# Patient Record
Sex: MALE | Race: WHITE | NOT HISPANIC OR LATINO | Employment: OTHER | URBAN - METROPOLITAN AREA
[De-identification: names, ages, dates, MRNs, and addresses within clinical notes are randomized per-mention and may not be internally consistent; named-entity substitution may affect disease eponyms.]

---

## 2012-03-12 LAB — CRC RECOMMENDATION EXT: NORMAL

## 2017-04-22 ENCOUNTER — HISTORICAL (OUTPATIENT)
Dept: LAB | Facility: HOSPITAL | Age: 62
End: 2017-04-22

## 2018-02-20 ENCOUNTER — HISTORICAL (OUTPATIENT)
Dept: LAB | Facility: HOSPITAL | Age: 63
End: 2018-02-20

## 2018-02-20 LAB
ABS NEUT (OLG): 2.8 X10(3)/MCL (ref 2.1–9.2)
ALBUMIN SERPL-MCNC: 4 GM/DL (ref 3.4–5)
ALBUMIN/GLOB SERPL: 1.2 RATIO (ref 1.1–2)
ALP SERPL-CCNC: 49 UNIT/L (ref 46–116)
ALT SERPL-CCNC: 31 UNIT/L (ref 12–78)
AST SERPL-CCNC: 13 UNIT/L (ref 15–37)
BASOPHILS NFR BLD AUTO: 0 % (ref 0–2)
BILIRUB SERPL-MCNC: 0.5 MG/DL (ref 0.2–1)
BILIRUBIN DIRECT+TOT PNL SERPL-MCNC: 0.15 MG/DL (ref 0–0.2)
BILIRUBIN DIRECT+TOT PNL SERPL-MCNC: 0.39 MG/DL (ref 0–0.8)
BUN SERPL-MCNC: 26.2 MG/DL (ref 7–18)
CALCIUM SERPL-MCNC: 9.6 MG/DL (ref 8.5–10.1)
CHLORIDE SERPL-SCNC: 105 MMOL/L (ref 98–107)
CHOLEST SERPL-MCNC: 200 MG/DL (ref 0–200)
CHOLEST/HDLC SERPL: 3.9 {RATIO} (ref 0–5)
CO2 SERPL-SCNC: 29.9 MMOL/L (ref 21–32)
CREAT SERPL-MCNC: 1.05 MG/DL (ref 0.6–1.3)
DEPRECATED CALCIDIOL+CALCIFEROL SERPL-MC: 18.73 NG/ML (ref 30–80)
EOSINOPHIL # BLD AUTO: 0.1 X10(3)/MCL
EOSINOPHIL NFR BLD AUTO: 2 %
ERYTHROCYTE [DISTWIDTH] IN BLOOD BY AUTOMATED COUNT: 13.4 % (ref 11.5–17)
EST. AVERAGE GLUCOSE BLD GHB EST-MCNC: 146 MG/DL
GLOBULIN SER-MCNC: 3.2 GM/DL (ref 2.4–3.5)
GLUCOSE SERPL-MCNC: 144 MG/DL (ref 74–106)
HBA1C MFR BLD: 6.7 % (ref 4.5–6.2)
HCT VFR BLD AUTO: 43.9 % (ref 42–52)
HDLC SERPL-MCNC: 51 MG/DL (ref 40–60)
HGB BLD-MCNC: 15.3 GM/DL (ref 14–18)
LDLC SERPL CALC-MCNC: 78 MG/DL (ref 0–129)
LYMPHOCYTES # BLD AUTO: 2 X10(3)/MCL
LYMPHOCYTES NFR BLD AUTO: 35 % (ref 13–40)
MCH RBC QN AUTO: 32.3 PG (ref 27–31)
MCHC RBC AUTO-ENTMCNC: 34.8 GM/DL (ref 33–36)
MCV RBC AUTO: 92.8 FL (ref 80–94)
MONOCYTES # BLD AUTO: 0.9 X10(3)/MCL
MONOCYTES NFR BLD AUTO: 16 % (ref 2–11)
NEUTROPHILS # BLD AUTO: 2.8 X10(3)/MCL (ref 2.1–9.2)
NEUTROPHILS NFR BLD AUTO: 47 % (ref 47–80)
PLATELET # BLD AUTO: 190 X10(3)/MCL (ref 130–400)
PMV BLD AUTO: 8.5 FL (ref 7.4–10.4)
POTASSIUM SERPL-SCNC: 3.9 MMOL/L (ref 3.5–5.1)
PROT SERPL-MCNC: 7.2 GM/DL (ref 6.4–8.2)
RBC # BLD AUTO: 4.73 X10(6)/MCL (ref 4.7–6.1)
SODIUM SERPL-SCNC: 143 MMOL/L (ref 136–145)
TRIGL SERPL-MCNC: 356 MG/DL
TSH SERPL-ACNC: 1.58 MIU/ML (ref 0.36–3.74)
URATE SERPL-MCNC: 5.6 MG/DL (ref 3.4–7)
VLDLC SERPL CALC-MCNC: 71 MG/DL
WBC # SPEC AUTO: 5.9 X10(3)/MCL (ref 4.5–11.5)

## 2018-04-16 ENCOUNTER — HISTORICAL (OUTPATIENT)
Dept: RADIOLOGY | Facility: HOSPITAL | Age: 63
End: 2018-04-16

## 2018-06-13 ENCOUNTER — HISTORICAL (OUTPATIENT)
Dept: RADIOLOGY | Facility: HOSPITAL | Age: 63
End: 2018-06-13

## 2018-06-26 ENCOUNTER — HISTORICAL (OUTPATIENT)
Dept: CARDIOLOGY | Facility: CLINIC | Age: 63
End: 2018-06-26

## 2018-07-02 ENCOUNTER — HISTORICAL (OUTPATIENT)
Dept: CARDIOLOGY | Facility: HOSPITAL | Age: 63
End: 2018-07-02

## 2018-07-02 LAB
APTT PPP: 23.5 SECOND(S) (ref 23.3–37)
BUN SERPL-MCNC: 20 MG/DL (ref 7–18)
CALCIUM SERPL-MCNC: 9.1 MG/DL (ref 8.5–10.1)
CHLORIDE SERPL-SCNC: 106 MMOL/L (ref 98–107)
CHOLEST SERPL-MCNC: 164 MG/DL
CHOLEST/HDLC SERPL: 4.1 {RATIO} (ref 0–5)
CO2 SERPL-SCNC: 28 MMOL/L (ref 21–32)
CREAT SERPL-MCNC: 1 MG/DL (ref 0.6–1.3)
CREAT/UREA NIT SERPL: 20
GLUCOSE SERPL-MCNC: 133 MG/DL (ref 74–106)
HDLC SERPL-MCNC: 40 MG/DL
INR PPP: 0.97 (ref 0.9–1.2)
LDLC SERPL CALC-MCNC: 44 MG/DL (ref 0–130)
POTASSIUM SERPL-SCNC: 3.8 MMOL/L (ref 3.5–5.1)
PROTHROMBIN TIME: 12.2 SECOND(S) (ref 11.9–14.4)
SODIUM SERPL-SCNC: 140 MMOL/L (ref 136–145)
TRIGL SERPL-MCNC: 399 MG/DL
VLDLC SERPL CALC-MCNC: 80 MG/DL

## 2018-07-12 ENCOUNTER — HISTORICAL (OUTPATIENT)
Dept: RESPIRATORY THERAPY | Facility: HOSPITAL | Age: 63
End: 2018-07-12

## 2018-09-06 ENCOUNTER — HISTORICAL (OUTPATIENT)
Dept: LAB | Facility: HOSPITAL | Age: 63
End: 2018-09-06

## 2018-09-06 LAB
EST. AVERAGE GLUCOSE BLD GHB EST-MCNC: 148 MG/DL
HBA1C MFR BLD: 6.8 % (ref 4.5–6.2)
PSA SERPL-MCNC: 0.87 NG/ML (ref 0–4)

## 2019-04-10 ENCOUNTER — HISTORICAL (OUTPATIENT)
Dept: RADIOLOGY | Facility: HOSPITAL | Age: 64
End: 2019-04-10

## 2019-05-22 ENCOUNTER — HISTORICAL (OUTPATIENT)
Dept: PHYSICAL THERAPY | Facility: HOSPITAL | Age: 64
End: 2019-05-22

## 2019-08-05 ENCOUNTER — HISTORICAL (OUTPATIENT)
Dept: LAB | Facility: HOSPITAL | Age: 64
End: 2019-08-05

## 2019-08-05 LAB
ALBUMIN SERPL-MCNC: 4.3 GM/DL (ref 3.4–5)
ALP SERPL-CCNC: 44 UNIT/L (ref 46–116)
ALT SERPL-CCNC: 26 UNIT/L (ref 12–78)
AST SERPL-CCNC: 18 UNIT/L (ref 15–37)
BILIRUB SERPL-MCNC: 0.7 MG/DL (ref 0.2–1)
BILIRUBIN DIRECT+TOT PNL SERPL-MCNC: 0.18 MG/DL (ref 0–0.2)
BILIRUBIN DIRECT+TOT PNL SERPL-MCNC: 0.52 MG/DL (ref 0–0.8)
HCV AB SERPL QL IA: NEGATIVE
PROT SERPL-MCNC: 8 GM/DL (ref 6.4–8.2)

## 2019-08-12 ENCOUNTER — HISTORICAL (OUTPATIENT)
Dept: ADMINISTRATIVE | Facility: HOSPITAL | Age: 64
End: 2019-08-12

## 2019-08-12 LAB
ABS NEUT (OLG): 6.82 X10(3)/MCL (ref 2.1–9.2)
ALBUMIN SERPL-MCNC: 4 GM/DL (ref 3.4–5)
ALBUMIN/GLOB SERPL: 1.1 RATIO (ref 1.1–2)
ALP SERPL-CCNC: 44 UNIT/L (ref 45–117)
ALT SERPL-CCNC: 16 UNIT/L (ref 12–78)
AST SERPL-CCNC: 24 UNIT/L (ref 15–37)
BASOPHILS # BLD AUTO: 0.02 X10(3)/MCL
BASOPHILS NFR BLD AUTO: 0 %
BILIRUB SERPL-MCNC: 0.5 MG/DL (ref 0.2–1)
BILIRUBIN DIRECT+TOT PNL SERPL-MCNC: 0.2 MG/DL
BILIRUBIN DIRECT+TOT PNL SERPL-MCNC: 0.3 MG/DL
BUN SERPL-MCNC: 16 MG/DL (ref 7–18)
CALCIUM SERPL-MCNC: 9 MG/DL (ref 8.5–10.1)
CHLORIDE SERPL-SCNC: 107 MMOL/L (ref 98–107)
CO2 SERPL-SCNC: 29 MMOL/L (ref 21–32)
CREAT SERPL-MCNC: 1 MG/DL (ref 0.6–1.3)
CRP SERPL-MCNC: <0.3 MG/DL
EOSINOPHIL # BLD AUTO: 0.05 10*3/UL
EOSINOPHIL NFR BLD AUTO: 0 %
ERYTHROCYTE [DISTWIDTH] IN BLOOD BY AUTOMATED COUNT: 12.7 % (ref 11.5–14.5)
ERYTHROCYTE [SEDIMENTATION RATE] IN BLOOD: 8 MM/HR (ref 0–15)
GLOBULIN SER-MCNC: 3.8 GM/ML (ref 2.3–3.5)
GLUCOSE SERPL-MCNC: 80 MG/DL (ref 74–106)
HCT VFR BLD AUTO: 43.5 % (ref 40–51)
HGB BLD-MCNC: 15.1 GM/DL (ref 13.5–17.5)
IMM GRANULOCYTES # BLD AUTO: 0.04 10*3/UL
IMM GRANULOCYTES NFR BLD AUTO: 0 %
LYMPHOCYTES # BLD AUTO: 2.94 X10(3)/MCL
LYMPHOCYTES NFR BLD AUTO: 27 % (ref 13–40)
MCH RBC QN AUTO: 31.9 PG (ref 26–34)
MCHC RBC AUTO-ENTMCNC: 34.7 GM/DL (ref 31–37)
MCV RBC AUTO: 92 FL (ref 80–100)
MONOCYTES # BLD AUTO: 1.12 X10(3)/MCL
MONOCYTES NFR BLD AUTO: 10 % (ref 0–24)
NEUTROPHILS # BLD AUTO: 6.82 X10(3)/MCL
NEUTROPHILS NFR BLD AUTO: 62 X10(3)/MCL
PLATELET # BLD AUTO: 242 X10(3)/MCL (ref 130–400)
PMV BLD AUTO: 10.2 FL (ref 7.4–10.4)
POTASSIUM SERPL-SCNC: 4 MMOL/L (ref 3.5–5.1)
PROT SERPL-MCNC: 7.8 GM/DL (ref 6.4–8.2)
RBC # BLD AUTO: 4.73 X10(6)/MCL (ref 4.5–5.9)
SODIUM SERPL-SCNC: 141 MMOL/L (ref 136–145)
URATE SERPL-MCNC: 5.4 MG/DL (ref 3.5–7.2)
WBC # SPEC AUTO: 11 X10(3)/MCL (ref 4.5–11)

## 2019-09-04 LAB
BILIRUB SERPL-MCNC: NEGATIVE MG/DL
BLOOD URINE, POC: NEGATIVE
CLARITY, POC UA: CLEAR
COLOR, POC UA: YELLOW
GLUCOSE UR QL STRIP: NEGATIVE
KETONES UR QL STRIP: NEGATIVE
LEUKOCYTE EST, POC UA: NEGATIVE
NITRITE, POC UA: NEGATIVE
PH, POC UA: 5
PROTEIN, POC: NEGATIVE
SPECIFIC GRAVITY, POC UA: 1.01
UROBILINOGEN, POC UA: NORMAL

## 2019-09-18 ENCOUNTER — HISTORICAL (OUTPATIENT)
Dept: LAB | Facility: HOSPITAL | Age: 64
End: 2019-09-18

## 2019-09-18 LAB
ABS NEUT (OLG): 4.43 X10(3)/MCL (ref 2.1–9.2)
ALBUMIN SERPL-MCNC: 4.3 GM/DL (ref 3.4–5)
ALBUMIN/GLOB SERPL: 1.1 RATIO (ref 1.1–2)
ALP SERPL-CCNC: 52 UNIT/L (ref 46–116)
ALT SERPL-CCNC: 51 UNIT/L (ref 12–78)
AST SERPL-CCNC: 27 UNIT/L (ref 15–37)
BILIRUB SERPL-MCNC: 0.6 MG/DL (ref 0.2–1)
BILIRUBIN DIRECT+TOT PNL SERPL-MCNC: 0.14 MG/DL (ref 0–0.2)
BILIRUBIN DIRECT+TOT PNL SERPL-MCNC: 0.46 MG/DL (ref 0–0.8)
BUN SERPL-MCNC: 26.7 MG/DL (ref 7–18)
CALCIUM SERPL-MCNC: 9.7 MG/DL (ref 8.5–10.1)
CHLORIDE SERPL-SCNC: 103 MMOL/L (ref 98–107)
CHOLEST SERPL-MCNC: 211 MG/DL (ref 0–200)
CHOLEST/HDLC SERPL: 4.5 {RATIO} (ref 0–5)
CO2 SERPL-SCNC: 30.2 MMOL/L (ref 21–32)
CREAT SERPL-MCNC: 1.32 MG/DL (ref 0.6–1.3)
DEPRECATED CALCIDIOL+CALCIFEROL SERPL-MC: 30.46 NG/ML (ref 30–80)
ERYTHROCYTE [DISTWIDTH] IN BLOOD BY AUTOMATED COUNT: 12.8 % (ref 11.5–17)
GLOBULIN SER-MCNC: 3.8 GM/DL (ref 2.4–3.5)
GLUCOSE SERPL-MCNC: 110 MG/DL (ref 74–106)
HCT VFR BLD AUTO: 43.8 % (ref 42–52)
HDLC SERPL-MCNC: 47 MG/DL (ref 40–60)
HGB BLD-MCNC: 15.5 GM/DL (ref 14–18)
LDLC SERPL CALC-MCNC: 82 MG/DL (ref 0–129)
LYMPHOCYTES NFR BLD MANUAL: 27 % (ref 13–40)
MCH RBC QN AUTO: 32.7 PG (ref 27–31)
MCHC RBC AUTO-ENTMCNC: 35.4 GM/DL (ref 33–36)
MCV RBC AUTO: 92.4 FL (ref 80–94)
MONOCYTES NFR BLD MANUAL: 10 % (ref 2–11)
NEUTROPHILS NFR BLD MANUAL: 63 % (ref 47–80)
PLATELET # BLD AUTO: 222 X10(3)/MCL (ref 130–400)
PLATELET # BLD EST: ADEQUATE 10*3/UL
PMV BLD AUTO: 10.8 FL (ref 9.4–12.4)
POTASSIUM SERPL-SCNC: 4 MMOL/L (ref 3.5–5.1)
PROT SERPL-MCNC: 8.1 GM/DL (ref 6.4–8.2)
PSA SERPL-MCNC: 1.06 NG/ML (ref 0–4)
RBC # BLD AUTO: 4.74 X10(6)/MCL (ref 4.7–6.1)
RBC MORPH BLD: NORMAL
SODIUM SERPL-SCNC: 143 MMOL/L (ref 136–145)
TRIGL SERPL-MCNC: 409 MG/DL
TSH SERPL-ACNC: 2.55 MIU/ML (ref 0.36–3.74)
VLDLC SERPL CALC-MCNC: 82 MG/DL
WBC # SPEC AUTO: 8.3 X10(3)/MCL (ref 4.5–11.5)

## 2019-11-12 ENCOUNTER — HISTORICAL (OUTPATIENT)
Dept: ADMINISTRATIVE | Facility: HOSPITAL | Age: 64
End: 2019-11-12

## 2019-11-12 LAB
ABS NEUT (OLG): 2.97 X10(3)/MCL (ref 2.1–9.2)
ALBUMIN SERPL-MCNC: 4.1 GM/DL (ref 3.4–5)
ALBUMIN/GLOB SERPL: 1.1 RATIO (ref 1.1–2)
ALP SERPL-CCNC: 41 UNIT/L (ref 45–117)
ALT SERPL-CCNC: 62 UNIT/L (ref 12–78)
AST SERPL-CCNC: 32 UNIT/L (ref 15–37)
BASOPHILS # BLD AUTO: 0 X10(3)/MCL (ref 0–0.2)
BASOPHILS NFR BLD AUTO: 0 %
BILIRUB SERPL-MCNC: 0.6 MG/DL (ref 0.2–1)
BILIRUBIN DIRECT+TOT PNL SERPL-MCNC: 0.1 MG/DL (ref 0–0.2)
BILIRUBIN DIRECT+TOT PNL SERPL-MCNC: 0.5 MG/DL
BUN SERPL-MCNC: 18 MG/DL (ref 7–18)
CALCIUM SERPL-MCNC: 9.9 MG/DL (ref 8.5–10.1)
CHLORIDE SERPL-SCNC: 104 MMOL/L (ref 98–107)
CHOLEST SERPL-MCNC: 227 MG/DL
CHOLEST/HDLC SERPL: 4.9 {RATIO} (ref 0–5)
CO2 SERPL-SCNC: 28 MMOL/L (ref 21–32)
CREAT SERPL-MCNC: 1.1 MG/DL (ref 0.6–1.3)
EOSINOPHIL # BLD AUTO: 0.1 X10(3)/MCL (ref 0–0.9)
EOSINOPHIL NFR BLD AUTO: 1 %
ERYTHROCYTE [DISTWIDTH] IN BLOOD BY AUTOMATED COUNT: 13.2 % (ref 11.5–14.5)
EST. AVERAGE GLUCOSE BLD GHB EST-MCNC: 123 MG/DL
GLOBULIN SER-MCNC: 3.9 GM/ML (ref 2.3–3.5)
GLUCOSE SERPL-MCNC: 111 MG/DL (ref 74–106)
HBA1C MFR BLD: 5.9 % (ref 4.2–6.3)
HCT VFR BLD AUTO: 44.6 % (ref 40–51)
HDLC SERPL-MCNC: 46 MG/DL (ref 40–59)
HGB BLD-MCNC: 15.5 GM/DL (ref 13.5–17.5)
IMM GRANULOCYTES # BLD AUTO: 0.02 10*3/UL
IMM GRANULOCYTES NFR BLD AUTO: 0 %
LDLC SERPL CALC-MCNC: ABNORMAL MG/DL
LYMPHOCYTES # BLD AUTO: 2.5 X10(3)/MCL (ref 0.6–4.6)
LYMPHOCYTES NFR BLD AUTO: 38 %
MCH RBC QN AUTO: 32.4 PG (ref 26–34)
MCHC RBC AUTO-ENTMCNC: 34.8 GM/DL (ref 31–37)
MCV RBC AUTO: 93.3 FL (ref 80–100)
MONOCYTES # BLD AUTO: 1 X10(3)/MCL (ref 0.1–1.3)
MONOCYTES NFR BLD AUTO: 15 %
NEUTROPHILS # BLD AUTO: 2.97 X10(3)/MCL (ref 2.1–9.2)
NEUTROPHILS NFR BLD AUTO: 45 %
PLATELET # BLD AUTO: 233 X10(3)/MCL (ref 130–400)
PMV BLD AUTO: 10.6 FL (ref 7.4–10.4)
POTASSIUM SERPL-SCNC: 4.2 MMOL/L (ref 3.5–5.1)
PROT SERPL-MCNC: 8 GM/DL (ref 6.4–8.2)
RBC # BLD AUTO: 4.78 X10(6)/MCL (ref 4.5–5.9)
SODIUM SERPL-SCNC: 139 MMOL/L (ref 136–145)
TRIGL SERPL-MCNC: 613 MG/DL
URATE SERPL-MCNC: 6.9 MG/DL (ref 3.5–7.2)
VLDLC SERPL CALC-MCNC: ABNORMAL MG/DL
WBC # SPEC AUTO: 6.6 X10(3)/MCL (ref 4.5–11)

## 2020-02-13 ENCOUNTER — HISTORICAL (OUTPATIENT)
Dept: ADMINISTRATIVE | Facility: HOSPITAL | Age: 65
End: 2020-02-13

## 2020-02-13 LAB
ABS NEUT (OLG): 2.16 X10(3)/MCL (ref 2.1–9.2)
ALBUMIN SERPL-MCNC: 4.3 GM/DL (ref 3.4–5)
ALBUMIN/GLOB SERPL: 1.1 RATIO (ref 1.1–2)
ALP SERPL-CCNC: 37 UNIT/L (ref 45–117)
ALT SERPL-CCNC: 102 UNIT/L (ref 12–78)
AST SERPL-CCNC: 59 UNIT/L (ref 15–37)
BASOPHILS # BLD AUTO: 0 X10(3)/MCL (ref 0–0.2)
BASOPHILS NFR BLD AUTO: 0 %
BILIRUB SERPL-MCNC: 0.8 MG/DL (ref 0.2–1)
BILIRUBIN DIRECT+TOT PNL SERPL-MCNC: 0.1 MG/DL (ref 0–0.2)
BILIRUBIN DIRECT+TOT PNL SERPL-MCNC: 0.7 MG/DL
BUN SERPL-MCNC: 22 MG/DL (ref 7–18)
CALCIUM SERPL-MCNC: 9.4 MG/DL (ref 8.5–10.1)
CHLORIDE SERPL-SCNC: 103 MMOL/L (ref 98–107)
CO2 SERPL-SCNC: 29 MMOL/L (ref 21–32)
CREAT SERPL-MCNC: 1.2 MG/DL (ref 0.6–1.3)
EOSINOPHIL # BLD AUTO: 0.1 X10(3)/MCL (ref 0–0.9)
EOSINOPHIL NFR BLD AUTO: 2 %
ERYTHROCYTE [DISTWIDTH] IN BLOOD BY AUTOMATED COUNT: 12.9 % (ref 11.5–14.5)
GLOBULIN SER-MCNC: 3.9 GM/ML (ref 2.3–3.5)
GLUCOSE SERPL-MCNC: 110 MG/DL (ref 74–106)
HCT VFR BLD AUTO: 45.1 % (ref 40–51)
HGB BLD-MCNC: 16 GM/DL (ref 13.5–17.5)
IMM GRANULOCYTES # BLD AUTO: 0.01 10*3/UL
IMM GRANULOCYTES NFR BLD AUTO: 0 %
LYMPHOCYTES # BLD AUTO: 2.4 X10(3)/MCL (ref 0.6–4.6)
LYMPHOCYTES NFR BLD AUTO: 44 %
MCH RBC QN AUTO: 33.8 PG (ref 26–34)
MCHC RBC AUTO-ENTMCNC: 35.5 GM/DL (ref 31–37)
MCV RBC AUTO: 95.1 FL (ref 80–100)
MONOCYTES # BLD AUTO: 0.8 X10(3)/MCL (ref 0.1–1.3)
MONOCYTES NFR BLD AUTO: 15 %
NEUTROPHILS # BLD AUTO: 2.16 X10(3)/MCL (ref 2.1–9.2)
NEUTROPHILS NFR BLD AUTO: 39 %
PLATELET # BLD AUTO: 215 X10(3)/MCL (ref 130–400)
PMV BLD AUTO: 10.9 FL (ref 7.4–10.4)
POTASSIUM SERPL-SCNC: 3.8 MMOL/L (ref 3.5–5.1)
PROT SERPL-MCNC: 8.2 GM/DL (ref 6.4–8.2)
RBC # BLD AUTO: 4.74 X10(6)/MCL (ref 4.5–5.9)
SODIUM SERPL-SCNC: 139 MMOL/L (ref 136–145)
WBC # SPEC AUTO: 5.6 X10(3)/MCL (ref 4.5–11)

## 2020-05-08 ENCOUNTER — HISTORICAL (OUTPATIENT)
Dept: LAB | Facility: HOSPITAL | Age: 65
End: 2020-05-08

## 2020-05-08 LAB
ALBUMIN SERPL-MCNC: 4.4 GM/DL (ref 3.4–5)
ALBUMIN/GLOB SERPL: 1.2 RATIO (ref 1.1–2)
ALP SERPL-CCNC: 33 UNIT/L (ref 46–116)
ALT SERPL-CCNC: 103 UNIT/L (ref 12–78)
AMYLASE SERPL-CCNC: 68 UNIT/L (ref 25–115)
AST SERPL-CCNC: 61 UNIT/L (ref 15–37)
BILIRUB SERPL-MCNC: 0.6 MG/DL (ref 0.2–1)
BILIRUBIN DIRECT+TOT PNL SERPL-MCNC: 0.11 MG/DL (ref 0–0.2)
BILIRUBIN DIRECT+TOT PNL SERPL-MCNC: 0.49 MG/DL (ref 0–0.8)
BUN SERPL-MCNC: 24.9 MG/DL (ref 7–18)
CALCIUM SERPL-MCNC: 9.6 MG/DL (ref 8.5–10.1)
CHLORIDE SERPL-SCNC: 101 MMOL/L (ref 98–107)
CHOLEST SERPL-MCNC: 246 MG/DL (ref 0–200)
CHOLEST/HDLC SERPL: 6.2 {RATIO} (ref 0–5)
CO2 SERPL-SCNC: 30 MMOL/L (ref 21–32)
CREAT SERPL-MCNC: 1.2 MG/DL (ref 0.6–1.3)
EST. AVERAGE GLUCOSE BLD GHB EST-MCNC: 131 MG/DL
GLOBULIN SER-MCNC: 3.6 GM/DL (ref 2.4–3.5)
GLUCOSE SERPL-MCNC: 116 MG/DL (ref 74–106)
HBA1C MFR BLD: 6.2 % (ref 4.5–6.2)
HDLC SERPL-MCNC: 40 MG/DL (ref 40–60)
LDLC SERPL CALC-MCNC: 69 MG/DL (ref 0–129)
LIPASE SERPL-CCNC: 206 UNIT/L (ref 73–393)
POTASSIUM SERPL-SCNC: 3.9 MMOL/L (ref 3.5–5.1)
PROT SERPL-MCNC: 8 GM/DL (ref 6.4–8.2)
SODIUM SERPL-SCNC: 140 MMOL/L (ref 136–145)
TRIGL SERPL-MCNC: 686 MG/DL
TSH SERPL-ACNC: 1.93 MIU/ML (ref 0.36–3.74)
VLDLC SERPL CALC-MCNC: 137 MG/DL

## 2020-05-14 ENCOUNTER — HISTORICAL (OUTPATIENT)
Dept: LAB | Facility: HOSPITAL | Age: 65
End: 2020-05-14

## 2020-06-25 ENCOUNTER — HISTORICAL (OUTPATIENT)
Dept: ADMINISTRATIVE | Facility: HOSPITAL | Age: 65
End: 2020-06-25

## 2020-07-22 ENCOUNTER — HISTORICAL (OUTPATIENT)
Dept: LAB | Facility: HOSPITAL | Age: 65
End: 2020-07-22

## 2020-07-22 LAB
ABS NEUT (OLG): 1.72 X10(3)/MCL (ref 2.1–9.2)
ALBUMIN SERPL-MCNC: 4.1 GM/DL (ref 3.4–5)
ALBUMIN/GLOB SERPL: 0.9 RATIO (ref 1.1–2)
ALP SERPL-CCNC: 34 UNIT/L (ref 45–117)
ALT SERPL-CCNC: 58 UNIT/L (ref 12–78)
AST SERPL-CCNC: 54 UNIT/L (ref 15–37)
BASOPHILS # BLD AUTO: 0 X10(3)/MCL (ref 0–0.2)
BASOPHILS NFR BLD AUTO: 0 %
BILIRUB SERPL-MCNC: 0.4 MG/DL (ref 0.2–1)
BILIRUBIN DIRECT+TOT PNL SERPL-MCNC: 0.2 MG/DL
BILIRUBIN DIRECT+TOT PNL SERPL-MCNC: 0.2 MG/DL (ref 0–0.2)
BUN SERPL-MCNC: 24 MG/DL (ref 7–18)
CALCIUM SERPL-MCNC: 9.6 MG/DL (ref 8.5–10.1)
CHLORIDE SERPL-SCNC: 108 MMOL/L (ref 98–107)
CHOLEST SERPL-MCNC: 199 MG/DL
CHOLEST/HDLC SERPL: 4.2 {RATIO} (ref 0–5)
CO2 SERPL-SCNC: 25 MMOL/L (ref 21–32)
CREAT SERPL-MCNC: 1.2 MG/DL (ref 0.6–1.3)
CREAT UR-MCNC: 138 MG/DL
EOSINOPHIL # BLD AUTO: 0.2 X10(3)/MCL (ref 0–0.9)
EOSINOPHIL NFR BLD AUTO: 2 %
ERYTHROCYTE [DISTWIDTH] IN BLOOD BY AUTOMATED COUNT: 13.3 % (ref 11.5–14.5)
EST. AVERAGE GLUCOSE BLD GHB EST-MCNC: 137 MG/DL
GLOBULIN SER-MCNC: 4.4 GM/ML (ref 2.3–3.5)
GLUCOSE SERPL-MCNC: 142 MG/DL (ref 74–106)
HBA1C MFR BLD: 6.4 % (ref 4.2–6.3)
HCT VFR BLD AUTO: 40.2 % (ref 40–51)
HDLC SERPL-MCNC: 47 MG/DL (ref 40–59)
HGB BLD-MCNC: 14 GM/DL (ref 13.5–17.5)
IMM GRANULOCYTES # BLD AUTO: 0.06 10*3/UL
IMM GRANULOCYTES NFR BLD AUTO: 1 %
LDLC SERPL CALC-MCNC: 80 MG/DL
LYMPHOCYTES # BLD AUTO: 3.5 X10(3)/MCL (ref 0.6–4.6)
LYMPHOCYTES NFR BLD AUTO: 55 %
MCH RBC QN AUTO: 33 PG (ref 26–34)
MCHC RBC AUTO-ENTMCNC: 34.8 GM/DL (ref 31–37)
MCV RBC AUTO: 94.8 FL (ref 80–100)
MICROALBUMIN UR-MCNC: 33 MG/L (ref 0–19)
MICROALBUMIN/CREAT RATIO PNL UR: 23.9 MCG/MG CR (ref 0–29)
MONOCYTES # BLD AUTO: 0.9 X10(3)/MCL (ref 0.1–1.3)
MONOCYTES NFR BLD AUTO: 14 %
NEUTROPHILS # BLD AUTO: 1.72 X10(3)/MCL (ref 2.1–9.2)
NEUTROPHILS NFR BLD AUTO: 27 %
PLATELET # BLD AUTO: 330 X10(3)/MCL (ref 130–400)
PMV BLD AUTO: 10 FL (ref 7.4–10.4)
POTASSIUM SERPL-SCNC: 4 MMOL/L (ref 3.5–5.1)
PROT SERPL-MCNC: 8.5 GM/DL (ref 6.4–8.2)
PSA SERPL-MCNC: 1.1 NG/ML
RBC # BLD AUTO: 4.24 X10(6)/MCL (ref 4.5–5.9)
SODIUM SERPL-SCNC: 138 MMOL/L (ref 136–145)
TRIGL SERPL-MCNC: 359 MG/DL
TSH SERPL-ACNC: 1.83 MIU/L (ref 0.36–3.74)
VLDLC SERPL CALC-MCNC: 72 MG/DL
WBC # SPEC AUTO: 6.3 X10(3)/MCL (ref 4.5–11)

## 2020-09-25 ENCOUNTER — HISTORICAL (OUTPATIENT)
Dept: ADMINISTRATIVE | Facility: HOSPITAL | Age: 65
End: 2020-09-25

## 2020-09-25 LAB
ABS NEUT (OLG): 1.61 X10(3)/MCL (ref 2.1–9.2)
ALBUMIN SERPL-MCNC: 4.7 GM/DL (ref 3.4–5)
ALBUMIN/GLOB SERPL: 1.2 RATIO (ref 1.1–2)
ALP SERPL-CCNC: 44 UNIT/L (ref 45–117)
ALT SERPL-CCNC: 110 UNIT/L (ref 12–78)
AST SERPL-CCNC: 85 UNIT/L (ref 15–37)
BASOPHILS # BLD AUTO: 0 X10(3)/MCL (ref 0–0.2)
BASOPHILS NFR BLD AUTO: 1 %
BILIRUB SERPL-MCNC: 0.4 MG/DL (ref 0.2–1)
BILIRUBIN DIRECT+TOT PNL SERPL-MCNC: 0.2 MG/DL
BILIRUBIN DIRECT+TOT PNL SERPL-MCNC: 0.2 MG/DL (ref 0–0.2)
BUN SERPL-MCNC: 23 MG/DL (ref 7–18)
CALCIUM SERPL-MCNC: 9.6 MG/DL (ref 8.5–10.1)
CHLORIDE SERPL-SCNC: 105 MMOL/L (ref 98–107)
CO2 SERPL-SCNC: 29 MMOL/L (ref 21–32)
CREAT SERPL-MCNC: 1.3 MG/DL (ref 0.6–1.3)
DEPRECATED CALCIDIOL+CALCIFEROL SERPL-MC: 35.8 NG/ML (ref 30–80)
EOSINOPHIL # BLD AUTO: 0.2 X10(3)/MCL (ref 0–0.9)
EOSINOPHIL NFR BLD AUTO: 5 %
ERYTHROCYTE [DISTWIDTH] IN BLOOD BY AUTOMATED COUNT: 13.1 % (ref 11.5–14.5)
EST. AVERAGE GLUCOSE BLD GHB EST-MCNC: 154 MG/DL
GLOBULIN SER-MCNC: 4 GM/ML (ref 2.3–3.5)
GLUCOSE SERPL-MCNC: 126 MG/DL (ref 74–106)
HBA1C MFR BLD: 7 % (ref 4.2–6.3)
HBV SURFACE AG SERPL QL IA: NONREACTIVE
HCT VFR BLD AUTO: 43.4 % (ref 40–51)
HGB BLD-MCNC: 14.9 GM/DL (ref 13.5–17.5)
IMM GRANULOCYTES # BLD AUTO: 0.02 10*3/UL
IMM GRANULOCYTES NFR BLD AUTO: 0 %
LYMPHOCYTES # BLD AUTO: 2.3 X10(3)/MCL (ref 0.6–4.6)
LYMPHOCYTES NFR BLD AUTO: 46 %
MCH RBC QN AUTO: 32.9 PG (ref 26–34)
MCHC RBC AUTO-ENTMCNC: 34.3 GM/DL (ref 31–37)
MCV RBC AUTO: 95.8 FL (ref 80–100)
MONOCYTES # BLD AUTO: 0.8 X10(3)/MCL (ref 0.1–1.3)
MONOCYTES NFR BLD AUTO: 16 %
NEG CONT SPOT COUNT: NORMAL
NEUTROPHILS # BLD AUTO: 1.61 X10(3)/MCL (ref 2.1–9.2)
NEUTROPHILS NFR BLD AUTO: 32 %
PANEL A SPOT COUNT: 1
PANEL B SPOT COUNT: 1
PLATELET # BLD AUTO: 245 X10(3)/MCL (ref 130–400)
PMV BLD AUTO: 10.1 FL (ref 7.4–10.4)
POS CONT SPOT COUNT: NORMAL
POTASSIUM SERPL-SCNC: 3.8 MMOL/L (ref 3.5–5.1)
PROT SERPL-MCNC: 8.7 GM/DL (ref 6.4–8.2)
PSA SERPL-MCNC: 1.3 NG/ML
RBC # BLD AUTO: 4.53 X10(6)/MCL (ref 4.5–5.9)
SODIUM SERPL-SCNC: 140 MMOL/L (ref 136–145)
T-SPOT.TB: NORMAL
TSH SERPL-ACNC: 2.87 MIU/L (ref 0.36–3.74)
URATE SERPL-MCNC: 6.2 MG/DL (ref 3.5–7.2)
VIT B12 SERPL-MCNC: 556 PG/ML (ref 193–986)
WBC # SPEC AUTO: 5 X10(3)/MCL (ref 4.5–11)

## 2020-10-05 ENCOUNTER — HISTORICAL (OUTPATIENT)
Dept: RADIOLOGY | Facility: HOSPITAL | Age: 65
End: 2020-10-05

## 2020-11-24 ENCOUNTER — HISTORICAL (OUTPATIENT)
Dept: SPEECH THERAPY | Facility: HOSPITAL | Age: 65
End: 2020-11-24

## 2020-11-24 LAB
ABS NEUT (OLG): 3.02 X10(3)/MCL (ref 2.1–9.2)
ALBUMIN SERPL-MCNC: 4.7 GM/DL (ref 3.4–4.8)
ALBUMIN/GLOB SERPL: 1.2 RATIO (ref 1.1–2)
ALP SERPL-CCNC: 38 UNIT/L (ref 40–150)
ALT SERPL-CCNC: 103 UNIT/L (ref 0–55)
AST SERPL-CCNC: 103 UNIT/L (ref 5–34)
BASOPHILS # BLD AUTO: 0 X10(3)/MCL (ref 0–0.2)
BASOPHILS NFR BLD AUTO: 0 %
BILIRUB SERPL-MCNC: 0.6 MG/DL
BILIRUBIN DIRECT+TOT PNL SERPL-MCNC: 0.3 MG/DL (ref 0–0.5)
BILIRUBIN DIRECT+TOT PNL SERPL-MCNC: 0.3 MG/DL (ref 0–0.8)
BUN SERPL-MCNC: 21 MG/DL (ref 8.4–25.7)
CALCIUM SERPL-MCNC: 10.1 MG/DL (ref 8.8–10)
CHLORIDE SERPL-SCNC: 104 MMOL/L (ref 98–107)
CO2 SERPL-SCNC: 26 MMOL/L (ref 23–31)
CREAT SERPL-MCNC: 1.19 MG/DL (ref 0.73–1.18)
CRP SERPL-MCNC: <0.1 MG/DL
EOSINOPHIL # BLD AUTO: 0.1 X10(3)/MCL (ref 0–0.9)
EOSINOPHIL NFR BLD AUTO: 2 %
ERYTHROCYTE [DISTWIDTH] IN BLOOD BY AUTOMATED COUNT: 13.1 % (ref 11.5–14.5)
ERYTHROCYTE [SEDIMENTATION RATE] IN BLOOD: 16 MM/HR (ref 0–15)
GLOBULIN SER-MCNC: 3.8 GM/DL (ref 2.4–3.5)
GLUCOSE SERPL-MCNC: 108 MG/DL (ref 82–115)
HCT VFR BLD AUTO: 42.9 % (ref 40–51)
HGB BLD-MCNC: 14.9 GM/DL (ref 13.5–17.5)
IMM GRANULOCYTES # BLD AUTO: 0.02 10*3/UL
IMM GRANULOCYTES NFR BLD AUTO: 0 %
LYMPHOCYTES # BLD AUTO: 1.9 X10(3)/MCL (ref 0.6–4.6)
LYMPHOCYTES NFR BLD AUTO: 32 %
MCH RBC QN AUTO: 32.9 PG (ref 26–34)
MCHC RBC AUTO-ENTMCNC: 34.7 GM/DL (ref 31–37)
MCV RBC AUTO: 94.7 FL (ref 80–100)
MONOCYTES # BLD AUTO: 0.9 X10(3)/MCL (ref 0.1–1.3)
MONOCYTES NFR BLD AUTO: 14 %
NEUTROPHILS # BLD AUTO: 3.02 X10(3)/MCL (ref 2.1–9.2)
NEUTROPHILS NFR BLD AUTO: 51 %
PLATELET # BLD AUTO: 272 X10(3)/MCL (ref 130–400)
PMV BLD AUTO: 10.7 FL (ref 7.4–10.4)
POTASSIUM SERPL-SCNC: 4 MMOL/L (ref 3.5–5.1)
PROT SERPL-MCNC: 8.5 GM/DL (ref 5.8–7.6)
RBC # BLD AUTO: 4.53 X10(6)/MCL (ref 4.5–5.9)
SODIUM SERPL-SCNC: 141 MMOL/L (ref 136–145)
WBC # SPEC AUTO: 5.9 X10(3)/MCL (ref 4.5–11)

## 2020-12-16 ENCOUNTER — HISTORICAL (OUTPATIENT)
Dept: LAB | Facility: HOSPITAL | Age: 65
End: 2020-12-16

## 2020-12-16 LAB
EST. AVERAGE GLUCOSE BLD GHB EST-MCNC: 174.3 MG/DL
HBA1C MFR BLD: 7.7 %

## 2020-12-22 ENCOUNTER — HISTORICAL (OUTPATIENT)
Dept: SPEECH THERAPY | Facility: HOSPITAL | Age: 65
End: 2020-12-22

## 2021-01-26 ENCOUNTER — HISTORICAL (OUTPATIENT)
Dept: SPEECH THERAPY | Facility: HOSPITAL | Age: 66
End: 2021-01-26

## 2021-02-02 ENCOUNTER — HISTORICAL (OUTPATIENT)
Dept: RADIOLOGY | Facility: HOSPITAL | Age: 66
End: 2021-02-02

## 2021-02-25 ENCOUNTER — HISTORICAL (OUTPATIENT)
Dept: LAB | Facility: HOSPITAL | Age: 66
End: 2021-02-25

## 2021-02-25 LAB
ABS NEUT (OLG): 4.68 X10(3)/MCL (ref 2.1–9.2)
ALBUMIN SERPL-MCNC: 4.1 GM/DL (ref 3.4–4.8)
ALBUMIN/GLOB SERPL: 1 RATIO (ref 1.1–2)
ALP SERPL-CCNC: 60 UNIT/L (ref 40–150)
ALT SERPL-CCNC: 37 UNIT/L (ref 0–55)
AST SERPL-CCNC: 28 UNIT/L (ref 5–34)
BASOPHILS # BLD AUTO: 0 X10(3)/MCL (ref 0–0.2)
BASOPHILS NFR BLD AUTO: 0 %
BILIRUB SERPL-MCNC: 0.6 MG/DL
BILIRUBIN DIRECT+TOT PNL SERPL-MCNC: 0.3 MG/DL (ref 0–0.5)
BILIRUBIN DIRECT+TOT PNL SERPL-MCNC: 0.3 MG/DL (ref 0–0.8)
BUN SERPL-MCNC: 18 MG/DL (ref 8.4–25.7)
CALCIUM SERPL-MCNC: 10 MG/DL (ref 8.8–10)
CHLORIDE SERPL-SCNC: 100 MMOL/L (ref 98–107)
CO2 SERPL-SCNC: 28 MMOL/L (ref 23–31)
CREAT SERPL-MCNC: 1.13 MG/DL (ref 0.73–1.18)
CRP SERPL-MCNC: 4.32 MG/DL
EOSINOPHIL # BLD AUTO: 0.3 X10(3)/MCL (ref 0–0.9)
EOSINOPHIL NFR BLD AUTO: 4 %
ERYTHROCYTE [DISTWIDTH] IN BLOOD BY AUTOMATED COUNT: 13.7 % (ref 11.5–14.5)
ERYTHROCYTE [SEDIMENTATION RATE] IN BLOOD: 80 MM/HR (ref 0–15)
GLOBULIN SER-MCNC: 4 GM/DL (ref 2.4–3.5)
GLUCOSE SERPL-MCNC: 121 MG/DL (ref 82–115)
HCT VFR BLD AUTO: 36.7 % (ref 40–51)
HGB BLD-MCNC: 12.6 GM/DL (ref 13.5–17.5)
IMM GRANULOCYTES # BLD AUTO: 0.03 10*3/UL
IMM GRANULOCYTES NFR BLD AUTO: 0 %
LYMPHOCYTES # BLD AUTO: 1.2 X10(3)/MCL (ref 0.6–4.6)
LYMPHOCYTES NFR BLD AUTO: 16 %
MCH RBC QN AUTO: 33.2 PG (ref 26–34)
MCHC RBC AUTO-ENTMCNC: 34.3 GM/DL (ref 31–37)
MCV RBC AUTO: 96.8 FL (ref 80–100)
MONOCYTES # BLD AUTO: 1.4 X10(3)/MCL (ref 0.1–1.3)
MONOCYTES NFR BLD AUTO: 18 %
NEUTROPHILS # BLD AUTO: 4.68 X10(3)/MCL (ref 2.1–9.2)
NEUTROPHILS NFR BLD AUTO: 61 %
PLATELET # BLD AUTO: 294 X10(3)/MCL (ref 130–400)
PMV BLD AUTO: 10.4 FL (ref 7.4–10.4)
POTASSIUM SERPL-SCNC: 3.9 MMOL/L (ref 3.5–5.1)
PROT SERPL-MCNC: 8.1 GM/DL (ref 5.8–7.6)
RBC # BLD AUTO: 3.79 X10(6)/MCL (ref 4.5–5.9)
SODIUM SERPL-SCNC: 138 MMOL/L (ref 136–145)
WBC # SPEC AUTO: 7.7 X10(3)/MCL (ref 4.5–11)

## 2021-03-30 ENCOUNTER — HISTORICAL (OUTPATIENT)
Dept: ADMINISTRATIVE | Facility: HOSPITAL | Age: 66
End: 2021-03-30

## 2021-03-30 LAB
ABS NEUT (OLG): 1.5 X10(3)/MCL (ref 2.1–9.2)
ALBUMIN SERPL-MCNC: 4.6 GM/DL (ref 3.4–4.8)
ALBUMIN/GLOB SERPL: 1.2 RATIO (ref 1.1–2)
ALP SERPL-CCNC: 44 UNIT/L (ref 40–150)
ALT SERPL-CCNC: 19 UNIT/L (ref 0–55)
AST SERPL-CCNC: 37 UNIT/L (ref 5–34)
BASOPHILS # BLD AUTO: 0 X10(3)/MCL (ref 0–0.2)
BASOPHILS NFR BLD AUTO: 0 %
BILIRUB SERPL-MCNC: 0.5 MG/DL
BILIRUBIN DIRECT+TOT PNL SERPL-MCNC: 0.2 MG/DL (ref 0–0.5)
BILIRUBIN DIRECT+TOT PNL SERPL-MCNC: 0.3 MG/DL (ref 0–0.8)
BUN SERPL-MCNC: 27 MG/DL (ref 8.4–25.7)
CALCIUM SERPL-MCNC: 10 MG/DL (ref 8.8–10)
CHLORIDE SERPL-SCNC: 102 MMOL/L (ref 98–107)
CO2 SERPL-SCNC: 28 MMOL/L (ref 23–31)
CREAT SERPL-MCNC: 1.25 MG/DL (ref 0.73–1.18)
CRP SERPL-MCNC: <0.4 MG/DL
EOSINOPHIL # BLD AUTO: 0.2 X10(3)/MCL (ref 0–0.9)
EOSINOPHIL NFR BLD AUTO: 4 %
ERYTHROCYTE [DISTWIDTH] IN BLOOD BY AUTOMATED COUNT: 13.8 % (ref 11.5–14.5)
ERYTHROCYTE [SEDIMENTATION RATE] IN BLOOD: 17 MM/HR (ref 0–15)
EST. AVERAGE GLUCOSE BLD GHB EST-MCNC: 131.2 MG/DL
GLOBULIN SER-MCNC: 3.9 GM/DL (ref 2.4–3.5)
GLUCOSE SERPL-MCNC: 111 MG/DL (ref 82–115)
HBA1C MFR BLD: 6.2 %
HCT VFR BLD AUTO: 40.8 % (ref 40–51)
HGB BLD-MCNC: 13.9 GM/DL (ref 13.5–17.5)
IMM GRANULOCYTES # BLD AUTO: 0.01 10*3/UL
IMM GRANULOCYTES NFR BLD AUTO: 0 %
LYMPHOCYTES # BLD AUTO: 1.5 X10(3)/MCL (ref 0.6–4.6)
LYMPHOCYTES NFR BLD AUTO: 38 %
MCH RBC QN AUTO: 33 PG (ref 26–34)
MCHC RBC AUTO-ENTMCNC: 34.1 GM/DL (ref 31–37)
MCV RBC AUTO: 96.9 FL (ref 80–100)
MONOCYTES # BLD AUTO: 0.8 X10(3)/MCL (ref 0.1–1.3)
MONOCYTES NFR BLD AUTO: 20 %
NEUTROPHILS # BLD AUTO: 1.5 X10(3)/MCL (ref 2.1–9.2)
NEUTROPHILS NFR BLD AUTO: 38 %
PLATELET # BLD AUTO: 189 X10(3)/MCL (ref 130–400)
PMV BLD AUTO: 10.2 FL (ref 7.4–10.4)
POTASSIUM SERPL-SCNC: 4.2 MMOL/L (ref 3.5–5.1)
PROT SERPL-MCNC: 8.5 GM/DL (ref 5.8–7.6)
RBC # BLD AUTO: 4.21 X10(6)/MCL (ref 4.5–5.9)
SODIUM SERPL-SCNC: 141 MMOL/L (ref 136–145)
WBC # SPEC AUTO: 4 X10(3)/MCL (ref 4.5–11)

## 2021-05-25 ENCOUNTER — HISTORICAL (OUTPATIENT)
Dept: LAB | Facility: HOSPITAL | Age: 66
End: 2021-05-25

## 2021-05-25 LAB
CHOLEST SERPL-MCNC: 201 MG/DL
CHOLEST/HDLC SERPL: 4 {RATIO} (ref 0–5)
DEPRECATED CALCIDIOL+CALCIFEROL SERPL-MC: 43.5 NG/ML (ref 30–80)
HDLC SERPL-MCNC: 48 MG/DL (ref 35–60)
LDLC SERPL CALC-MCNC: 115 MG/DL (ref 50–140)
PSA SERPL-MCNC: 2.26 NG/ML
TRIGL SERPL-MCNC: 191 MG/DL (ref 34–140)
TSH SERPL-ACNC: 1.9 UIU/ML (ref 0.35–4.94)
URATE SERPL-MCNC: 5.7 MG/DL (ref 3.5–7.2)
VLDLC SERPL CALC-MCNC: 38 MG/DL

## 2021-06-01 ENCOUNTER — HISTORICAL (OUTPATIENT)
Dept: ADMINISTRATIVE | Facility: HOSPITAL | Age: 66
End: 2021-06-01

## 2021-06-08 ENCOUNTER — HISTORICAL (OUTPATIENT)
Dept: SPEECH THERAPY | Facility: HOSPITAL | Age: 66
End: 2021-06-08

## 2021-06-23 ENCOUNTER — HISTORICAL (OUTPATIENT)
Dept: SURGERY | Facility: HOSPITAL | Age: 66
End: 2021-06-23

## 2021-07-14 ENCOUNTER — HISTORICAL (OUTPATIENT)
Dept: SURGERY | Facility: HOSPITAL | Age: 66
End: 2021-07-14

## 2021-10-08 ENCOUNTER — HISTORICAL (OUTPATIENT)
Dept: RADIOLOGY | Facility: HOSPITAL | Age: 66
End: 2021-10-08

## 2021-10-26 ENCOUNTER — HISTORICAL (OUTPATIENT)
Dept: LAB | Facility: HOSPITAL | Age: 66
End: 2021-10-26

## 2021-10-26 LAB
ABS NEUT (OLG): 1.72 X10(3)/MCL (ref 2.1–9.2)
ALBUMIN SERPL-MCNC: 4.5 GM/DL (ref 3.4–4.8)
ALBUMIN/GLOB SERPL: 1.2 RATIO (ref 1.1–2)
ALP SERPL-CCNC: 48 UNIT/L (ref 40–150)
ALT SERPL-CCNC: 14 UNIT/L (ref 0–55)
AST SERPL-CCNC: 26 UNIT/L (ref 5–34)
BASOPHILS # BLD AUTO: 0 X10(3)/MCL (ref 0–0.2)
BASOPHILS NFR BLD AUTO: 0 %
BILIRUB SERPL-MCNC: 0.5 MG/DL
BILIRUBIN DIRECT+TOT PNL SERPL-MCNC: 0.2 MG/DL (ref 0–0.5)
BILIRUBIN DIRECT+TOT PNL SERPL-MCNC: 0.3 MG/DL (ref 0–0.8)
BUN SERPL-MCNC: 36 MG/DL (ref 8.4–25.7)
CALCIUM SERPL-MCNC: 10.6 MG/DL (ref 8.7–10.5)
CHLORIDE SERPL-SCNC: 103 MMOL/L (ref 98–107)
CO2 SERPL-SCNC: 25 MMOL/L (ref 23–31)
CREAT SERPL-MCNC: 1.24 MG/DL (ref 0.73–1.18)
CRP SERPL-MCNC: 0.16 MG/DL
EOSINOPHIL # BLD AUTO: 0.3 X10(3)/MCL (ref 0–0.9)
EOSINOPHIL NFR BLD AUTO: 5 %
ERYTHROCYTE [DISTWIDTH] IN BLOOD BY AUTOMATED COUNT: 13.1 % (ref 11.5–14.5)
ERYTHROCYTE [SEDIMENTATION RATE] IN BLOOD: 18 MM/HR (ref 0–15)
GLOBULIN SER-MCNC: 3.7 GM/DL (ref 2.4–3.5)
GLUCOSE SERPL-MCNC: 108 MG/DL (ref 82–115)
HCT VFR BLD AUTO: 39.6 % (ref 40–51)
HGB BLD-MCNC: 13.9 GM/DL (ref 13.5–17.5)
IMM GRANULOCYTES # BLD AUTO: 0.02 10*3/UL
IMM GRANULOCYTES NFR BLD AUTO: 0 %
LYMPHOCYTES # BLD AUTO: 2.5 X10(3)/MCL (ref 0.6–4.6)
LYMPHOCYTES NFR BLD AUTO: 45 %
MCH RBC QN AUTO: 33.6 PG (ref 26–34)
MCHC RBC AUTO-ENTMCNC: 35.1 GM/DL (ref 31–37)
MCV RBC AUTO: 95.7 FL (ref 80–100)
MONOCYTES # BLD AUTO: 1 X10(3)/MCL (ref 0.1–1.3)
MONOCYTES NFR BLD AUTO: 18 %
NEUTROPHILS # BLD AUTO: 1.72 X10(3)/MCL (ref 2.1–9.2)
NEUTROPHILS NFR BLD AUTO: 32 %
NRBC BLD AUTO-RTO: 0 % (ref 0–0.2)
PLATELET # BLD AUTO: 250 X10(3)/MCL (ref 130–400)
PMV BLD AUTO: 10.3 FL (ref 7.4–10.4)
POTASSIUM SERPL-SCNC: 4 MMOL/L (ref 3.5–5.1)
PROT SERPL-MCNC: 8.2 GM/DL (ref 5.8–7.6)
RBC # BLD AUTO: 4.14 X10(6)/MCL (ref 4.5–5.9)
SODIUM SERPL-SCNC: 141 MMOL/L (ref 136–145)
WBC # SPEC AUTO: 5.4 X10(3)/MCL (ref 4.5–11)

## 2021-11-18 ENCOUNTER — HISTORICAL (OUTPATIENT)
Dept: ADMINISTRATIVE | Facility: HOSPITAL | Age: 66
End: 2021-11-18

## 2021-11-18 LAB — SARS-COV-2 RNA RESP QL NAA+PROBE: NEGATIVE

## 2021-11-29 ENCOUNTER — HISTORICAL (OUTPATIENT)
Dept: RESPIRATORY THERAPY | Facility: HOSPITAL | Age: 66
End: 2021-11-29

## 2022-01-27 ENCOUNTER — HISTORICAL (OUTPATIENT)
Dept: INTERNAL MEDICINE | Facility: CLINIC | Age: 67
End: 2022-01-27

## 2022-01-27 LAB
ABS NEUT (OLG): 2.03 (ref 2.1–9.2)
ALBUMIN SERPL-MCNC: 4.5 G/DL (ref 3.4–4.8)
ALBUMIN/GLOB SERPL: 1.2 {RATIO} (ref 1.1–2)
ALP SERPL-CCNC: 51 U/L (ref 40–150)
ALT SERPL-CCNC: 29 U/L (ref 0–55)
AST SERPL-CCNC: 30 U/L (ref 5–34)
BASOPHILS # BLD AUTO: 0 10*3/UL (ref 0–0.2)
BASOPHILS NFR BLD AUTO: 0 %
BILIRUB SERPL-MCNC: 0.6 MG/DL
BILIRUBIN DIRECT+TOT PNL SERPL-MCNC: 0.2 (ref 0–0.5)
BILIRUBIN DIRECT+TOT PNL SERPL-MCNC: 0.4 (ref 0–0.8)
BUN SERPL-MCNC: 31.1 MG/DL (ref 8.4–25.7)
CALCIUM SERPL-MCNC: 10.4 MG/DL (ref 8.7–10.5)
CHLORIDE SERPL-SCNC: 100 MMOL/L (ref 98–107)
CO2 SERPL-SCNC: 28 MMOL/L (ref 23–31)
CREAT SERPL-MCNC: 1.24 MG/DL (ref 0.73–1.18)
CRP SERPL-MCNC: 0.18 MG/L
EOSINOPHIL # BLD AUTO: 0.2 10*3/UL (ref 0–0.9)
EOSINOPHIL NFR BLD AUTO: 4 %
ERYTHROCYTE [DISTWIDTH] IN BLOOD BY AUTOMATED COUNT: 13.6 % (ref 11.5–14.5)
ERYTHROCYTE [SEDIMENTATION RATE] IN BLOOD: 18 MM/H (ref 0–15)
FLAG2 (OHS): 80
FLAG3 (OHS): 90
FLAGS (OHS): 70
GLOBULIN SER-MCNC: 3.8 G/DL (ref 2.4–3.5)
GLUCOSE SERPL-MCNC: 114 MG/DL (ref 82–115)
HBV SURFACE AG SERPL QL IA: 0.38
HBV SURFACE AG SERPL QL IA: NONREACTIVE
HCT VFR BLD AUTO: 41.3 % (ref 40–51)
HCV AB SERPL QL IA: 0.11
HCV AB SERPL QL IA: NONREACTIVE
HEMOLYSIS INTERF INDEX SERPL-ACNC: 2
HGB BLD-MCNC: 14.2 G/DL (ref 13.5–17.5)
ICTERIC INTERF INDEX SERPL-ACNC: 1
IMM GRANULOCYTES # BLD AUTO: 0.01 10*3/UL
IMM GRANULOCYTES NFR BLD AUTO: 0 %
LIPEMIC INTERF INDEX SERPL-ACNC: 8
LOW EVENT # SUSPECT FLAG (OHS): 70
LYMPHOCYTES # BLD AUTO: 1.3 10*3/UL (ref 0.6–4.6)
LYMPHOCYTES NFR BLD AUTO: 31 %
MANUAL DIFF? (OHS): NO
MCH RBC QN AUTO: 33.3 PG (ref 26–34)
MCHC RBC AUTO-ENTMCNC: 34.4 G/DL (ref 31–37)
MCV RBC AUTO: 96.9 FL (ref 80–100)
MO BLASTS SUSPECT FLAG (OHS): 40
MONOCYTES # BLD AUTO: 0.7 10*3/UL (ref 0.1–1.3)
MONOCYTES NFR BLD AUTO: 17 %
NEUTROPHILS # BLD AUTO: 2.03 10*3/UL (ref 2.1–9.2)
NEUTROPHILS NFR BLD AUTO: 47 %
NRBC BLD AUTO-RTO: 0 % (ref 0–0.2)
PLATELET # BLD AUTO: 244 10*3/UL (ref 130–400)
PMV BLD AUTO: 10 FL (ref 7.4–10.4)
POTASSIUM SERPL-SCNC: 3.8 MMOL/L (ref 3.5–5.1)
PROT SERPL-MCNC: 8.3 G/DL (ref 5.8–7.6)
RBC # BLD AUTO: 4.26 10*6/UL (ref 4.5–5.9)
SODIUM SERPL-SCNC: 137 MMOL/L (ref 136–145)
WBC # SPEC AUTO: 4.3 10*3/UL (ref 4.5–11)

## 2022-01-31 LAB
NEG CONT SPOT COUNT: NORMAL
PANEL A SPOT COUNT: 0
PANEL B SPOT COUNT: 0
POS CONT SPOT COUNT: NORMAL
T-SPOT.TB: NORMAL

## 2022-02-08 ENCOUNTER — HISTORICAL (OUTPATIENT)
Dept: LAB | Facility: HOSPITAL | Age: 67
End: 2022-02-08

## 2022-02-08 LAB
IRON SATN MFR SERPL: 34 % (ref 20–50)
IRON SERPL-MCNC: 128 UG/DL (ref 65–175)
TIBC SERPL-MCNC: 254 UG/DL (ref 69–240)
TIBC SERPL-MCNC: 382 UG/DL (ref 250–450)
TRANSFERRIN SERPL-MCNC: 341 MG/DL (ref 163–344)

## 2022-03-29 ENCOUNTER — HISTORICAL (OUTPATIENT)
Dept: INTERNAL MEDICINE | Facility: CLINIC | Age: 67
End: 2022-03-29

## 2022-03-29 LAB
APPEARANCE, UA: CLEAR
BACTERIA SPEC CULT: NORMAL
BILIRUB UR QL STRIP: NEGATIVE
BUN SERPL-MCNC: 30.4 MG/DL (ref 8.4–25.7)
CALCIUM SERPL-MCNC: 10.4 MG/DL (ref 8.7–10.5)
CHLORIDE SERPL-SCNC: 103 MMOL/L (ref 98–107)
CHOLEST SERPL-MCNC: 177 MG/DL
CHOLEST/HDLC SERPL: 4 {RATIO} (ref 0–5)
CO2 SERPL-SCNC: 28 MMOL/L (ref 23–31)
COLOR UR: YELLOW
CREAT SERPL-MCNC: 1.25 MG/DL (ref 0.73–1.18)
CREAT UR-MCNC: 210.2 MG/DL (ref 58–161)
CREAT/UREA NIT SERPL: 24
EST. AVERAGE GLUCOSE BLD GHB EST-MCNC: 125.5 MG/DL
GLUCOSE (UA): NORMAL
GLUCOSE SERPL-MCNC: 113 MG/DL (ref 82–115)
HBA1C MFR BLD: 6 %
HDLC SERPL-MCNC: 48 MG/DL (ref 35–60)
HEMOLYSIS INTERF INDEX SERPL-ACNC: 3
HGB UR QL STRIP: NEGATIVE
HYALINE CASTS #/AREA URNS LPF: NORMAL /[LPF]
ICTERIC INTERF INDEX SERPL-ACNC: 1
KETONES UR QL STRIP: NEGATIVE
LDLC SERPL CALC-MCNC: 101 MG/DL (ref 50–140)
LEUKOCYTE ESTERASE UR QL STRIP: NEGATIVE
LIPEMIC INTERF INDEX SERPL-ACNC: 6
MICROALBUMIN UR-MCNC: 55.6
MICROALBUMIN/CREAT RATIO PNL UR: 26.5 (ref 0–30)
MUCOUS THREADS URNS QL MICRO: NORMAL
NITRITE UR QL STRIP: NEGATIVE
PH UR STRIP: 6 [PH] (ref 4.5–8)
POTASSIUM SERPL-SCNC: 4.4 MMOL/L (ref 3.5–5.1)
PROT UR QL STRIP: NORMAL
RBC #/AREA URNS HPF: NORMAL /[HPF] (ref 0–5)
SODIUM SERPL-SCNC: 139 MMOL/L (ref 136–145)
SP GR UR STRIP: 1.02 (ref 1–1.03)
SQUAMOUS EPITHELIAL, UA: NORMAL
TRIGL SERPL-MCNC: 138 MG/DL (ref 34–140)
TSH SERPL-ACNC: 1.82 M[IU]/L (ref 0.35–4.94)
UROBILINOGEN UR STRIP-ACNC: NORMAL
VLDLC SERPL CALC-MCNC: 28 MG/DL
WBC #/AREA URNS HPF: NORMAL /[HPF] (ref 0–5)

## 2022-04-11 ENCOUNTER — HISTORICAL (OUTPATIENT)
Dept: ADMINISTRATIVE | Facility: HOSPITAL | Age: 67
End: 2022-04-11
Payer: MEDICARE

## 2022-04-25 VITALS
DIASTOLIC BLOOD PRESSURE: 82 MMHG | SYSTOLIC BLOOD PRESSURE: 137 MMHG | WEIGHT: 229.25 LBS | OXYGEN SATURATION: 94 % | BODY MASS INDEX: 32.1 KG/M2 | HEIGHT: 71 IN

## 2022-05-04 NOTE — HISTORICAL [facility] CERNER
This is a historical note converted from Valentin. Formatting and pictures may have been removed.  Please reference Valentin for original formatting and attached multimedia. Procedure Name  Bilateral L2-5 medial branch blocks  ?   Pre-Procedure Diagnoses:  1. Chronic pain syndrome  2. Low back pain  3. Lumbar facet arthropathy  ?   Post-Procedure Diagnoses:  1. Chronic pain syndrome  2. Low back pain  3. Lumbar facet arthropathy  ?   Anesthesia:  Local and MAC  ?  Estimated Blood Loss:  None  ?  Complications:  None  ?  Informed Consent:  The procedure, risks, benefits, and alternatives were discussed with the patient.? There were no contraindications to the procedure.? The patient expressed understanding and agreed to proceed.? Fully informed written consent was obtained.?  ?  Description of the Procedure:  The patient was taken to the operating room.? IV access was obtained prior to the start of the procedure.? The patient was positioned prone on the fluoroscopy table.? Continuous hemodynamic monitoring was initiated and continued throughout the duration of the procedure.? The skin overlying the lumbosacral spine was prepped with Chloraprep and draped into a sterile field.? Fluoroscopy was used to identify the locations of the left side?L2, L3, L4, and L5 medial branch nerves at the junctions of the superior articular processes and transverse processes of L3, L4, L5, and the sacral ala, respectively.? Skin anesthesia was achieved using?0.5 mL of 1% lidocaine over each injection site.? A 22-gauge 3.5-inch Quinke spinal needle was slowly inserted and advanced under intermittent fluoroscopic guidance until it made bony contact at the target sites.? Proper needle position was confirmed under AP, oblique, and lateral fluoroscopic views.? Negative aspiration for blood or CSF was confirmed.? Then 0.5 mL of 0.25% bupivacaine was easily injected at each level.? There was no pain on injection.? The needles were removed intact  and bleeding was nil.? The same procedure was repeated in identical fashion on the?right side. Sterile bandages were applied.? The patient was taken to the recovery room for further observation in stable condition.? The patient was then discharged home without any complications.

## 2022-05-04 NOTE — HISTORICAL [facility] CERNER
This is a historical note converted from Valentin. Formatting and pictures may have been removed.  Please reference Valentin for original formatting and attached multimedia. Procedure Name  1. Left L4?Transforaminal Epidural Steroid Injection  2. Right L4?Transforaminal Epidural Steroid Injection  ?  Pre-Procedure Diagnoses:  1. Chronic pain syndrome  2. Low back pain  3. Lumbar radiculopathy  4. Lumbar disc displacement  5. Lumbar degenerative disc disease  ?   Post-Procedure Diagnoses:  1. Chronic pain syndrome  2. Low back pain  3. Lumbar radiculopathy  4. Lumbar disc displacement  5. Lumbar degenerative disc disease  ?   Anesthesia:  Local and MAC  ?   Estimated Blood Loss:  None  ?  Complications:  None  ?  Informed Consent:  The procedure, risks, benefits, and alternatives were discussed with the patient.? There were no contraindications to the procedure.? The patient expressed understanding and agreed to proceed.? Fully informed written consent was obtained.?  ?  Description of the Procedure:  The patient was taken to the operating room.? IV access was obtained prior to the start of the procedure.? The patient was positioned prone on the fluoroscopy table.? Continuous hemodynamic monitoring was initiated and continued throughout the duration of the procedure.? The skin overlying the lumbosacral spine was prepped with Chloraprep and draped into a sterile field.? An oblique fluoroscopic view was obtained on the left side at L4, with the superior articular process of the inferior vertebral body aligned with the pedicle.? Skin anesthesia was achieved using 1 mL of 1% lidocaine.? A 22-gauge 3.5-inch Quinke spinal needle was slowly inserted and advanced towards the 6 oclock position of the pedicle and into the epidural space.? Proper needle position was confirmed under AP, oblique, and lateral fluoroscopic views.? Negative aspiration for blood or CSF was confirmed.? 0.5 mL of Isovue contrast was injected.? Fluoroscopic  imaging revealed a clear outline of the spinal nerve with proximal spread of agent through the neural foramen and into the epidural space.? Then a combination of 5 mg of dexamethasone and 1 mL of 0.5% bupivacaine was easily injected.? There was no pain on injection.? The needle was removed intact and bleeding was nil.? The same procedure was repeated in identical fashion on the right side at L4. Sterile bandages were applied.? The patient was taken to the recovery room for further observation in stable condition.? The patient was then discharged home without any complications.

## 2022-05-26 ENCOUNTER — DOCUMENTATION ONLY (OUTPATIENT)
Dept: AUDIOLOGY | Facility: HOSPITAL | Age: 67
End: 2022-05-26

## 2022-05-26 ENCOUNTER — TELEPHONE (OUTPATIENT)
Dept: AUDIOLOGY | Facility: HOSPITAL | Age: 67
End: 2022-05-26
Payer: MEDICARE

## 2022-07-18 ENCOUNTER — TELEPHONE (OUTPATIENT)
Dept: INTERNAL MEDICINE | Facility: CLINIC | Age: 67
End: 2022-07-18

## 2022-07-18 DIAGNOSIS — U07.1 COVID-19: Primary | ICD-10-CM

## 2022-07-18 NOTE — TELEPHONE ENCOUNTER
Pt called stating that he has covid and is in need of a blood infusion because pt has the lico and lico shot and there is not booster for this vaccine so Bethany after ours told him that he would need a referral in order to receive the infusion.

## 2022-07-19 NOTE — TELEPHONE ENCOUNTER
A referral to the infusion clinic has been placed. Infusions are being performed at [facility] in [place]. Phone number is [phone]. Patient can check status once referral sent. He cannot take Paxlovid (the oral agent to treat COVID) due to major drug-drug interactions. Please inform patient.

## 2022-07-22 ENCOUNTER — INFUSION (OUTPATIENT)
Dept: INFECTIOUS DISEASES | Facility: HOSPITAL | Age: 67
End: 2022-07-22
Attending: NURSE PRACTITIONER
Payer: MEDICARE

## 2022-07-22 VITALS
RESPIRATION RATE: 18 BRPM | DIASTOLIC BLOOD PRESSURE: 92 MMHG | WEIGHT: 229 LBS | HEART RATE: 78 BPM | HEIGHT: 71 IN | BODY MASS INDEX: 32.06 KG/M2 | TEMPERATURE: 98 F | SYSTOLIC BLOOD PRESSURE: 154 MMHG | OXYGEN SATURATION: 96 %

## 2022-07-22 DIAGNOSIS — U07.1 COVID: Primary | ICD-10-CM

## 2022-07-22 PROCEDURE — M0222 HC IV INJECTION, BEBTELOVIMAB, INCL POST ADMIN MONIT: HCPCS | Performed by: NURSE PRACTITIONER

## 2022-07-22 PROCEDURE — 63600175 PHARM REV CODE 636 W HCPCS: Performed by: NURSE PRACTITIONER

## 2022-07-22 RX ORDER — ALBUTEROL SULFATE 90 UG/1
2 AEROSOL, METERED RESPIRATORY (INHALATION)
Status: ACTIVE | OUTPATIENT
Start: 2022-07-22 | End: 2022-07-25

## 2022-07-22 RX ORDER — ONDANSETRON 4 MG/1
4 TABLET, ORALLY DISINTEGRATING ORAL
Status: ACTIVE | OUTPATIENT
Start: 2022-07-22 | End: 2022-07-23

## 2022-07-22 RX ORDER — EPINEPHRINE 0.3 MG/.3ML
0.3 INJECTION SUBCUTANEOUS
Status: ACTIVE | OUTPATIENT
Start: 2022-07-22 | End: 2022-07-25

## 2022-07-22 RX ORDER — ACETAMINOPHEN 325 MG/1
650 TABLET ORAL
Status: ACTIVE | OUTPATIENT
Start: 2022-07-22 | End: 2022-07-23

## 2022-07-22 RX ORDER — BEBTELOVIMAB 87.5 MG/ML
175 INJECTION, SOLUTION INTRAVENOUS
Status: COMPLETED | OUTPATIENT
Start: 2022-07-22 | End: 2022-07-22

## 2022-07-22 RX ORDER — DIPHENHYDRAMINE HYDROCHLORIDE 50 MG/ML
25 INJECTION INTRAMUSCULAR; INTRAVENOUS
Status: ACTIVE | OUTPATIENT
Start: 2022-07-22 | End: 2022-07-23

## 2022-07-22 RX ADMIN — BEBTELOVIMAB 175 MG: 87.5 INJECTION, SOLUTION INTRAVENOUS at 09:07

## 2022-07-22 NOTE — PROGRESS NOTES
No signs/symptoms of reaction to bebtelovimab noted after one hour post administration monitoring period.  Provided patient with AVS, no questions.  Escorted patient to exit.

## 2022-08-29 RX ORDER — COLCHICINE 0.6 MG/1
0.6 TABLET ORAL DAILY
Qty: 30 TABLET | Refills: 0 | Status: SHIPPED | OUTPATIENT
Start: 2022-08-29 | End: 2022-09-30 | Stop reason: SDUPTHER

## 2022-09-21 ENCOUNTER — HISTORICAL (OUTPATIENT)
Dept: ADMINISTRATIVE | Facility: HOSPITAL | Age: 67
End: 2022-09-21
Payer: MEDICARE

## 2022-09-26 ENCOUNTER — LAB VISIT (OUTPATIENT)
Dept: LAB | Facility: HOSPITAL | Age: 67
End: 2022-09-26
Attending: NURSE PRACTITIONER
Payer: MEDICARE

## 2022-09-26 DIAGNOSIS — Z12.5 ENCOUNTER FOR PROSTATE CANCER SCREENING: ICD-10-CM

## 2022-09-26 DIAGNOSIS — I10 HYPERTENSION, UNSPECIFIED TYPE: ICD-10-CM

## 2022-09-26 DIAGNOSIS — E78.5 HYPERLIPIDEMIA, UNSPECIFIED HYPERLIPIDEMIA TYPE: ICD-10-CM

## 2022-09-26 DIAGNOSIS — N18.9 CKD (CHRONIC KIDNEY DISEASE): ICD-10-CM

## 2022-09-26 DIAGNOSIS — E11.9 DM (DIABETES MELLITUS): Primary | ICD-10-CM

## 2022-09-26 LAB
ALBUMIN SERPL-MCNC: 4.3 GM/DL (ref 3.4–4.8)
ALBUMIN/GLOB SERPL: 1.2 RATIO (ref 1.1–2)
ALP SERPL-CCNC: 52 UNIT/L (ref 40–150)
ALT SERPL-CCNC: 23 UNIT/L (ref 0–55)
APPEARANCE UR: CLEAR
AST SERPL-CCNC: 20 UNIT/L (ref 5–34)
BACTERIA #/AREA URNS AUTO: ABNORMAL /HPF
BASOPHILS # BLD AUTO: 0.04 X10(3)/MCL (ref 0–0.2)
BASOPHILS NFR BLD AUTO: 0.7 %
BILIRUB UR QL STRIP.AUTO: NEGATIVE MG/DL
BILIRUBIN DIRECT+TOT PNL SERPL-MCNC: 0.4 MG/DL
BUN SERPL-MCNC: 21.8 MG/DL (ref 8.4–25.7)
CALCIUM SERPL-MCNC: 10.2 MG/DL (ref 8.8–10)
CHLORIDE SERPL-SCNC: 105 MMOL/L (ref 98–107)
CHOLEST SERPL-MCNC: 174 MG/DL
CHOLEST/HDLC SERPL: 4 {RATIO} (ref 0–5)
CO2 SERPL-SCNC: 26 MMOL/L (ref 23–31)
COLOR UR AUTO: ABNORMAL
CREAT SERPL-MCNC: 1.21 MG/DL (ref 0.73–1.18)
CREAT UR-MCNC: 289.4 MG/DL (ref 63–166)
EOSINOPHIL # BLD AUTO: 0.21 X10(3)/MCL (ref 0–0.9)
EOSINOPHIL NFR BLD AUTO: 3.4 %
ERYTHROCYTE [DISTWIDTH] IN BLOOD BY AUTOMATED COUNT: 13.8 % (ref 11.5–17)
EST. AVERAGE GLUCOSE BLD GHB EST-MCNC: 137 MG/DL
GFR SERPLBLD CREATININE-BSD FMLA CKD-EPI: >60 MLS/MIN/1.73/M2
GLOBULIN SER-MCNC: 3.7 GM/DL (ref 2.4–3.5)
GLUCOSE SERPL-MCNC: 122 MG/DL (ref 82–115)
GLUCOSE UR QL STRIP.AUTO: NORMAL MG/DL
HBA1C MFR BLD: 6.4 %
HCT VFR BLD AUTO: 40.3 % (ref 42–52)
HDLC SERPL-MCNC: 47 MG/DL (ref 35–60)
HGB BLD-MCNC: 13.7 GM/DL (ref 14–18)
HYALINE CASTS #/AREA URNS LPF: ABNORMAL /LPF
IMM GRANULOCYTES # BLD AUTO: 0.04 X10(3)/MCL (ref 0–0.04)
IMM GRANULOCYTES NFR BLD AUTO: 0.7 %
KETONES UR QL STRIP.AUTO: ABNORMAL MG/DL
LDLC SERPL CALC-MCNC: 85 MG/DL (ref 50–140)
LEUKOCYTE ESTERASE UR QL STRIP.AUTO: NEGATIVE UNIT/L
LYMPHOCYTES # BLD AUTO: 1.29 X10(3)/MCL (ref 0.6–4.6)
LYMPHOCYTES NFR BLD AUTO: 21.1 %
MCH RBC QN AUTO: 32.4 PG (ref 27–31)
MCHC RBC AUTO-ENTMCNC: 34 MG/DL (ref 33–36)
MCV RBC AUTO: 95.3 FL (ref 80–94)
MICROALBUMIN UR-MCNC: 26.8 UG/ML
MICROALBUMIN/CREAT RATIO PNL UR: 9.3 MG/GM CR (ref 0–30)
MONOCYTES # BLD AUTO: 1.04 X10(3)/MCL (ref 0.1–1.3)
MONOCYTES NFR BLD AUTO: 17 %
MUCOUS THREADS URNS QL MICRO: ABNORMAL /LPF
NEUTROPHILS # BLD AUTO: 3.5 X10(3)/MCL (ref 2.1–9.2)
NEUTROPHILS NFR BLD AUTO: 57.1 %
NITRITE UR QL STRIP.AUTO: NEGATIVE
NRBC BLD AUTO-RTO: 0 %
PH UR STRIP.AUTO: 6 [PH]
PLATELET # BLD AUTO: 270 X10(3)/MCL (ref 130–400)
PMV BLD AUTO: 10.1 FL (ref 7.4–10.4)
POTASSIUM SERPL-SCNC: 4.1 MMOL/L (ref 3.5–5.1)
PROT SERPL-MCNC: 8 GM/DL (ref 5.8–7.6)
PROT UR QL STRIP.AUTO: ABNORMAL MG/DL
PSA SERPL-MCNC: 0.91 NG/ML
RBC # BLD AUTO: 4.23 X10(6)/MCL (ref 4.7–6.1)
RBC #/AREA URNS AUTO: ABNORMAL /HPF
RBC UR QL AUTO: NEGATIVE UNIT/L
SODIUM SERPL-SCNC: 141 MMOL/L (ref 136–145)
SP GR UR STRIP.AUTO: 1.03
SQUAMOUS #/AREA URNS LPF: ABNORMAL /HPF
TRIGL SERPL-MCNC: 211 MG/DL (ref 34–140)
UROBILINOGEN UR STRIP-ACNC: NORMAL MG/DL
VLDLC SERPL CALC-MCNC: 42 MG/DL
WBC # SPEC AUTO: 6.1 X10(3)/MCL (ref 4.5–11.5)
WBC #/AREA URNS AUTO: ABNORMAL /HPF

## 2022-09-26 PROCEDURE — 36415 COLL VENOUS BLD VENIPUNCTURE: CPT

## 2022-09-26 PROCEDURE — 83036 HEMOGLOBIN GLYCOSYLATED A1C: CPT

## 2022-09-26 PROCEDURE — 82043 UR ALBUMIN QUANTITATIVE: CPT

## 2022-09-26 PROCEDURE — 80061 LIPID PANEL: CPT

## 2022-09-26 PROCEDURE — 80053 COMPREHEN METABOLIC PANEL: CPT

## 2022-09-26 PROCEDURE — 81001 URINALYSIS AUTO W/SCOPE: CPT

## 2022-09-26 PROCEDURE — 85025 COMPLETE CBC W/AUTO DIFF WBC: CPT

## 2022-09-26 PROCEDURE — 84153 ASSAY OF PSA TOTAL: CPT

## 2022-09-30 ENCOUNTER — TELEPHONE (OUTPATIENT)
Dept: INTERNAL MEDICINE | Facility: CLINIC | Age: 67
End: 2022-09-30
Payer: MEDICARE

## 2022-09-30 ENCOUNTER — OFFICE VISIT (OUTPATIENT)
Dept: INTERNAL MEDICINE | Facility: CLINIC | Age: 67
End: 2022-09-30
Payer: MEDICARE

## 2022-09-30 VITALS
DIASTOLIC BLOOD PRESSURE: 74 MMHG | TEMPERATURE: 98 F | SYSTOLIC BLOOD PRESSURE: 118 MMHG | BODY MASS INDEX: 32.9 KG/M2 | WEIGHT: 235 LBS | RESPIRATION RATE: 20 BRPM | HEART RATE: 89 BPM | HEIGHT: 71 IN

## 2022-09-30 DIAGNOSIS — I10 HYPERTENSION, UNSPECIFIED TYPE: ICD-10-CM

## 2022-09-30 DIAGNOSIS — Z00.00 WELLNESS EXAMINATION: ICD-10-CM

## 2022-09-30 DIAGNOSIS — Z12.5 SCREENING FOR PROSTATE CANCER: ICD-10-CM

## 2022-09-30 DIAGNOSIS — M06.9 RHEUMATOID ARTHRITIS, INVOLVING UNSPECIFIED SITE, UNSPECIFIED WHETHER RHEUMATOID FACTOR PRESENT: ICD-10-CM

## 2022-09-30 DIAGNOSIS — K21.9 GASTROESOPHAGEAL REFLUX DISEASE, UNSPECIFIED WHETHER ESOPHAGITIS PRESENT: ICD-10-CM

## 2022-09-30 DIAGNOSIS — I48.91 ATRIAL FIBRILLATION AND FLUTTER: ICD-10-CM

## 2022-09-30 DIAGNOSIS — M51.36 DEGENERATION OF LUMBAR INTERVERTEBRAL DISC: ICD-10-CM

## 2022-09-30 DIAGNOSIS — I48.92 ATRIAL FIBRILLATION AND FLUTTER: ICD-10-CM

## 2022-09-30 DIAGNOSIS — Z12.11 SCREENING FOR COLON CANCER: ICD-10-CM

## 2022-09-30 DIAGNOSIS — E11.9 CONTROLLED TYPE 2 DIABETES MELLITUS WITHOUT COMPLICATION, WITHOUT LONG-TERM CURRENT USE OF INSULIN: ICD-10-CM

## 2022-09-30 DIAGNOSIS — G20.A1 PARKINSON'S DISEASE: ICD-10-CM

## 2022-09-30 DIAGNOSIS — E78.5 HYPERLIPIDEMIA, UNSPECIFIED HYPERLIPIDEMIA TYPE: ICD-10-CM

## 2022-09-30 DIAGNOSIS — K59.00 CONSTIPATION, UNSPECIFIED CONSTIPATION TYPE: ICD-10-CM

## 2022-09-30 DIAGNOSIS — E11.9 TYPE 2 DIABETES MELLITUS WITHOUT COMPLICATION, WITHOUT LONG-TERM CURRENT USE OF INSULIN: Primary | ICD-10-CM

## 2022-09-30 DIAGNOSIS — Z83.719 FAMILY HISTORY OF COLONIC POLYPS: ICD-10-CM

## 2022-09-30 DIAGNOSIS — G89.4 CHRONIC PAIN SYNDROME: ICD-10-CM

## 2022-09-30 PROBLEM — M51.369 DEGENERATION OF LUMBAR INTERVERTEBRAL DISC: Status: ACTIVE | Noted: 2022-07-06

## 2022-09-30 PROBLEM — Z86.73 HISTORY OF CVA (CEREBROVASCULAR ACCIDENT): Status: ACTIVE | Noted: 2022-09-30

## 2022-09-30 PROBLEM — N48.6 INDURATIO PENIS PLASTICA: Status: ACTIVE | Noted: 2022-09-30

## 2022-09-30 PROBLEM — Z98.41 HISTORY OF RIGHT CATARACT EXTRACTION: Status: ACTIVE | Noted: 2022-09-30

## 2022-09-30 PROBLEM — M48.061 SPINAL STENOSIS OF LUMBAR REGION: Status: ACTIVE | Noted: 2022-09-30

## 2022-09-30 PROBLEM — K76.0 STEATOSIS OF LIVER: Status: ACTIVE | Noted: 2022-09-30

## 2022-09-30 PROBLEM — E66.9 OBESITY: Status: ACTIVE | Noted: 2022-09-30

## 2022-09-30 PROBLEM — R49.0 DYSPHONIA: Status: ACTIVE | Noted: 2022-09-30

## 2022-09-30 PROBLEM — N52.9 ERECTILE DYSFUNCTION: Status: ACTIVE | Noted: 2022-09-30

## 2022-09-30 PROBLEM — Z79.899 HIGH RISK MEDICATION USE: Status: ACTIVE | Noted: 2022-09-30

## 2022-09-30 PROBLEM — H49.20 SIXTH NERVE PALSY: Status: ACTIVE | Noted: 2022-09-30

## 2022-09-30 PROCEDURE — 1159F PR MEDICATION LIST DOCUMENTED IN MEDICAL RECORD: ICD-10-PCS | Mod: CPTII,,, | Performed by: NURSE PRACTITIONER

## 2022-09-30 PROCEDURE — 3066F NEPHROPATHY DOC TX: CPT | Mod: CPTII,,, | Performed by: NURSE PRACTITIONER

## 2022-09-30 PROCEDURE — 3066F PR DOCUMENTATION OF TREATMENT FOR NEPHROPATHY: ICD-10-PCS | Mod: CPTII,,, | Performed by: NURSE PRACTITIONER

## 2022-09-30 PROCEDURE — 99214 PR OFFICE/OUTPT VISIT, EST, LEVL IV, 30-39 MIN: ICD-10-PCS | Mod: S$PBB,,, | Performed by: NURSE PRACTITIONER

## 2022-09-30 PROCEDURE — 3078F PR MOST RECENT DIASTOLIC BLOOD PRESSURE < 80 MM HG: ICD-10-PCS | Mod: CPTII,,, | Performed by: NURSE PRACTITIONER

## 2022-09-30 PROCEDURE — 3061F PR NEG MICROALBUMINURIA RESULT DOCUMENTED/REVIEW: ICD-10-PCS | Mod: CPTII,,, | Performed by: NURSE PRACTITIONER

## 2022-09-30 PROCEDURE — 3078F DIAST BP <80 MM HG: CPT | Mod: CPTII,,, | Performed by: NURSE PRACTITIONER

## 2022-09-30 PROCEDURE — 1159F MED LIST DOCD IN RCRD: CPT | Mod: CPTII,,, | Performed by: NURSE PRACTITIONER

## 2022-09-30 PROCEDURE — 3061F NEG MICROALBUMINURIA REV: CPT | Mod: CPTII,,, | Performed by: NURSE PRACTITIONER

## 2022-09-30 PROCEDURE — 4010F PR ACE/ARB THEARPY RXD/TAKEN: ICD-10-PCS | Mod: CPTII,,, | Performed by: NURSE PRACTITIONER

## 2022-09-30 PROCEDURE — 99214 OFFICE O/P EST MOD 30 MIN: CPT | Mod: S$PBB,,, | Performed by: NURSE PRACTITIONER

## 2022-09-30 PROCEDURE — 1160F PR REVIEW ALL MEDS BY PRESCRIBER/CLIN PHARMACIST DOCUMENTED: ICD-10-PCS | Mod: CPTII,,, | Performed by: NURSE PRACTITIONER

## 2022-09-30 PROCEDURE — 1101F PR PT FALLS ASSESS DOC 0-1 FALLS W/OUT INJ PAST YR: ICD-10-PCS | Mod: CPTII,,, | Performed by: NURSE PRACTITIONER

## 2022-09-30 PROCEDURE — 4010F ACE/ARB THERAPY RXD/TAKEN: CPT | Mod: CPTII,,, | Performed by: NURSE PRACTITIONER

## 2022-09-30 PROCEDURE — 3288F PR FALLS RISK ASSESSMENT DOCUMENTED: ICD-10-PCS | Mod: CPTII,,, | Performed by: NURSE PRACTITIONER

## 2022-09-30 PROCEDURE — 3008F BODY MASS INDEX DOCD: CPT | Mod: CPTII,,, | Performed by: NURSE PRACTITIONER

## 2022-09-30 PROCEDURE — 3074F PR MOST RECENT SYSTOLIC BLOOD PRESSURE < 130 MM HG: ICD-10-PCS | Mod: CPTII,,, | Performed by: NURSE PRACTITIONER

## 2022-09-30 PROCEDURE — 99215 OFFICE O/P EST HI 40 MIN: CPT | Mod: PBBFAC | Performed by: NURSE PRACTITIONER

## 2022-09-30 PROCEDURE — 1101F PT FALLS ASSESS-DOCD LE1/YR: CPT | Mod: CPTII,,, | Performed by: NURSE PRACTITIONER

## 2022-09-30 PROCEDURE — 1160F RVW MEDS BY RX/DR IN RCRD: CPT | Mod: CPTII,,, | Performed by: NURSE PRACTITIONER

## 2022-09-30 PROCEDURE — 3288F FALL RISK ASSESSMENT DOCD: CPT | Mod: CPTII,,, | Performed by: NURSE PRACTITIONER

## 2022-09-30 PROCEDURE — 3074F SYST BP LT 130 MM HG: CPT | Mod: CPTII,,, | Performed by: NURSE PRACTITIONER

## 2022-09-30 PROCEDURE — 3008F PR BODY MASS INDEX (BMI) DOCUMENTED: ICD-10-PCS | Mod: CPTII,,, | Performed by: NURSE PRACTITIONER

## 2022-09-30 RX ORDER — PANTOPRAZOLE SODIUM 40 MG/1
40 TABLET, DELAYED RELEASE ORAL DAILY
Qty: 90 TABLET | Refills: 1 | Status: SHIPPED | OUTPATIENT
Start: 2022-09-30 | End: 2023-10-31

## 2022-09-30 RX ORDER — ALOGLIPTIN AND PIOGLITAZONE 25; 30 MG/1; MG/1
1 TABLET, FILM COATED ORAL DAILY
Qty: 90 TABLET | Refills: 1 | Status: SHIPPED | OUTPATIENT
Start: 2022-09-30 | End: 2023-03-30 | Stop reason: SDUPTHER

## 2022-09-30 RX ORDER — COLCHICINE 0.6 MG/1
0.6 TABLET ORAL 2 TIMES DAILY
Qty: 180 TABLET | Refills: 1 | Status: SHIPPED | OUTPATIENT
Start: 2022-09-30 | End: 2023-03-30 | Stop reason: SDUPTHER

## 2022-09-30 NOTE — TELEPHONE ENCOUNTER
"Patient previously referred to Dr. Armen Reyes for chronic lower back pain in May. States has never received an appt. Apparently clinic told him they've been contacting us for "more information," but there's no communication regarding such. Can we check status.  "

## 2022-09-30 NOTE — ASSESSMENT & PLAN NOTE
Trig above goal. States had a pastry the night before lab analysis. LDL improved  Continue Crestor 10 mg po nightly  Educated on a low-fat, low-cholesterol diet and aerobic exercise (20-30 mins/day x 5 days a week). Encouraged healthy fruits and veggie intake. Increase water intake. Avoid excess ETOH intake and smoking

## 2022-09-30 NOTE — ASSESSMENT & PLAN NOTE
States taken off of antihypertensives per Cards 2/2 hypotension said to be from PD  BP at goal today  DASH

## 2022-09-30 NOTE — PROGRESS NOTES
DAVIDSON Mandujano   [facility]  [address] [facility] [address]      PATIENT NAME: Melquiades Rehman Jr.  : 1955  DATE: 22  MRN: [id number]      Billing Provider: DAVIDSON Mandujano  Level of Service:   Patient PCP Information       Provider PCP Type    DAVIDSON Mandujano General            Reason for Visit / Chief Complaint: Follow-up (Lab review)       History of Present Illness / Problem Focused Workflow     Melquiades Rehman Jr. presents to the clinic with Follow-up (Lab review)     22: 66 y.o. male presenting to [facility] to establish primary care.   Previous PCP: Dr. Woodrow Cobb, last visit > 6 mths   PmHx: h/o CVA (), HTN, HLD, CHF (patient denies such hx, though documented in record mx times), A fib managed on OAC, Type 2 DM, hepatic steatosis, gout, asymmetric hearing loss on left side--h/o infection, tinnitus, h/o right eye cataract, Parkinson's disease (), Peyronie's disease, RA (), obesity, h/o tobacco abuse, chronic lower back pain (following pain mgmt), GERD  SHx: cataract sx, knee arthroscopy, LHC, lumbar spine injections  FHx: Alzheimer's Disease and Emphysema in Father, Heart Failure and colon polyps in mother, PNA in sister  Complaints today: Establish primary care. Previously following Dr. Cobb. States seen x 2. Now requesting to establish at [facility]. Extensive PmHX as documented. He follows [facility]; has been seen in Cards at [facility] in the past. H/o AFIB/denies h/o CHF. Type 2 DM managed with Oseni. States taking x ~10 years. Reports CBGs 90s-130s. Tolerating medication well. Taking Rosuvastatin and Gemfibrozil for HLD. He follows ENT at [facility]. Follows Rheum at [facility] for RA. Awaiting an appt with new rheumatologist as previous MD moved out of state. Following prescribed management. Parkinson's Dz managed by [facility]. Pt reports recently seen by neurologist. Scheduled to completed  labs today as ordered by Neuro. H/o lumbar spine stenosis and lower back pain. Apparently followed by Dr. Simpson for pain mgmt. He was referred to Dr. Perez for neurosx eval, however MD no longer with same practice. Pt now waiting to see Dr. Erik Love. When he was seen by Dr. Perez, it was not recommended that he undergo a multilevel lumbar fusion. He did suggest a spinal cord stimulator, however, pt alleges he was told he'd be sent for some type of stem cell treatment. This information has not been verified. He follows Dr. Chintan Benjamin for podiatry needs. No acute concerns.  Colon Cancer Screening: Reported colonoscopy 3/2012  Prostate Cancer Screening: PSA 2.26 (5/2021)  Eye Exam: Following [facility]  Vaccinations: Refuses Tdap; Shingrix series previously started    (3/29/22): Pt presenting for routine f/u. He was seen in ED 3/1/22 with c/o epistaxis. Apparently was in a [facility] and had a syncopal episode due to nosebleed x 48hs. He was then transferred to [facility]. States they were able to control/stop the bleeding ~5 hrs later in the ED. On OAC; states Xarelto was decreased from 20 mg po daily to 15 mg po daily. Following ENT. States cancelled recent appt because he hadn't had a nose bleed in 2 weeks, however, he had some bleeding from right nare last night x 10 min. Controlled with manual pressure. States will f/u with ENT if recurs. He also reports having some issues with low blood pressures (asymptomatic) lately. [facility] cards told him Parkinson's could play a role. So, he is currently holding his HCTZ-Lisinopril at present. Labs completed and noted stable. Renal indices stable compared to baseline. HgA1c 6%. Taking Oseni. Denies overt s/s of hypoglycemia or hyperglycemia. No other concerns.    Today's Visit (9/30/22): Pt presenting for routine f/u. Labs completed earlier this week. Significant for: triglycerides 211, glucose 122, A1c 6.4%. Otherwise, labs are stable compared to baseline. He was  "referred to Dr. Armen Reyes in May for chronic lower back pain. Alleges their office has "been trying to get in touch with you for some more paperwork, but they can't get through." He continues to follows Cards at [facility], Dr. Meza. States has been taken off of ACE-I d/t hypotension that is reportedly 2/2 PD. BP at goal today. Parkinson's Dz managed by [facility]. He will see Rheum in November. Asking for refill on colchicine. He mentions h/o hard, infrequent stools from "when I was taking that pain medicine." States plans to obtain Miralax OTC for PRN use. No other complaints.      Review of Systems     Review of Systems   Gastrointestinal:  Positive for constipation.   All other systems reviewed and are negative.    Medical / Social / Family History     Past Medical History:   Diagnosis Date    Central stenosis of spinal canal     CHF (congestive heart failure)     Fatty liver     HTN (hypertension)     Parkinson's disease     Paroxysmal atrial fibrillation     Peyronie disease     Rheumatoid arthritis, unspecified     Type 2 diabetes mellitus without complications        Past Surgical History:   Procedure Laterality Date    CATARACT EXTRACTION      EPIDURAL STEROID INJECTION INTO LUMBAR SPINE         Social History    reports that he has quit smoking. He has never used smokeless tobacco. He reports that he does not currently use alcohol after a past usage of about 1.0 - 2.0 standard drink per week. He reports current drug use. Drug: Marijuana.    Family History  's family history includes Alzheimer's disease in his father; Autoimmune disease in his sister; Coronary artery disease in his mother; Dementia in his sister; Emphysema in his father; Heart failure in his mother; Pneumonia in his sister.    Medications and Allergies     Medications  Medication List with Changes/Refills   Current Medications    B COMPLEX VITAMINS CAPSULE    Take by mouth.    BLOOD SUGAR DIAGNOSTIC (ACCU-CHEK GUIDE TEST " STRIPS MISC)    Accu-Chek Guide test strips    CARBIDOPA-LEVODOPA  MG (SINEMET)  MG PER TABLET    Take 2 tablets by mouth 2 (two) times a day.    COENZYME Q10 100 MG CAPSULE    Take 100 mg by mouth once daily.    FOLIC ACID/MULTIVIT-MIN/LUTEIN (CENTRUM SILVER ORAL)    Take 1 tablet by mouth once daily.    GEMFIBROZIL (LOPID) 600 MG TABLET    Take 600 mg by mouth 2 (two) times daily.    LANCETS (ACCU-CHEK FASTCLIX LANCET DRUM MISC)    Accu-Chek Fastclix Lancet Drum    ROSUVASTATIN (CRESTOR) 10 MG TABLET    Take 10 mg by mouth nightly.    XARELTO 20 MG TAB    Take 20 mg by mouth once daily.    XELJANZ XR 11 MG TB24    Take 1 tablet by mouth once daily.    ZINC SULFATE (ZINC-15 ORAL)    Take 140 mg by mouth once daily.   Changed and/or Refilled Medications    Modified Medication Previous Medication    COLCHICINE (COLCRYS) 0.6 MG TABLET colchicine (COLCRYS) 0.6 mg tablet       Take 1 tablet (0.6 mg total) by mouth 2 (two) times daily.    Take 1 tablet (0.6 mg total) by mouth once daily.    OSENI 25-30 MG TAB OSENI 25-30 mg Tab       Take 1 tablet by mouth once daily.    Take 1 tablet by mouth once daily.    PANTOPRAZOLE (PROTONIX) 40 MG TABLET pantoprazole (PROTONIX) 40 MG tablet       Take 1 tablet (40 mg total) by mouth once daily.    TAKE ONE TABLET BY MOUTH EVERY DAY   Discontinued Medications    FLUTICASONE PROPIONATE (FLONASE ALLERGY RELIEF) 50 MCG/ACTUATION NASAL SPRAY    by Nasal route.    GEMFIBROZIL ORAL    gemfibrozil Take No date recorded No form recorded No frequency recorded No route recorded No set duration recorded No set duration amount recorded active No dosage strength recorded No dosage strength units of measure recorded    LISINOPRIL 10 MG TABLET        LISINOPRIL-HYDROCHLOROTHIAZIDE (PRINZIDE,ZESTORETIC) 20-25 MG TAB        MUPIROCIN (BACTROBAN) 2 % OINTMENT        SELEGILINE HCL (ELDEPRYL) 5 MG TABLET        TRAMADOL (ULTRAM) 50 MG TABLET    Take 50 mg by mouth 3 (three) times daily as  needed.       Allergies  Review of patient's allergies indicates:   Allergen Reactions    Sarilumab      Other reaction(s): fainting       Physical Examination     Vitals:    09/30/22 0739   BP: 118/74   Pulse: 89   Resp: 20   Temp: 97.7 °F (36.5 °C)     Physical Exam  Constitutional:       Appearance: Normal appearance. He is obese.   HENT:      Head: Normocephalic and atraumatic.   Eyes:      Extraocular Movements: Extraocular movements intact.      Conjunctiva/sclera: Conjunctivae normal.      Pupils: Pupils are equal, round, and reactive to light.   Cardiovascular:      Rate and Rhythm: Normal rate and regular rhythm.      Pulses: Normal pulses.      Heart sounds: Normal heart sounds.   Pulmonary:      Effort: Pulmonary effort is normal.      Breath sounds: Normal breath sounds.   Abdominal:      General: Bowel sounds are normal.      Palpations: Abdomen is soft.   Musculoskeletal:         General: Normal range of motion.   Skin:     General: Skin is warm and dry.   Neurological:      General: No focal deficit present.      Mental Status: He is alert and oriented to person, place, and time.      Motor: Tremor present.   Psychiatric:         Mood and Affect: Mood normal.         Behavior: Behavior normal.         Thought Content: Thought content normal.         Judgment: Judgment normal.         Results     Lab Results   Component Value Date    WBC 6.1 09/26/2022    RBC 4.23 (L) 09/26/2022    HGB 13.7 (L) 09/26/2022    HCT 40.3 (L) 09/26/2022    MCV 95.3 (H) 09/26/2022    MCH 32.4 (H) 09/26/2022    MCHC 34.0 09/26/2022    RDW 13.8 09/26/2022     09/26/2022    MPV 10.1 09/26/2022     CMP  Sodium Level   Date Value Ref Range Status   09/26/2022 141 136 - 145 mmol/L Final     Potassium Level   Date Value Ref Range Status   09/26/2022 4.1 3.5 - 5.1 mmol/L Final     Carbon Dioxide   Date Value Ref Range Status   09/26/2022 26 23 - 31 mmol/L Final     Blood Urea Nitrogen   Date Value Ref Range Status    09/26/2022 21.8 8.4 - 25.7 mg/dL Final     Creatinine   Date Value Ref Range Status   09/26/2022 1.21 (H) 0.73 - 1.18 mg/dL Final     Calcium Level Total   Date Value Ref Range Status   09/26/2022 10.2 (H) 8.8 - 10.0 mg/dL Final     Albumin Level   Date Value Ref Range Status   09/26/2022 4.3 3.4 - 4.8 gm/dL Final     Bilirubin Total   Date Value Ref Range Status   09/26/2022 0.4 <=1.5 mg/dL Final     Alkaline Phosphatase   Date Value Ref Range Status   09/26/2022 52 40 - 150 unit/L Final     Aspartate Aminotransferase   Date Value Ref Range Status   09/26/2022 20 5 - 34 unit/L Final     Alanine Aminotransferase   Date Value Ref Range Status   09/26/2022 23 0 - 55 unit/L Final     Estimated GFR-Non    Date Value Ref Range Status   03/29/2022 62 >=90      Lab Results   Component Value Date    CHOL 174 09/26/2022     Lab Results   Component Value Date    HDL 47 09/26/2022     No results found for: LDLCALC  Lab Results   Component Value Date    TRIG 211 (H) 09/26/2022     No results found for: CHOLHDL  Lab Results   Component Value Date    TSH 1.8182 03/29/2022     Lab Results   Component Value Date    PHUR 6.0 03/29/2022    SPECGRAV 1.015 09/04/2019    PROTEINUA Trace (A) 09/26/2022    GLUCUA Normal 03/29/2022    KETONESU Negative 03/29/2022    OCCULTUA Negative 03/29/2022    NITRITE Negative 03/29/2022    LEUKOCYTESUR Negative 09/26/2022           Assessment and Plan (including Health Maintenance)     Plan:         Health Maintenance Due   Topic Date Due    TETANUS VACCINE  Never done    Colorectal Cancer Screening  Never done    COVID-19 Vaccine (3 - Booster for Danie series) 12/20/2021    Influenza Vaccine (1) 09/01/2022    Abdominal Aortic Aneurysm Screening  Never done       Problem List Items Addressed This Visit          Neuro    Chronic pain syndrome    Current Assessment & Plan     Check status of referral to Dr. Reyes         Relevant Medications    colchicine (COLCRYS) 0.6 mg tablet     Degeneration of lumbar intervertebral disc    Current Assessment & Plan     As above         Relevant Medications    colchicine (COLCRYS) 0.6 mg tablet    Parkinson's disease    Current Assessment & Plan     Mgmt per Neuro. Continue Sinemet            Cardiac/Vascular    Atrial fibrillation and flutter    Current Assessment & Plan     Continue OAC with Xarelto         Hyperlipidemia    Current Assessment & Plan     Trig above goal. States had a pastry the night before lab analysis. LDL improved  Continue Crestor 10 mg po nightly  Educated on a low-fat, low-cholesterol diet and aerobic exercise (20-30 mins/day x 5 days a week). Encouraged healthy fruits and veggie intake. Increase water intake. Avoid excess ETOH intake and smoking           Hypertension    Current Assessment & Plan     States taken off of antihypertensives per Cards 2/2 hypotension said to be from PD  BP at goal today  DASH            Immunology/Multi System    Rheumatoid arthritis    Current Assessment & Plan     Following Rhuem  Continue medications  Refilled Colchicine per request. Monitor renal function. Ensure adequate hydration            Endocrine    Diabetes type 2, controlled    Overview     Current medications: Oseni  A1c level: 6.4%, at goal  CBG trends: no log provided  Educated on diabetic diet: Low-fat, Low-carb, Low-cholesterol. Avoid sugary/dark drinks/sodas and white foods (i.e., bread, pasta, potatoes, rice)  Aerobic exercise: 20-30 min/day x 5 days/week  Diabetic Eye Exam: Following Eye Clinic. F/u 11/11/22  Diabetic Foot Exam: 2/8/22, WNL  Microalbumin-U: slightly elevated. H/o ACE-I therapy  Kidney Protection: ACE-I stopped per Cards at [facility] d/t hypotension  Statin Therapy: yes           Current Assessment & Plan     Medication Adjustments: None, continue current medication         RESOLVED: Diabetes mellitus - Primary    Relevant Medications    OSENI 25-30 mg Tab    Other Relevant Orders    CBC Auto Differential    Comprehensive  Metabolic Panel    Lipid Panel    TSH    Hemoglobin A1C    Urinalysis, Reflex to Urine Culture Urine, Clean Catch       GI    Family history of colonic polyps    Current Assessment & Plan     Refer for repeat colonoscopy         GERD (gastroesophageal reflux disease)    Current Assessment & Plan     1. Drink 6-8 glasses of water a day  2. Avoid triggers (i.e., spicy foods, caffeine, chocolate, fatty foods)  3. No smoking  4. Elevate head of bed at night if needed  5. Avoid eating large meals 2-3 hours before bedtime  6. Antacids as needed           Relevant Medications    pantoprazole (PROTONIX) 40 MG tablet    Constipation    Current Assessment & Plan     Increase Fiber in diet  Increase Water intake to 6-8 glasses of water/day  Pharmacological Therapy: Miralax PRN. Obtain OTC  Call if no BM for 3 or more days (if this is not normal pattern)  Refer for colonoscopy            Other    Wellness examination    Overview     Colon Cancer Screening: Reported colonoscopy 3/2012  Prostate Cancer Screening: PSA 0.91 (9/2022), 2.26 (5/2021)  Eye Exam: Following [facility]              Other Visit Diagnoses       Screening for colon cancer        Relevant Orders    Ambulatory referral/consult to Endo Procedure     Screening for prostate cancer                Health Maintenance Topics with due status: Not Due       Topic Last Completion Date    Pneumococcal Vaccines (Age 65+) 11/24/2020    Lipid Panel 09/26/2022       Future Appointments   Date Time Provider Department Center   11/11/2022  8:30 AM PROVIDERS, CONY Stallings   11/16/2022  8:30 AM Brian Hernandez MD [facility] BRITNI Peña   3/30/2023  7:15 AM DAVIDSON Mandujano [facility] VIPUL Peña    I spent a total of 32 minutes on the day of the visit.  This includes face to face time and non-face to face time preparing to see the patient (eg, review of tests), obtaining and/or reviewing separately obtained history, documenting  clinical information in the electronic or other health record, independently interpreting results and communicating results to the patient/family/caregiver, or care coordinator.          Signature:  DAVIDSON Mandujano  [facility]  [address] [address]    Date of encounter: 9/30/22

## 2022-09-30 NOTE — ASSESSMENT & PLAN NOTE
Increase Fiber in diet  Increase Water intake to 6-8 glasses of water/day  Pharmacological Therapy: Miralax PRN. Obtain OTC  Call if no BM for 3 or more days (if this is not normal pattern)  Refer for colonoscopy

## 2022-09-30 NOTE — ASSESSMENT & PLAN NOTE
Following Rhuem  Continue medications  Refilled Colchicine per request. Monitor renal function. Ensure adequate hydration

## 2022-09-30 NOTE — TELEPHONE ENCOUNTER
I called Dr. Pal office and left a voicemail, to contact clinic in reference to what information is needed for the pt to be seen.

## 2022-09-30 NOTE — TELEPHONE ENCOUNTER
Spoke to Magalys, she informed me that she had everything that she needed and that the referral is under review by the NP. They will give the pt a call as soon as the review is done.

## 2022-10-03 NOTE — TELEPHONE ENCOUNTER
Spoke to Nguyen with Dr. Armen Pal office and informed her that the pt was not seeing Dr. Simpson anymore and wants to be seen by Dr. Pal. Nguyen informed me that the pt had also called and told her the same thing.Nguyen said that Dr. Pal will re-review the referral and get back to us.

## 2022-10-14 ENCOUNTER — OFFICE VISIT (OUTPATIENT)
Dept: ORTHOPEDICS | Facility: CLINIC | Age: 67
End: 2022-10-14
Payer: MEDICARE

## 2022-10-14 VITALS — SYSTOLIC BLOOD PRESSURE: 148 MMHG | DIASTOLIC BLOOD PRESSURE: 95 MMHG | HEART RATE: 89 BPM

## 2022-10-14 DIAGNOSIS — M51.36 DEGENERATION OF LUMBAR INTERVERTEBRAL DISC: Primary | ICD-10-CM

## 2022-10-14 DIAGNOSIS — M54.9 CHRONIC BACK PAIN GREATER THAN 3 MONTHS DURATION: ICD-10-CM

## 2022-10-14 DIAGNOSIS — M47.816 LUMBAR SPONDYLOSIS: ICD-10-CM

## 2022-10-14 DIAGNOSIS — G89.29 CHRONIC BACK PAIN GREATER THAN 3 MONTHS DURATION: ICD-10-CM

## 2022-10-14 PROCEDURE — 3066F PR DOCUMENTATION OF TREATMENT FOR NEPHROPATHY: ICD-10-PCS | Mod: CPTII,,, | Performed by: ANESTHESIOLOGY

## 2022-10-14 PROCEDURE — 3077F SYST BP >= 140 MM HG: CPT | Mod: CPTII,,, | Performed by: ANESTHESIOLOGY

## 2022-10-14 PROCEDURE — 1101F PR PT FALLS ASSESS DOC 0-1 FALLS W/OUT INJ PAST YR: ICD-10-PCS | Mod: CPTII,,, | Performed by: ANESTHESIOLOGY

## 2022-10-14 PROCEDURE — 3061F PR NEG MICROALBUMINURIA RESULT DOCUMENTED/REVIEW: ICD-10-PCS | Mod: CPTII,,, | Performed by: ANESTHESIOLOGY

## 2022-10-14 PROCEDURE — 1160F RVW MEDS BY RX/DR IN RCRD: CPT | Mod: CPTII,,, | Performed by: ANESTHESIOLOGY

## 2022-10-14 PROCEDURE — 4010F PR ACE/ARB THEARPY RXD/TAKEN: ICD-10-PCS | Mod: CPTII,,, | Performed by: ANESTHESIOLOGY

## 2022-10-14 PROCEDURE — 3066F NEPHROPATHY DOC TX: CPT | Mod: CPTII,,, | Performed by: ANESTHESIOLOGY

## 2022-10-14 PROCEDURE — 99213 OFFICE O/P EST LOW 20 MIN: CPT | Mod: ,,, | Performed by: ANESTHESIOLOGY

## 2022-10-14 PROCEDURE — 1160F PR REVIEW ALL MEDS BY PRESCRIBER/CLIN PHARMACIST DOCUMENTED: ICD-10-PCS | Mod: CPTII,,, | Performed by: ANESTHESIOLOGY

## 2022-10-14 PROCEDURE — 3061F NEG MICROALBUMINURIA REV: CPT | Mod: CPTII,,, | Performed by: ANESTHESIOLOGY

## 2022-10-14 PROCEDURE — 3080F DIAST BP >= 90 MM HG: CPT | Mod: CPTII,,, | Performed by: ANESTHESIOLOGY

## 2022-10-14 PROCEDURE — 3077F PR MOST RECENT SYSTOLIC BLOOD PRESSURE >= 140 MM HG: ICD-10-PCS | Mod: CPTII,,, | Performed by: ANESTHESIOLOGY

## 2022-10-14 PROCEDURE — 99213 PR OFFICE/OUTPT VISIT, EST, LEVL III, 20-29 MIN: ICD-10-PCS | Mod: ,,, | Performed by: ANESTHESIOLOGY

## 2022-10-14 PROCEDURE — 3080F PR MOST RECENT DIASTOLIC BLOOD PRESSURE >= 90 MM HG: ICD-10-PCS | Mod: CPTII,,, | Performed by: ANESTHESIOLOGY

## 2022-10-14 PROCEDURE — 3288F FALL RISK ASSESSMENT DOCD: CPT | Mod: CPTII,,, | Performed by: ANESTHESIOLOGY

## 2022-10-14 PROCEDURE — 4010F ACE/ARB THERAPY RXD/TAKEN: CPT | Mod: CPTII,,, | Performed by: ANESTHESIOLOGY

## 2022-10-14 PROCEDURE — 1101F PT FALLS ASSESS-DOCD LE1/YR: CPT | Mod: CPTII,,, | Performed by: ANESTHESIOLOGY

## 2022-10-14 PROCEDURE — 1159F MED LIST DOCD IN RCRD: CPT | Mod: CPTII,,, | Performed by: ANESTHESIOLOGY

## 2022-10-14 PROCEDURE — 3288F PR FALLS RISK ASSESSMENT DOCUMENTED: ICD-10-PCS | Mod: CPTII,,, | Performed by: ANESTHESIOLOGY

## 2022-10-14 PROCEDURE — 1159F PR MEDICATION LIST DOCUMENTED IN MEDICAL RECORD: ICD-10-PCS | Mod: CPTII,,, | Performed by: ANESTHESIOLOGY

## 2022-10-14 NOTE — PROGRESS NOTES
Gertrude Blanco MD        PATIENT NAME: Melquiades Rehman Jr.  : 1955  DATE: 10/14/22  MRN: [id number]      Billing Provider: Gertrude Blanco MD  Level of Service:   Patient PCP Information       Provider PCP Type    [facility]            Reason for Visit / Chief Complaint: Back Pain (States having lower back pain. Was seen here a year ago. Ibuprofen PRN. Walks for exercise.)       Update PCP  Update Chief Complaint         History of Present Illness / Problem Focused Workflow     Melquiades Rehman Jr. presents to the clinic with Back Pain (States having lower back pain. Was seen here a year ago. Ibuprofen PRN. Walks for exercise.)     This is a 67-year-old male who returns to clinic today for follow up of his chronic low back pain.  He was last seen here over a year ago, at which time he was talking about getting stem cell injections with Dr. Perez.  He had undergone lumbar medial branch blocks here, but states he did not really tested out to see how much relief he was getting from those injections.  He is interested in a radiofrequency ablation at this time.  He does not really have much pain when he is sitting or lying down, but states that when he has been standing at the sink to wash dishes for just 10 minutes, the pain is so severe that he has to sit down.    Back Pain      Review of Systems     Review of Systems   Musculoskeletal:  Positive for back pain.   All other systems reviewed and are negative.     Medical / Social / Family History     Past Medical History:   Diagnosis Date    Central stenosis of spinal canal     CHF (congestive heart failure)     Fatty liver     HTN (hypertension)     Parkinson's disease     Paroxysmal atrial fibrillation     Peyronie disease     Rheumatoid arthritis, unspecified     Type 2 diabetes mellitus without complications        Past Surgical History:   Procedure Laterality Date    CATARACT EXTRACTION      EPIDURAL STEROID INJECTION INTO LUMBAR SPINE       VASECTOMY  1998       Social History  Mr. Rehman  reports that he has quit smoking. He has never used smokeless tobacco. He reports current drug use. Drug: Marijuana. He reports that he does not drink alcohol.    Family History  Mr.'s Rehman family history includes Alzheimer's disease in his father; Autoimmune disease in his sister; Coronary artery disease in his mother; Dementia in his sister; Emphysema in his father; Heart failure in his mother; Pneumonia in his sister.    Medications and Allergies     Medications  Outpatient Medications Marked as Taking for the 10/14/22 encounter (Office Visit) with Gertrude Blanco MD   Medication Sig Dispense Refill    b complex vitamins capsule Take by mouth.      blood sugar diagnostic (ACCU-CHEK GUIDE TEST STRIPS MIS) Accu-Chek Guide test strips      carbidopa-levodopa  mg (SINEMET)  mg per tablet Take 2 tablets by mouth 2 (two) times a day.      coenzyme Q10 100 mg capsule Take 100 mg by mouth once daily.      colchicine (COLCRYS) 0.6 mg tablet Take 1 tablet (0.6 mg total) by mouth 2 (two) times daily. 180 tablet 1    folic acid/multivit-min/lutein (CENTRUM SILVER ORAL) Take 1 tablet by mouth once daily.      gemfibroziL (LOPID) 600 MG tablet Take 600 mg by mouth 2 (two) times daily.      lancets (ACCU-CHEK FASTCLIX LANCET DRUM [facility]) Accu-Chek Fastclix Lancet Drum      OSENI 25-30 mg Tab Take 1 tablet by mouth once daily. 90 tablet 1    pantoprazole (PROTONIX) 40 MG tablet Take 1 tablet (40 mg total) by mouth once daily. 90 tablet 1    rosuvastatin (CRESTOR) 10 MG tablet Take 10 mg by mouth nightly.      XARELTO 20 mg Tab Take 20 mg by mouth once daily.      XELJANZ XR 11 mg Tb24 Take 1 tablet by mouth once daily.      zinc sulfate (ZINC-15 ORAL) Take 140 mg by mouth once daily.         Allergies  Review of patient's allergies indicates:   Allergen Reactions    Sarilumab      Other reaction(s): fainting       Physical Examination     Vitals:     10/14/22 0845   BP: (!) 148/95   Pulse: 89     Spine Musculoskeletal Exam    Gait    Gait is normal.    Inspection    Thoracolumbar    Thoracolumbar inspection is normal.    Range of Motion    Thoracolumbar        Thoracolumbar range of motion additional comments:   Restricted range of motion in the lower back due to pain    Strength    Thoracolumbar    Thoracolumbar motor exam is normal.       Sensory    Thoracolumbar    Thoracolumbar sensation is normal.    General      Constitutional: appears stated age, well-developed and well-nourished    Scleral icterus: no    Labored breathing: no    Psychiatric: normal mood and affect and no acute distress    Neurological: alert and oriented x3    Skin: intact    Lymphadenopathy: none     Assessment and Plan (including Health Maintenance)      Problem List  Smart Sets  Document Outside HM   :    Plan:   Degeneration of lumbar intervertebral disc    Chronic back pain greater than 3 months duration    Lumbar spondylosis        I am scheduling him for bilateral L4-5 and L5-S1 facet diagnostic medial branch blocks.  We discussed proceeding to radiofrequency ablation if he has good but temporary relief with the blocks.  He has had blocks done before, but has been over a year.  The plan was discussed with the patient and he wishes to proceed.    Problem List Items Addressed This Visit          Orthopedic Problems    Degeneration of lumbar intervertebral disc - Primary    Lumbar spondylosis       Other    Chronic back pain greater than 3 months duration         Future Appointments   Date Time Provider Department Center   11/11/2022  8:30 AM [facility], CONY [facility]all[facility]   11/16/2022  8:30 AM Brian Hernandez MD [facility] BRITNI Peña   3/30/2023  7:15 AM DAV[facility] Mandujano [facility] VIPUL Peña        There are no Patient Instructions on file for this visit.  No follow-ups on file.     Signature:  Gertrude Blanco MD      Date of encounter: 10/14/22

## 2022-10-26 ENCOUNTER — TELEPHONE (OUTPATIENT)
Dept: ORTHOPEDICS | Facility: CLINIC | Age: 67
End: 2022-10-26
Payer: MEDICARE

## 2022-10-26 DIAGNOSIS — M47.816 LUMBAR SPONDYLOSIS: Primary | ICD-10-CM

## 2022-10-26 DIAGNOSIS — M51.36 DEGENERATION OF LUMBAR INTERVERTEBRAL DISC: ICD-10-CM

## 2022-10-26 NOTE — TELEPHONE ENCOUNTER
Called patient to schedule procedure no answer. Will try again later. Called patient alternate contact spoke with patient's daughter she states patient doesn't have a working phone but she will give him a message.

## 2022-11-09 DIAGNOSIS — M06.9 RHEUMATOID ARTHRITIS, INVOLVING UNSPECIFIED SITE, UNSPECIFIED WHETHER RHEUMATOID FACTOR PRESENT: Primary | ICD-10-CM

## 2022-11-09 RX ORDER — TOFACITINIB 11 MG/1
1 TABLET, FILM COATED, EXTENDED RELEASE ORAL DAILY
Qty: 30 TABLET | Refills: 0 | Status: SHIPPED | OUTPATIENT
Start: 2022-11-09 | End: 2022-11-16 | Stop reason: SDUPTHER

## 2022-11-09 NOTE — TELEPHONE ENCOUNTER
----- Message from Lola Key sent at 11/9/2022  7:50 AM CST -----  Regarding: Refill  Pt needs refill on [facility]anz [facility] [address]

## 2022-11-10 RX ORDER — IBUPROFEN 100 MG/5ML
1000 SUSPENSION, ORAL (FINAL DOSE FORM) ORAL NIGHTLY
COMMUNITY
End: 2024-01-11

## 2022-11-10 RX ORDER — VITAMIN E 268 MG
400 CAPSULE ORAL NIGHTLY
COMMUNITY
End: 2024-01-11

## 2022-11-10 RX ORDER — CHOLECALCIFEROL (VITAMIN D3) 125 MCG
1000 CAPSULE ORAL NIGHTLY
COMMUNITY
End: 2024-01-11

## 2022-11-10 RX ORDER — POLYETHYLENE GLYCOL 3350 17 G/17G
POWDER, FOR SOLUTION ORAL DAILY
COMMUNITY
End: 2023-03-30 | Stop reason: ALTCHOICE

## 2022-11-10 RX ORDER — CALCIUM CARB, CITRATE, MALATE 250 MG
1 CAPSULE ORAL NIGHTLY
COMMUNITY
End: 2024-01-11

## 2022-11-10 RX ORDER — MAGNESIUM 30 MG
250 TABLET ORAL NIGHTLY
COMMUNITY
End: 2024-01-11

## 2022-11-10 RX ORDER — SYRING-NEEDL,DISP,INSUL,0.3 ML 29 G X1/2"
SYRINGE, EMPTY DISPOSABLE MISCELLANEOUS NIGHTLY
COMMUNITY
End: 2022-11-16 | Stop reason: CLARIF

## 2022-11-13 ENCOUNTER — PATIENT MESSAGE (OUTPATIENT)
Dept: ADMINISTRATIVE | Facility: OTHER | Age: 67
End: 2022-11-13
Payer: MEDICARE

## 2022-11-14 ENCOUNTER — PATIENT MESSAGE (OUTPATIENT)
Dept: ADMINISTRATIVE | Facility: OTHER | Age: 67
End: 2022-11-14
Payer: MEDICARE

## 2022-11-14 ENCOUNTER — ANESTHESIA EVENT (OUTPATIENT)
Dept: SURGERY | Facility: HOSPITAL | Age: 67
End: 2022-11-14
Payer: MEDICARE

## 2022-11-16 ENCOUNTER — OFFICE VISIT (OUTPATIENT)
Dept: RHEUMATOLOGY | Facility: CLINIC | Age: 67
End: 2022-11-16
Payer: MEDICARE

## 2022-11-16 ENCOUNTER — TELEPHONE (OUTPATIENT)
Dept: RHEUMATOLOGY | Facility: CLINIC | Age: 67
End: 2022-11-16

## 2022-11-16 ENCOUNTER — ANESTHESIA (OUTPATIENT)
Dept: SURGERY | Facility: HOSPITAL | Age: 67
End: 2022-11-16
Payer: MEDICARE

## 2022-11-16 ENCOUNTER — HOSPITAL ENCOUNTER (OUTPATIENT)
Dept: RADIOLOGY | Facility: HOSPITAL | Age: 67
Discharge: HOME | End: 2022-11-16
Attending: ANESTHESIOLOGY
Payer: MEDICARE

## 2022-11-16 ENCOUNTER — HOSPITAL ENCOUNTER (OUTPATIENT)
Facility: HOSPITAL | Age: 67
Discharge: HOME | End: 2022-11-16
Attending: ANESTHESIOLOGY | Admitting: ANESTHESIOLOGY
Payer: MEDICARE

## 2022-11-16 VITALS
RESPIRATION RATE: 18 BRPM | OXYGEN SATURATION: 96 % | BODY MASS INDEX: 32.67 KG/M2 | SYSTOLIC BLOOD PRESSURE: 153 MMHG | DIASTOLIC BLOOD PRESSURE: 92 MMHG | TEMPERATURE: 98 F | HEIGHT: 71 IN | HEART RATE: 86 BPM | WEIGHT: 233.38 LBS

## 2022-11-16 DIAGNOSIS — Z79.899 HIGH RISK MEDICATION USE: ICD-10-CM

## 2022-11-16 DIAGNOSIS — I50.9 CONGESTIVE HEART FAILURE, UNSPECIFIED HF CHRONICITY, UNSPECIFIED HEART FAILURE TYPE: ICD-10-CM

## 2022-11-16 DIAGNOSIS — M47.816 LUMBAR SPONDYLOSIS: ICD-10-CM

## 2022-11-16 DIAGNOSIS — K21.9 GASTROESOPHAGEAL REFLUX DISEASE, UNSPECIFIED WHETHER ESOPHAGITIS PRESENT: ICD-10-CM

## 2022-11-16 DIAGNOSIS — M47.816 LUMBAR SPONDYLOSIS: Primary | ICD-10-CM

## 2022-11-16 DIAGNOSIS — M54.9 CHRONIC BACK PAIN GREATER THAN 3 MONTHS DURATION: ICD-10-CM

## 2022-11-16 DIAGNOSIS — K76.0 STEATOSIS OF LIVER: ICD-10-CM

## 2022-11-16 DIAGNOSIS — M48.062 SPINAL STENOSIS OF LUMBAR REGION WITH NEUROGENIC CLAUDICATION: ICD-10-CM

## 2022-11-16 DIAGNOSIS — Z86.73 HISTORY OF CVA (CEREBROVASCULAR ACCIDENT): ICD-10-CM

## 2022-11-16 DIAGNOSIS — M05.9 SEROPOSITIVE RHEUMATOID ARTHRITIS: Primary | ICD-10-CM

## 2022-11-16 DIAGNOSIS — M06.9 RHEUMATOID ARTHRITIS, INVOLVING UNSPECIFIED SITE, UNSPECIFIED WHETHER RHEUMATOID FACTOR PRESENT: ICD-10-CM

## 2022-11-16 DIAGNOSIS — G89.29 CHRONIC BACK PAIN GREATER THAN 3 MONTHS DURATION: ICD-10-CM

## 2022-11-16 LAB — POCT GLUCOSE: 112 MG/DL (ref 70–110)

## 2022-11-16 PROCEDURE — 3077F SYST BP >= 140 MM HG: CPT | Mod: CPTII,,, | Performed by: INTERNAL MEDICINE

## 2022-11-16 PROCEDURE — 3066F NEPHROPATHY DOC TX: CPT | Mod: CPTII,,, | Performed by: INTERNAL MEDICINE

## 2022-11-16 PROCEDURE — 3061F PR NEG MICROALBUMINURIA RESULT DOCUMENTED/REVIEW: ICD-10-PCS | Mod: CPTII,,, | Performed by: INTERNAL MEDICINE

## 2022-11-16 PROCEDURE — 3008F PR BODY MASS INDEX (BMI) DOCUMENTED: ICD-10-PCS | Mod: CPTII,,, | Performed by: INTERNAL MEDICINE

## 2022-11-16 PROCEDURE — 1101F PR PT FALLS ASSESS DOC 0-1 FALLS W/OUT INJ PAST YR: ICD-10-PCS | Mod: CPTII,,, | Performed by: INTERNAL MEDICINE

## 2022-11-16 PROCEDURE — 1159F PR MEDICATION LIST DOCUMENTED IN MEDICAL RECORD: ICD-10-PCS | Mod: CPTII,,, | Performed by: INTERNAL MEDICINE

## 2022-11-16 PROCEDURE — 4010F ACE/ARB THERAPY RXD/TAKEN: CPT | Mod: CPTII,,, | Performed by: INTERNAL MEDICINE

## 2022-11-16 PROCEDURE — 64494 INJ PARAVERT F JNT L/S 2 LEV: CPT | Mod: 50,KX,, | Performed by: ANESTHESIOLOGY

## 2022-11-16 PROCEDURE — 64494 INJ PARAVERT F JNT L/S 2 LEV: CPT | Performed by: ANESTHESIOLOGY

## 2022-11-16 PROCEDURE — 64493 INJ PARAVERT F JNT L/S 1 LEV: CPT | Mod: 50,KX,, | Performed by: ANESTHESIOLOGY

## 2022-11-16 PROCEDURE — 1159F MED LIST DOCD IN RCRD: CPT | Mod: CPTII,,, | Performed by: INTERNAL MEDICINE

## 2022-11-16 PROCEDURE — 64494 PR INJ DX/THER AGNT PARAVERT FACET JOINT,IMG GUIDE,LUMBAR/SAC, 2ND LEVEL: ICD-10-PCS | Mod: 50,KX,, | Performed by: ANESTHESIOLOGY

## 2022-11-16 PROCEDURE — 63600175 PHARM REV CODE 636 W HCPCS: Performed by: NURSE ANESTHETIST, CERTIFIED REGISTERED

## 2022-11-16 PROCEDURE — 3080F PR MOST RECENT DIASTOLIC BLOOD PRESSURE >= 90 MM HG: ICD-10-PCS | Mod: CPTII,,, | Performed by: INTERNAL MEDICINE

## 2022-11-16 PROCEDURE — 63600175 PHARM REV CODE 636 W HCPCS: Performed by: ANESTHESIOLOGY

## 2022-11-16 PROCEDURE — 3288F PR FALLS RISK ASSESSMENT DOCUMENTED: ICD-10-PCS | Mod: CPTII,,, | Performed by: INTERNAL MEDICINE

## 2022-11-16 PROCEDURE — 99215 OFFICE O/P EST HI 40 MIN: CPT | Mod: PBBFAC | Performed by: INTERNAL MEDICINE

## 2022-11-16 PROCEDURE — 25000003 PHARM REV CODE 250: Performed by: NURSE ANESTHETIST, CERTIFIED REGISTERED

## 2022-11-16 PROCEDURE — 64493 INJ PARAVERT F JNT L/S 1 LEV: CPT | Performed by: ANESTHESIOLOGY

## 2022-11-16 PROCEDURE — 3061F NEG MICROALBUMINURIA REV: CPT | Mod: CPTII,,, | Performed by: INTERNAL MEDICINE

## 2022-11-16 PROCEDURE — 37000008 HC ANESTHESIA 1ST 15 MINUTES: Performed by: ANESTHESIOLOGY

## 2022-11-16 PROCEDURE — 99214 PR OFFICE/OUTPT VISIT, EST, LEVL IV, 30-39 MIN: ICD-10-PCS | Mod: S$PBB,,, | Performed by: INTERNAL MEDICINE

## 2022-11-16 PROCEDURE — 3066F PR DOCUMENTATION OF TREATMENT FOR NEPHROPATHY: ICD-10-PCS | Mod: CPTII,,, | Performed by: INTERNAL MEDICINE

## 2022-11-16 PROCEDURE — 3077F PR MOST RECENT SYSTOLIC BLOOD PRESSURE >= 140 MM HG: ICD-10-PCS | Mod: CPTII,,, | Performed by: INTERNAL MEDICINE

## 2022-11-16 PROCEDURE — 25000003 PHARM REV CODE 250: Performed by: ANESTHESIOLOGY

## 2022-11-16 PROCEDURE — 3288F FALL RISK ASSESSMENT DOCD: CPT | Mod: CPTII,,, | Performed by: INTERNAL MEDICINE

## 2022-11-16 PROCEDURE — 64493 PR INJ DX/THER AGNT PARAVERT FACET JOINT,IMG GUIDE,LUMBAR/SAC,1ST LVL: ICD-10-PCS | Mod: 50,KX,, | Performed by: ANESTHESIOLOGY

## 2022-11-16 PROCEDURE — 99214 OFFICE O/P EST MOD 30 MIN: CPT | Mod: S$PBB,,, | Performed by: INTERNAL MEDICINE

## 2022-11-16 PROCEDURE — 82962 GLUCOSE BLOOD TEST: CPT | Performed by: ANESTHESIOLOGY

## 2022-11-16 PROCEDURE — 3080F DIAST BP >= 90 MM HG: CPT | Mod: CPTII,,, | Performed by: INTERNAL MEDICINE

## 2022-11-16 PROCEDURE — 4010F PR ACE/ARB THEARPY RXD/TAKEN: ICD-10-PCS | Mod: CPTII,,, | Performed by: INTERNAL MEDICINE

## 2022-11-16 PROCEDURE — 76000 FLUOROSCOPY <1 HR PHYS/QHP: CPT | Mod: TC

## 2022-11-16 PROCEDURE — 1101F PT FALLS ASSESS-DOCD LE1/YR: CPT | Mod: CPTII,,, | Performed by: INTERNAL MEDICINE

## 2022-11-16 PROCEDURE — 3008F BODY MASS INDEX DOCD: CPT | Mod: CPTII,,, | Performed by: INTERNAL MEDICINE

## 2022-11-16 RX ORDER — HYDROMORPHONE HYDROCHLORIDE 2 MG/ML
0.4 INJECTION, SOLUTION INTRAMUSCULAR; INTRAVENOUS; SUBCUTANEOUS EVERY 5 MIN PRN
Status: CANCELLED | OUTPATIENT
Start: 2022-11-16

## 2022-11-16 RX ORDER — LIDOCAINE HYDROCHLORIDE 10 MG/ML
INJECTION, SOLUTION EPIDURAL; INFILTRATION; INTRACAUDAL; PERINEURAL
Status: DISCONTINUED
Start: 2022-11-16 | End: 2022-11-16 | Stop reason: HOSPADM

## 2022-11-16 RX ORDER — LIDOCAINE HYDROCHLORIDE 10 MG/ML
INJECTION INFILTRATION; PERINEURAL
Status: DISCONTINUED
Start: 2022-11-16 | End: 2022-11-16 | Stop reason: HOSPADM

## 2022-11-16 RX ORDER — PREDNISONE 5 MG/1
5 TABLET ORAL DAILY PRN
Qty: 30 TABLET | Refills: 5 | Status: SHIPPED | OUTPATIENT
Start: 2022-11-16 | End: 2023-03-30

## 2022-11-16 RX ORDER — TRIAMCINOLONE ACETONIDE 40 MG/ML
INJECTION, SUSPENSION INTRA-ARTICULAR; INTRAMUSCULAR
Status: DISCONTINUED | OUTPATIENT
Start: 2022-11-16 | End: 2022-11-16 | Stop reason: HOSPADM

## 2022-11-16 RX ORDER — PREGABALIN 25 MG/1
25 CAPSULE ORAL 2 TIMES DAILY
Qty: 60 CAPSULE | Refills: 5 | Status: SHIPPED | OUTPATIENT
Start: 2022-11-16 | End: 2023-03-30

## 2022-11-16 RX ORDER — TOFACITINIB 11 MG/1
1 TABLET, FILM COATED, EXTENDED RELEASE ORAL DAILY
Qty: 30 TABLET | Refills: 11 | Status: SHIPPED | OUTPATIENT
Start: 2022-11-16 | End: 2023-03-21 | Stop reason: SDUPTHER

## 2022-11-16 RX ORDER — MIDAZOLAM HYDROCHLORIDE 1 MG/ML
2 INJECTION INTRAMUSCULAR; INTRAVENOUS ONCE AS NEEDED
Status: CANCELLED | OUTPATIENT
Start: 2022-11-16 | End: 2034-04-14

## 2022-11-16 RX ORDER — ACETAMINOPHEN 325 MG/1
650 TABLET ORAL EVERY 4 HOURS PRN
Status: CANCELLED | OUTPATIENT
Start: 2022-11-16

## 2022-11-16 RX ORDER — ONDANSETRON 2 MG/ML
4 INJECTION INTRAMUSCULAR; INTRAVENOUS DAILY PRN
Status: CANCELLED | OUTPATIENT
Start: 2022-11-16

## 2022-11-16 RX ORDER — LIDOCAINE HYDROCHLORIDE 20 MG/ML
INJECTION, SOLUTION EPIDURAL; INFILTRATION; INTRACAUDAL; PERINEURAL
Status: DISCONTINUED | OUTPATIENT
Start: 2022-11-16 | End: 2022-11-16

## 2022-11-16 RX ORDER — BUPIVACAINE HYDROCHLORIDE 2.5 MG/ML
INJECTION, SOLUTION EPIDURAL; INFILTRATION; INTRACAUDAL
Status: DISCONTINUED
Start: 2022-11-16 | End: 2022-11-16 | Stop reason: HOSPADM

## 2022-11-16 RX ORDER — BUPIVACAINE HYDROCHLORIDE 5 MG/ML
INJECTION, SOLUTION EPIDURAL; INTRACAUDAL
Status: DISCONTINUED | OUTPATIENT
Start: 2022-11-16 | End: 2022-11-16 | Stop reason: HOSPADM

## 2022-11-16 RX ORDER — SODIUM CHLORIDE, SODIUM GLUCONATE, SODIUM ACETATE, POTASSIUM CHLORIDE AND MAGNESIUM CHLORIDE 30; 37; 368; 526; 502 MG/100ML; MG/100ML; MG/100ML; MG/100ML; MG/100ML
INJECTION, SOLUTION INTRAVENOUS CONTINUOUS
Status: CANCELLED | OUTPATIENT
Start: 2022-11-16 | End: 2022-12-16

## 2022-11-16 RX ORDER — ONDANSETRON 4 MG/1
4 TABLET, ORALLY DISINTEGRATING ORAL ONCE
Status: CANCELLED | OUTPATIENT
Start: 2022-11-16 | End: 2022-11-16

## 2022-11-16 RX ORDER — HYDROCODONE BITARTRATE AND ACETAMINOPHEN 5; 325 MG/1; MG/1
1 TABLET ORAL EVERY 4 HOURS PRN
Status: CANCELLED | OUTPATIENT
Start: 2022-11-16

## 2022-11-16 RX ORDER — LISINOPRIL AND HYDROCHLOROTHIAZIDE 20; 25 MG/1; MG/1
1 TABLET ORAL
COMMUNITY
End: 2024-02-08 | Stop reason: SDUPTHER

## 2022-11-16 RX ORDER — LIDOCAINE HYDROCHLORIDE 10 MG/ML
1 INJECTION, SOLUTION EPIDURAL; INFILTRATION; INTRACAUDAL; PERINEURAL ONCE
Status: CANCELLED | OUTPATIENT
Start: 2022-11-16 | End: 2022-11-16

## 2022-11-16 RX ORDER — MEPERIDINE HYDROCHLORIDE 25 MG/ML
12.5 INJECTION INTRAMUSCULAR; INTRAVENOUS; SUBCUTANEOUS ONCE
Status: CANCELLED | OUTPATIENT
Start: 2022-11-16 | End: 2022-11-16

## 2022-11-16 RX ORDER — DIPHENHYDRAMINE HYDROCHLORIDE 50 MG/ML
25 INJECTION INTRAMUSCULAR; INTRAVENOUS ONCE
Status: CANCELLED | OUTPATIENT
Start: 2022-11-16 | End: 2022-11-16

## 2022-11-16 RX ORDER — PROPOFOL 10 MG/ML
VIAL (ML) INTRAVENOUS
Status: DISCONTINUED | OUTPATIENT
Start: 2022-11-16 | End: 2022-11-16

## 2022-11-16 RX ORDER — TRIAMCINOLONE ACETONIDE 40 MG/ML
INJECTION, SUSPENSION INTRA-ARTICULAR; INTRAMUSCULAR
Status: DISCONTINUED
Start: 2022-11-16 | End: 2022-11-16 | Stop reason: HOSPADM

## 2022-11-16 RX ADMIN — PROPOFOL 50 MG: 10 INJECTION, EMULSION INTRAVENOUS at 01:11

## 2022-11-16 RX ADMIN — LIDOCAINE HYDROCHLORIDE 50 MG: 20 INJECTION, SOLUTION EPIDURAL; INFILTRATION; INTRACAUDAL; PERINEURAL at 01:11

## 2022-11-16 NOTE — PLAN OF CARE
Preparing patient for discharge. Vital signs stable. Patient reports no pain. Patient and spouse verbalize understanding of discharge instructions.

## 2022-11-16 NOTE — TELEPHONE ENCOUNTER
Attempted to contact patient to schedule 4-month follow-up with Dr. Donovan. Left message for callback.

## 2022-11-16 NOTE — DISCHARGE SUMMARY
[facility] - Periop Services  Discharge Note  Short Stay    Procedure(s) (LRB):  Block-nerve-medial branch-lumbar (Bilateral)      OUTCOME: Patient tolerated treatment/procedure well without complication and is now ready for discharge.    DISPOSITION: Home or Self Care    FINAL DIAGNOSIS:  <principal problem not specified>    FOLLOWUP: In clinic    DISCHARGE INSTRUCTIONS:  No discharge procedures on file.     TIME SPENT ON DISCHARGE: 5 minutes

## 2022-11-16 NOTE — PROGRESS NOTES
"Subjective:       Patient ID: Melquiades Rehman Jr. is a 67 y.o. male.    Chief Complaint: New patient, RA    Pt  is complaining of joint pain involving his MCP PIP wrist elbow shoulders hips knees and ankles bilaterally.  Is 9/10 in intensity dull in quality and continuous.  It is associated with a morning stiffness lasting for more than 60 minutes. Pt reports having difficulty maintaining a good night of sleep and this has been associated with myalgia of 9/10 in intensity.  This pain is dull continuous and gets worse mainly at night.  It is associated with fatigue.  No fever no chills no others.        Review of Systems   Constitutional:  Negative for appetite change, chills and fever.   HENT:  Negative for congestion, ear pain, mouth sores, nosebleeds and trouble swallowing.    Eyes:  Negative for photophobia and discharge.   Respiratory:  Negative for chest tightness and shortness of breath.    Cardiovascular:  Negative for chest pain.   Gastrointestinal:  Negative for abdominal pain and vomiting.   Endocrine: Negative.    Genitourinary:  Negative for hematuria.   Musculoskeletal:         As per HPI   Skin:  Negative for rash.   Neurological:  Negative for weakness.       Objective:   BP (!) 153/92 (BP Location: Right arm, Patient Position: Sitting, BP Method: Medium (Automatic))   Pulse 86   Temp 97.8 °F (36.6 °C) (Oral)   Resp 18   Ht 5' 11" (1.803 m)   Wt 105.9 kg (233 lb 6.4 oz)   SpO2 96%   BMI 32.55 kg/m²      Physical Exam   Constitutional: He is oriented to person, place, and time. He appears well-developed and well-nourished. No distress.   HENT:   Head: Normocephalic and atraumatic.   Right Ear: External ear normal.   Left Ear: External ear normal.   Eyes: Pupils are equal, round, and reactive to light.   Cardiovascular: Normal rate, regular rhythm and normal heart sounds.   Pulmonary/Chest: Breath sounds normal.   Abdominal: Soft. There is no abdominal tenderness.   Musculoskeletal:      Right " shoulder: Tenderness present.      Left shoulder: Tenderness present.      Right elbow: Tenderness present.      Left elbow: Tenderness present.      Right wrist: Tenderness present.      Left wrist: Tenderness present.      Cervical back: Neck supple.      Right hip: Tenderness present.      Left hip: Tenderness present.      Right knee: Tenderness present.      Left knee: Tenderness present.      Right ankle: Tenderness present.      Left ankle: Tenderness present.   Lymphadenopathy:     He has no cervical adenopathy.   Neurological: He is alert and oriented to person, place, and time. He displays normal reflexes. No cranial nerve deficit or sensory deficit. He exhibits normal muscle tone. Coordination normal.   Skin: No rash noted. No erythema.   Vitals reviewed.      Right Side Rheumatological Exam     The patient is tender to palpation of the shoulder, elbow, wrist, knee, 1st PIP, 1st MCP, 2nd PIP, 2nd MCP, 3rd PIP, 3rd MCP, 4th PIP, 4th MCP, 5th PIP, hip, ankle, 1st MTP, 2nd MTP, 3rd MTP, 4th MTP, 5th MTP, 1st toe IP, 2nd toe IP, 3rd toe IP, 4th toe IP and 5th toe IP    Left Side Rheumatological Exam     The patient is tender to palpation of the shoulder, elbow, wrist, knee, 1st PIP, 1st MCP, 2nd PIP, 2nd MCP, 3rd PIP, 3rd MCP, 4th PIP, 4th MCP, 5th PIP, 5th MCP, hip, ankle, 1st MTP, 2nd MTP, 3rd MTP, 4th MTP, 5th MTP, 1st toe IP, 2nd toe IP, 3rd toe IP, 4th toe IP and 5th toe IP.       Completed Fibromyalgia exam 18/18 tender points.  No data to display     Assessment:       1. Seropositive rheumatoid arthritis    2. High risk medication use    3. Gastroesophageal reflux disease, unspecified whether esophagitis present    4. Congestive heart failure, unspecified HF chronicity, unspecified heart failure type    5. History of CVA (cerebrovascular accident)    6. Spinal stenosis of lumbar region with neurogenic claudication    7. Steatosis of liver    8. Rheumatoid arthritis, involving unspecified site,  unspecified whether rheumatoid factor present            Medication List with Changes/Refills   New Medications    PREDNISONE (DELTASONE) 5 MG TABLET    Take 1 tablet (5 mg total) by mouth daily as needed (flare up). AFTER BREAKFAST       Start Date: 11/16/2022End Date: --    PREGABALIN (LYRICA) 25 MG CAPSULE    Take 1 capsule (25 mg total) by mouth 2 (two) times daily.       Start Date: 11/16/2022End Date: 5/17/2023   Current Medications    ASCORBIC ACID, VITAMIN C, (VITAMIN C) 1000 MG TABLET    Take 1,000 mg by mouth every evening.       Start Date: --        End Date: --    B COMPLEX VITAMINS CAPSULE    Take 1 capsule by mouth nightly.       Start Date: 6/15/2021 End Date: --    BLOOD SUGAR DIAGNOSTIC (ACCU-CHEK GUIDE TEST STRIPS MISC)    Accu-Chek Guide test strips       Start Date: --        End Date: --    CARBIDOPA-LEVODOPA  MG (SINEMET)  MG PER TABLET    Take 2 tablets by mouth 2 (two) times a day.       Start Date: 6/17/2022 End Date: --    CHOLECALCIFEROL, VITAMIN D3, 125 MCG (5,000 UNIT) CAPSULE    Take 1,000 Units by mouth nightly.       Start Date: --        End Date: --    COENZYME Q10 100 MG CAPSULE    Take 100 mg by mouth every evening.       Start Date: 6/15/2021 End Date: --    COLCHICINE (COLCRYS) 0.6 MG TABLET    Take 1 tablet (0.6 mg total) by mouth 2 (two) times daily.       Start Date: 9/30/2022 End Date: 3/29/2023    DOCUSATE SODIUM (COLACE) 50 MG CAPSULE    Take 100 mg by mouth Daily.       Start Date: --        End Date: --    FOLIC ACID/MULTIVIT-MIN/LUTEIN (CENTRUM SILVER ORAL)    Take 1 tablet by mouth every evening.       Start Date: 6/15/2021 End Date: --    GEMFIBROZIL (LOPID) 600 MG TABLET    Take 600 mg by mouth 2 (two) times daily.       Start Date: 6/9/2022  End Date: --    LANCETS (ACCU-CHEK FASTCLIX LANCET DRUM [facility])    Accu-Chek Fastclix Lancet Drum       Start Date: --        End Date: --    MAGNESIUM 30 MG TAB    Take 250 mg by mouth nightly.       Start Date: --         End Date: --    MAGNESIUM CITRATE SOLUTION    Take by mouth nightly.       Start Date: --        End Date: --    NON FORMULARY MEDICATION    Take 1 tablet by mouth nightly.       Start Date: --        End Date: --    OSENI 25-30 MG TAB    Take 1 tablet by mouth once daily.       Start Date: 9/30/2022 End Date: 3/29/2023    PANTOPRAZOLE (PROTONIX) 40 MG TABLET    Take 1 tablet (40 mg total) by mouth once daily.       Start Date: 9/30/2022 End Date: --    POLYETHYLENE GLYCOL (GLYCOLAX) 17 GRAM PWPK    Take by mouth once daily.       Start Date: --        End Date: --    POTASSIUM CITRATE 99 MG CAP    Take 1 tablet by mouth nightly.       Start Date: --        End Date: --    ROSUVASTATIN (CRESTOR) 10 MG TABLET    Take 10 mg by mouth once daily.       Start Date: 5/28/2022 End Date: --    VITAMIN E 400 UNIT CAPSULE    Take 400 Units by mouth nightly.       Start Date: --        End Date: --    XARELTO 20 MG TAB    Take 20 mg by mouth once daily.       Start Date: 5/29/2022 End Date: --    ZINC SULFATE (ZINC-15 ORAL)    Take 140 mg by mouth nightly.       Start Date: 2/8/2022  End Date: --   Changed and/or Refilled Medications    Modified Medication Previous Medication    TOFACITINIB (XELJANZ XR) 11 MG TB24 tofacitinib (XELJANZ XR) 11 mg Tb24       Take 1 tablet by mouth once daily.    Take 1 tablet by mouth once daily.       Start Date: 11/16/2022End Date: --    Start Date: 11/9/2022 End Date: 11/16/2022         Plan:       Problem List Items Addressed This Visit          Neuro    History of CVA (cerebrovascular accident)    Relevant Medications    tofacitinib (XELJANZ XR) 11 mg Tb24    pregabalin (LYRICA) 25 MG capsule    predniSONE (DELTASONE) 5 MG tablet    Spinal stenosis of lumbar region    Relevant Medications    tofacitinib (XELJANZ XR) 11 mg Tb24    pregabalin (LYRICA) 25 MG capsule    predniSONE (DELTASONE) 5 MG tablet       Cardiac/Vascular    Congestive heart failure    Relevant Medications     tofacitinib (XELJANZ XR) 11 mg Tb24    pregabalin (LYRICA) 25 MG capsule    predniSONE (DELTASONE) 5 MG tablet       Immunology/Multi System    Rheumatoid arthritis    Relevant Medications    tofacitinib (XELJANZ XR) 11 mg Tb24    pregabalin (LYRICA) 25 MG capsule    predniSONE (DELTASONE) 5 MG tablet       GI    GERD (gastroesophageal reflux disease)    Relevant Medications    tofacitinib (XELJANZ XR) 11 mg Tb24    pregabalin (LYRICA) 25 MG capsule    predniSONE (DELTASONE) 5 MG tablet    Steatosis of liver    Relevant Medications    tofacitinib (XELJANZ XR) 11 mg Tb24    pregabalin (LYRICA) 25 MG capsule    predniSONE (DELTASONE) 5 MG tablet       Palliative Care    High risk medication use    Relevant Medications    tofacitinib (XELJANZ XR) 11 mg Tb24    pregabalin (LYRICA) 25 MG capsule    predniSONE (DELTASONE) 5 MG tablet     Other Visit Diagnoses       Seropositive rheumatoid arthritis    -  Primary    Relevant Medications    tofacitinib (XELJANZ XR) 11 mg Tb24    pregabalin (LYRICA) 25 MG capsule    predniSONE (DELTASONE) 5 MG tablet

## 2022-11-16 NOTE — H&P
Reason for Visit / Chief Complaint: Back Pain (States having lower back pain. Was seen here a year ago. Ibuprofen PRN. Walks for exercise.)         Update PCP   Update Chief Complaint             History of Present Illness / Problem Focused Workflow      Melquiades Rehman Jr. presents to the clinic with Back Pain (States having lower back pain. Was seen here a year ago. Ibuprofen PRN. Walks for exercise.)     This is a 67-year-old male who returns to clinic today for follow up of his chronic low back pain.  He was last seen here over a year ago, at which time he was talking about getting stem cell injections with Dr. Perez.  He had undergone lumbar medial branch blocks here, but states he did not really tested out to see how much relief he was getting from those injections.  He is interested in a radiofrequency ablation at this time.  He does not really have much pain when he is sitting or lying down, but states that when he has been standing at the sink to wash dishes for just 10 minutes, the pain is so severe that he has to sit down.     Back Pain        Review of Systems      Review of Systems   Musculoskeletal:  Positive for back pain.   All other systems reviewed and are negative.      Medical / Social / Family History           Past Medical History:   Diagnosis Date    Central stenosis of spinal canal      CHF (congestive heart failure)      Fatty liver      HTN (hypertension)      Parkinson's disease      Paroxysmal atrial fibrillation      Peyronie disease      Rheumatoid arthritis, unspecified      Type 2 diabetes mellitus without complications                 Past Surgical History:   Procedure Laterality Date    CATARACT EXTRACTION        EPIDURAL STEROID INJECTION INTO LUMBAR SPINE        VASECTOMY   1998         Social History  Mr. Rehman  reports that he has quit smoking. He has never used smokeless tobacco. He reports current drug use. Drug: Marijuana. He reports that he does not drink alcohol.      Family History  Mr.'s Rehman family history includes Alzheimer's disease in his father; Autoimmune disease in his sister; Coronary artery disease in his mother; Dementia in his sister; Emphysema in his father; Heart failure in his mother; Pneumonia in his sister.     Medications and Allergies      Medications         Outpatient Medications Marked as Taking for the 10/14/22 encounter (Office Visit) with Gertrude Blanco MD   Medication Sig Dispense Refill    b complex vitamins capsule Take by mouth.        blood sugar diagnostic (ACCU-CHEK GUIDE TEST STRIPS MISC) Accu-Chek Guide test strips        carbidopa-levodopa  mg (SINEMET)  mg per tablet Take 2 tablets by mouth 2 (two) times a day.        coenzyme Q10 100 mg capsule Take 100 mg by mouth once daily.        colchicine (COLCRYS) 0.6 mg tablet Take 1 tablet (0.6 mg total) by mouth 2 (two) times daily. 180 tablet 1    folic acid/multivit-min/lutein (CENTRUM SILVER ORAL) Take 1 tablet by mouth once daily.        gemfibroziL (LOPID) 600 MG tablet Take 600 mg by mouth 2 (two) times daily.        lancets (ACCU-CHEK FASTCLIX LANCET DRUM [facility]) Accu-Chek Fastclix Lancet Drum        OSENI 25-30 mg Tab Take 1 tablet by mouth once daily. 90 tablet 1    pantoprazole (PROTONIX) 40 MG tablet Take 1 tablet (40 mg total) by mouth once daily. 90 tablet 1    rosuvastatin (CRESTOR) 10 MG tablet Take 10 mg by mouth nightly.        XARELTO 20 mg Tab Take 20 mg by mouth once daily.        XELJANZ XR 11 mg Tb24 Take 1 tablet by mouth once daily.        zinc sulfate (ZINC-15 ORAL) Take 140 mg by mouth once daily.             Allergies        Review of patient's allergies indicates:   Allergen Reactions    Sarilumab         Other reaction(s): fainting         Physical Examination          Vitals:     10/14/22 0845   BP: (!) 148/95   Pulse: 89      Spine Musculoskeletal Exam     Gait    Gait is normal.     Inspection    Thoracolumbar    Thoracolumbar inspection is normal.      Range of Motion    Thoracolumbar        Thoracolumbar range of motion additional comments:   Restricted range of motion in the lower back due to pain     Strength    Thoracolumbar    Thoracolumbar motor exam is normal.        Sensory    Thoracolumbar    Thoracolumbar sensation is normal.     General      Constitutional: appears stated age, well-developed and well-nourished    Scleral icterus: no    Labored breathing: no    Psychiatric: normal mood and affect and no acute distress    Neurological: alert and oriented x3    Skin: intact    Lymphadenopathy: none      Assessment and Plan (including Health Maintenance)       Problem List   Smart Sets   Document Outside HM   :     Plan:   Degeneration of lumbar intervertebral disc     Chronic back pain greater than 3 months duration     Lumbar spondylosis         I am scheduling him for bilateral L4-5 and L5-S1 facet diagnostic medial branch blocks.  We discussed proceeding to radiofrequency ablation if he has good but temporary relief with the blocks.  He has had blocks done before, but has been over a year.  The plan was discussed with the patient and he wishes to proceed.

## 2022-11-16 NOTE — ANESTHESIA POSTPROCEDURE EVALUATION
Anesthesia Post Evaluation    Patient: Melquiades Rehman Jr.    Procedure(s) Performed: Procedure(s) (LRB):  Block-nerve-medial branch-lumbar (Bilateral)    Final Anesthesia Type: general      Patient location during evaluation: PACU  Patient participation: Yes- Able to Participate  Level of consciousness: awake and alert and oriented  Post-procedure vital signs: reviewed and stable  Pain management: adequate  Airway patency: patent  CAITLYN mitigation strategies: Verification of full reversal of neuromuscular block  PONV status at discharge: No PONV  Anesthetic complications: no      Cardiovascular status: blood pressure returned to baseline and stable  Respiratory status: spontaneous ventilation and unassisted  Hydration status: euvolemic  Follow-up not needed.  Comments: [facility]          Vitals Value Taken Time   /98 11/16/22 1432     11/16/22 1538   Pulse 84 11/16/22 1433   Resp 18 11/16/22 1425   SpO2 96 % 11/16/22 1433   Vitals shown include unvalidated device data.      No case tracking events are documented in the log.      Pain/Reese Score: Modified Reese Score: 20 (11/16/2022  2:34 PM)

## 2022-11-16 NOTE — TRANSFER OF CARE
"Anesthesia Transfer of Care Note    Patient: Melquiades Rehman Jr.    Procedure(s) Performed: Procedure(s) (LRB):  Block-nerve-medial branch-lumbar (Bilateral)    Patient location: OPS    Anesthesia Type: MAC    Transport from OR: Transported from OR on room air with adequate spontaneous ventilation    Post pain: adequate analgesia    Post assessment: no apparent anesthetic complications    Post vital signs: stable    Level of consciousness: awake    Complications: none    Transfer of care protocol was followed      Last vitals:   Visit Vitals  BP (!) 157/100   Ht 5' 10.98" (1.803 m)   Wt 103.4 kg (228 lb)   BMI 31.81 kg/m²     "

## 2022-11-16 NOTE — OP NOTE
Bilateral L4-5 and L5-S1 facet diagnostic medial branch blocks    Pre-Procedure Diagnoses:  1. Chronic pain syndrome  2. Low back pain  3. Lumbar facet arthropathy    Post-Procedure Diagnoses:  1. Chronic pain syndrome  2. Low back pain  3. Lumbar facet arthropathy    Anesthesia:  Local and MAC    Estimated Blood Loss:  None    Complications:  None    Informed Consent:  The procedure, risks, benefits, and alternatives were discussed with the patient. There were no contraindications to the procedure. The patient expressed understanding and agreed to proceed. Fully informed written consent was obtained.     Description of the Procedure:  The patient was taken to the operating room. IV access was obtained prior to the start of the procedure. The patient was positioned prone on the fluoroscopy table. Continuous hemodynamic monitoring was initiated and continued throughout the duration of the procedure. The skin overlying the lumbosacral spine was prepped with Chloraprep and draped into a sterile field. Fluoroscopy was used to identify the locations of the left side L3, L4, and L5 medial branch nerves at the junctions of the superior articular processes and transverse processes of L4, L5, and the sacral ala, respectively. Skin anesthesia was achieved using 0.5 mL of 1% lidocaine over each injection site. A 22-gauge 3.5-inch Quinke spinal needle was slowly inserted and advanced under intermittent fluoroscopic guidance until it made bony contact at the target sites. Proper needle position was confirmed under AP, oblique, and lateral fluoroscopic views. Negative aspiration for blood or CSF was confirmed. Then 0.25% bupivacaine was easily injected at each level. There was no pain on injection. The needles were removed intact and bleeding was nil. The same procedure was repeated in identical fashion on the right side. Sterile bandages were applied. The patient was taken to the recovery room for further observation in stable  condition. The patient was then discharged home without any complications.

## 2022-11-16 NOTE — ANESTHESIA PREPROCEDURE EVALUATION
11/16/2022  Melquiades Rehman Jr. is a 67 y.o., male.    Block-nerve-medial branch-lumbar (Bilateral)    Pre-op Assessment    I have reviewed the Patient Summary Reports.     I have reviewed the Nursing Notes. I have reviewed the NPO Status.   I have reviewed the Medications.     Review of Systems  Anesthesia Hx:  No problems with previous Anesthesia    Hematology/Oncology:  Hematology Normal   Oncology Normal     EENT/Dental:EENT/Dental Normal   Cardiovascular:   Exercise tolerance: good Hypertension CHF  Functional Capacity good / => 4 METS    Pulmonary:  Pulmonary Normal    Renal/:   Denies Chronic Renal Disease.     Hepatic/GI:   GERD Liver Disease,    Musculoskeletal:  Musculoskeletal Normal    Neurological:   Neuromuscular Disease,    Endocrine:   Diabetes  Denies Morbid Obesity / BMI > 40  Dermatological:  Skin Normal    Psych:  Psychiatric Normal           Physical Exam  General: Alert, Oriented, Well nourished and Cooperative    Airway:  Mallampati: II   Mouth Opening: Normal  TM Distance: Normal  Tongue: Normal  Neck ROM: Normal ROM    Dental:  Intact    Chest/Lungs:  Clear to auscultation, Normal Respiratory Rate    Heart:  Rate: Normal  Rhythm: Regular Rhythm        Anesthesia Plan  Type of Anesthesia, risks & benefits discussed:    Anesthesia Type: Gen Natural Airway, MAC  Intra-op Monitoring Plan: Standard ASA Monitors  Post Op Pain Control Plan: multimodal analgesia  Induction:  IV  Airway Plan: Direct  Informed Consent: Informed consent signed with the Patient and all parties understand the risks and agree with anesthesia plan.  All questions answered. Patient consented to blood products? Yes  ASA Score: 3  Day of Surgery Review of History & Physical: H&P Update referred to the surgeon/provider.    Ready For Surgery From Anesthesia Perspective.     .

## 2022-11-17 VITALS
WEIGHT: 228 LBS | HEART RATE: 86 BPM | DIASTOLIC BLOOD PRESSURE: 90 MMHG | SYSTOLIC BLOOD PRESSURE: 153 MMHG | BODY MASS INDEX: 31.92 KG/M2 | RESPIRATION RATE: 18 BRPM | OXYGEN SATURATION: 97 % | HEIGHT: 71 IN

## 2022-12-06 ENCOUNTER — OFFICE VISIT (OUTPATIENT)
Dept: ORTHOPEDICS | Facility: CLINIC | Age: 67
End: 2022-12-06
Payer: MEDICARE

## 2022-12-06 VITALS
DIASTOLIC BLOOD PRESSURE: 84 MMHG | BODY MASS INDEX: 32.62 KG/M2 | SYSTOLIC BLOOD PRESSURE: 122 MMHG | HEART RATE: 86 BPM | WEIGHT: 233 LBS | HEIGHT: 71 IN

## 2022-12-06 DIAGNOSIS — M51.36 DEGENERATION OF LUMBAR INTERVERTEBRAL DISC: Primary | ICD-10-CM

## 2022-12-06 DIAGNOSIS — M54.9 CHRONIC BACK PAIN GREATER THAN 3 MONTHS DURATION: ICD-10-CM

## 2022-12-06 DIAGNOSIS — G89.29 CHRONIC BACK PAIN GREATER THAN 3 MONTHS DURATION: ICD-10-CM

## 2022-12-06 DIAGNOSIS — M54.9 DORSALGIA, UNSPECIFIED: ICD-10-CM

## 2022-12-06 DIAGNOSIS — M47.816 LUMBAR SPONDYLOSIS: ICD-10-CM

## 2022-12-06 PROCEDURE — 3061F NEG MICROALBUMINURIA REV: CPT | Mod: CPTII,,, | Performed by: ANESTHESIOLOGY

## 2022-12-06 PROCEDURE — 1101F PR PT FALLS ASSESS DOC 0-1 FALLS W/OUT INJ PAST YR: ICD-10-PCS | Mod: CPTII,,, | Performed by: ANESTHESIOLOGY

## 2022-12-06 PROCEDURE — 3079F PR MOST RECENT DIASTOLIC BLOOD PRESSURE 80-89 MM HG: ICD-10-PCS | Mod: CPTII,,, | Performed by: ANESTHESIOLOGY

## 2022-12-06 PROCEDURE — 3066F NEPHROPATHY DOC TX: CPT | Mod: CPTII,,, | Performed by: ANESTHESIOLOGY

## 2022-12-06 PROCEDURE — 3061F PR NEG MICROALBUMINURIA RESULT DOCUMENTED/REVIEW: ICD-10-PCS | Mod: CPTII,,, | Performed by: ANESTHESIOLOGY

## 2022-12-06 PROCEDURE — 3074F SYST BP LT 130 MM HG: CPT | Mod: CPTII,,, | Performed by: ANESTHESIOLOGY

## 2022-12-06 PROCEDURE — 99213 OFFICE O/P EST LOW 20 MIN: CPT | Mod: ,,, | Performed by: ANESTHESIOLOGY

## 2022-12-06 PROCEDURE — 3079F DIAST BP 80-89 MM HG: CPT | Mod: CPTII,,, | Performed by: ANESTHESIOLOGY

## 2022-12-06 PROCEDURE — 1101F PT FALLS ASSESS-DOCD LE1/YR: CPT | Mod: CPTII,,, | Performed by: ANESTHESIOLOGY

## 2022-12-06 PROCEDURE — 1160F RVW MEDS BY RX/DR IN RCRD: CPT | Mod: CPTII,,, | Performed by: ANESTHESIOLOGY

## 2022-12-06 PROCEDURE — 1159F PR MEDICATION LIST DOCUMENTED IN MEDICAL RECORD: ICD-10-PCS | Mod: CPTII,,, | Performed by: ANESTHESIOLOGY

## 2022-12-06 PROCEDURE — 3008F PR BODY MASS INDEX (BMI) DOCUMENTED: ICD-10-PCS | Mod: CPTII,,, | Performed by: ANESTHESIOLOGY

## 2022-12-06 PROCEDURE — 3288F FALL RISK ASSESSMENT DOCD: CPT | Mod: CPTII,,, | Performed by: ANESTHESIOLOGY

## 2022-12-06 PROCEDURE — 3066F PR DOCUMENTATION OF TREATMENT FOR NEPHROPATHY: ICD-10-PCS | Mod: CPTII,,, | Performed by: ANESTHESIOLOGY

## 2022-12-06 PROCEDURE — 3008F BODY MASS INDEX DOCD: CPT | Mod: CPTII,,, | Performed by: ANESTHESIOLOGY

## 2022-12-06 PROCEDURE — 3288F PR FALLS RISK ASSESSMENT DOCUMENTED: ICD-10-PCS | Mod: CPTII,,, | Performed by: ANESTHESIOLOGY

## 2022-12-06 PROCEDURE — 4010F PR ACE/ARB THEARPY RXD/TAKEN: ICD-10-PCS | Mod: CPTII,,, | Performed by: ANESTHESIOLOGY

## 2022-12-06 PROCEDURE — 99213 PR OFFICE/OUTPT VISIT, EST, LEVL III, 20-29 MIN: ICD-10-PCS | Mod: ,,, | Performed by: ANESTHESIOLOGY

## 2022-12-06 PROCEDURE — 1159F MED LIST DOCD IN RCRD: CPT | Mod: CPTII,,, | Performed by: ANESTHESIOLOGY

## 2022-12-06 PROCEDURE — 4010F ACE/ARB THERAPY RXD/TAKEN: CPT | Mod: CPTII,,, | Performed by: ANESTHESIOLOGY

## 2022-12-06 PROCEDURE — 3074F PR MOST RECENT SYSTOLIC BLOOD PRESSURE < 130 MM HG: ICD-10-PCS | Mod: CPTII,,, | Performed by: ANESTHESIOLOGY

## 2022-12-06 PROCEDURE — 1160F PR REVIEW ALL MEDS BY PRESCRIBER/CLIN PHARMACIST DOCUMENTED: ICD-10-PCS | Mod: CPTII,,, | Performed by: ANESTHESIOLOGY

## 2022-12-06 NOTE — PROGRESS NOTES
Gertrude Blanco MD        PATIENT NAME: Melquiades Rehman Jr.  : 1955  DATE: 22  MRN: [id number]      Billing Provider: Gertrude Blanco MD  Level of Service:   Patient PCP Information       Provider PCP Type    [facility]            Reason for Visit / Chief Complaint: Post-op Evaluation (Follow up post op injections MBB 22. Patient would like to discuss a tens unit. Patient states the injections did not help. Pain is a 10/10 on worse day. Taking OTC ibuprofen for pain./)       Update PCP  Update Chief Complaint         History of Present Illness / Problem Focused Workflow     Melquiades Rehman Jr. presents to the clinic with Post-op Evaluation (Follow up post op injections MBB 22. Patient would like to discuss a tens unit. Patient states the injections did not help. Pain is a 10/10 on worse day. Taking OTC ibuprofen for pain./)     This is a 67-year-old male who returns to clinic today for follow up of his chronic low back pain.  He underwent lumbar medial branch blocks a couple of weeks ago but states that he only got mild relief for 1 day.  He does not really have much pain when he is sitting or lying down, but states that when he has been standing at the sink to wash dishes for just 10 minutes, the pain is so severe that he has to sit down.    Back Pain      Review of Systems     Review of Systems   Musculoskeletal:  Positive for back pain.   All other systems reviewed and are negative.     Medical / Social / Family History     Past Medical History:   Diagnosis Date    Central stenosis of spinal canal     CHF (congestive heart failure)     Fatty liver     HTN (hypertension)     Parkinson's disease     Paroxysmal atrial fibrillation     Peyronie disease     Rheumatoid arthritis, unspecified     Type 2 diabetes mellitus without complications        Past Surgical History:   Procedure Laterality Date    CATARACT EXTRACTION W/  INTRAOCULAR LENS IMPLANT      EPIDURAL STEROID  INJECTION INTO LUMBAR SPINE      exc cyst Lt ear      INJECTION OF ANESTHETIC AGENT AROUND MEDIAL BRANCH NERVES INNERVATING LUMBAR FACET JOINT Bilateral 11/16/2022    Procedure: Block-nerve-medial branch-lumbar;  Surgeon: Gertrude Blanco MD;  Location: [facility] OR;  Service: Pain Management;  Laterality: Bilateral;  Bilateral L4-L5...Patient is requesting to be first case    KNEE ARTHROSCOPY Right     VASECTOMY  1998       Social History  Mr. Rehman  reports that he has quit smoking. His smoking use included cigarettes. He has never used smokeless tobacco. He reports that he does not currently use alcohol after a past usage of about 2.0 - 3.0 standard drinks per week. He reports current drug use. Drug: Marijuana.    Family History  Mr.'s Rehman family history includes Alzheimer's disease in his father; Autoimmune disease in his sister; Coronary artery disease in his mother; Dementia in his sister; Emphysema in his father; Heart failure in his mother; Pneumonia in his sister.    Medications and Allergies     Medications  Outpatient Medications Marked as Taking for the 12/6/22 encounter (Office Visit) with Gertrude Blanco MD   Medication Sig Dispense Refill    ascorbic acid, vitamin C, (VITAMIN C) 1000 MG tablet Take 1,000 mg by mouth every evening.      b complex vitamins capsule Take 1 capsule by mouth nightly.      blood sugar diagnostic (ACCU-CHEK GUIDE TEST STRIPS [facility]) Accu-Chek Guide test strips      carbidopa-levodopa  mg (SINEMET)  mg per tablet Take 2 tablets by mouth 2 (two) times a day.      cholecalciferol, vitamin D3, 125 mcg (5,000 unit) capsule Take 1,000 Units by mouth nightly.      coenzyme Q10 100 mg capsule Take 100 mg by mouth every evening.      colchicine (COLCRYS) 0.6 mg tablet Take 1 tablet (0.6 mg total) by mouth 2 (two) times daily. 180 tablet 1    docusate sodium (COLACE) 50 MG capsule Take 100 mg by mouth Daily.      folic acid/multivit-min/lutein (CENTRUM SILVER ORAL) Take 1  tablet by mouth every evening.      gemfibroziL (LOPID) 600 MG tablet Take 600 mg by mouth 2 (two) times daily.      lancets (ACCU-CHEK FASTCLIX LANCET DRUM MIS) Accu-Chek Fastclix Lancet Drum      lisinopriL-hydrochlorothiazide (PRINZIDE,ZESTORETIC) 20-25 mg Tab Take 1 tablet by mouth as needed.      magnesium 30 mg Tab Take 250 mg by mouth nightly.      NON FORMULARY MEDICATION Take 1 tablet by mouth nightly.      OSENI 25-30 mg Tab Take 1 tablet by mouth once daily. 90 tablet 1    pantoprazole (PROTONIX) 40 MG tablet Take 1 tablet (40 mg total) by mouth once daily. 90 tablet 1    polyethylene glycol (GLYCOLAX) 17 gram PwPk Take by mouth once daily.      potassium citrate 99 mg Cap Take 1 tablet by mouth nightly.      predniSONE (DELTASONE) 5 MG tablet Take 1 tablet (5 mg total) by mouth daily as needed (flare up). AFTER BREAKFAST 30 tablet 5    pregabalin (LYRICA) 25 MG capsule Take 1 capsule (25 mg total) by mouth 2 (two) times daily. 60 capsule 5    rosuvastatin (CRESTOR) 10 MG tablet TAKE ONE TABLET BY MOUTH EVERY DAY 90 tablet 1    tofacitinib (XELJANZ XR) 11 mg Tb24 Take 1 tablet by mouth once daily. 30 tablet 11    vitamin E 400 UNIT capsule Take 400 Units by mouth nightly.      XARELTO 20 mg Tab Take 20 mg by mouth once daily.      zinc sulfate (ZINC-15 ORAL) Take 140 mg by mouth nightly.         Allergies  Review of patient's allergies indicates:   Allergen Reactions    Sarilumab      Other reaction(s): fainting       Physical Examination     Vitals:    12/06/22 1039   BP: 122/84   Pulse: 86     Spine Musculoskeletal Exam    Gait    Gait is normal.    Inspection    Thoracolumbar    Thoracolumbar inspection is normal.    Range of Motion    Thoracolumbar        Thoracolumbar range of motion additional comments:   Restricted range of motion in the lower back due to pain    Strength    Thoracolumbar    Thoracolumbar motor exam is normal.       Sensory    Thoracolumbar    Thoracolumbar sensation is  normal.    General      Constitutional: appears stated age, well-developed and well-nourished    Scleral icterus: no    Labored breathing: no    Psychiatric: normal mood and affect and no acute distress    Neurological: alert and oriented x3    Skin: intact    Lymphadenopathy: none     Assessment and Plan (including Health Maintenance)      Problem List  Smart Sets  Document Outside HM   :    Plan:   Degeneration of lumbar intervertebral disc  -     Ambulatory referral/consult to Physical/Occupational Therapy; Future; Expected date: 12/13/2022    Lumbar spondylosis  -     Ambulatory referral/consult to Physical/Occupational Therapy; Future; Expected date: 12/13/2022    Chronic back pain greater than 3 months duration  -     Ambulatory referral/consult to Physical/Occupational Therapy; Future; Expected date: 12/13/2022    Dorsalgia, unspecified  -     MRI Lumbar Spine Without Contrast; Future; Expected date: 12/06/2022    I am obtaining an updated MRI of the lumbar spine without contrast for further evaluation of his persistent low back pain, that has not responded to lumbar medial branch blocks.  He is inquiring about a spinal cord stimulator today, but notes that he does not have any pain radiating into the lower extremities.  He really only complains of axial low back pain.  I am going to refer him for a course of physical therapy today.  We will follow-up pending results of his MRI.  Problem List Items Addressed This Visit          Orthopedic Problems    Degeneration of lumbar intervertebral disc - Primary    Relevant Orders    Ambulatory referral/consult to Physical/Occupational Therapy    Lumbar spondylosis    Relevant Orders    Ambulatory referral/consult to Physical/Occupational Therapy       Other    Chronic back pain greater than 3 months duration    Relevant Orders    Ambulatory referral/consult to Physical/Occupational Therapy     Other Visit Diagnoses       Dorsalgia, unspecified        Relevant Orders     MRI Lumbar Spine Without Contrast              Future Appointments   Date Time Provider Department Center   12/19/2022 12:45 PM PROVIDERS, CONY Stallings   3/21/2023  9:00 AM Valeria Donovan MD [facility] BRITNI Peña   3/30/2023  7:15 AM DAVIDSON Mandujano [facility] VIPUL Peña        There are no Patient Instructions on file for this visit.  No follow-ups on file.     Signature:  Gertrude Blanco MD      Date of encounter: 12/6/22

## 2022-12-15 RX ORDER — GEMFIBROZIL 600 MG/1
600 TABLET, FILM COATED ORAL 2 TIMES DAILY
Qty: 180 TABLET | Refills: 1 | Status: SHIPPED | OUTPATIENT
Start: 2022-12-15 | End: 2023-03-30 | Stop reason: SDUPTHER

## 2022-12-22 ENCOUNTER — HOSPITAL ENCOUNTER (OUTPATIENT)
Dept: RADIOLOGY | Facility: HOSPITAL | Age: 67
Discharge: HOME | End: 2022-12-22
Attending: ANESTHESIOLOGY
Payer: MEDICARE

## 2022-12-22 DIAGNOSIS — M54.9 DORSALGIA, UNSPECIFIED: ICD-10-CM

## 2022-12-22 PROCEDURE — 72148 MRI LUMBAR SPINE W/O DYE: CPT | Mod: TC

## 2023-01-02 PROBLEM — Z00.00 WELLNESS EXAMINATION: Status: RESOLVED | Noted: 2022-09-30 | Resolved: 2023-01-02

## 2023-02-02 LAB
LEFT EYE DM RETINOPATHY: POSITIVE
RIGHT EYE DM RETINOPATHY: POSITIVE

## 2023-02-16 ENCOUNTER — TELEPHONE (OUTPATIENT)
Dept: PAIN MEDICINE | Facility: CLINIC | Age: 68
End: 2023-02-16
Payer: MEDICARE

## 2023-03-14 ENCOUNTER — OFFICE VISIT (OUTPATIENT)
Dept: OPHTHALMOLOGY | Facility: CLINIC | Age: 68
End: 2023-03-14
Payer: MEDICARE

## 2023-03-14 VITALS — HEIGHT: 71 IN | WEIGHT: 233 LBS | BODY MASS INDEX: 32.62 KG/M2

## 2023-03-14 DIAGNOSIS — E11.9 CONTROLLED TYPE 2 DIABETES MELLITUS WITHOUT COMPLICATION, WITHOUT LONG-TERM CURRENT USE OF INSULIN: Primary | ICD-10-CM

## 2023-03-14 DIAGNOSIS — Z98.890 HISTORY OF RADIAL KERATOTOMY: ICD-10-CM

## 2023-03-14 PROCEDURE — 92025 CPTRIZED CORNEAL TOPOGRAPHY: CPT | Mod: PBBFAC,PO | Performed by: OPHTHALMOLOGY

## 2023-03-14 PROCEDURE — 99214 OFFICE O/P EST MOD 30 MIN: CPT | Mod: PBBFAC,PO | Performed by: STUDENT IN AN ORGANIZED HEALTH CARE EDUCATION/TRAINING PROGRAM

## 2023-03-14 RX ORDER — SELEGILINE HYDROCHLORIDE 5 MG/1
5 TABLET ORAL 2 TIMES DAILY
COMMUNITY
Start: 2023-01-06 | End: 2023-03-21 | Stop reason: ALTCHOICE

## 2023-03-14 RX ORDER — VALBENAZINE 40 MG/1
10 CAPSULE ORAL
COMMUNITY
Start: 2023-02-24 | End: 2023-03-21 | Stop reason: ALTCHOICE

## 2023-03-14 RX ORDER — TIZANIDINE 4 MG/1
TABLET ORAL
COMMUNITY
Start: 2023-01-02 | End: 2023-03-21 | Stop reason: ALTCHOICE

## 2023-03-14 NOTE — PROGRESS NOTES
HPI     History of Radial keratotomy      Additional comments: 1998 OU, OD done twice. Patient states that vision   fluctuates all of the time. Glare is extremely bothersome OU.            Comments    Diabetic eye exam           Last edited by Александр Mendoza MA on 3/14/2023 12:37 PM.            Assessment /Plan     For exam results, see Encounter Report.    There are no diagnoses linked to this encounter.               Topography 3/14/23  OD flat, 36.42 simK, 1.8D astigmatism 019  OS flat, 36.50D simK, 0.38D astigmatism    3/23 iris nevus        RK  - Fluctuating vision. Gets RX with optometry usually to 20/30-20/40. MRx 3/23 to 20/80 ou  - LOWELL 20/25 OU    2. Iris nevus  - CTM    3. Diabetes w/o ophthalmic manifestations OU  Hemoglobin A1c   Date Value Ref Range Status   09/26/2022 6.4 <=7.0 % Final   03/29/2022 6.0 <=7.0    03/30/2021 6.2 <<=7.0 % Final     - no retinopathy on exam today  - encouraged good BG/HTN control  - recommend annual DFE (next due 3/24)      RTC 1 year DFE

## 2023-03-21 ENCOUNTER — TELEPHONE (OUTPATIENT)
Dept: RHEUMATOLOGY | Facility: CLINIC | Age: 68
End: 2023-03-21
Payer: MEDICARE

## 2023-03-21 ENCOUNTER — HOSPITAL ENCOUNTER (OUTPATIENT)
Dept: RADIOLOGY | Facility: HOSPITAL | Age: 68
Discharge: HOME | End: 2023-03-21
Attending: INTERNAL MEDICINE
Payer: MEDICARE

## 2023-03-21 ENCOUNTER — OFFICE VISIT (OUTPATIENT)
Dept: RHEUMATOLOGY | Facility: CLINIC | Age: 68
End: 2023-03-21
Payer: MEDICARE

## 2023-03-21 VITALS
DIASTOLIC BLOOD PRESSURE: 75 MMHG | HEART RATE: 98 BPM | WEIGHT: 223.63 LBS | HEIGHT: 71 IN | RESPIRATION RATE: 16 BRPM | TEMPERATURE: 98 F | SYSTOLIC BLOOD PRESSURE: 116 MMHG | OXYGEN SATURATION: 97 % | BODY MASS INDEX: 31.31 KG/M2

## 2023-03-21 DIAGNOSIS — N18.31 STAGE 3A CHRONIC KIDNEY DISEASE: ICD-10-CM

## 2023-03-21 DIAGNOSIS — I48.91 ATRIAL FIBRILLATION AND FLUTTER: ICD-10-CM

## 2023-03-21 DIAGNOSIS — G20.A1 PARKINSON'S DISEASE: ICD-10-CM

## 2023-03-21 DIAGNOSIS — I48.92 ATRIAL FIBRILLATION AND FLUTTER: ICD-10-CM

## 2023-03-21 DIAGNOSIS — Z79.899 HIGH RISK MEDICATION USE: ICD-10-CM

## 2023-03-21 DIAGNOSIS — K21.9 GASTROESOPHAGEAL REFLUX DISEASE, UNSPECIFIED WHETHER ESOPHAGITIS PRESENT: ICD-10-CM

## 2023-03-21 DIAGNOSIS — M05.79 SEROPOSITIVE RHEUMATOID ARTHRITIS OF MULTIPLE SITES: ICD-10-CM

## 2023-03-21 DIAGNOSIS — M05.79 SEROPOSITIVE RHEUMATOID ARTHRITIS OF MULTIPLE SITES: Primary | ICD-10-CM

## 2023-03-21 DIAGNOSIS — E78.5 HYPERLIPIDEMIA, UNSPECIFIED HYPERLIPIDEMIA TYPE: ICD-10-CM

## 2023-03-21 DIAGNOSIS — I10 HYPERTENSION, UNSPECIFIED TYPE: ICD-10-CM

## 2023-03-21 DIAGNOSIS — Z86.73 HISTORY OF CVA (CEREBROVASCULAR ACCIDENT): ICD-10-CM

## 2023-03-21 DIAGNOSIS — K76.0 STEATOSIS OF LIVER: ICD-10-CM

## 2023-03-21 DIAGNOSIS — M48.062 SPINAL STENOSIS OF LUMBAR REGION WITH NEUROGENIC CLAUDICATION: ICD-10-CM

## 2023-03-21 DIAGNOSIS — J84.9 ILD (INTERSTITIAL LUNG DISEASE): Primary | ICD-10-CM

## 2023-03-21 DIAGNOSIS — M48.061 SPINAL STENOSIS AT L4-L5 LEVEL: ICD-10-CM

## 2023-03-21 DIAGNOSIS — E11.9 CONTROLLED TYPE 2 DIABETES MELLITUS WITHOUT COMPLICATION, WITHOUT LONG-TERM CURRENT USE OF INSULIN: ICD-10-CM

## 2023-03-21 PROCEDURE — 99215 OFFICE O/P EST HI 40 MIN: CPT | Mod: PBBFAC,25 | Performed by: INTERNAL MEDICINE

## 2023-03-21 PROCEDURE — 1125F PR PAIN SEVERITY QUANTIFIED, PAIN PRESENT: ICD-10-PCS | Mod: CPTII,,, | Performed by: INTERNAL MEDICINE

## 2023-03-21 PROCEDURE — 73130 X-RAY EXAM OF HAND: CPT | Mod: TC,LT

## 2023-03-21 PROCEDURE — 3074F PR MOST RECENT SYSTOLIC BLOOD PRESSURE < 130 MM HG: ICD-10-PCS | Mod: CPTII,,, | Performed by: INTERNAL MEDICINE

## 2023-03-21 PROCEDURE — 1125F AMNT PAIN NOTED PAIN PRSNT: CPT | Mod: CPTII,,, | Performed by: INTERNAL MEDICINE

## 2023-03-21 PROCEDURE — 3008F BODY MASS INDEX DOCD: CPT | Mod: CPTII,,, | Performed by: INTERNAL MEDICINE

## 2023-03-21 PROCEDURE — 3288F FALL RISK ASSESSMENT DOCD: CPT | Mod: CPTII,,, | Performed by: INTERNAL MEDICINE

## 2023-03-21 PROCEDURE — 73130 X-RAY EXAM OF HAND: CPT | Mod: TC,RT

## 2023-03-21 PROCEDURE — 4010F ACE/ARB THERAPY RXD/TAKEN: CPT | Mod: CPTII,,, | Performed by: INTERNAL MEDICINE

## 2023-03-21 PROCEDURE — 73630 X-RAY EXAM OF FOOT: CPT | Mod: TC,RT

## 2023-03-21 PROCEDURE — 1159F PR MEDICATION LIST DOCUMENTED IN MEDICAL RECORD: ICD-10-PCS | Mod: CPTII,,, | Performed by: INTERNAL MEDICINE

## 2023-03-21 PROCEDURE — 1101F PR PT FALLS ASSESS DOC 0-1 FALLS W/OUT INJ PAST YR: ICD-10-PCS | Mod: CPTII,,, | Performed by: INTERNAL MEDICINE

## 2023-03-21 PROCEDURE — 73630 X-RAY EXAM OF FOOT: CPT | Mod: TC,LT

## 2023-03-21 PROCEDURE — 3078F DIAST BP <80 MM HG: CPT | Mod: CPTII,,, | Performed by: INTERNAL MEDICINE

## 2023-03-21 PROCEDURE — 1159F MED LIST DOCD IN RCRD: CPT | Mod: CPTII,,, | Performed by: INTERNAL MEDICINE

## 2023-03-21 PROCEDURE — 3078F PR MOST RECENT DIASTOLIC BLOOD PRESSURE < 80 MM HG: ICD-10-PCS | Mod: CPTII,,, | Performed by: INTERNAL MEDICINE

## 2023-03-21 PROCEDURE — 3288F PR FALLS RISK ASSESSMENT DOCUMENTED: ICD-10-PCS | Mod: CPTII,,, | Performed by: INTERNAL MEDICINE

## 2023-03-21 PROCEDURE — 1101F PT FALLS ASSESS-DOCD LE1/YR: CPT | Mod: CPTII,,, | Performed by: INTERNAL MEDICINE

## 2023-03-21 PROCEDURE — 99215 PR OFFICE/OUTPT VISIT, EST, LEVL V, 40-54 MIN: ICD-10-PCS | Mod: S$PBB,,, | Performed by: INTERNAL MEDICINE

## 2023-03-21 PROCEDURE — 3074F SYST BP LT 130 MM HG: CPT | Mod: CPTII,,, | Performed by: INTERNAL MEDICINE

## 2023-03-21 PROCEDURE — 99215 OFFICE O/P EST HI 40 MIN: CPT | Mod: S$PBB,,, | Performed by: INTERNAL MEDICINE

## 2023-03-21 PROCEDURE — 71046 X-RAY EXAM CHEST 2 VIEWS: CPT | Mod: TC

## 2023-03-21 PROCEDURE — 3008F PR BODY MASS INDEX (BMI) DOCUMENTED: ICD-10-PCS | Mod: CPTII,,, | Performed by: INTERNAL MEDICINE

## 2023-03-21 PROCEDURE — 4010F PR ACE/ARB THEARPY RXD/TAKEN: ICD-10-PCS | Mod: CPTII,,, | Performed by: INTERNAL MEDICINE

## 2023-03-21 RX ORDER — TIZANIDINE 4 MG/1
4 TABLET ORAL EVERY 6 HOURS PRN
COMMUNITY
End: 2023-07-06

## 2023-03-21 RX ORDER — SELEGILINE HYDROCHLORIDE 5 MG/1
5 CAPSULE ORAL 2 TIMES DAILY
COMMUNITY
End: 2023-07-06

## 2023-03-21 RX ORDER — TOFACITINIB 11 MG/1
1 TABLET, FILM COATED, EXTENDED RELEASE ORAL DAILY
Qty: 30 TABLET | Refills: 4 | Status: SHIPPED | OUTPATIENT
Start: 2023-03-21 | End: 2023-10-09 | Stop reason: SDUPTHER

## 2023-03-21 RX ORDER — HYDROCODONE BITARTRATE AND ACETAMINOPHEN 5; 325 MG/1; MG/1
1 TABLET ORAL EVERY 6 HOURS PRN
COMMUNITY
End: 2023-06-06

## 2023-03-21 RX ORDER — LISINOPRIL 5 MG/1
TABLET ORAL
COMMUNITY
Start: 2022-09-28 | End: 2023-06-06

## 2023-03-21 NOTE — TELEPHONE ENCOUNTER
Informed pt kidney function sightly higher than before Instructed pt to stay hydrated order BMP and urine protein to be repeat 3-4 weeks chest x-ray showed mild fibrosis in the lungs will order PFT's for further evaluation. Pt verbalized understanding.

## 2023-03-21 NOTE — PROGRESS NOTES
Patient ID: [id number]     Chief Complaint: Rheumatoid Arthritis (Back hurting/PT helps but he still has pain/Had a nerve block that didn't work, neither did steroids/Blood pressure has been low. Has gotten to 80/60. Could be from Parkinsons. /BP was good today )      HPI:     Melquiades Rehman Jr. is a 67 y.o. male here today for follow-up of rheumatoid arthritis.    CCP (>250) and RF + RA. He recalls his symptoms began 1994 w/ mainly elbow problems. But over a year, his symptoms worsened and involved hands. He was referred to Dr. Beck who started Enbrel 2-3 yrs but developed secondary failure. He then changed to Humira, which he took for 15 yrs but lost efficacy a few yrs ago. He briefly tried Kevzara but had poor rxn. He was then changed to Xeljanz, which worked wonderfully. However, he lost his insurance and started seeing [facility], NP April. He reports that she didn't want to start Xeljanz over concerns of blood clots. Instead, she started Orencia, which didn't work at all. He had one bottle left of Xeljanz and restarted it. It has worked great so I resumed it. He did have +Hep C ab but saw GI (Dr. Toledo) - had US and repeat testing for Hep C was negative. Told may have fatty liver dz.    Chronic back pain and he has DJD and spinal stenosis, no sciatica symptoms. He is seeing Dr Montero, neurosurgery and Dr Simmons, pain management. He is doing PT, traction is helping. Nerve block and VALERIE did not help. Pain worse with bending and better with sitting and lying down.     RA in remission, denies red, warm and swollen joints. No recent flares. May be once in a while he takes prednisone, took once 6 months back, but that's the only time he had taken in last 2 years. He is taking Xeljanz 11 mg daily, doing well.   He has A fib and on AC. He sees Dr Meza at [facility]. CHF was on his problem list, but per patient he does not have HF and had tests done for that, and the diagnosis was taken off the list.     He has  parkinson's, he sees Dr Barrios. Symptoms are better with sinemet. He also takes medication for movement disorders.  H/o CVA long back. No residual weakness form stroke.    PMH: Afib on Xarelto, HTN, Inc lipids, Parkinsons, Peyronie's dz, DM, fatty liver.  Social: 3-4 beers qow w/ playing music. No TBO but marijuana    Denies history of fevers, rashes, photosensitivity, oral or nasal ulcers, h/o MI, seizures, h/o PE or DVT, Raynaud's phenomenon, uveitis, malignancies.   Family history of autoimmune disease: none.  Smoking:   Social History     Tobacco Use   Smoking Status Former    Types: Cigarettes   Smokeless Tobacco Never          ----------------------------  Central stenosis of spinal canal  CHF (congestive heart failure)  Fatty liver  H/O cataract removal with insertion of prosthetic lens  History of radial keratotomy  HTN (hypertension)  Parkinson's disease  Paroxysmal atrial fibrillation  Peyronie disease  Rheumatoid arthritis, unspecified  Type 2 diabetes mellitus without complications     Past Surgical History:   Procedure Laterality Date    CATARACT EXTRACTION W/  INTRAOCULAR LENS IMPLANT      EPIDURAL STEROID INJECTION INTO LUMBAR SPINE      exc cyst Lt ear      INJECTION OF ANESTHETIC AGENT AROUND MEDIAL BRANCH NERVES INNERVATING LUMBAR FACET JOINT Bilateral 11/16/2022    Procedure: Block-nerve-medial branch-lumbar;  Surgeon: Gertrude Blanco MD;  Location: [facility];  Service: Pain Management;  Laterality: Bilateral;  Bilateral L4-L5...Patient is requesting to be first case    KNEE ARTHROSCOPY Right     REFRACTIVE SURGERY      VASECTOMY  1998       Review of patient's allergies indicates:   Allergen Reactions    Sarilumab Other (See Comments)     Other reaction(s): fainting       Outpatient Medications Marked as Taking for the 3/21/23 encounter (Office Visit) with Valeria Donovan MD   Medication Sig Dispense Refill    ascorbic acid, vitamin C, (VITAMIN C) 1000 MG tablet Take 1,000 mg by mouth  every evening.      b complex vitamins capsule Take 1 capsule by mouth nightly.      blood sugar diagnostic (ACCU-CHEK GUIDE TEST STRIPS MIS) Accu-Chek Guide test strips      carbidopa-levodopa  mg (SINEMET)  mg per tablet Take 2 tablets by mouth 2 (two) times a day.      cholecalciferol, vitamin D3, 125 mcg (5,000 unit) capsule Take 1,000 Units by mouth nightly.      coenzyme Q10 100 mg capsule Take 100 mg by mouth every evening.      colchicine (COLCRYS) 0.6 mg tablet Take 1 tablet (0.6 mg total) by mouth 2 (two) times daily. 180 tablet 1    docusate sodium (COLACE) 50 MG capsule Take 100 mg by mouth Daily.      folic acid/multivit-min/lutein (CENTRUM SILVER ORAL) Take 1 tablet by mouth every evening.      gemfibroziL (LOPID) 600 MG tablet Take 1 tablet (600 mg total) by mouth 2 (two) times daily. 180 tablet 1    HYDROcodone-acetaminophen (NORCO) 5-325 mg per tablet Take 1 tablet by mouth every 6 (six) hours as needed for Pain.      lancets (ACCU-CHEK FASTCLIX LANCET DRUM [facility]) Accu-Chek Fastclix Lancet Drum      lisinopriL (PRINIVIL,ZESTRIL) 5 MG tablet       lisinopriL-hydrochlorothiazide (PRINZIDE,ZESTORETIC) 20-25 mg Tab Take 1 tablet by mouth as needed.      magnesium 30 mg Tab Take 250 mg by mouth nightly.      NON FORMULARY MEDICATION Take 1 tablet by mouth nightly.      OSENI 25-30 mg Tab Take 1 tablet by mouth once daily. 90 tablet 1    pantoprazole (PROTONIX) 40 MG tablet Take 1 tablet (40 mg total) by mouth once daily. 90 tablet 1    potassium citrate 99 mg Cap Take 1 tablet by mouth nightly.      predniSONE (DELTASONE) 5 MG tablet Take 1 tablet (5 mg total) by mouth daily as needed (flare up). AFTER BREAKFAST 30 tablet 5    rosuvastatin (CRESTOR) 10 MG tablet TAKE ONE TABLET BY MOUTH EVERY DAY 90 tablet 1    selegiline (ELDEPRYL) 5 mg Cap Take 5 mg by mouth 2 (two) times daily.      tiZANidine (ZANAFLEX) 4 MG tablet Take 4 mg by mouth every 6 (six) hours as needed.       tofacitinib (XELJANZ XR) 11 mg Tb24 Take 1 tablet by mouth once daily. 30 tablet 4    vitamin E 400 UNIT capsule Take 400 Units by mouth nightly.      XARELTO 20 mg Tab Take 20 mg by mouth once daily.      zinc sulfate (ZINC-15 ORAL) Take 140 mg by mouth nightly.      [DISCONTINUED] INGREZZA 40 mg Cap Take 10 capsules by mouth.      [DISCONTINUED] tiZANidine (ZANAFLEX) 4 MG tablet Take by mouth.      [DISCONTINUED] tofacitinib (XELJANZ XR) 11 mg Tb24 Take 1 tablet by mouth once daily. 30 tablet 11       Social History     Socioeconomic History    Marital status:     Number of children: 1   Tobacco Use    Smoking status: Former     Types: Cigarettes    Smokeless tobacco: Never   Substance and Sexual Activity    Alcohol use: Not Currently     Alcohol/week: 2.0 - 3.0 standard drinks     Types: 1 - 2 Cans of beer, 1 Shots of liquor per week     Comment: rarely    Drug use: Yes     Types: Marijuana    Sexual activity: Yes     Partners: Female     Social Determinants of Health     Financial Resource Strain: High Risk    Difficulty of Paying Living Expenses: Very hard   Food Insecurity: No Food Insecurity    Worried About Running Out of Food in the Last Year: Never true    Ran Out of Food in the Last Year: Never true   Transportation Needs: No Transportation Needs    Lack of Transportation (Medical): No    Lack of Transportation (Non-Medical): No   Physical Activity: Sufficiently Active    Days of Exercise per Week: 7 days    Minutes of Exercise per Session: 30 min   Stress: No Stress Concern Present    Feeling of Stress : Not at all   Social Connections: Moderately Isolated    Frequency of Communication with Friends and Family: More than three times a week    Frequency of Social Gatherings with Friends and Family: Twice a week    Attends Bahai Services: Never    Active Member of Clubs or Organizations: Yes    Attends Club or Organization Meetings: More than 4 times per year     Marital Status:    Housing Stability: Low Risk     Unable to Pay for Housing in the Last Year: No    Number of Places Lived in the Last Year: 1    Unstable Housing in the Last Year: No        Family History   Problem Relation Age of Onset    Heart failure Mother     Coronary artery disease Mother     Glaucoma Father     Alzheimer's disease Father     Emphysema Father     Autoimmune disease Sister     Pneumonia Sister     Dementia Sister         Immunization History   Administered Date(s) Administered    COVID-19, vector-nr, rS-Ad26, PF (Danie) 03/08/2021, 10/25/2021    Influenza - Quadrivalent 09/30/2020    Influenza - Trivalent - PF (ADULT) 10/26/2018, 12/04/2019    Pneumococcal Conjugate - 13 Valent 05/03/2019    Pneumococcal Polysaccharide - 23 Valent 11/24/2020    Zoster Recombinant 11/12/2019, 02/13/2020       Patient Care Team:  DAVIDSON Mandujano as PCP - General (Family Medicine)     Subjective:     Constitutional:  Denies chills. Denies fever. Denies night sweats. Denies weight loss.   Ophthalmology: Denies blurred vision. Denies dry eyes. Denies eye pain. Denies Itching and redness.   ENT: Denies oral ulcers. Denies epistaxis. Denies dry mouth. Denies swollen glands.   Endocrine: Admits diabetes. Denies thyroid Problems.   Respiratory: Denies cough. Denies shortness of breath. Denies shortness of breath with exertion. Denies hemoptysis.   Cardiovascular: Denies chest pain at rest. Denies chest pain with exertion. Denies palpitations.    Gastrointestinal: Denies abdominal pain. Denies diarrhea. Denies nausea. Denies vomiting. Denies hematemesis or hematochezia. Denies heartburn.  Genitourinary: Denies blood in urine.  Musculoskeletal: See HPI for details  Integumentary: Denies rash. Denies photosensitivity.   Peripheral Vascular: Denies Ulcers of hands and/or feet. Denies Cold extremities.   Neurologic: Denies dizziness. Denies headache.  Denies loss of strength. Admits numbness  "or tingling.   Psychiatric: Denies depression. Denies anxiety. Denies suicidal/homicidal ideations.      Objective:     /75 (BP Location: Left arm, Patient Position: Sitting)   Pulse 98   Temp 97.6 °F (36.4 °C) (Oral)   Resp 16   Ht 5' 11" (1.803 m)   Wt 101.4 kg (223 lb 9.6 oz)   SpO2 97%   BMI 31.19 kg/m²     Physical Exam    General Appearance: alert, pleasant, in no acute distress.  Skin: Skin color, texture, turgor normal. No rashes or lesions.  Eyes:  extraocular movement intact (EOMI), pupils equal, round, reactive to light and accommodation, conjunctiva clear.  ENT: No oral or nasal ulcers.  Neck:  Neck supple. No adenopathy.   Lungs: CTA throughout without crackles, rhonchi, or wheezes.   Heart: RRR w/o murmurs.  No edema.   Abdomen: Soft, non-tender, no masses, rebound or guarding.  Neuro: Alert, oriented, CN II-XII GI, sensory and motor innervation intact.  Tremors present.  Rigidity of left upper extremity on exam today.  Musculoskeletal: Radial and pedal pulses 2+ bilaterally, no LE edema. No synovitis or swellings in joints of hands, knees, feet, or elbows.  Psych: Alert, oriented, normal eye contact.      Labs:     Lab Results   Component Value Date    WBC 5.5 03/21/2023    HGB 15.2 03/21/2023    HCT 44.5 03/21/2023     03/21/2023    ALT 33 03/21/2023    AST 31 03/21/2023    BUN 20.2 03/21/2023    CREATININE 1.52 (H) 03/21/2023     03/21/2023    K 4.0 03/21/2023    CO2 28 03/21/2023    CRP 1.90 03/21/2023    SEDRATE 12 03/21/2023        Imaging:     Assessment:       ICD-10-CM ICD-9-CM   1. Seropositive rheumatoid arthritis of multiple sites  M05.79 714.0   2. High risk medication use  Z79.899 V58.69   3. Spinal stenosis at L4-L5 level  M48.061 724.02   4. Atrial fibrillation and flutter  I48.91 427.31    I48.92 427.32   5. Hyperlipidemia, unspecified hyperlipidemia type  E78.5 272.4   6. Hypertension, unspecified type  I10 401.9   7. Parkinson's disease  G20 332.0   8. " History of CVA (cerebrovascular accident)  Z86.73 V12.54   9. Controlled type 2 diabetes mellitus without complication, without long-term current use of insulin  E11.9 250.00   10. Steatosis of liver  K76.0 571.8   11. Stage 3a chronic kidney disease  N18.31 585.3   12. Gastroesophageal reflux disease, unspecified whether esophagitis present  K21.9 530.81   13. Congestive heart failure, unspecified HF chronicity, unspecified heart failure type  I50.9 428.0   14. Seropositive rheumatoid arthritis  M05.9 714.0   15. Spinal stenosis of lumbar region with neurogenic claudication  M48.062 724.03   16. Rheumatoid arthritis, involving unspecified site, unspecified whether rheumatoid factor present  M06.9 714.0        Plan:     Requested echocardiogram results form Dr Meza, [facility].       1. Strongly + CCP and RF rheumatoid arthritis dx 2014. MTX was not used d/t etoh. He had secondary failure of enbrel after 2-3 yrs. Humira worked for 15 yrs but developed secondary failure. He briefly tried kevzara but he did not tolerate it. He was changed to xeljanz, which worked well. Failed Orencia, did not help.   - Restarted Xeljanz since around October 2019 and he has been doing very well - will continue tx. If fails this moving forward, could consider Rinvoq.  - labs and x-rays today.    2. Severe multifactorial spinal canal stenosis at L4-L5, moderate at L2-L3 and L3-L4, mild at L1-L2. Referring to Pain Management. Follows with neurosurgery Dr. Montero.    3. Afib on Xarelto.  Heart failure was ruled out, diagnosis was taken off his problem list per patient. Requested echo results form [facility].    4. Parkinson's. On carbidopa-levodopa.  Follows with neurologist Dr. Barrios.    5. Peyronie's dz. Pt reports using colchicine for this.    6. Fatty liver. One Hep C ab + but repeat w/ PCR negative. Possibly false +. Will follow. Infection work up ordered today.    7. High risk med use. PPSV-23 11/20, Prevnar 5/19, Shingrix 6/2020. UTD w/ flu  shot. UTD COVID vaccine.  Persons with rheumatoid arthritis, lupus, psoriatic arthritis and other autoimmune diseases are at increased risk of cardiovascular disease including heart attack and stroke. We recommend that all patients with these conditions have annual health maintenance exams including lipid measurements, blood pressure measurements, and smoking cessation counseling when applicable at their primary care provider's office.   -Advised to stay up-to-date on age appropriate vaccinations and malignancy screening, including yearly skin exams.        Follow up in about 3 months (around 6/21/2023). In addition to their scheduled follow up, the patient has also been instructed to follow up on as needed basis.        Total time spent with patient and documentation is more than 40 minutes. All questions were answered to patient's satisfaction and patient verbalized understanding.

## 2023-03-21 NOTE — TELEPHONE ENCOUNTER
Kidney function slightly higher than before, asked him to stay hydrated.  Order BMP and urine protein to be repeated in 3-4 weeks.    Chest x-ray showed mild fibrosis in the lungs, order PFTs for further evaluation.

## 2023-03-28 ENCOUNTER — OFFICE VISIT (OUTPATIENT)
Dept: OTOLARYNGOLOGY | Facility: CLINIC | Age: 68
End: 2023-03-28
Payer: MEDICARE

## 2023-03-28 VITALS
HEART RATE: 81 BPM | TEMPERATURE: 98 F | SYSTOLIC BLOOD PRESSURE: 120 MMHG | WEIGHT: 224.38 LBS | DIASTOLIC BLOOD PRESSURE: 78 MMHG | BODY MASS INDEX: 31.3 KG/M2

## 2023-03-28 DIAGNOSIS — R49.0 HOARSENESS OF VOICE: ICD-10-CM

## 2023-03-28 DIAGNOSIS — H90.3 ASYMMETRIC SNHL (SENSORINEURAL HEARING LOSS): Primary | ICD-10-CM

## 2023-03-28 DIAGNOSIS — R04.0 EPISTAXIS: ICD-10-CM

## 2023-03-28 PROCEDURE — 99215 OFFICE O/P EST HI 40 MIN: CPT | Mod: PBBFAC | Performed by: STUDENT IN AN ORGANIZED HEALTH CARE EDUCATION/TRAINING PROGRAM

## 2023-03-28 NOTE — PROGRESS NOTES
[facility]  Otolaryngology-Head & Neck Surgery    Office Visit    Melquiades Rehman Jr.  [id number]  1955    CC:     HPI: Melquiades Rehman Jr. is a 67 y.o. male with a history of AFib on Eliquis, Parkinson's disease, that presents for evaluation of sensorineural hearing loss, tinnitus, epistaxis.  He is not had any issues with epistaxis for the last 6 months.  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  He has not had a hearing test in nearly 2 years.  No otalgia or otorrhea.  He does have prior history of spontaneous eardrum rupture on the left side with routine healing and scar tissue.  He does have a history of Parkinson's disease, he has been in speech therapy prior for his voice.  It has been about the same he states.    ROS:   General: Negative except per HPI  Skin: Denies rash, ulcer, or lesion.  Eyes: Denies vision changes or diplopia.  Ears: Negative except per HPI  Nose: Negative except per HPI  Throat/mouth: Negative except per HPI  Cardiovascular: Negative except per HPI  Respiratory: Negative except per HPI  Neck: Negative except per HPI  Endocrine: Negative except per HPI  Neurologic: Negative except per HPI    Review of patient's allergies indicates:   Allergen Reactions    Sarilumab Other (See Comments)     Other reaction(s): fainting       Past Medical History:   Diagnosis Date    Central stenosis of spinal canal     CHF (congestive heart failure)     Fatty liver     H/O cataract removal with insertion of prosthetic lens     History of radial keratotomy 01/01/1998    HTN (hypertension)     Parkinson's disease     Paroxysmal atrial fibrillation     Peyronie disease     Rheumatoid arthritis, unspecified     Type 2 diabetes mellitus without complications        Past Surgical History:   Procedure Laterality Date    CATARACT EXTRACTION W/  INTRAOCULAR LENS IMPLANT       EPIDURAL STEROID INJECTION INTO LUMBAR SPINE      exc cyst Lt ear      INJECTION OF ANESTHETIC AGENT AROUND MEDIAL BRANCH NERVES INNERVATING LUMBAR FACET JOINT Bilateral 11/16/2022    Procedure: Block-nerve-medial branch-lumbar;  Surgeon: Gertrude Blanco MD;  Location: [facility];  Service: Pain Management;  Laterality: Bilateral;  Bilateral L4-L5...Patient is requesting to be first case    KNEE ARTHROSCOPY Right     REFRACTIVE SURGERY      VASECTOMY  1998       Social History     Socioeconomic History    Marital status:     Number of children: 1   Tobacco Use    Smoking status: Former     Types: Cigarettes    Smokeless tobacco: Never   Substance and Sexual Activity    Alcohol use: Not Currently     Alcohol/week: 2.0 - 3.0 standard drinks     Types: 1 - 2 Cans of beer, 1 Shots of liquor per week     Comment: rarely    Drug use: Yes     Types: Marijuana    Sexual activity: Yes     Partners: Female     Social Determinants of Health     Financial Resource Strain: High Risk    Difficulty of Paying Living Expenses: Very hard   Food Insecurity: No Food Insecurity    Worried About Running Out of Food in the Last Year: Never true    Ran Out of Food in the Last Year: Never true   Transportation Needs: No Transportation Needs    Lack of Transportation (Medical): No    Lack of Transportation (Non-Medical): No   Physical Activity: Sufficiently Active    Days of Exercise per Week: 7 days    Minutes of Exercise per Session: 30 min   Stress: No Stress Concern Present    Feeling of Stress : Not at all   Social Connections: Moderately Isolated    Frequency of Communication with Friends and Family: More than three times a week    Frequency of Social Gatherings with Friends and Family: Twice a week    Attends Tenriism Services: Never    Active Member of Clubs or Organizations: Yes    Attends Club or Organization Meetings: More than 4 times per year    Marital Status:    Housing Stability: Low Risk     Unable to Pay for  Housing in the Last Year: No    Number of Places Lived in the Last Year: 1    Unstable Housing in the Last Year: No       Family History   Problem Relation Age of Onset    Heart failure Mother     Coronary artery disease Mother     Glaucoma Father     Alzheimer's disease Father     Emphysema Father     Autoimmune disease Sister     Pneumonia Sister     Dementia Sister        Outpatient Encounter Medications as of 3/28/2023   Medication Sig Dispense Refill    ascorbic acid, vitamin C, (VITAMIN C) 1000 MG tablet Take 1,000 mg by mouth every evening.      b complex vitamins capsule Take 1 capsule by mouth nightly.      blood sugar diagnostic (ACCU-CHEK GUIDE TEST STRIPS [facility]) Accu-Chek Guide test strips      carbidopa-levodopa  mg (SINEMET)  mg per tablet Take 2 tablets by mouth 2 (two) times a day.      cholecalciferol, vitamin D3, 125 mcg (5,000 unit) capsule Take 1,000 Units by mouth nightly.      coenzyme Q10 100 mg capsule Take 100 mg by mouth every evening.      colchicine (COLCRYS) 0.6 mg tablet Take 1 tablet (0.6 mg total) by mouth 2 (two) times daily. 180 tablet 1    docusate sodium (COLACE) 50 MG capsule Take 100 mg by mouth Daily.      folic acid/multivit-min/lutein (CENTRUM SILVER ORAL) Take 1 tablet by mouth every evening.      gemfibroziL (LOPID) 600 MG tablet Take 1 tablet (600 mg total) by mouth 2 (two) times daily. 180 tablet 1    HYDROcodone-acetaminophen (NORCO) 5-325 mg per tablet Take 1 tablet by mouth every 6 (six) hours as needed for Pain.      lancets (ACCU-CHEK FASTCLIX LANCET DRUM [facility]) Accu-Chek Fastclix Lancet Drum      lisinopriL (PRINIVIL,ZESTRIL) 5 MG tablet       lisinopriL-hydrochlorothiazide (PRINZIDE,ZESTORETIC) 20-25 mg Tab Take 1 tablet by mouth as needed.      magnesium 30 mg Tab Take 250 mg by mouth nightly.      NON FORMULARY MEDICATION Take 1 tablet by mouth nightly.      OSENI 25-30 mg Tab Take 1 tablet by mouth once daily. 90 tablet 1    pantoprazole (PROTONIX) 40  MG tablet Take 1 tablet (40 mg total) by mouth once daily. 90 tablet 1    potassium citrate 99 mg Cap Take 1 tablet by mouth nightly.      predniSONE (DELTASONE) 5 MG tablet Take 1 tablet (5 mg total) by mouth daily as needed (flare up). AFTER BREAKFAST 30 tablet 5    rosuvastatin (CRESTOR) 10 MG tablet TAKE ONE TABLET BY MOUTH EVERY DAY 90 tablet 1    selegiline (ELDEPRYL) 5 mg Cap Take 5 mg by mouth 2 (two) times daily.      tiZANidine (ZANAFLEX) 4 MG tablet Take 4 mg by mouth every 6 (six) hours as needed.      tofacitinib (XELJANZ XR) 11 mg Tb24 Take 1 tablet by mouth once daily. 30 tablet 4    vitamin E 400 UNIT capsule Take 400 Units by mouth nightly.      XARELTO 20 mg Tab Take 20 mg by mouth once daily.      zinc sulfate (ZINC-15 ORAL) Take 140 mg by mouth nightly.      [DISCONTINUED] INGREZZA 40 mg Cap Take 10 capsules by mouth.      [DISCONTINUED] polyethylene glycol (GLYCOLAX) 17 gram PwPk Take by mouth once daily.      [DISCONTINUED] pregabalin (LYRICA) 25 MG capsule Take 1 capsule (25 mg total) by mouth 2 (two) times daily. (Patient not taking: Reported on 3/21/2023) 60 capsule 5    [DISCONTINUED] selegiline HCl (ELDEPRYL) 5 mg tablet Take 5 mg by mouth 2 (two) times daily.      [DISCONTINUED] tiZANidine (ZANAFLEX) 4 MG tablet Take by mouth.      [DISCONTINUED] tofacitinib (XELJANZ XR) 11 mg Tb24 Take 1 tablet by mouth once daily. 30 tablet 11     No facility-administered encounter medications on file as of 3/28/2023.       PHYSICAL EXAM:  Vitals:    03/28/23 1515   BP: 120/78   Pulse: 81   Temp: 98.1 °F (36.7 °C)       General Appearance: well nourished, well-developed, alert, oriented, in no acute distress; tremor noted in extremities  Head/Face: NC, AT  Eyes: PEERLA, EOMI, normal conjunctiva  Ears: Hears well at normal conversation volume  AD: external normal, ear canal normal, TM intact, no middle ear fluid  AS: external normal, ear canal normal, TM intact with area of tympanosclerosis anteriorly,  no middle ear fluid  Nose: septum with caudal leftward deflection, no obvious bleeding points or large vessels, no inferior turbinate hypertrophy, no polyps  OC/OP: dentition moderate, no oral lesions, FOM and BOT soft  Nasopharynx, Hypopharynx, and Larynx: indirect visualization attempted, limited view due to patient intolerance  Neck: soft, non-tender, no palpable lymph nodes, thyroid- no nodules or goiter  Neuro: CN II - XII intact; tremor appreciated extremities  Psychiatric: oriented to time, place and person, no depression, anxiety or agitation      PERTINENT DATA:  6/2021 audio      ASSESSMENT:  Melquiades Rehman Jr. is a 67 y.o. male with history of AFib on Eliquis with epistaxis, since resolved.  Also with history of Parkinson's disease with dysphonia, stable at this time.  Asymmetric sensorineural hearing loss on prior audiogram from 2021.  Worsening hearing and tinnitus subjectively on the left    PLAN:  --plan for repeat audiology assessment, if continues to be asymmetric may consider MRI IAC  --continue nasal moisturization as needed for epistaxis, has not been an issue for last 6 months  --discussed his potential for worsening dysphonia given his Parkinson's disease, not interested in further voice therapy at this time  -- return to clinic 3 months after audiogram      Gareth Card MD  [facility]  3:32 PM 03/28/2023

## 2023-03-30 ENCOUNTER — TELEPHONE (OUTPATIENT)
Dept: ADMINISTRATIVE | Facility: HOSPITAL | Age: 68
End: 2023-03-30
Payer: MEDICARE

## 2023-03-30 ENCOUNTER — OFFICE VISIT (OUTPATIENT)
Dept: INTERNAL MEDICINE | Facility: CLINIC | Age: 68
End: 2023-03-30
Payer: MEDICARE

## 2023-03-30 VITALS
BODY MASS INDEX: 31.19 KG/M2 | HEIGHT: 71 IN | SYSTOLIC BLOOD PRESSURE: 133 MMHG | HEART RATE: 99 BPM | RESPIRATION RATE: 20 BRPM | TEMPERATURE: 98 F | WEIGHT: 222.81 LBS | DIASTOLIC BLOOD PRESSURE: 80 MMHG

## 2023-03-30 DIAGNOSIS — R94.4 DECREASED GFR: ICD-10-CM

## 2023-03-30 DIAGNOSIS — G89.4 CHRONIC PAIN SYNDROME: ICD-10-CM

## 2023-03-30 DIAGNOSIS — M51.36 DEGENERATION OF LUMBAR INTERVERTEBRAL DISC: ICD-10-CM

## 2023-03-30 DIAGNOSIS — I10 HYPERTENSION, UNSPECIFIED TYPE: ICD-10-CM

## 2023-03-30 DIAGNOSIS — Z13.6 ENCOUNTER FOR ABDOMINAL AORTIC ANEURYSM (AAA) SCREENING: ICD-10-CM

## 2023-03-30 DIAGNOSIS — Z12.11 SCREENING FOR COLON CANCER: ICD-10-CM

## 2023-03-30 DIAGNOSIS — R31.29 MICROHEMATURIA: ICD-10-CM

## 2023-03-30 DIAGNOSIS — E11.9 TYPE 2 DIABETES MELLITUS WITHOUT COMPLICATION, WITHOUT LONG-TERM CURRENT USE OF INSULIN: Primary | ICD-10-CM

## 2023-03-30 DIAGNOSIS — G20.A1 PARKINSON'S DISEASE: ICD-10-CM

## 2023-03-30 DIAGNOSIS — Z12.5 SCREENING FOR PROSTATE CANCER: ICD-10-CM

## 2023-03-30 DIAGNOSIS — Z83.719 FAMILY HISTORY OF COLONIC POLYPS: Primary | ICD-10-CM

## 2023-03-30 DIAGNOSIS — E78.5 HYPERLIPIDEMIA, UNSPECIFIED HYPERLIPIDEMIA TYPE: ICD-10-CM

## 2023-03-30 PROCEDURE — 3075F SYST BP GE 130 - 139MM HG: CPT | Mod: CPTII,,, | Performed by: NURSE PRACTITIONER

## 2023-03-30 PROCEDURE — 3075F PR MOST RECENT SYSTOLIC BLOOD PRESS GE 130-139MM HG: ICD-10-PCS | Mod: CPTII,,, | Performed by: NURSE PRACTITIONER

## 2023-03-30 PROCEDURE — 3008F PR BODY MASS INDEX (BMI) DOCUMENTED: ICD-10-PCS | Mod: CPTII,,, | Performed by: NURSE PRACTITIONER

## 2023-03-30 PROCEDURE — 1101F PT FALLS ASSESS-DOCD LE1/YR: CPT | Mod: CPTII,,, | Performed by: NURSE PRACTITIONER

## 2023-03-30 PROCEDURE — 3288F PR FALLS RISK ASSESSMENT DOCUMENTED: ICD-10-PCS | Mod: CPTII,,, | Performed by: NURSE PRACTITIONER

## 2023-03-30 PROCEDURE — 1101F PR PT FALLS ASSESS DOC 0-1 FALLS W/OUT INJ PAST YR: ICD-10-PCS | Mod: CPTII,,, | Performed by: NURSE PRACTITIONER

## 2023-03-30 PROCEDURE — 3079F DIAST BP 80-89 MM HG: CPT | Mod: CPTII,,, | Performed by: NURSE PRACTITIONER

## 2023-03-30 PROCEDURE — 3079F PR MOST RECENT DIASTOLIC BLOOD PRESSURE 80-89 MM HG: ICD-10-PCS | Mod: CPTII,,, | Performed by: NURSE PRACTITIONER

## 2023-03-30 PROCEDURE — 3288F FALL RISK ASSESSMENT DOCD: CPT | Mod: CPTII,,, | Performed by: NURSE PRACTITIONER

## 2023-03-30 PROCEDURE — 4010F PR ACE/ARB THEARPY RXD/TAKEN: ICD-10-PCS | Mod: CPTII,,, | Performed by: NURSE PRACTITIONER

## 2023-03-30 PROCEDURE — 1126F AMNT PAIN NOTED NONE PRSNT: CPT | Mod: CPTII,,, | Performed by: NURSE PRACTITIONER

## 2023-03-30 PROCEDURE — 1126F PR PAIN SEVERITY QUANTIFIED, NO PAIN PRESENT: ICD-10-PCS | Mod: CPTII,,, | Performed by: NURSE PRACTITIONER

## 2023-03-30 PROCEDURE — 1160F RVW MEDS BY RX/DR IN RCRD: CPT | Mod: CPTII,,, | Performed by: NURSE PRACTITIONER

## 2023-03-30 PROCEDURE — 4010F ACE/ARB THERAPY RXD/TAKEN: CPT | Mod: CPTII,,, | Performed by: NURSE PRACTITIONER

## 2023-03-30 PROCEDURE — 1160F PR REVIEW ALL MEDS BY PRESCRIBER/CLIN PHARMACIST DOCUMENTED: ICD-10-PCS | Mod: CPTII,,, | Performed by: NURSE PRACTITIONER

## 2023-03-30 PROCEDURE — 99215 OFFICE O/P EST HI 40 MIN: CPT | Mod: S$PBB,,, | Performed by: NURSE PRACTITIONER

## 2023-03-30 PROCEDURE — 3008F BODY MASS INDEX DOCD: CPT | Mod: CPTII,,, | Performed by: NURSE PRACTITIONER

## 2023-03-30 PROCEDURE — 99215 OFFICE O/P EST HI 40 MIN: CPT | Mod: PBBFAC | Performed by: NURSE PRACTITIONER

## 2023-03-30 PROCEDURE — 1159F PR MEDICATION LIST DOCUMENTED IN MEDICAL RECORD: ICD-10-PCS | Mod: CPTII,,, | Performed by: NURSE PRACTITIONER

## 2023-03-30 PROCEDURE — 99215 PR OFFICE/OUTPT VISIT, EST, LEVL V, 40-54 MIN: ICD-10-PCS | Mod: S$PBB,,, | Performed by: NURSE PRACTITIONER

## 2023-03-30 PROCEDURE — 1159F MED LIST DOCD IN RCRD: CPT | Mod: CPTII,,, | Performed by: NURSE PRACTITIONER

## 2023-03-30 RX ORDER — COLCHICINE 0.6 MG/1
0.6 TABLET ORAL 2 TIMES DAILY
Qty: 180 TABLET | Refills: 1 | Status: SHIPPED | OUTPATIENT
Start: 2023-03-30 | End: 2024-02-09 | Stop reason: SDUPTHER

## 2023-03-30 RX ORDER — GEMFIBROZIL 600 MG/1
600 TABLET, FILM COATED ORAL 2 TIMES DAILY
Qty: 180 TABLET | Refills: 1 | Status: SHIPPED | OUTPATIENT
Start: 2023-03-30 | End: 2023-11-08 | Stop reason: SDUPTHER

## 2023-03-30 RX ORDER — ROSUVASTATIN CALCIUM 20 MG/1
20 TABLET, COATED ORAL DAILY
Qty: 90 TABLET | Refills: 1 | Status: SHIPPED | OUTPATIENT
Start: 2023-03-30 | End: 2023-11-08 | Stop reason: SDUPTHER

## 2023-03-30 RX ORDER — ALOGLIPTIN AND PIOGLITAZONE 25; 30 MG/1; MG/1
1 TABLET, FILM COATED ORAL DAILY
Qty: 90 TABLET | Refills: 1 | Status: SHIPPED | OUTPATIENT
Start: 2023-03-30 | End: 2023-09-26

## 2023-03-30 NOTE — ASSESSMENT & PLAN NOTE
LDL above 100; goal < 100  Increase Crestor to 20 mg po daily. Continue Gemfibrozil  Educated on a low-fat, low-cholesterol diet and aerobic exercise (20-30 mins/day x 5 days a week). Encouraged healthy fruits and veggie intake. Increase water intake. Avoid excess ETOH intake and smoking

## 2023-03-30 NOTE — PROGRESS NOTES
DAVIDSON Mandujano   [facility]  [address] [facility] [address]      PATIENT NAME: Melquiades Rehman Jr.  : 1955  DATE: 3/30/23  MRN: [id number]        Reason for Visit / Chief Complaint: Follow-up (Lab review)       History of Present Illness / Problem Focused Workflow     Melquiades Rehman Jr. presents to the clinic with Follow-up (Lab review)       22: 66 y.o. male presenting to [facility] to establish primary care.   Previous PCP: Dr. Woodrow Cobb, last visit > 6 mths   PmHx: h/o CVA (), HTN, HLD, CHF (patient denies such hx, though documented in record mx times), A fib managed on OAC, Type 2 DM, hepatic steatosis, gout, asymmetric hearing loss on left side--h/o infection, tinnitus, h/o right eye cataract, Parkinson's disease (), Peyronie's disease, RA (), obesity, h/o tobacco abuse, chronic lower back pain (following pain mgmt), GERD  SHx: cataract sx, knee arthroscopy, LHC, lumbar spine injections  FHx: Alzheimer's Disease and Emphysema in Father, Heart Failure and colon polyps in mother, PNA in sister  Complaints today: Establish primary care. Previously following Dr. Cobb. States seen x 2. Now requesting to establish at [facility]. Extensive PmHX as documented. He follows [facility]; has been seen in Cards at [facility] in the past. H/o AFIB/denies h/o CHF. Type 2 DM managed with Oseni. States taking x ~10 years. Reports CBGs 90s-130s. Tolerating medication well. Taking Rosuvastatin and Gemfibrozil for HLD. He follows ENT at [facility]. Follows Rheum at [facility] for RA. Awaiting an appt with new rheumatologist as previous MD moved out of state. Following prescribed management. Parkinson's Dz managed by [facility]. Pt reports recently seen by neurologist. Scheduled to completed labs today as ordered by Neuro. H/o lumbar spine stenosis and lower back pain. Apparently followed by Dr. Simpson for pain mgmt. He was referred  "to Dr. Perez for neurosx eval, however MD no longer with same practice. Pt now waiting to see Dr. Erik Love. When he was seen by Dr. Perez, it was not recommended that he undergo a multilevel lumbar fusion. He did suggest a spinal cord stimulator, however, pt alleges he was told he'd be sent for some type of stem cell treatment. This information has not been verified. He follows Dr. Chintan Benjamin for podiatry needs. No acute concerns.  Colon Cancer Screening: Reported colonoscopy 3/2012  Prostate Cancer Screening: PSA 2.26 (5/2021)  Eye Exam: Following [facility]  Vaccinations: Refuses Tdap; Shingrix series previously started    (3/29/22): Pt presenting for routine f/u. He was seen in ED 3/1/22 with c/o epistaxis. Apparently was in a [facility] and had a syncopal episode due to nosebleed x 48hs. He was then transferred to [facility]. States they were able to control/stop the bleeding ~5 hrs later in the ED. On OAC; states Xarelto was decreased from 20 mg po daily to 15 mg po daily. Following ENT. States cancelled recent appt because he hadn't had a nose bleed in 2 weeks, however, he had some bleeding from right nare last night x 10 min. Controlled with manual pressure. States will f/u with ENT if recurs. He also reports having some issues with low blood pressures (asymptomatic) lately. States cards told him Parkinson's could play a role. So, he is currently holding his HCTZ-Lisinopril at present. Labs completed and noted stable. Renal indices stable compared to baseline. HgA1c 6%. Taking Oseni. Denies overt s/s of hypoglycemia or hyperglycemia. No other concerns.    (9/30/22): Pt presenting for routine f/u. Labs completed earlier this week. Significant for: triglycerides 211, glucose 122, A1c 6.4%. Otherwise, labs are stable compared to baseline. He was referred to Dr. Armen Reyes in May for chronic lower back pain. Alleges their office has "been trying to get in touch with you for some more paperwork, but " "they can't get through." He continues to follows Cards at Dr. Susana PÉREZ.  has been taken off of ACE-I d/t hypotension that is reportedly 2/2 PD. BP at goal today. Parkinson's Dz managed by [facility]. He will see Rheum in November. Asking for refill on colchicine. He mentions h/o hard, infrequent stools from "when I was taking that pain medicine."  plans to obtain Miralax OTC for PRN use. No other complaints.    3/30/23: Pt presenting for routine f/u. Labs completed last week. Significant for: tLDL 113, A1c 6.5%, Creatinine 1.5, eGFR 50s, RBCs on UA. Otherwise, labs are stable compared to baseline. He was referred to Dr. Armen Reyes in May 2022 for chronic lower back pain. Alleges unable to be seen due to wait > 1 year. He is still following Dr. Simpson for pain mgmt. States seeking alternative measures to manage his back pain definitely as he is against surgical intervention. He continues to follows Cards at Dr. Susana PÉREZ.  has been taken off of ACE-I d/t hypotension that is reportedly 2/2 PD. BP at goal today. Parkinson's Dz managed by [facility]; Dr. Barrios. He is following Dr. Donovan for Rheum services here at [facility]. Last visit 3/21/23. He also underwent an eye exam 3/14/23, no diabetic retinopathy. Denies any glucose readings < 110. Admits drinking Steven at least 2-3x/day. He completed a CXR per Rheum on 3/21/23. [facility] [place] was told he had some scarring so he's being sent for PFTs. Appt 4/5/23. Admits 15-year h/o smoking and quit in the 80s. Also had PNA twice, but denies trouble breathing, cough, wheezing, CP, etc. No other complaints.    03/21/23 10:43  Sodium: 142  Potassium: 4.0  Chloride: 103  CO2: 28  BUN: 20.2  Creatinine: 1.52 (H)  eGFR: 51  Glucose: 140 (H)  Calcium: 10.3 (H)  Alkaline Phosphatase: 62  PROTEIN TOTAL: 8.2 (H)  Albumin: 4.5  Albumin/Globulin Ratio: 1.2  BILIRUBIN TOTAL: 0.5  AST: 31  ALT: 33  CRP: 1.90  Globulin, Total: 3.7 " (H)          Review of Systems     Review of Systems   Constitutional: Negative.    HENT: Negative.     Eyes: Negative.    Respiratory: Negative.  Negative for apnea, cough, choking, chest tightness, shortness of breath, wheezing and stridor.    Cardiovascular: Negative.  Negative for chest pain, palpitations and leg swelling.   Endocrine: Negative.    Genitourinary: Negative.  Negative for decreased urine volume, difficulty urinating, dysuria, enuresis, flank pain, frequency, genital sores, hematuria, penile discharge, penile pain, penile swelling, scrotal swelling, testicular pain and urgency.   Musculoskeletal:  Positive for arthralgias, back pain and myalgias.   Skin: Negative.    Allergic/Immunologic: Negative.    Neurological: Negative.    Hematological: Negative.    Psychiatric/Behavioral: Negative.       Medical / Social / Family History     Past Medical History:   Diagnosis Date    Central stenosis of spinal canal     CHF (congestive heart failure)     Fatty liver     H/O cataract removal with insertion of prosthetic lens     History of radial keratotomy 01/01/1998    HTN (hypertension)     Parkinson's disease     Paroxysmal atrial fibrillation     Peyronie disease     Rheumatoid arthritis, unspecified     Type 2 diabetes mellitus without complications        Past Surgical History:   Procedure Laterality Date    CATARACT EXTRACTION W/  INTRAOCULAR LENS IMPLANT      EPIDURAL STEROID INJECTION INTO LUMBAR SPINE      exc cyst Lt ear      INJECTION OF ANESTHETIC AGENT AROUND MEDIAL BRANCH NERVES INNERVATING LUMBAR FACET JOINT Bilateral 11/16/2022    Procedure: Block-nerve-medial branch-lumbar;  Surgeon: Gertrude Blanco MD;  Location: [facility];  Service: Pain Management;  Laterality: Bilateral;  Bilateral L4-L5...Patient is requesting to be first case    KNEE ARTHROSCOPY Right     REFRACTIVE SURGERY      VASECTOMY  1998       Social History    reports that he quit smoking about 36 years ago. His smoking use  included cigarettes. He has been exposed to tobacco smoke. He has never used smokeless tobacco. He reports that he does not currently use alcohol after a past usage of about 2.0 - 3.0 standard drinks per week. He reports current drug use. Drug: Marijuana.    Family History  's family history includes Alzheimer's disease in his father; Autoimmune disease in his sister; Coronary artery disease in his mother; Dementia in his sister; Emphysema in his father; Glaucoma in his father; Heart failure in his mother; Pneumonia in his sister.    Medications and Allergies     Medications  Current Outpatient Medications   Medication Instructions    alprostadil (CAVERJECT IMPULSE ICAV) Intracavernosal    ascorbic acid (vitamin C) (VITAMIN C) 1,000 mg, Oral, Nightly    b complex vitamins capsule 1 capsule, Oral, Nightly    blood sugar diagnostic (ACCU-CHEK GUIDE TEST STRIPS MISC) Accu-Chek Guide test strips    carbidopa-levodopa  mg (SINEMET)  mg per tablet 2 tablets, Oral, 2 times daily    cholecalciferol (vitamin D3) 1,000 Units, Oral, Nightly    coenzyme Q10 100 mg, Oral, Nightly    colchicine (COLCRYS) 0.6 mg, Oral, 2 times daily    docusate sodium (COLACE) 100 mg, Oral, Daily    folic acid/multivit-min/lutein (CENTRUM SILVER ORAL) 1 tablet, Oral, Nightly    gemfibroziL (LOPID) 600 mg, Oral, 2 times daily    HYDROcodone-acetaminophen (NORCO) 5-325 mg per tablet 1 tablet, Oral, Every 6 hours PRN    lancets (ACCU-CHEK FASTCLIX LANCET DRUM [facility]) Accu-Chek Fastclix Lancet Drum    lisinopriL (PRINIVIL,ZESTRIL) 5 MG tablet No dose, route, or frequency recorded.    lisinopriL-hydrochlorothiazide (PRINZIDE,ZESTORETIC) 20-25 mg Tab 1 tablet, Oral, As needed (PRN)    magnesium 250 mg, Oral, Nightly    NON FORMULARY MEDICATION 1 tablet, Oral, Nightly    OSENI 25-30 mg Tab 1 tablet, Oral, Daily    pantoprazole (PROTONIX) 40 mg, Oral, Daily    potassium citrate 99 mg Cap 1 tablet, Oral, Nightly    rosuvastatin (CRESTOR) 20  "mg, Oral, Daily    selegiline (ELDEPRYL) 5 mg, Oral, 2 times daily    tiZANidine (ZANAFLEX) 4 mg, Oral, Every 6 hours PRN    tofacitinib (XELJANZ XR) 11 mg Tb24 1 tablet, Oral, Daily    vitamin E 400 Units, Oral, Nightly    XARELTO 20 mg, Oral, Daily    zinc sulfate (ZINC-15 ORAL) 140 mg, Oral, Nightly       Allergies  Review of patient's allergies indicates:   Allergen Reactions    Sarilumab Other (See Comments)     Other reaction(s): fainting       Physical Examination   Visit Vitals  /80 (BP Location: Left arm, Patient Position: Sitting, BP Method: Medium (Automatic))   Pulse 99   Temp 98.3 °F (36.8 °C) (Oral)   Resp 20   Ht 5' 11" (1.803 m)   Wt 101.1 kg (222 lb 12.8 oz)   BMI 31.07 kg/m²     Physical Exam  Constitutional:       Appearance: Normal appearance.   HENT:      Head: Normocephalic and atraumatic.      Right Ear: External ear normal.      Left Ear: External ear normal.   Eyes:      Extraocular Movements: Extraocular movements intact.   Cardiovascular:      Rate and Rhythm: Normal rate and regular rhythm.      Pulses:           Dorsalis pedis pulses are 1+ on the right side and 1+ on the left side.      Heart sounds: Normal heart sounds.   Pulmonary:      Effort: Pulmonary effort is normal.      Breath sounds: Normal breath sounds.   Abdominal:      General: Bowel sounds are normal.      Palpations: Abdomen is soft.   Musculoskeletal:         General: Normal range of motion.      Cervical back: Normal range of motion.      Right lower leg: No edema.      Left lower leg: No edema.   Feet:      Right foot:      Protective Sensation: 10 sites tested.  10 sites sensed.      Toenail Condition: Right toenails are long.      Left foot:      Protective Sensation: 10 sites tested.  10 sites sensed.      Toenail Condition: Left toenails are long.   Skin:     General: Skin is warm and dry.   Neurological:      General: No focal deficit present.      Mental Status: He is alert and oriented to person, place, " and time.   Psychiatric:         Mood and Affect: Mood normal.         Behavior: Behavior normal.         Thought Content: Thought content normal.         Judgment: Judgment normal.         Results     Chemistry:  Lab Results   Component Value Date     03/21/2023    K 4.0 03/21/2023    CHLORIDE 103 03/21/2023    BUN 20.2 03/21/2023    CREATININE 1.52 (H) 03/21/2023    EGFRNORACEVR 51 03/21/2023    GLUCOSE 140 (H) 03/21/2023    CALCIUM 10.3 (H) 03/21/2023    ALKPHOS 62 03/21/2023    LABPROT 8.2 (H) 03/21/2023    ALBUMIN 4.5 03/21/2023    BILIDIR 0.2 01/27/2022    IBILI 0.40 01/27/2022    AST 31 03/21/2023    ALT 33 03/21/2023    MG 1.60 12/04/2020    PHOS 3.3 12/04/2020    TEFXDEDV96CA 43.5 05/25/2021        Lab Results   Component Value Date    HGBA1C 6.5 03/21/2023        Hematology:  Lab Results   Component Value Date    WBC 5.5 03/21/2023    HGB 15.2 03/21/2023    HCT 44.5 03/21/2023     03/21/2023       Lipid Panel:  Lab Results   Component Value Date    CHOL 190 03/21/2023    HDL 52 03/21/2023    .00 03/21/2023    TRIG 124 03/21/2023    TOTALCHOLEST 4 03/21/2023        Urine:  Lab Results   Component Value Date    COLORUA Yellow 03/21/2023    APPEARANCEUA Clear 03/21/2023    SGUA 1.026 03/21/2023    PHUA 5.5 03/21/2023    PROTEINUA 1+ (A) 03/21/2023    GLUCOSEUA Normal 03/21/2023    KETONESUA Trace (A) 03/21/2023    BLOODUA Negative 03/21/2023    NITRITESUA Negative 03/21/2023    LEUKOCYTESUR Negative 03/21/2023    RBCUA 6-10 (A) 03/21/2023    WBCUA 0-5 03/21/2023    BACTERIA None Seen 03/21/2023    SQEPUA Trace (A) 03/21/2023    HYALINECASTS >20 (A) 03/21/2023    CREATRANDUR 289.4 (H) 09/26/2022          Assessment        ICD-10-CM ICD-9-CM   1. Type 2 diabetes mellitus without complication, without long-term current use of insulin  E11.9 250.00   2. Encounter for abdominal aortic aneurysm (AAA) screening  Z13.6 V81.2   3. Hyperlipidemia, unspecified hyperlipidemia type  E78.5 272.4   4.  Degeneration of lumbar intervertebral disc  M51.36 722.52   5. Chronic pain syndrome  G89.4 338.4   6. Parkinson's disease  G20 332.0   7. Hypertension, unspecified type  I10 401.9   8. Decreased GFR  R94.4 794.4   9. Microhematuria  R31.29 599.72   10. Screening for prostate cancer  Z12.5 V76.44        Plan (including Health Maintenance)     Problem List Items Addressed This Visit          Neuro    Chronic pain syndrome    Current Assessment & Plan     Continue w/ pain mgmt         Relevant Medications    colchicine (COLCRYS) 0.6 mg tablet    Degeneration of lumbar intervertebral disc    Current Assessment & Plan     Continue w/ pain mgmt         Relevant Medications    colchicine (COLCRYS) 0.6 mg tablet    Parkinson's disease    Current Assessment & Plan     Continue mgmt per Neuro            Cardiac/Vascular    Hyperlipidemia    Current Assessment & Plan     LDL above 100; goal < 100  Increase Crestor to 20 mg po daily. Continue Gemfibrozil  Educated on a low-fat, low-cholesterol diet and aerobic exercise (20-30 mins/day x 5 days a week). Encouraged healthy fruits and veggie intake. Increase water intake. Avoid excess ETOH intake and smoking           Relevant Medications    gemfibroziL (LOPID) 600 MG tablet    rosuvastatin (CRESTOR) 20 MG tablet    Hypertension    Current Assessment & Plan     Only taking antihypertensives if BP high per Cards. Due to h/o PD, pt experiences extreme fluctuations in BP, including mx episodes with hypotension. Today, Bp at goal. He's not needed to take his medications today  DASH            Renal/    Decreased GFR    Current Assessment & Plan     Avoid excessive intake of NSAIDs (i.e., Advil, Aleve, Ibuprofen, Motrin, Naproxen, Diclofenac, Indocin, Celebrex, Mobic, Voltaren). Avoid the following GI meds: Milk of Mag, Mylanta, Fleets Enema, and Pepto-Bismol. Okay to take Tylenol (acetaminophen) (if no end-stage liver disease), low dose ASA (81 mg), Miralax, Tums, and Colace.  Increase clear fluid intake. Avoid dark sodas.  Will monitor closely and refer to Renal at next visit if eGFR remains reduced. Pt agreeable but wants to wait for now           Relevant Orders    Comprehensive Metabolic Panel    Microhematuria    Current Assessment & Plan     Patient believes UA is a false reading as he was taking B12 last week and notes this discolors his urine. Denies urinary complaints  Will check UA with urine cytology at f/u. Informed pt I would like to send to Uro at next visit if no resolution or worsening UA prior to f/u. He VU, but declines referral for now  Also encouraged to drink more water         Relevant Orders    Urinalysis, Reflex to Urine Culture    Cytology, Urine       Endocrine    Type 2 diabetes mellitus without complication, without long-term current use of insulin - Primary    Overview     Current medications: Oseni  A1c level: 6.5%, at goal  CBG trends: no log provided  Educated on diabetic diet: Low-fat, Low-carb, Low-cholesterol. Avoid sugary/dark drinks/sodas and white foods (i.e., bread, pasta, potatoes, rice)  Aerobic exercise: 20-30 min/day x 5 days/week  Diabetic Eye Exam: Following Eye Clinic. 3/14/23, no DR  Diabetic Foot Exam: 3/30/23, WNL  Microalbumin-U: slightly elevated. H/o ACE-I therapy  Kidney Protection: ACE-I stopped per Cards at [facility] d/t hypotension  Statin Therapy: yes           Current Assessment & Plan     Continue Oseni  Hypoglycemic precautions  F/u with podiatry for toenail clipping         Relevant Medications    OSENI 25-30 mg Tab    Other Relevant Orders    Urinalysis, Reflex to Urine Culture    Hemoglobin A1C    TSH    Lipid Panel    Comprehensive Metabolic Panel    CBC Auto Differential     Other Visit Diagnoses       Encounter for abdominal aortic aneurysm (AAA) screening        Relevant Orders    US AAA Screening    Screening for prostate cancer                  Health Maintenance Due   Topic Date Due    Foot Exam  Never done    High Dose  Statin  Never done    Colorectal Cancer Screening  Never done    COVID-19 Vaccine (3 - Booster for Danie series) 12/20/2021    Abdominal Aortic Aneurysm Screening  Never done       Future Appointments   Date Time Provider Department Center   4/5/2023 10:00 AM RESPIRATORY THERAPY, [facility] [place]   5/1/2023  9:00 AM Adriane Raygoza MD [facility] CARD [place]   5/30/2023  8:00 AM RESIDENT 3, [facility] OTORHINOLARYNGOLOGY [facility] ENT [place]    7/11/2023 10:00 AM Valeria Donovan MD [facility] RHEU [place]    9/28/2023  7:30 AM DAVIDSON Mandujano [facility] INTMED [place]    3/14/2024  9:45 AM PROVIDERS, [facility] [place] [facility]      I spent a total of 40 minutes on the day of the visit.  This includes face to face time and non-face to face time preparing to see the patient (eg, review of tests), obtaining and/or reviewing separately obtained history, documenting clinical information in the electronic or other health record, independently interpreting results and communicating results to the patient/family/caregiver, or care coordinator.    Follow up in about 6 months (around 9/30/2023).    Signature:  DAVIDSON Mandujano  [facility]  [address] [facility] [address]    Date of encounter: 3/30/23

## 2023-03-30 NOTE — ASSESSMENT & PLAN NOTE
Only taking antihypertensives if BP high per Cards. Due to h/o PD, pt experiences extreme fluctuations in BP, including mx episodes with hypotension. Today, Bp at goal. He's not needed to take his medications today  DASH

## 2023-03-30 NOTE — ASSESSMENT & PLAN NOTE
Patient believes UA is a false reading as he was taking B12 last week and notes this discolors his urine. Denies urinary complaints  Will check UA with urine cytology at f/u. Informed pt I would like to send to Uro at next visit if no resolution or worsening UA prior to f/u. He VU, but declines referral for now  Also encouraged to drink more water

## 2023-03-30 NOTE — PROGRESS NOTES
DAVIDSON Mandujano   [facility]  [address] [facility] [address]      PATIENT NAME: Melquiades Rehman Jr.  : 1955  DATE: 3/30/23  MRN: [id number]      Billing Provider: DAVIDSON Mandujano  Level of Service:   Patient PCP Information       Provider PCP Type    DAVIDSON Mandujano General            Reason for Visit / Chief Complaint: Follow-up (Lab review)       History of Present Illness / Problem Focused Workflow     Melquiades Rehman Jr. presents to the clinic with Follow-up (Lab review)       22: 66 y.o. male presenting to [facility] [place] primary care.   Previous PCP: Dr. Woodrow Cobb, last visit > 6 mths   PmHx: h/o CVA (), HTN, HLD, CHF (patient denies such hx, though documented in record mx times), A fib managed on OAC, Type 2 DM, hepatic steatosis, gout, asymmetric hearing loss on left side--h/o infection, tinnitus, h/o right eye cataract, Parkinson's disease (), Peyronie's disease, RA (), obesity, h/o tobacco abuse, chronic lower back pain (following pain mgmt), GERD  SHx: cataract sx, knee arthroscopy, LHC, lumbar spine injections  FHx: Alzheimer's Disease and Emphysema in Father, Heart Failure and colon polyps in mother, PNA in sister  Complaints today: Establish primary care. Previously following Dr. Cobb. States seen x 2. Now requesting to establish at [facility]. Extensive PmHX as documented. He follows [facility]; has been seen in Cards at [facility] in the past. H/o AFIB/denies h/o CHF. Type 2 DM managed with Oseni. States taking x ~10 years. Reports CBGs 90s-130s. Tolerating medication well. Taking Rosuvastatin and Gemfibrozil for HLD. He follows ENT at [facility]. Follows Rheum at [facility] for RA. Awaiting an appt with new rheumatologist as previous MD moved out of state. Following prescribed management. Parkinson's Dz managed by [facility]. Pt reports recently seen by neurologist. Scheduled to completed  labs today as ordered by Neuro. H/o lumbar spine stenosis and lower back pain. Apparently followed by Dr. Simpson for pain mgmt. He was referred to Dr. Perez for neurosx eval, however MD no longer with same practice. Pt now waiting to see Dr. Erik Love. When he was seen by Dr. Perez, it was not recommended that he undergo a multilevel lumbar fusion. He did suggest a spinal cord stimulator, however, pt alleges he was told he'd be sent for some type of stem cell treatment. This information has not been verified. He follows Dr. Chintan Benjamin for podiatry needs. No acute concerns.  Colon Cancer Screening: Reported colonoscopy 3/2012  Prostate Cancer Screening: PSA 2.26 (5/2021)  Eye Exam: Following [facility]  Vaccinations: Refuses Tdap; Shingrix series previously started    (3/29/22): Pt presenting for routine f/u. He was seen in ED 3/1/22 with c/o epistaxis. Apparently was in a [facility] and had a syncopal episode due to nosebleed x 48hs. He was then transferred to [facility]. States they were able to control/stop the bleeding ~5 hrs later in the ED. On OAC; states Xarelto was decreased from 20 mg po daily to 15 mg po daily. Following ENT. States cancelled recent appt because he hadn't had a nose bleed in 2 weeks, however, he had some bleeding from right nare last night x 10 min. Controlled with manual pressure. States will f/u with ENT if recurs. He also reports having some issues with low blood pressures (asymptomatic) lately. [facility] cards told him Parkinson's could play a role. So, he is currently holding his HCTZ-Lisinopril at present. Labs completed and noted stable. Renal indices stable compared to baseline. HgA1c 6%. Taking Oseni. Denies overt s/s of hypoglycemia or hyperglycemia. No other concerns.    (9/30/22): Pt presenting for routine f/u. Labs completed earlier this week. Significant for: triglycerides 211, glucose 122, A1c 6.4%. Otherwise, labs are stable compared to baseline. He was referred to  "Dr. Armen Reyes in May for chronic lower back pain. Alleges their office has "been trying to get in touch with you for some more paperwork, but they can't get through." He continues to follows Cards at Dr. Susana PÉREZ.  has been taken off of ACE-I d/t hypotension that is reportedly 2/2 PD. BP at goal today. Parkinson's Dz managed by [facility]. He will see Rheum in November. Asking for refill on colchicine. He mentions h/o hard, infrequent stools from "when I was taking that pain medicine."  plans to obtain Miralax OTC for PRN use. No other complaints.    3/30/23: Pt presenting for routine f/u. Labs completed last week. Significant for: tLDL 113, A1c 6.5%, Creatinine 1.5, eGFR 50s, RBCs on UA. Otherwise, labs are stable compared to baseline. He was referred to Dr. Armen Reyes in May 2022 for chronic lower back pain. Alleges unable to be seen due to wait > 1 year. He is still following Dr. Simpson for pain mgmt. States seeking alternative measures to manage his back pain definitely as he is against surgical intervention. He continues to follows Cards at Dr. Susana PÉREZ.  has been taken off of ACE-I d/t hypotension that is reportedly 2/2 PD. BP at goal today. Parkinson's Dz managed by [facility]; Dr. Barrios. He is following Dr. Donovan for Rheum services here at [facility]. Last visit 3/21/23. He also underwent an eye exam 3/14/23, no diabetic retinopathy. Denies any glucose readings < 110. Admits drinking Steven at least 2-3x/day. He completed a CXR per Rheum on 3/21/23.  was told he had some scarring so he's being sent for PFTs. Appt 4/5/23. Admits 15-year h/o smoking and quit in the 80s. Also had PNA twice, but denies trouble breathing, cough, wheezing, CP, etc. No other complaints.    03/21/23 10:43  Sodium: 142  Potassium: 4.0  Chloride: 103  CO2: 28  BUN: 20.2  Creatinine: 1.52 (H)  eGFR: 51  Glucose: 140 (H)  Calcium: 10.3 (H)  Alkaline Phosphatase: 62  PROTEIN " TOTAL: 8.2 (H)  Albumin: 4.5  Albumin/Globulin Ratio: 1.2  BILIRUBIN TOTAL: 0.5  AST: 31  ALT: 33  CRP: 1.90  Globulin, Total: 3.7 (H)          Review of Systems     Review of Systems   Respiratory:  Negative for apnea, cough, choking and chest tightness.    Cardiovascular:  Negative for chest pain, palpitations and leg swelling.   Musculoskeletal:  Positive for arthralgias and back pain.   Neurological:  Positive for tremors.   All other systems reviewed and are negative.    Medical / Social / Family History     Past Medical History:   Diagnosis Date    Central stenosis of spinal canal     CHF (congestive heart failure)     Fatty liver     H/O cataract removal with insertion of prosthetic lens     History of radial keratotomy 01/01/1998    HTN (hypertension)     Parkinson's disease     Paroxysmal atrial fibrillation     Peyronie disease     Rheumatoid arthritis, unspecified     Type 2 diabetes mellitus without complications        Past Surgical History:   Procedure Laterality Date    CATARACT EXTRACTION W/  INTRAOCULAR LENS IMPLANT      EPIDURAL STEROID INJECTION INTO LUMBAR SPINE      exc cyst Lt ear      INJECTION OF ANESTHETIC AGENT AROUND MEDIAL BRANCH NERVES INNERVATING LUMBAR FACET JOINT Bilateral 11/16/2022    Procedure: Block-nerve-medial branch-lumbar;  Surgeon: Gertrude Blanco MD;  Location: [facility];  Service: Pain Management;  Laterality: Bilateral;  Bilateral L4-L5...Patient is requesting to be first case    KNEE ARTHROSCOPY Right     REFRACTIVE SURGERY      VASECTOMY  1998       Social History    reports that he quit smoking about 36 years ago. His smoking use included cigarettes. He has been exposed to tobacco smoke. He has never used smokeless tobacco. He reports that he does not currently use alcohol after a past usage of about 2.0 - 3.0 standard drinks per week. He reports current drug use. Drug: Marijuana.    Family History  's family history includes Alzheimer's disease in his father;  Autoimmune disease in his sister; Coronary artery disease in his mother; Dementia in his sister; Emphysema in his father; Glaucoma in his father; Heart failure in his mother; Pneumonia in his sister.    Medications and Allergies     Medications  Medication List with Changes/Refills   Current Medications    ALPROSTADIL (CAVERJECT IMPULSE ICAV)    by Intracavernosal route.    ASCORBIC ACID, VITAMIN C, (VITAMIN C) 1000 MG TABLET    Take 1,000 mg by mouth every evening.    B COMPLEX VITAMINS CAPSULE    Take 1 capsule by mouth nightly.    BLOOD SUGAR DIAGNOSTIC (ACCU-CHEK GUIDE TEST STRIPS MISC)    Accu-Chek Guide test strips    CARBIDOPA-LEVODOPA  MG (SINEMET)  MG PER TABLET    Take 2 tablets by mouth 2 (two) times a day.    CHOLECALCIFEROL, VITAMIN D3, 125 MCG (5,000 UNIT) CAPSULE    Take 1,000 Units by mouth nightly.    COENZYME Q10 100 MG CAPSULE    Take 100 mg by mouth every evening.    COLCHICINE (COLCRYS) 0.6 MG TABLET    Take 1 tablet (0.6 mg total) by mouth 2 (two) times daily.    DOCUSATE SODIUM (COLACE) 50 MG CAPSULE    Take 100 mg by mouth Daily.    FOLIC ACID/MULTIVIT-MIN/LUTEIN (CENTRUM SILVER ORAL)    Take 1 tablet by mouth every evening.    GEMFIBROZIL (LOPID) 600 MG TABLET    Take 1 tablet (600 mg total) by mouth 2 (two) times daily.    HYDROCODONE-ACETAMINOPHEN (NORCO) 5-325 MG PER TABLET    Take 1 tablet by mouth every 6 (six) hours as needed for Pain.    LANCETS (ACCU-CHEK FASTCLIX LANCET DRUM [facility])    Accu-Chek Fastclix Lancet Drum    LISINOPRIL (PRINIVIL,ZESTRIL) 5 MG TABLET        LISINOPRIL-HYDROCHLOROTHIAZIDE (PRINZIDE,ZESTORETIC) 20-25 MG TAB    Take 1 tablet by mouth as needed.    MAGNESIUM 30 MG TAB    Take 250 mg by mouth nightly.    NON FORMULARY MEDICATION    Take 1 tablet by mouth nightly.    PANTOPRAZOLE (PROTONIX) 40 MG TABLET    Take 1 tablet (40 mg total) by mouth once daily.    POTASSIUM CITRATE 99 MG CAP    Take 1 tablet by mouth nightly.    PREDNISONE (DELTASONE) 5 MG  TABLET    Take 1 tablet (5 mg total) by mouth daily as needed (flare up). AFTER BREAKFAST    ROSUVASTATIN (CRESTOR) 10 MG TABLET    TAKE ONE TABLET BY MOUTH EVERY DAY    SELEGILINE (ELDEPRYL) 5 MG CAP    Take 5 mg by mouth 2 (two) times daily.    TIZANIDINE (ZANAFLEX) 4 MG TABLET    Take 4 mg by mouth every 6 (six) hours as needed.    TOFACITINIB (XELJANZ XR) 11 MG TB24    Take 1 tablet by mouth once daily.    VITAMIN E 400 UNIT CAPSULE    Take 400 Units by mouth nightly.    XARELTO 20 MG TAB    Take 20 mg by mouth once daily.    ZINC SULFATE (ZINC-15 ORAL)    Take 140 mg by mouth nightly.       Allergies  Review of patient's allergies indicates:   Allergen Reactions    Sarilumab Other (See Comments)     Other reaction(s): fainting       Physical Examination     Vitals:    03/30/23 0730   BP: 133/80   Pulse: 99   Resp: 20   Temp: 98.3 °F (36.8 °C)     Physical Exam  Constitutional:       Appearance: Normal appearance. He is obese.   HENT:      Head: Normocephalic and atraumatic.   Eyes:      Extraocular Movements: Extraocular movements intact.   Cardiovascular:      Rate and Rhythm: Normal rate and regular rhythm.      Pulses: Normal pulses.      Heart sounds: Normal heart sounds.   Pulmonary:      Effort: Pulmonary effort is normal.      Breath sounds: Normal breath sounds.   Abdominal:      General: Bowel sounds are normal.      Palpations: Abdomen is soft.   Musculoskeletal:         General: Normal range of motion.   Skin:     General: Skin is warm and dry.   Neurological:      General: No focal deficit present.      Mental Status: He is alert and oriented to person, place, and time.      Motor: Tremor present.   Psychiatric:         Mood and Affect: Mood normal.         Behavior: Behavior normal.         Thought Content: Thought content normal.         Judgment: Judgment normal.         Results     Lab Results   Component Value Date    WBC 5.5 03/21/2023    RBC 4.77 03/21/2023    HGB 15.2 03/21/2023    HCT 44.5  03/21/2023    MCV 93.3 03/21/2023    MCH 31.9 03/21/2023    MCHC 34.2 03/21/2023    RDW 13.7 03/21/2023     03/21/2023    MPV 10.1 03/21/2023     CMP  Sodium Level   Date Value Ref Range Status   03/21/2023 142 136 - 145 mmol/L Final     Potassium Level   Date Value Ref Range Status   03/21/2023 4.0 3.5 - 5.1 mmol/L Final     Carbon Dioxide   Date Value Ref Range Status   03/21/2023 28 23 - 31 mmol/L Final     Blood Urea Nitrogen   Date Value Ref Range Status   03/21/2023 20.2 8.4 - 25.7 mg/dL Final     Creatinine   Date Value Ref Range Status   03/21/2023 1.52 (H) 0.73 - 1.18 mg/dL Final     Calcium Level Total   Date Value Ref Range Status   03/21/2023 10.3 (H) 8.8 - 10.0 mg/dL Final     Albumin Level   Date Value Ref Range Status   03/21/2023 4.5 3.4 - 4.8 g/dL Final     Bilirubin Total   Date Value Ref Range Status   03/21/2023 0.5 <=1.5 mg/dL Final     Alkaline Phosphatase   Date Value Ref Range Status   03/21/2023 62 40 - 150 unit/L Final     Aspartate Aminotransferase   Date Value Ref Range Status   03/21/2023 31 5 - 34 unit/L Final     Alanine Aminotransferase   Date Value Ref Range Status   03/21/2023 33 0 - 55 unit/L Final     Estimated GFR-Non    Date Value Ref Range Status   03/29/2022 62 >=90      Lab Results   Component Value Date    CHOL 190 03/21/2023     Lab Results   Component Value Date    HDL 52 03/21/2023     No results found for: LDLCALC  Lab Results   Component Value Date    TRIG 124 03/21/2023     No results found for: CHOLHDL  Lab Results   Component Value Date    TSH 1.841 03/21/2023     Lab Results   Component Value Date    PHUR 6.0 03/29/2022    SPECGRAV 1.015 09/04/2019    PROTEINUA 1+ (A) 03/21/2023    GLUCUA Normal 03/29/2022    KETONESU Negative 03/29/2022    OCCULTUA Negative 03/29/2022    NITRITE Negative 03/29/2022    LEUKOCYTESUR Negative 03/21/2023           Assessment and Plan (including Health Maintenance)     Plan:         Health Maintenance Due   Topic  Date Due    Foot Exam  Never done    Colorectal Cancer Screening  Never done    COVID-19 Vaccine (3 - Booster for Danie series) 12/20/2021    Abdominal Aortic Aneurysm Screening  Never done       Problem List Items Addressed This Visit    None        Health Maintenance Topics with due status: Not Due       Topic Last Completion Date    Pneumococcal Vaccines (Age 65+) 11/24/2020    Diabetes Urine Screening 09/26/2022    Eye Exam 03/14/2023    Lipid Panel 03/21/2023    Hemoglobin A1c 03/21/2023    High Dose Statin 03/30/2023       Future Appointments   Date Time Provider Department Center   4/5/2023 10:00 AM RESPIRATORY THERAPY, [facility]   5/1/2023  9:00 AM Adriane Raygoza MD [facility] CARD [facility]   5/30/2023  8:00 AM RESIDENT 3, [facility] OTORHINOLARYNGOLOGY [facility] ENT [facility]   7/11/2023 10:00 AM Valeria Donovan MD [facility] RHEU [facility]   3/14/2024  9:45 AM PROVIDERS, [facility] [place] [facility]         Signature:  DAVIDSON Mandujano  [facility]  [address] [address]    Date of encounter: 3/30/23

## 2023-03-30 NOTE — TELEPHONE ENCOUNTER
Please check status of referral to GI Lab (endoscopy) from September 2022. Patient relates he was never contacted. Please contact GI lab.

## 2023-03-30 NOTE — ASSESSMENT & PLAN NOTE
Avoid excessive intake of NSAIDs (i.e., Advil, Aleve, Ibuprofen, Motrin, Naproxen, Diclofenac, Indocin, Celebrex, Mobic, Voltaren). Avoid the following GI meds: Milk of Mag, Mylanta, Fleets Enema, and Pepto-Bismol. Okay to take Tylenol (acetaminophen) (if no end-stage liver disease), low dose ASA (81 mg), Miralax, Tums, and Colace. Increase clear fluid intake. Avoid dark sodas.  Will monitor closely and refer to Renal at next visit if eGFR remains reduced. Pt agreeable but wants to wait for now

## 2023-03-30 NOTE — TELEPHONE ENCOUNTER
Spoke with Ursula she states referral was closed, you will have to resend referral and send to central clinic.

## 2023-03-31 NOTE — PROGRESS NOTES
I have reviewed the notes, assessments, and/or procedures performed this visit, and I concur with the documentation. Face to face visit.

## 2023-04-05 ENCOUNTER — PROCEDURE VISIT (OUTPATIENT)
Dept: RESPIRATORY THERAPY | Facility: HOSPITAL | Age: 68
End: 2023-04-05
Payer: MEDICARE

## 2023-04-05 DIAGNOSIS — J84.9 ILD (INTERSTITIAL LUNG DISEASE): ICD-10-CM

## 2023-04-05 PROCEDURE — 94727 GAS DIL/WSHOT DETER LNG VOL: CPT

## 2023-04-05 PROCEDURE — 94010 BREATHING CAPACITY TEST: CPT

## 2023-04-05 PROCEDURE — 94729 DIFFUSING CAPACITY: CPT

## 2023-04-26 ENCOUNTER — TELEPHONE (OUTPATIENT)
Dept: ORTHOPEDICS | Facility: HOSPITAL | Age: 68
End: 2023-04-26
Payer: MEDICARE

## 2023-04-26 NOTE — TELEPHONE ENCOUNTER
----- Message from Carolina Wood sent at 4/25/2023  3:13 PM CDT -----  Regarding: Neuro MD  Mr. Rehman phoned the office regarding his PT and Neuro MD Corrective Therapy Device.  He would like to have this device to help his back pain.  He says his insurance will cover it if we request it.  We would need to get prior authorization for the device.

## 2023-05-01 ENCOUNTER — OFFICE VISIT (OUTPATIENT)
Dept: CARDIOLOGY | Facility: CLINIC | Age: 68
End: 2023-05-01
Payer: MEDICARE

## 2023-05-01 VITALS
RESPIRATION RATE: 20 BRPM | TEMPERATURE: 98 F | OXYGEN SATURATION: 96 % | WEIGHT: 218.63 LBS | BODY MASS INDEX: 30.61 KG/M2 | HEIGHT: 71 IN | HEART RATE: 84 BPM | SYSTOLIC BLOOD PRESSURE: 136 MMHG | DIASTOLIC BLOOD PRESSURE: 88 MMHG

## 2023-05-01 DIAGNOSIS — I48.91 ATRIAL FIBRILLATION AND FLUTTER: Primary | ICD-10-CM

## 2023-05-01 DIAGNOSIS — E78.5 HYPERLIPIDEMIA, UNSPECIFIED HYPERLIPIDEMIA TYPE: ICD-10-CM

## 2023-05-01 DIAGNOSIS — I10 HYPERTENSION, UNSPECIFIED TYPE: ICD-10-CM

## 2023-05-01 DIAGNOSIS — I51.89 DIASTOLIC DYSFUNCTION: ICD-10-CM

## 2023-05-01 DIAGNOSIS — I48.92 ATRIAL FIBRILLATION AND FLUTTER: Primary | ICD-10-CM

## 2023-05-01 PROCEDURE — 93005 ELECTROCARDIOGRAM TRACING: CPT

## 2023-05-01 PROCEDURE — 99213 OFFICE O/P EST LOW 20 MIN: CPT | Mod: PBBFAC | Performed by: STUDENT IN AN ORGANIZED HEALTH CARE EDUCATION/TRAINING PROGRAM

## 2023-05-01 NOTE — PROGRESS NOTES
[facility]   Cardiology Clinic     Date of Visit: 5/1/2023  Reason for Visit/Chief Complaint:   Chief Complaint    New patient          I have explained to Mr. Melquiades Rehman Jr. that I am not a cardiologist. He understands that I am an internal medicine physician seeing patients in the cardiology clinic. Mr. Melquiades Rehman Jr. has expressed understanding of this fact and is willing to proceed with this visit.     The patient was discussed with the cardiologist at the time of the appointment.     History of Present Illness:      Melquiades Rehman Jr. is a 67 y.o. male with a PMH significant for h/o CVA (2015), HTN, HLD, diastolic dysfunction (grade 1), A fib, Type 2 DM, hepatic steatosis, gout, asymmetric hearing loss on left side, Parkinson's disease, Peyronie's disease, RA, obesity, h/o tobacco abuse, chronic lower back pain (following pain mgmt), GERD who was referred to cardiology for ongoing care. He was originally followed at [facility] but requested to be seen at this facility.  Patient reports originally being diagnosed in 2015 with Afib. He has had two major Afib episodes since that time. He reports palpitation episodes 1-2 times per week. He is currently on PRN Lisinopril-HCTZ. He was told by his previous cardiologist to avoid BP meds if his SBP was less than 110. He reports checking his BP every morning. On review of his [facility] records, it appears he has autonomic dysfunction secondary to his Parkinsons disease. Because of this, he is unable to tolerate rate and rhythm control meds and consistent BP meds. He had once syncopal episodes 8 months ago during an episode of prolonged epistaxis (secondary to Xarelto) but has not had any episodes since that time.     CP:  The patient has no chest discomfort.      SOB:  The patient denies shortness of breath. No BRAGG    EDEMA:  The patient denies edema.      ORTHOPNEA:  The patient denies orthopnea.  No PND.      SYNCOPE:  The patient denies  near syncope.  No recent syncope.   No dizziness.    PALPITATIONS:  The patient has palpitations.    LEVEL OF EXERTION:  The patient exercises and does not have symptoms with this level of exertion.  The patient's level of exertion is good.    Past Medical History:        Past Medical History:   Diagnosis Date    Central stenosis of spinal canal     CHF (congestive heart failure)     Fatty liver     H/O cataract removal with insertion of prosthetic lens     History of radial keratotomy 1998    HTN (hypertension)     Parkinson's disease     Paroxysmal atrial fibrillation     Peyronie disease     Rheumatoid arthritis, unspecified     Type 2 diabetes mellitus without complications        Surgical History:        Past Surgical History:   Procedure Laterality Date    CATARACT EXTRACTION W/  INTRAOCULAR LENS IMPLANT      EPIDURAL STEROID INJECTION INTO LUMBAR SPINE      exc cyst Lt ear      INJECTION OF ANESTHETIC AGENT AROUND MEDIAL BRANCH NERVES INNERVATING LUMBAR FACET JOINT Bilateral 2022    Procedure: Block-nerve-medial branch-lumbar;  Surgeon: Gertrude Blanco MD;  Location: [facility];  Service: Pain Management;  Laterality: Bilateral;  Bilateral L4-L5...Patient is requesting to be first case    KNEE ARTHROSCOPY Right     REFRACTIVE SURGERY      VASECTOMY         Family History:        Family History   Problem Relation Age of Onset    Heart failure Mother     Coronary artery disease Mother     Glaucoma Father     Alzheimer's disease Father     Emphysema Father     Autoimmune disease Sister     Pneumonia Sister     Dementia Sister        Social History:        Social History     Tobacco Use    Smoking status: Former     Types: Cigarettes     Quit date:      Years since quittin.3     Passive exposure: Past    Smokeless tobacco: Never   Substance Use Topics    Alcohol use: Not Currently     Alcohol/week: 2.0 - 3.0 standard drinks     Types: 1 - 2 Cans of beer, 1 Shots of liquor per week      Comment: rarely    Drug use: Yes     Types: Marijuana       Allergies:         Review of patient's allergies indicates:   Allergen Reactions    Sarilumab Other (See Comments)     Other reaction(s): fainting       Medications:        Current Outpatient Medications   Medication Sig Dispense Refill    ascorbic acid, vitamin C, (VITAMIN C) 1000 MG tablet Take 1,000 mg by mouth every evening.      b complex vitamins capsule Take 1 capsule by mouth nightly.      blood sugar diagnostic (ACCU-CHEK GUIDE TEST STRIPS MISC) Accu-Chek Guide test strips      carbidopa-levodopa  mg (SINEMET)  mg per tablet Take 2 tablets by mouth 2 (two) times a day.      cholecalciferol, vitamin D3, 125 mcg (5,000 unit) capsule Take 1,000 Units by mouth nightly.      coenzyme Q10 100 mg capsule Take 100 mg by mouth every evening.      colchicine (COLCRYS) 0.6 mg tablet Take 1 tablet (0.6 mg total) by mouth 2 (two) times daily. 180 tablet 1    docusate sodium (COLACE) 50 MG capsule Take 100 mg by mouth Daily.      folic acid/multivit-min/lutein (CENTRUM SILVER ORAL) Take 1 tablet by mouth every evening.      gemfibroziL (LOPID) 600 MG tablet Take 1 tablet (600 mg total) by mouth 2 (two) times daily. 180 tablet 1    lancets (ACCU-CHEK FASTCLIX LANCET DRUM MISC) Accu-Chek Fastclix Lancet Drum      lisinopriL-hydrochlorothiazide (PRINZIDE,ZESTORETIC) 20-25 mg Tab Take 1 tablet by mouth as needed.      magnesium 30 mg Tab Take 250 mg by mouth nightly.      NON FORMULARY MEDICATION Take 1 tablet by mouth nightly.      OSENI 25-30 mg Tab Take 1 tablet by mouth once daily. 90 tablet 1    pantoprazole (PROTONIX) 40 MG tablet Take 1 tablet (40 mg total) by mouth once daily. 90 tablet 1    potassium citrate 99 mg Cap Take 1 tablet by mouth nightly.      rosuvastatin (CRESTOR) 20 MG tablet Take 1 tablet (20 mg total) by mouth once daily. 90 tablet 1    selegiline (ELDEPRYL) 5 mg Cap Take 5 mg by mouth 2 (two) times daily.      tofacitinib  (XELJANZ XR) 11 mg Tb24 Take 1 tablet by mouth once daily. 30 tablet 4    vitamin E 400 UNIT capsule Take 400 Units by mouth nightly.      XARELTO 20 mg Tab Take 20 mg by mouth once daily.      zinc sulfate (ZINC-15 ORAL) Take 140 mg by mouth nightly.      alprostadil (CAVERJECT IMPULSE ICAV) by Intracavernosal route.      HYDROcodone-acetaminophen (NORCO) 5-325 mg per tablet Take 1 tablet by mouth every 6 (six) hours as needed for Pain.      lisinopriL (PRINIVIL,ZESTRIL) 5 MG tablet       TENS unit and electrodes Cmpk NeuroMD Corrective Therapy Device (Patient not taking: Reported on 5/1/2023)      tiZANidine (ZANAFLEX) 4 MG tablet Take 4 mg by mouth every 6 (six) hours as needed.       No current facility-administered medications for this visit.       I have reviewed and updated the patient's medications, allergies, past medical history, surgical history, social history and family history as needed.    Review of Systems:      Review of Systems   Constitutional:  Negative for chills, diaphoresis and fever.   HENT:  Negative for hearing loss and nosebleeds.    Eyes:  Negative for blurred vision.   Respiratory:  Negative for shortness of breath.    Cardiovascular:  Negative for chest pain, palpitations, orthopnea, claudication and PND.   Gastrointestinal:  Negative for nausea and vomiting.   Genitourinary:  Negative for dysuria.   Musculoskeletal:  Negative for myalgias.   Skin:  Negative for rash.   Neurological:  Negative for dizziness and headaches.     Objective:        Vitals:    05/01/23 0844   BP: 136/88   Pulse: 84   Resp: 20   Temp: 97.6 °F (36.4 °C)     Wt Readings from Last 3 Encounters:   05/01/23 99.2 kg (218 lb 9.6 oz)   03/30/23 101.1 kg (222 lb 12.8 oz)   03/28/23 101.8 kg (224 lb 6.4 oz)     Temp Readings from Last 3 Encounters:   05/01/23 97.6 °F (36.4 °C) (Oral)   03/30/23 98.3 °F (36.8 °C) (Oral)   03/28/23 98.1 °F (36.7 °C)     BP Readings from Last 3 Encounters:   05/01/23 136/88   03/30/23  "133/80   03/28/23 120/78     Pulse Readings from Last 3 Encounters:   05/01/23 84   03/30/23 99   03/28/23 81       Vitals:    05/01/23 0844   BP: 136/88   BP Location: Right arm   Patient Position: Sitting   BP Method: X-Large (Automatic)   Pulse: 84   Resp: 20   Temp: 97.6 °F (36.4 °C)   TempSrc: Oral   SpO2: 96%   Weight: 99.2 kg (218 lb 9.6 oz)   Height: 5' 11" (1.803 m)     Body mass index is 30.49 kg/m².    Physical Exam  Constitutional:       Appearance: Normal appearance.   HENT:      Head: Normocephalic and atraumatic.   Eyes:      Conjunctiva/sclera: Conjunctivae normal.   Neck:      Vascular: No carotid bruit.   Cardiovascular:      Rate and Rhythm: Normal rate and regular rhythm.      Pulses: Normal pulses.      Heart sounds: Normal heart sounds. No murmur heard.    No friction rub. No gallop.   Pulmonary:      Effort: Pulmonary effort is normal. No respiratory distress.      Breath sounds: Normal breath sounds.   Abdominal:      General: Bowel sounds are normal. There is no distension.      Palpations: Abdomen is soft.   Musculoskeletal:      Right lower leg: No edema.      Left lower leg: No edema.   Skin:     General: Skin is warm and dry.      Findings: No rash.   Neurological:      Mental Status: He is alert. Mental status is at baseline.   Psychiatric:         Mood and Affect: Mood normal.         Thought Content: Thought content normal.         Judgment: Judgment normal.        Labs:      I have reviewed the following labs below:      CBC:  Lab Results   Component Value Date    WBC 5.5 03/21/2023    HGB 15.2 03/21/2023    HCT 44.5 03/21/2023     03/21/2023    MCV 93.3 03/21/2023    RDW 13.7 03/21/2023     BMP:  Lab Results   Component Value Date     03/21/2023    K 4.0 03/21/2023    CO2 28 03/21/2023    BUN 20.2 03/21/2023    CALCIUM 10.3 (H) 03/21/2023    MG 1.60 12/04/2020    PHOS 3.3 12/04/2020     LFTs:  Lab Results   Component Value Date    ALBUMIN 4.5 03/21/2023    BILITOT 0.5 " 03/21/2023    AST 31 03/21/2023    ALKPHOS 62 03/21/2023    ALT 33 03/21/2023     FLP:  Cholesterol Total   Date Value Ref Range Status   03/21/2023 190 <=200 mg/dL Final     HDL Cholesterol   Date Value Ref Range Status   03/21/2023 52 35 - 60 mg/dL Final     LDL Cholesterol   Date Value Ref Range Status   03/21/2023 113.00 50.00 - 140.00 mg/dL Final     Triglyceride   Date Value Ref Range Status   03/21/2023 124 34 - 140 mg/dL Final     DM:  Lab Results   Component Value Date    HGBA1C 6.5 03/21/2023    HGBA1C 6.4 09/26/2022    HGBA1C 6.0 03/29/2022    CREATININE 1.52 (H) 03/21/2023     Thyroid:  Lab Results   Component Value Date    TSH 1.841 03/21/2023     Anemia:  Lab Results   Component Value Date    IRON 128 02/08/2022    TIBC 382 02/08/2022    FERRITIN 816.80 (H) 12/09/2020    TDNIUWJO56 556 09/25/2020     Coags:  Lab Results   Component Value Date    INR 0.97 07/02/2018    PROTIME 12.2 07/02/2018      Cardiac:  Lab Results   Component Value Date    TROPONINI <0.010 12/08/2020    TROPONINI <0.010 12/07/2020    TROPONINI <0.010 12/07/2020    TROPONINI <0.010 12/04/2020       Cardiac Studies/Imaging:        I have reviewed the following studies below:      Echocardiogram ([facility]) - 1/15/2021  Normal LV size and function   LVEF 60%  Grade 1 Diastolic Dysfunction   Notable LVH - mild to moderate  Dilation of aortic root is limited to the sinus of valsalva (4.1cm)   No valvular dysfunction      Assessment & Plan:      67 y.o. male with the following medical problems:    Paroxysmal Atrial Fibrillation   - [id number] Score 5  - Anticoagulation: Xarelto 20  - Rate Control: None - presumed due to autonomic dysfunction    Diastolic Dysfunction   - Grade 1  - continue Low Na Diet   - will have patient log BP and HR for 1 month to determine need for more consistent Bp control     HTN  - /88 today - at goal   - patient reports taking Lisinopril-HCTZ prn due to Parkinson's disease   - patient to log BP for 1 month  for further evaluation     HLD  -  - not at goal   - Continue Crestor 20 mg    Autonomic Dysfunction  - 2/2 Parkinsons   - patient to log BP and HR for 1 month for further evaluation       Return to clinic in 3 months.      Future Appointments   Date Time Provider Department Center   5/4/2023  7:00 [address]    5/8/2023  8:00 AM AUDIOLOGIST 1, [facility] AUDIOLOGY [facility] AUDIO [place]    5/30/2023  8:00 AM RESIDENT 3, [facility] OTORHINOLARYNGOLOGY [facility] ENT [place]    7/11/2023 10:00 AM Valeria Donovan MD [facility] RHEU [place]    9/28/2023  7:30 AM DAVIDSON Mandujano [facility] INTMED [place]    3/14/2024  9:45 AM PROVIDERS, CONY Stein DO  Internal Medicine Physician   Department of Medicine   [facility]    05/01/2023 9:06 AM

## 2023-05-04 ENCOUNTER — HOSPITAL ENCOUNTER (OUTPATIENT)
Dept: RADIOLOGY | Facility: HOSPITAL | Age: 68
Discharge: HOME | End: 2023-05-04
Attending: NURSE PRACTITIONER
Payer: MEDICARE

## 2023-05-04 DIAGNOSIS — Z13.6 ENCOUNTER FOR ABDOMINAL AORTIC ANEURYSM (AAA) SCREENING: ICD-10-CM

## 2023-05-04 PROCEDURE — 76706 US ABDL AORTA SCREEN AAA: CPT | Mod: TC

## 2023-05-05 ENCOUNTER — PATIENT MESSAGE (OUTPATIENT)
Dept: INTERNAL MEDICINE | Facility: CLINIC | Age: 68
End: 2023-05-05
Payer: MEDICARE

## 2023-05-08 ENCOUNTER — CLINICAL SUPPORT (OUTPATIENT)
Dept: AUDIOLOGY | Facility: HOSPITAL | Age: 68
End: 2023-05-08
Payer: MEDICARE

## 2023-05-08 DIAGNOSIS — H90.3 ASYMMETRIC SNHL (SENSORINEURAL HEARING LOSS): ICD-10-CM

## 2023-05-08 DIAGNOSIS — H90.3 SENSORINEURAL HEARING LOSS, BILATERAL: Primary | ICD-10-CM

## 2023-05-08 PROCEDURE — 92567 TYMPANOMETRY: CPT | Performed by: AUDIOLOGIST

## 2023-05-08 PROCEDURE — 92557 COMPREHENSIVE HEARING TEST: CPT | Performed by: AUDIOLOGIST

## 2023-05-08 NOTE — PROGRESS NOTES
Hearing Evaluation        Patient History: Mr. Rehman has a long-standing hearing loss reporting no changes in hearing since previous evaluation. He does reports concern with tinnitus in the left ear, describing it as somewhat 'pitch-changing' with a little reverberation.  Vertigo and middle ear issues are denied. All additional history is unremarkable.        Test Results:                    Pure Tone Testing:      Right ear:       Mild to moderate sensorineural hearing loss from 3k-4kHz rising to within normal limits at 8kHz. Speech reception threshold is in agreement with puretone findings.  Discrimination score of 84% is considered good.        Left ear:          Mild to severe sensorineural hearing loss from 1500-8kHz. Speech reception threshold is in agreement with puretone findings.  Discrimination score of 56% is considered fair.                                                                            Tympanometry:                                           Right ear:       Type 'A' tymp, normal middle ear pressure/function     Left ear:          Type 'A' tymp, normal middle ear pressure/function                                           Interpretations:      Behavioral test findings indicate a decrease in hearing in the left ear since previous hearing eval.  Findings for the right ear are unchanged. Speech reception thresholds obtained at 10dB, AU, and are in agreement with puretone findings. Speech discrimination scores of 84%, AD and 56%, AS, are considered good/fair.  Immittance measures indicate normal middle ear pressure/function, bilaterally.            Recommendations:   Continue with ENT follow up  2. Annual hearing evaluations  3. Binaural amplification (Information on [place] [employer] given)

## 2023-05-09 ENCOUNTER — PATIENT OUTREACH (OUTPATIENT)
Dept: ADMINISTRATIVE | Facility: HOSPITAL | Age: 68
End: 2023-05-09
Payer: MEDICARE

## 2023-05-09 NOTE — PROGRESS NOTES
Population Health Outreach. The following record(s) were uploaded for Health Maintenance.    Colonoscopy 3/12/2012  EGD 3/12/2012

## 2023-05-12 ENCOUNTER — TELEPHONE (OUTPATIENT)
Dept: RHEUMATOLOGY | Facility: CLINIC | Age: 68
End: 2023-05-12
Payer: MEDICARE

## 2023-05-15 ENCOUNTER — APPOINTMENT (OUTPATIENT)
Dept: LAB | Facility: HOSPITAL | Age: 68
End: 2023-05-15
Attending: INTERNAL MEDICINE
Payer: MEDICARE

## 2023-05-30 ENCOUNTER — OFFICE VISIT (OUTPATIENT)
Dept: OTOLARYNGOLOGY | Facility: CLINIC | Age: 68
End: 2023-05-30
Payer: MEDICARE

## 2023-05-30 VITALS
BODY MASS INDEX: 31.24 KG/M2 | SYSTOLIC BLOOD PRESSURE: 135 MMHG | DIASTOLIC BLOOD PRESSURE: 83 MMHG | WEIGHT: 224 LBS | HEART RATE: 85 BPM

## 2023-05-30 DIAGNOSIS — H93.13 TINNITUS AURIUM, BILATERAL: ICD-10-CM

## 2023-05-30 DIAGNOSIS — H90.3 SENSORINEURAL HEARING LOSS (SNHL) OF BOTH EARS: ICD-10-CM

## 2023-05-30 DIAGNOSIS — H90.3 ASYMMETRIC SNHL (SENSORINEURAL HEARING LOSS): Primary | ICD-10-CM

## 2023-05-30 PROCEDURE — 99213 OFFICE O/P EST LOW 20 MIN: CPT | Mod: PBBFAC | Performed by: STUDENT IN AN ORGANIZED HEALTH CARE EDUCATION/TRAINING PROGRAM

## 2023-05-30 NOTE — PROGRESS NOTES
[facility]  Otolaryngology-Head & Neck Surgery    Office Visit    Melquiades Rehman Jr.  [id number]  1955    CC:     HPI: Melquiades Rehman Jr. is a 67 y.o. male with a history of AFib on Eliquis, Parkinson's disease, that presents for evaluation of sensorineural hearing loss, tinnitus, epistaxis.  He is not had any issues with epistaxis for the last 6 months.  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  He has not had a hearing test in nearly 2 years.  No otalgia or otorrhea.  He does have prior history of spontaneous eardrum rupture on the left side with routine healing and scar tissue.  He does have a history of Parkinson's disease, he has been in speech therapy prior for his voice.  It has been about the same he states.    5/30/23: Here today to follow up after his recent audiogram. He reports that he intermittently has trouble hearing two different tones in the left ear but this symptom has improved. He occasionally hears cricket sounds in both ears. No changes to his hearing. No ear pain, drainage or dizziness. He's not interested in hearing aids at this time. Not having any further issues with nosebleeds.     ROS:   General: Negative except per HPI  Skin: Denies rash, ulcer, or lesion.  Eyes: Denies vision changes or diplopia.  Ears: Negative except per HPI  Nose: Negative except per HPI  Throat/mouth: Negative except per HPI  Cardiovascular: Negative except per HPI  Respiratory: Negative except per HPI  Neck: Negative except per HPI  Endocrine: Negative except per HPI  Neurologic: Negative except per HPI    Review of patient's allergies indicates:   Allergen Reactions    Sarilumab Other (See Comments)     Other reaction(s): fainting       Past Medical History:   Diagnosis Date    Central stenosis of spinal canal     CHF (congestive heart failure)     Fatty liver     H/O  cataract removal with insertion of prosthetic lens     History of radial keratotomy 1998    HTN (hypertension)     Parkinson's disease     Paroxysmal atrial fibrillation     Peyronie disease     Rheumatoid arthritis, unspecified     Type 2 diabetes mellitus without complications        Past Surgical History:   Procedure Laterality Date    CATARACT EXTRACTION W/  INTRAOCULAR LENS IMPLANT      EPIDURAL STEROID INJECTION INTO LUMBAR SPINE      exc cyst Lt ear      INJECTION OF ANESTHETIC AGENT AROUND MEDIAL BRANCH NERVES INNERVATING LUMBAR FACET JOINT Bilateral 2022    Procedure: Block-nerve-medial branch-lumbar;  Surgeon: Gertrude Blanoc MD;  Location: [facility];  Service: Pain Management;  Laterality: Bilateral;  Bilateral L4-L5...Patient is requesting to be first case    KNEE ARTHROSCOPY Right     REFRACTIVE SURGERY      VASECTOMY         Social History     Socioeconomic History    Marital status:     Number of children: 1   Tobacco Use    Smoking status: Former     Types: Cigarettes     Quit date:      Years since quittin.4     Passive exposure: Past    Smokeless tobacco: Never   Substance and Sexual Activity    Alcohol use: Not Currently     Alcohol/week: 2.0 - 3.0 standard drinks     Types: 1 - 2 Cans of beer, 1 Shots of liquor per week     Comment: rarely    Drug use: Yes     Types: Marijuana    Sexual activity: Yes     Partners: Female     Social Determinants of Health     Financial Resource Strain: High Risk    Difficulty of Paying Living Expenses: Very hard   Food Insecurity: No Food Insecurity    Worried About Running Out of Food in the Last Year: Never true    Ran Out of Food in the Last Year: Never true   Transportation Needs: No Transportation Needs    Lack of Transportation (Medical): No    Lack of Transportation (Non-Medical): No   Physical Activity: Sufficiently Active    Days of Exercise per Week: 7 days    Minutes of Exercise per Session: 30 min   Stress: No Stress  Concern Present    Feeling of Stress : Not at all   Social Connections: Moderately Isolated    Frequency of Communication with Friends and Family: More than three times a week    Frequency of Social Gatherings with Friends and Family: Twice a week    Attends Yazidi Services: Never    Active Member of Clubs or Organizations: Yes    Attends Club or Organization Meetings: More than 4 times per year    Marital Status:    Housing Stability: Low Risk     Unable to Pay for Housing in the Last Year: No    Number of Places Lived in the Last Year: 1    Unstable Housing in the Last Year: No       Family History   Problem Relation Age of Onset    Heart failure Mother     Coronary artery disease Mother     Glaucoma Father     Alzheimer's disease Father     Emphysema Father     Autoimmune disease Sister     Pneumonia Sister     Dementia Sister        Outpatient Encounter Medications as of 5/30/2023   Medication Sig Dispense Refill    alprostadil (CAVERJECT IMPULSE ICAV) by Intracavernosal route.      ascorbic acid, vitamin C, (VITAMIN C) 1000 MG tablet Take 1,000 mg by mouth every evening.      b complex vitamins capsule Take 1 capsule by mouth nightly.      blood sugar diagnostic (ACCU-CHEK GUIDE TEST STRIPS MISC) Accu-Chek Guide test strips      carbidopa-levodopa  mg (SINEMET)  mg per tablet Take 2 tablets by mouth 2 (two) times a day.      cholecalciferol, vitamin D3, 125 mcg (5,000 unit) capsule Take 1,000 Units by mouth nightly.      coenzyme Q10 100 mg capsule Take 100 mg by mouth every evening.      colchicine (COLCRYS) 0.6 mg tablet Take 1 tablet (0.6 mg total) by mouth 2 (two) times daily. 180 tablet 1    docusate sodium (COLACE) 50 MG capsule Take 100 mg by mouth Daily.      folic acid/multivit-min/lutein (CENTRUM SILVER ORAL) Take 1 tablet by mouth every evening.      gemfibroziL (LOPID) 600 MG tablet Take 1 tablet (600 mg total) by mouth 2 (two) times daily. 180 tablet 1     HYDROcodone-acetaminophen (NORCO) 5-325 mg per tablet Take 1 tablet by mouth every 6 (six) hours as needed for Pain.      lancets (ACCU-CHEK FASTCLIX LANCET DRUM [facility] Accu-Chek Fastclix Lancet Drum      lisinopriL (PRINIVIL,ZESTRIL) 5 MG tablet       lisinopriL-hydrochlorothiazide (PRINZIDE,ZESTORETIC) 20-25 mg Tab Take 1 tablet by mouth as needed.      magnesium 30 mg Tab Take 250 mg by mouth nightly.      NON FORMULARY MEDICATION Take 1 tablet by mouth nightly.      OSENI 25-30 mg Tab Take 1 tablet by mouth once daily. 90 tablet 1    pantoprazole (PROTONIX) 40 MG tablet Take 1 tablet (40 mg total) by mouth once daily. 90 tablet 1    potassium citrate 99 mg Cap Take 1 tablet by mouth nightly.      rosuvastatin (CRESTOR) 20 MG tablet Take 1 tablet (20 mg total) by mouth once daily. 90 tablet 1    selegiline (ELDEPRYL) 5 mg Cap Take 5 mg by mouth 2 (two) times daily.      TENS unit and electrodes Cmpk NeuroMD Corrective Therapy Device (Patient not taking: Reported on 5/1/2023)      tiZANidine (ZANAFLEX) 4 MG tablet Take 4 mg by mouth every 6 (six) hours as needed.      tofacitinib (XELJANZ XR) 11 mg Tb24 Take 1 tablet by mouth once daily. 30 tablet 4    vitamin E 400 UNIT capsule Take 400 Units by mouth nightly.      XARELTO 20 mg Tab Take 20 mg by mouth once daily.      zinc sulfate (ZINC-15 ORAL) Take 140 mg by mouth nightly.       No facility-administered encounter medications on file as of 5/30/2023.       PHYSICAL EXAM:  Vitals:    05/30/23 0758   BP: 135/83   Pulse: 85       General Appearance: well nourished, well-developed, alert, oriented, in no acute distress; tremor noted in extremities  Head/Face: NC, AT  Eyes: PEERLA, EOMI, normal conjunctiva  Ears: Hears well at normal conversation volume  AD: external normal, ear canal normal, TM intact, no middle ear fluid  AS: external normal, ear canal normal, TM intact with area of tympanosclerosis anteriorly, no middle ear fluid  Nose: septum with caudal leftward  deflection, no obvious bleeding points or large vessels, no inferior turbinate hypertrophy, no polyps  OC/OP: dentition moderate, no oral lesions, FOM and BOT soft  Nasopharynx, Hypopharynx, and Larynx: indirect visualization attempted, limited view due to patient intolerance  Neck: soft, non-tender, no palpable lymph nodes, thyroid- no nodules or goiter  Neuro: CN II - XII intact; tremor appreciated extremities  Psychiatric: oriented to time, place and person, no depression, anxiety or agitation      PERTINENT DATA: Audio 5/8/23    Hearing Evaluation      ASSESSMENT:  Melquiades Rehman Jr. is a 67 y.o. male with history of AFib on Xarelto with epistaxis, since resolved.  Also with history of Parkinson's disease.  Asymmetric sensorineural hearing loss on prior audiogram from 2021 and again on recent audiogram. He has intermittent tinnitus and difficulty differentiating tones with the left ear when he plays music which hasn't been very bothersome to him lately.    PLAN:  -- MRI IAC to rule out any tumor/neoplasm as a cause of his asymmetric loss.   -- discussed his potential for worsening dysphonia given his Parkinson's disease, not interested in further voice therapy at this time  -- telemed appt in 1 month to discuss MRI results.   - Otherwise, can plan for a yearly audio due in 5/2024 which was ordered today.       Jodie Neal MD  [facility] Otolaryngology  3:32 PM 05/30/2023

## 2023-06-06 ENCOUNTER — TELEPHONE (OUTPATIENT)
Dept: INTERNAL MEDICINE | Facility: CLINIC | Age: 68
End: 2023-06-06
Payer: MEDICARE

## 2023-06-06 ENCOUNTER — CLINICAL SUPPORT (OUTPATIENT)
Dept: CARDIOLOGY | Facility: CLINIC | Age: 68
End: 2023-06-06
Payer: MEDICARE

## 2023-06-06 VITALS
HEIGHT: 71 IN | SYSTOLIC BLOOD PRESSURE: 150 MMHG | TEMPERATURE: 98 F | OXYGEN SATURATION: 96 % | HEART RATE: 86 BPM | BODY MASS INDEX: 31.42 KG/M2 | DIASTOLIC BLOOD PRESSURE: 91 MMHG | WEIGHT: 224.44 LBS | RESPIRATION RATE: 18 BRPM

## 2023-06-06 DIAGNOSIS — I10 HYPERTENSION, UNSPECIFIED TYPE: Primary | ICD-10-CM

## 2023-06-06 DIAGNOSIS — L60.3 DYSTROPHIC NAIL: Primary | ICD-10-CM

## 2023-06-06 PROCEDURE — 99212 PR OFFICE/OUTPT VISIT, EST, LEVL II, 10-19 MIN: ICD-10-PCS | Mod: S$PBB,,, | Performed by: NURSE PRACTITIONER

## 2023-06-06 PROCEDURE — 99212 OFFICE O/P EST SF 10 MIN: CPT | Mod: S$PBB,,, | Performed by: NURSE PRACTITIONER

## 2023-06-06 PROCEDURE — 99215 OFFICE O/P EST HI 40 MIN: CPT | Mod: PBBFAC

## 2023-06-06 NOTE — PROGRESS NOTES
Pt seen in clinic today for b/p check.Pt states he takes b/p meds depending on what his pressure is. Stated today he has not taken medicine today because he knew we had to check his b/p. B/P today is elevated 155/95.  Pulse  Visit presented to Jerrica Jj for review. Pt instructed per Jerrica to continue current regimen and keep all f/u appointments . Pt verbalized understanding.84. B/p log most are wnl. Rechecked manually b/p150/91 pt states it will be better once he takes meds. Pt encouraged to check b/p before coming to next appointment and take meds accordingly. Verbalized understanding

## 2023-06-06 NOTE — TELEPHONE ENCOUNTER
----- Message from Regina Dinh sent at 6/6/2023  8:58 AM CDT -----  Regarding: Referral  [facility] . Thanks

## 2023-06-16 ENCOUNTER — HOSPITAL ENCOUNTER (OUTPATIENT)
Dept: WOUND CARE | Facility: HOSPITAL | Age: 68
Discharge: HOME | End: 2023-06-16
Attending: NURSE PRACTITIONER
Payer: MEDICARE

## 2023-06-16 VITALS
OXYGEN SATURATION: 95 % | HEART RATE: 83 BPM | SYSTOLIC BLOOD PRESSURE: 138 MMHG | DIASTOLIC BLOOD PRESSURE: 85 MMHG | RESPIRATION RATE: 18 BRPM | TEMPERATURE: 98 F

## 2023-06-16 DIAGNOSIS — L60.3 DYSTROPHIC NAIL: ICD-10-CM

## 2023-06-16 DIAGNOSIS — E11.42 TYPE 2 DIABETES MELLITUS WITH PERIPHERAL NEUROPATHY: ICD-10-CM

## 2023-06-16 DIAGNOSIS — E11.9 ENCOUNTER FOR COMPREHENSIVE DIABETIC FOOT EXAMINATION, TYPE 2 DIABETES MELLITUS: Primary | ICD-10-CM

## 2023-06-16 DIAGNOSIS — L60.2 OVERGROWN TOENAILS: ICD-10-CM

## 2023-06-16 DIAGNOSIS — G63 POLYNEUROPATHY ASSOCIATED WITH UNDERLYING DISEASE: ICD-10-CM

## 2023-06-16 PROCEDURE — 27000999 HC MEDICAL RECORD PHOTO DOCUMENTATION

## 2023-06-16 PROCEDURE — 11719 TRIM NAIL(S) ANY NUMBER: CPT | Mod: ,,, | Performed by: NURSE PRACTITIONER

## 2023-06-16 PROCEDURE — 99499 UNLISTED E&M SERVICE: CPT | Mod: ,,, | Performed by: NURSE PRACTITIONER

## 2023-06-16 PROCEDURE — 99499 NO LOS: ICD-10-PCS | Mod: ,,, | Performed by: NURSE PRACTITIONER

## 2023-06-16 PROCEDURE — 11719 TRIM NAIL(S) ANY NUMBER: CPT

## 2023-06-16 PROCEDURE — G0127 TRIM NAIL(S): HCPCS

## 2023-06-16 PROCEDURE — 11719 PR TRIM NAIL(S): ICD-10-PCS | Mod: ,,, | Performed by: NURSE PRACTITIONER

## 2023-06-16 RX ORDER — ROSUVASTATIN CALCIUM 20 MG/1
20 TABLET, COATED ORAL NIGHTLY
Qty: 90 TABLET | Refills: 0 | Status: SHIPPED | OUTPATIENT
Start: 2023-06-16 | End: 2023-07-06 | Stop reason: ALTCHOICE

## 2023-06-16 NOTE — PROGRESS NOTES
CHIEF COMPLAINT:    Routine diabetic foot care      HISTORY OF  PRESENT ILLNESS:  67 y.o. White male being seen today for routine diabetic foot care.  Reports numbness and burning in big toes on daily basis.  Does have pain in calves with walking distances.  Not aware of his feet feeling cold.  Has occasional swelling in bilateral outer and inner ankles.  Wears CROCs.  Does go barefoot on occasion.  Denies hx of foot/toe wounds.  No hx of vascular work-ups or interventions.  Never smoker.  Followed by DAVIDSON Mandujano for PCP.   Last visit with Ms. Dodd was on 6/7/23.   .  Disabled .  Quit smoking in 1987; was a light smoker.  History includes:        Past Medical History:   Diagnosis Date    Central stenosis of spinal canal     CHF (congestive heart failure)     Chronic back pain greater than 3 months duration 10/14/2022    Constipation 9/30/2022    Degeneration of lumbar intervertebral disc 7/6/2022    Diastolic dysfunction 5/1/2023    Dysphonia 9/30/2022    Dystrophic nail 6/19/2023    Erectile dysfunction 9/30/2022    Fatty liver     GERD (gastroesophageal reflux disease) 9/30/2022    H/O cataract removal with insertion of prosthetic lens     History of CVA (cerebrovascular accident) 9/30/2022    History of radial keratotomy 01/01/1998    HTN (hypertension)     Hyperlipidemia 9/30/2022    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    Microhematuria 3/30/2023    Overgrown toenails 6/19/2023    Parkinson's disease     Paroxysmal atrial fibrillation     Peyronie disease     Polyneuropathy associated with underlying disease 6/19/2023    Rheumatoid arthritis 1/10/2022    Rheumatoid arthritis, unspecified     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    Spinal stenosis of lumbar region 9/30/2022    Type 2 diabetes mellitus without complications       Past Surgical History:   Procedure Laterality Date    CATARACT EXTRACTION W/  INTRAOCULAR LENS IMPLANT      EPIDURAL STEROID INJECTION INTO LUMBAR SPINE      exc cyst Lt  ear      INJECTION OF ANESTHETIC AGENT AROUND MEDIAL BRANCH NERVES INNERVATING LUMBAR FACET JOINT Bilateral 2022    Procedure: Block-nerve-medial branch-lumbar;  Surgeon: Gertrude Blanco MD;  Location: [facility] OR;  Service: Pain Management;  Laterality: Bilateral;  Bilateral L4-L5...Patient is requesting to be first case    KNEE ARTHROSCOPY Right     REFRACTIVE SURGERY      VASECTOMY        Social History     Socioeconomic History    Marital status:     Number of children: 1   Tobacco Use    Smoking status: Former     Types: Cigarettes     Quit date:      Years since quittin.4     Passive exposure: Past    Smokeless tobacco: Never   Substance and Sexual Activity    Alcohol use: Not Currently     Alcohol/week: 2.0 - 3.0 standard drinks     Types: 1 - 2 Cans of beer, 1 Shots of liquor per week     Comment: rarely    Drug use: Yes     Types: Marijuana    Sexual activity: Yes     Partners: Female     Social Determinants of Health     Financial Resource Strain: High Risk    Difficulty of Paying Living Expenses: Very hard   Food Insecurity: No Food Insecurity    Worried About Running Out of Food in the Last Year: Never true    Ran Out of Food in the Last Year: Never true   Transportation Needs: No Transportation Needs    Lack of Transportation (Medical): No    Lack of Transportation (Non-Medical): No   Physical Activity: Sufficiently Active    Days of Exercise per Week: 7 days    Minutes of Exercise per Session: 30 min   Stress: No Stress Concern Present    Feeling of Stress : Not at all   Social Connections: Moderately Isolated    Frequency of Communication with Friends and Family: More than three times a week    Frequency of Social Gatherings with Friends and Family: Twice a week    Attends Zoroastrianism Services: Never    Active Member of Clubs or Organizations: Yes    Attends Club or Organization Meetings: More than 4 times per year    Marital Status:    Housing Stability: Low Risk      Unable to Pay for Housing in the Last Year: No    Number of Places Lived in the Last Year: 1    Unstable Housing in the Last Year: No       Review of Most Recent Wound Care-Related Labs:  Lab Results   Component Value Date/Time    WBC 5.5 03/21/2023 10:43 AM    RBC 4.77 03/21/2023 10:43 AM    HGB 15.2 03/21/2023 10:43 AM    HCT 44.5 03/21/2023 10:43 AM    IRON 128 02/08/2022 09:14 AM    MCV 93.3 03/21/2023 10:43 AM    MCH 31.9 03/21/2023 10:43 AM    CREATININE 1.27 (H) 05/15/2023 07:42 AM    HGBA1C 6.5 03/21/2023 10:43 AM    CRP 1.90 03/21/2023 10:43 AM          REVIEW OF SYSTEMS:  Except as stated in HPI, all other 10 body systems normal.       Current Outpatient Medications   Medication Sig Dispense Refill    alprostadil (CAVERJECT IMPULSE ICAV) by Intracavernosal route.      ascorbic acid, vitamin C, (VITAMIN C) 1000 MG tablet Take 1,000 mg by mouth every evening.      b complex vitamins capsule Take 1 capsule by mouth nightly.      blood sugar diagnostic (ACCU-CHEK GUIDE TEST STRIPS MIS) Accu-Chek Guide test strips      carbidopa-levodopa  mg (SINEMET)  mg per tablet Take 2 tablets by mouth 2 (two) times a day.      cholecalciferol, vitamin D3, 125 mcg (5,000 unit) capsule Take 1,000 Units by mouth nightly.      coenzyme Q10 100 mg capsule Take 100 mg by mouth every evening.      colchicine (COLCRYS) 0.6 mg tablet Take 1 tablet (0.6 mg total) by mouth 2 (two) times daily. 180 tablet 1    docusate sodium (COLACE) 50 MG capsule Take 100 mg by mouth Daily.      folic acid/multivit-min/lutein (CENTRUM SILVER ORAL) Take 1 tablet by mouth every evening.      gemfibroziL (LOPID) 600 MG tablet Take 1 tablet (600 mg total) by mouth 2 (two) times daily. 180 tablet 1    lancets (ACCU-CHEK FASTCLIX LANCET DRUM MIS) Accu-Chek Fastclix Lancet Drum      lisinopriL-hydrochlorothiazide (PRINZIDE,ZESTORETIC) 20-25 mg Tab Take 1 tablet by mouth as needed.      magnesium 30 mg Tab Take 250 mg by mouth nightly.       NON FORMULARY MEDICATION Take 1 tablet by mouth nightly.      OSENI 25-30 mg Tab Take 1 tablet by mouth once daily. 90 tablet 1    pantoprazole (PROTONIX) 40 MG tablet Take 1 tablet (40 mg total) by mouth once daily. 90 tablet 1    potassium citrate 99 mg Cap Take 1 tablet by mouth nightly.      rosuvastatin (CRESTOR) 20 MG tablet Take 1 tablet (20 mg total) by mouth once daily. 90 tablet 1    rosuvastatin (CRESTOR) 20 MG tablet Take 1 tablet (20 mg total) by mouth every evening. 90 tablet 0    selegiline (ELDEPRYL) 5 mg Cap Take 5 mg by mouth 2 (two) times daily.      TENS unit and electrodes Cmpk NeuroMD Corrective Therapy Device (Patient not taking: Reported on 5/1/2023)      tiZANidine (ZANAFLEX) 4 MG tablet Take 4 mg by mouth every 6 (six) hours as needed.      tofacitinib (XELJANZ XR) 11 mg Tb24 Take 1 tablet by mouth once daily. 30 tablet 4    vitamin E 400 UNIT capsule Take 400 Units by mouth nightly.      XARELTO 20 mg Tab Take 20 mg by mouth once daily.      zinc sulfate (ZINC-15 ORAL) Take 140 mg by mouth nightly.       No current facility-administered medications for this encounter.         PHYSICAL EXAMINATION AND VITAL SIGNS:    Vitals:    06/16/23 1438   BP: 138/85   Pulse: 83   Resp: 18   Temp: 98.3 °F (36.8 °C)     There is no height or weight on file to calculate BMI.      General:  VSS, afebrile.  Overweight, in no acute distress.   Respiratory:   Breathing even, quiet, and unlabored.  No coughing.  Cardiovascular:   Mild non-pitting edema in bilateral ankles.  DP pulses palpable bilaterally.  PT pulses dopplered bilaterally.  Generalized pallor over BLE/feet/toes.  No hemosiderin staining or varicosities.  No hair distrubition over BLE or toes.  Toenails overgrown, with mild dystrophic changes.    Musculoskeletal:    Full range of motion of all extremities.  Full dorsiflexion and flexion in feet and hallux toes.  Normal intrinsic muscle ROM bilaterally.  No bunions or hammertoes.     Neurologic:   A&O X 3.  Cranial nerves grossly intact.  Normal monofilament testing bilaterally.  No LOPS identified.    Psychiatric:  Calm, cooperative.  Mood and effect normal.  Responses appropriate.   Integumentary:      Ten overgrown toenails, with moderate dystrophic changes.      Plantar surface of feet with dirt debris.                          ASSESSMENT AND PLAN:    Encounter for diabetic foot care, non-insulin dependent:  Last visit with PCP:  6/7/23  Diabetes well-controlled per last HgbA1C.     Diabetic foot care teaching, with wound clinic and [facility] precautions for any wounds, nail, or skin issues.  Instructed prompt medical attention decreases risk of lower limb amputations.  Instructed to check feet/toes daily for skin changes and to reach out to wound clinic or [facility] promptly for any breaks in skin.  Instructed on risk of non-healing wounds, osteomyelitis, and amputations with diabetes, with focus towards impaired immune response with diabetes and how that affects wound healing.  Instructed not to cut own toenails; however, to use an omar board to file down any sharp nails that may be getting caught on socks, or causing pain to surrounding skin, with rationale.  Instructed not to soak feet in epsom salt water, with rationale.  Instructed not to put any lotions or creams between toes, unless prescribed by a provider, with rationale.   Handouts given.                                        PROCEDURE NOTE    Procedure Today:  cutting and filing of 10 toenails.  Rationale:  Overgrown toenails, as patient presented today, can lead to diabetic foot/toe ulcers, osteomyelitis, and lower limb amputations.   Informed verbal consent obtained.  Using standard podiatry clippers and omar boards, I cut and filed 10 toenails.  No bleeding or discomfort during procedure.  Patient tolerated procedure without complications.                      Return to clinic in 3 months.  Teaching provided on s/s to call wound clinic for promptly.

## 2023-06-19 PROBLEM — L60.2 OVERGROWN TOENAILS: Status: ACTIVE | Noted: 2023-06-19

## 2023-06-19 PROBLEM — E11.42 TYPE 2 DIABETES MELLITUS WITH PERIPHERAL NEUROPATHY: Status: ACTIVE | Noted: 2023-06-19

## 2023-06-19 PROBLEM — E11.9 ENCOUNTER FOR COMPREHENSIVE DIABETIC FOOT EXAMINATION, TYPE 2 DIABETES MELLITUS: Status: ACTIVE | Noted: 2023-06-19

## 2023-06-19 PROBLEM — G63 POLYNEUROPATHY ASSOCIATED WITH UNDERLYING DISEASE: Status: ACTIVE | Noted: 2023-06-19

## 2023-06-19 PROBLEM — L60.3 DYSTROPHIC NAIL: Status: ACTIVE | Noted: 2023-06-19

## 2023-06-21 ENCOUNTER — HOSPITAL ENCOUNTER (OUTPATIENT)
Dept: RADIOLOGY | Facility: HOSPITAL | Age: 68
Discharge: HOME | End: 2023-06-21
Attending: STUDENT IN AN ORGANIZED HEALTH CARE EDUCATION/TRAINING PROGRAM
Payer: MEDICARE

## 2023-06-21 DIAGNOSIS — H90.3 SENSORINEURAL HEARING LOSS (SNHL) OF BOTH EARS: ICD-10-CM

## 2023-06-21 DIAGNOSIS — H90.3 ASYMMETRIC SNHL (SENSORINEURAL HEARING LOSS): ICD-10-CM

## 2023-06-21 LAB
CREAT SERPL-MCNC: 1.33 MG/DL (ref 0.73–1.18)
GFR SERPLBLD CREATININE-BSD FMLA CKD-EPI: 59 MLS/MIN/1.73/M2

## 2023-06-21 PROCEDURE — 82565 ASSAY OF CREATININE: CPT | Performed by: STUDENT IN AN ORGANIZED HEALTH CARE EDUCATION/TRAINING PROGRAM

## 2023-06-21 PROCEDURE — 25500020 PHARM REV CODE 255

## 2023-06-21 PROCEDURE — A9577 INJ MULTIHANCE: HCPCS

## 2023-06-21 PROCEDURE — 70553 MRI BRAIN STEM W/O & W/DYE: CPT | Mod: TC

## 2023-06-21 RX ADMIN — GADOBENATE DIMEGLUMINE 20 ML: 529 INJECTION, SOLUTION INTRAVENOUS at 10:06

## 2023-06-30 PROBLEM — M05.79 SEROPOSITIVE RHEUMATOID ARTHRITIS OF MULTIPLE SITES: Status: ACTIVE | Noted: 2022-01-10

## 2023-06-30 PROBLEM — N18.31 STAGE 3A CHRONIC KIDNEY DISEASE: Status: ACTIVE | Noted: 2023-06-30

## 2023-06-30 PROBLEM — J84.9 ILD (INTERSTITIAL LUNG DISEASE): Status: ACTIVE | Noted: 2023-06-30

## 2023-07-03 PROBLEM — Z00.00 WELLNESS EXAMINATION: Status: RESOLVED | Noted: 2022-09-30 | Resolved: 2023-07-03

## 2023-07-05 ENCOUNTER — HOSPITAL ENCOUNTER (OUTPATIENT)
Dept: WOUND CARE | Facility: HOSPITAL | Age: 68
Discharge: HOME | End: 2023-07-05
Attending: NURSE PRACTITIONER
Payer: MEDICARE

## 2023-07-05 ENCOUNTER — CLINICAL SUPPORT (OUTPATIENT)
Dept: OTOLARYNGOLOGY | Facility: CLINIC | Age: 68
End: 2023-07-05
Payer: MEDICARE

## 2023-07-05 VITALS
RESPIRATION RATE: 18 BRPM | DIASTOLIC BLOOD PRESSURE: 90 MMHG | SYSTOLIC BLOOD PRESSURE: 149 MMHG | TEMPERATURE: 98 F | OXYGEN SATURATION: 95 % | HEART RATE: 85 BPM

## 2023-07-05 DIAGNOSIS — S90.851A FOREIGN BODY IN RIGHT FOOT, INITIAL ENCOUNTER: Primary | ICD-10-CM

## 2023-07-05 DIAGNOSIS — H90.3 ASYMMETRIC SNHL (SENSORINEURAL HEARING LOSS): Primary | ICD-10-CM

## 2023-07-05 DIAGNOSIS — H90.3 SENSORINEURAL HEARING LOSS (SNHL) OF BOTH EARS: ICD-10-CM

## 2023-07-05 DIAGNOSIS — G63 POLYNEUROPATHY ASSOCIATED WITH UNDERLYING DISEASE: ICD-10-CM

## 2023-07-05 DIAGNOSIS — E11.42 TYPE 2 DIABETES MELLITUS WITH PERIPHERAL NEUROPATHY: ICD-10-CM

## 2023-07-05 DIAGNOSIS — M79.671 RIGHT FOOT PAIN: ICD-10-CM

## 2023-07-05 PROCEDURE — 10120 PR REMOVE FOREIGN BODY SIMPLE: ICD-10-PCS | Mod: ,,, | Performed by: NURSE PRACTITIONER

## 2023-07-05 PROCEDURE — 99499 NO LOS: ICD-10-PCS | Mod: ,,, | Performed by: NURSE PRACTITIONER

## 2023-07-05 PROCEDURE — 10120 INC&RMVL FB SUBQ TISS SMPL: CPT

## 2023-07-05 PROCEDURE — 10120 INC&RMVL FB SUBQ TISS SMPL: CPT | Mod: ,,, | Performed by: NURSE PRACTITIONER

## 2023-07-05 PROCEDURE — 99211 OFF/OP EST MAY X REQ PHY/QHP: CPT

## 2023-07-05 PROCEDURE — 27000999 HC MEDICAL RECORD PHOTO DOCUMENTATION

## 2023-07-05 PROCEDURE — 99499 UNLISTED E&M SERVICE: CPT | Mod: ,,, | Performed by: NURSE PRACTITIONER

## 2023-07-05 NOTE — PROGRESS NOTES
I reviewed the history and physical exam with the resident.   I agree with findings and plan.    Chintan Riley M.D.

## 2023-07-05 NOTE — PROGRESS NOTES
"TODAY'S VISIT NOTE WAS IMPORTED FROM LAST WOUND CLINIC VISIT OF 6/16/23.  I ATTEST THAT I REVIEWED THE HPI, ROS, LABS, WOUND-RELATED IMAGING, PHYSICAL EXAMINATION, EVALUATION AND PLAN SECTIONS OF IMPORTED NOTE AND REVISED TODAY'S VISIT NOTE TO REFLECT TODAY'S NEW ASSESSMENT FINDINGS AND TODAY'S UPDATES TO WOUND TREATMENT PLAN.          CHIEF COMPLAINT:    "I stepped on something with my right foot".      HISTORY OF  PRESENT ILLNESS:  67 y.o. White male being seen today for evaluation and management of pain in right heel, which he attributes to stepping on something.  Became aware of discomfort in right heel yesterday.  Does not know when he stepped on object, or where he might have stepped on something.  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  Denies drainage, redness, or swelling in foot.  Just feels discomfort when walking on foot.  Has not tried removing object in foot.  Has not been to C or ER for foot.  No hx of vascular work-ups or interventions.  Never smoker.  Followed by DAVIDSON Mandujano for PCP.   Last visit with Ms. Dodd was on 6/7/23.   .  Disabled .  Quit smoking in 1987; was a light smoker.  History includes:        Past Medical History:   Diagnosis Date    Central stenosis of spinal canal     CHF (congestive heart failure)     Chronic back pain greater than 3 months duration 10/14/2022    Constipation 9/30/2022    Degeneration of lumbar intervertebral disc 7/6/2022    Diastolic dysfunction 5/1/2023    Dysphonia 9/30/2022    Dystrophic nail 6/19/2023    Erectile dysfunction 9/30/2022    Fatty liver     GERD (gastroesophageal reflux disease) 9/30/2022    H/O cataract removal with insertion of prosthetic lens     History of CVA (cerebrovascular accident) 9/30/2022    History of radial keratotomy 01/01/1998    HTN (hypertension)     Hyperlipidemia 9/30/2022    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    Microhematuria 3/30/2023    Overgrown toenails " 2023    Parkinson's disease     Paroxysmal atrial fibrillation     Peyronie disease     Polyneuropathy associated with underlying disease 2023    Rheumatoid arthritis 1/10/2022    Rheumatoid arthritis, unspecified     Sixth nerve palsy 2022    Spinal stenosis of lumbar region 2022    Type 2 diabetes mellitus without complications       Past Surgical History:   Procedure Laterality Date    CATARACT EXTRACTION W/  INTRAOCULAR LENS IMPLANT      EPIDURAL STEROID INJECTION INTO LUMBAR SPINE      exc cyst Lt ear      INJECTION OF ANESTHETIC AGENT AROUND MEDIAL BRANCH NERVES INNERVATING LUMBAR FACET JOINT Bilateral 2022    Procedure: Block-nerve-medial branch-lumbar;  Surgeon: Gertrude Blanco MD;  [place] [facility] [place];  Service: Pain Management;  Laterality: Bilateral;  Bilateral L4-L5...Patient is requesting to be first case    KNEE ARTHROSCOPY Right     REFRACTIVE SURGERY      VASECTOMY        Social History     Socioeconomic History    Marital status:     Number of children: 1   Tobacco Use    Smoking status: Former     Types: Cigarettes     Quit date:      Years since quittin.5     Passive exposure: Past    Smokeless tobacco: Never   Substance and Sexual Activity    Alcohol use: Not Currently     Alcohol/week: 2.0 - 3.0 standard drinks     Types: 1 - 2 Cans of beer, 1 Shots of liquor per week     Comment: rarely    Drug use: Yes     Types: Marijuana    Sexual activity: Yes     Partners: Female     Social Determinants of Health     Financial Resource Strain: High Risk    Difficulty of Paying Living Expenses: Very hard   Food Insecurity: No Food Insecurity    Worried About Running Out of Food in the Last Year: Never true    Ran Out of Food in the Last Year: Never true   Transportation Needs: No Transportation Needs    Lack of Transportation (Medical): No    Lack of Transportation (Non-Medical): No   Physical Activity: Sufficiently Active    Days of Exercise per Week: 7 days     Minutes of Exercise per Session: 30 min   Stress: No Stress Concern Present    Feeling of Stress : Not at all   Social Connections: Moderately Isolated    Frequency of Communication with Friends and Family: More than three times a week    Frequency of Social Gatherings with Friends and Family: Twice a week    Attends Islam Services: Never    Active Member of Clubs or Organizations: Yes    Attends Club or Organization Meetings: More than 4 times per year    Marital Status:    Housing Stability: Low Risk     Unable to Pay for Housing in the Last Year: No    Number of Places Lived in the Last Year: 1    Unstable Housing in the Last Year: No       Review of Most Recent Wound Care-Related Labs:  Lab Results   Component Value Date/Time    WBC 5.5 03/21/2023 10:43 AM    RBC 4.77 03/21/2023 10:43 AM    HGB 15.2 03/21/2023 10:43 AM    HCT 44.5 03/21/2023 10:43 AM    IRON 128 02/08/2022 09:14 AM    MCV 93.3 03/21/2023 10:43 AM    MCH 31.9 03/21/2023 10:43 AM    CREATININE 1.33 (H) 06/21/2023 10:20 AM    HGBA1C 6.5 03/21/2023 10:43 AM    CRP 1.90 03/21/2023 10:43 AM          REVIEW OF SYSTEMS:  Except as stated in HPI, all other 10 body systems normal.       Current Outpatient Medications   Medication Sig Dispense Refill    alprostadil (CAVERJECT IMPULSE ICAV) by Intracavernosal route.      ascorbic acid, vitamin C, (VITAMIN C) 1000 MG tablet Take 1,000 mg by mouth every evening.      b complex vitamins capsule Take 1 capsule by mouth nightly.      blood sugar diagnostic (ACCU-CHEK GUIDE TEST STRIPS MISC) Accu-Chek Guide test strips      carbidopa-levodopa  mg (SINEMET)  mg per tablet Take 2 tablets by mouth 2 (two) times a day.      cholecalciferol, vitamin D3, 125 mcg (5,000 unit) capsule Take 1,000 Units by mouth nightly.      coenzyme Q10 100 mg capsule Take 100 mg by mouth every evening.      colchicine (COLCRYS) 0.6 mg tablet Take 1 tablet (0.6 mg total) by mouth 2 (two) times daily. 180 tablet 1     docusate sodium (COLACE) 50 MG capsule Take 100 mg by mouth Daily.      folic acid/multivit-min/lutein (CENTRUM SILVER ORAL) Take 1 tablet by mouth every evening.      gemfibroziL (LOPID) 600 MG tablet Take 1 tablet (600 mg total) by mouth 2 (two) times daily. 180 tablet 1    lancets (ACCU-CHEK FASTCLIX LANCET DRUM MISC) Accu-Chek Fastclix Lancet Drum      lisinopriL-hydrochlorothiazide (PRINZIDE,ZESTORETIC) 20-25 mg Tab Take 1 tablet by mouth as needed.      magnesium 30 mg Tab Take 250 mg by mouth nightly.      NON FORMULARY MEDICATION Take 1 tablet by mouth nightly.      OSENI 25-30 mg Tab Take 1 tablet by mouth once daily. 90 tablet 1    pantoprazole (PROTONIX) 40 MG tablet Take 1 tablet (40 mg total) by mouth once daily. 90 tablet 1    potassium citrate 99 mg Cap Take 1 tablet by mouth nightly.      rosuvastatin (CRESTOR) 20 MG tablet Take 1 tablet (20 mg total) by mouth once daily. 90 tablet 1    rosuvastatin (CRESTOR) 20 MG tablet Take 1 tablet (20 mg total) by mouth every evening. 90 tablet 0    selegiline (ELDEPRYL) 5 mg Cap Take 5 mg by mouth 2 (two) times daily.      TENS unit and electrodes Cmpk NeuroMD Corrective Therapy Device (Patient not taking: Reported on 5/1/2023)      tiZANidine (ZANAFLEX) 4 MG tablet Take 4 mg by mouth every 6 (six) hours as needed.      tofacitinib (XELJANZ XR) 11 mg Tb24 Take 1 tablet by mouth once daily. 30 tablet 4    vitamin E 400 UNIT capsule Take 400 Units by mouth nightly.      XARELTO 20 mg Tab Take 20 mg by mouth once daily.      zinc sulfate (ZINC-15 ORAL) Take 140 mg by mouth nightly.       No current facility-administered medications for this encounter.         PHYSICAL EXAMINATION AND VITAL SIGNS:    Vitals:    07/05/23 0832   BP: (!) 149/90   Pulse: 85   Resp: 18   Temp: 98.1 °F (36.7 °C)     There is no height or weight on file to calculate BMI.      General:  Hypertensive with diabetes, asymptomatic with; afebrile.  Overweight, in no acute distress.    Respiratory:   Breathing even, quiet, and unlabored.  No coughing.  Cardiovascular:   Mild non-pitting edema in bilateral ankles.  Right DP pulse palpable.     Musculoskeletal:    Full range of motion of all extremities.  Ambulates without assistive device.       Neurologic:  A&O X 3.  Cranial nerves grossly intact.    Psychiatric:  Calm, cooperative.  Mood and effect normal.  Responses appropriate.   Integumentary:      Very tiny black linear foreign body in right heel.  Tender with palpation.  I am able to palpate foreign body, as well.  No erythema, purulent drainage,  edema, odors, induration, bogginess, or fluctuance.                               ASSESSMENT AND PLAN:    Encounter for diabetic foot care, non-insulin dependent:  Last visit with PCP:  6/7/23  Diabetes well-controlled per last HgbA1C.     Nail care last done on:  6/16/23.    Diabetic foot care teaching reinforced with focused teaching towards proper foot and socks wear, and to not go barefoot while out of bed, with rationale.  Wound clinic and [facility] precautions reinforced.                                      PROCEDURE NOTE    Removal of foreign body from right heel, simple:  Rationale:  Risk of infection, diabetic foot ulcer, osteo, and amputation require removal of object.    Informed verbal consent obtained.  Using #4 dermal curette, I gently scraped and lifted out a visible gray foreign object, that appeared to be a metal shard.  No bleeding or discomfort during procedure.  Patient tolerated procedure without complications.   Mr. Rehman reported relief of discomfort with deep palpation, and walking, on right heel after procedure.                      Return to clinic in 3 months for his already scheduled appt for routine diabetic foot care.  Teaching reinforced on s/s to call wound clinic for promptly.

## 2023-07-05 NOTE — PROGRESS NOTES
[facility]  Otolaryngology-Head & Neck Surgery    Office Visit    Melquiades Rehman Jr.  [id number]  1955    CC: hearing loss    HPI: Melquiades Rehman Jr. is a 67 y.o. male with a history of AFib on Eliquis, Parkinson's disease, that presents for evaluation of sensorineural hearing loss, tinnitus, epistaxis.  He is not had any issues with epistaxis for the last 6 months.  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  He has not had a hearing test in nearly 2 years.  No otalgia or otorrhea.  He does have prior history of spontaneous eardrum rupture on the left side with routine healing and scar tissue.  He does have a history of Parkinson's disease, he has been in speech therapy prior for his voice.  It has been about the same he states.    5/30/23: Here today to follow up after his recent audiogram. He reports that he intermittently has trouble hearing two different tones in the left ear but this symptom has improved. He occasionally hears cricket sounds in both ears. No changes to his hearing. No ear pain, drainage or dizziness. He's not interested in hearing aids at this time. Not having any further issues with nosebleeds.     7/5/23:  Video visit today to discuss MRI results.  He says his hearing has been stable.  Every now and then he has some fluctuation but it overall does not bother him.  He is comfortable with his current level is not interested in hearing aids at this time.    ROS:   General: Negative except per HPI  Skin: Denies rash, ulcer, or lesion.  Eyes: Denies vision changes or diplopia.  Ears: Negative except per HPI  Nose: Negative except per HPI  Throat/mouth: Negative except per HPI  Cardiovascular: Negative except per HPI  Respiratory: Negative except per HPI  Neck: Negative except per HPI  Endocrine: Negative except per HPI  Neurologic: Negative except per  HPI    Review of patient's allergies indicates:   Allergen Reactions    Sarilumab Other (See Comments)     Other reaction(s): fainting       Past Medical History:   Diagnosis Date    Central stenosis of spinal canal     CHF (congestive heart failure)     Chronic back pain greater than 3 months duration 10/14/2022    Constipation 9/30/2022    Degeneration of lumbar intervertebral disc 7/6/2022    Diastolic dysfunction 5/1/2023    Dysphonia 9/30/2022    Dystrophic nail 6/19/2023    Erectile dysfunction 9/30/2022    Fatty liver     GERD (gastroesophageal reflux disease) 9/30/2022    H/O cataract removal with insertion of prosthetic lens     History of CVA (cerebrovascular accident) 9/30/2022    History of radial keratotomy 01/01/1998    HTN (hypertension)     Hyperlipidemia 9/30/2022    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    Microhematuria 3/30/2023    Overgrown toenails 6/19/2023    Parkinson's disease     Paroxysmal atrial fibrillation     Peyronie disease     Polyneuropathy associated with underlying disease 6/19/2023    Rheumatoid arthritis 1/10/2022    Rheumatoid arthritis, unspecified     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;  Service: Pain Management;  Laterality: Bilateral;  Bilateral L4-L5...Patient is requesting to be first case    KNEE ARTHROSCOPY Right     REFRACTIVE SURGERY      VASECTOMY  1998       Social History     Socioeconomic History    Marital status:     Number of children: 1   Tobacco Use    Smoking status: Former      Types: Cigarettes     Quit date:      Years since quittin.5     Passive exposure: Past    Smokeless tobacco: Never   Substance and Sexual Activity    Alcohol use: Not Currently     Alcohol/week: 2.0 - 3.0 standard drinks     Types: 1 - 2 Cans of beer, 1 Shots of liquor per week     Comment: rarely    Drug use: Yes     Types: Marijuana    Sexual activity: Yes     Partners: Female     Social Determinants of Health     Financial Resource Strain: High Risk    Difficulty of Paying Living Expenses: Very hard   Food Insecurity: No Food Insecurity    Worried About Running Out of Food in the Last Year: Never true    Ran Out of Food in the Last Year: Never true   Transportation Needs: No Transportation Needs    Lack of Transportation (Medical): No    Lack of Transportation (Non-Medical): No   Physical Activity: Sufficiently Active    Days of Exercise per Week: 7 days    Minutes of Exercise per Session: 30 min   Stress: No Stress Concern Present    Feeling of Stress : Not at all   Social Connections: Moderately Isolated    Frequency of Communication with Friends and Family: More than three times a week    Frequency of Social Gatherings with Friends and Family: Twice a week    Attends [employer] Services: Never    Active Member of Clubs or Organizations: Yes    Attends Club or Organization Meetings: More than 4 times per year    Marital Status:    Housing Stability: Low Risk     Unable to Pay for Housing in the Last Year: No    Number of Places Lived in the Last Year: 1    Unstable Housing in the Last Year: No       Family History   Problem Relation Age of Onset    Heart failure Mother     Coronary artery disease Mother     Glaucoma Father     Alzheimer's disease Father     Emphysema Father     Autoimmune disease Sister     Pneumonia Sister     Dementia Sister        Outpatient Encounter Medications as of 2023   Medication Sig Dispense Refill    alprostadil (CAVERJECT IMPULSE ICAV) by Intracavernosal route.       ascorbic acid, vitamin C, (VITAMIN C) 1000 MG tablet Take 1,000 mg by mouth every evening.      b complex vitamins capsule Take 1 capsule by mouth nightly.      blood sugar diagnostic (ACCU-CHEK GUIDE TEST STRIPS MIS) Accu-Chek Guide test strips      carbidopa-levodopa  mg (SINEMET)  mg per tablet Take 2 tablets by mouth 2 (two) times a day.      cholecalciferol, vitamin D3, 125 mcg (5,000 unit) capsule Take 1,000 Units by mouth nightly.      coenzyme Q10 100 mg capsule Take 100 mg by mouth every evening.      colchicine (COLCRYS) 0.6 mg tablet Take 1 tablet (0.6 mg total) by mouth 2 (two) times daily. 180 tablet 1    docusate sodium (COLACE) 50 MG capsule Take 100 mg by mouth Daily.      folic acid/multivit-min/lutein (CENTRUM SILVER ORAL) Take 1 tablet by mouth every evening.      gemfibroziL (LOPID) 600 MG tablet Take 1 tablet (600 mg total) by mouth 2 (two) times daily. 180 tablet 1    lancets (ACCU-CHEK FASTCLIX LANCET DRUM [facility] [place]) Accu-Chek Fastclix Lancet Drum      lisinopriL-hydrochlorothiazide (PRINZIDE,ZESTORETIC) 20-25 mg Tab Take 1 tablet by mouth as needed.      magnesium 30 mg Tab Take 250 mg by mouth nightly.      NON FORMULARY MEDICATION Take 1 tablet by mouth nightly.      OSENI 25-30 mg Tab Take 1 tablet by mouth once daily. 90 tablet 1    pantoprazole (PROTONIX) 40 MG tablet Take 1 tablet (40 mg total) by mouth once daily. 90 tablet 1    potassium citrate 99 mg Cap Take 1 tablet by mouth nightly.      rosuvastatin (CRESTOR) 20 MG tablet Take 1 tablet (20 mg total) by mouth once daily. 90 tablet 1    rosuvastatin (CRESTOR) 20 MG tablet Take 1 tablet (20 mg total) by mouth every evening. 90 tablet 0    selegiline (ELDEPRYL) 5 mg Cap Take 5 mg by mouth 2 (two) times daily.      TENS unit and electrodes Cmpk NeuroMD Corrective Therapy Device (Patient not taking: Reported on 5/1/2023)      tiZANidine (ZANAFLEX) 4 MG tablet Take 4 mg by mouth every 6 (six) hours as needed.       tofacitinib (XELJANZ XR) 11 mg Tb24 Take 1 tablet by mouth once daily. 30 tablet 4    vitamin E 400 UNIT capsule Take 400 Units by mouth nightly.      XARELTO 20 mg Tab Take 20 mg by mouth once daily.      zinc sulfate (ZINC-15 ORAL) Take 140 mg by mouth nightly.       No facility-administered encounter medications on file as of 7/5/2023.       PHYSICAL EXAM:  There were no vitals filed for this visit.    Limited physical exam due to nature of visit.  General: alert and oriented, no acute distress  Voice: normal  Hearing: able to hear at normal conversation volume  Respiratory: nonlabored breathing      PERTINENT DATA: Audio 5/8/23        MRI IAC/Temporal Bone 6/11/2023  There is no restricted diffusion, hemorrhage or edema.  Moderate patchy T2/FLAIR hyperintensities in the subcortical and periventricular white matter, basal ganglia and krystian likely represent chronic microvascular ischemic changes.  There is no abnormal parenchymal or leptomeningeal enhancement.  There is no mass effect or midline shift.  The basal cisterns are patent.  There is mild diffuse parenchymal volume loss.  There is no hydrocephalus or abnormal extra-axial fluid collection. There is mild scattered paranasal sinus mucosal thickening.  The 7th and 8th cranial nerves are intact.  There is normal fluid signal within the cochlea and semicircular canals.  There is no enhancing mass or abnormal signal to in the internal auditory canals or membranous labyrinths.  There is no CP angle mass.  There is a vascular loop on the right, type 1.  The mastoid air cells are clear.  Impression:  1. No acute intracranial abnormality.  2. Moderate chronic microvascular ischemic changes.  3. No acute abnormality of the internal auditory canals or membranous labyrinths.    ASSESSMENT:  Melquiades J Ori Arrington is a 67 y.o. male with history of AFib on Xarelto with epistaxis, since resolved.  Also with history of Parkinson's disease.  Asymmetric sensorineural hearing  loss on prior audiogram from 2021 and again on recent audiogram. He has intermittent tinnitus and difficulty differentiating tones with the left ear when he plays music which hasn't been very bothersome to him lately.  MRI for asymmetric hearing loss within normal limits.     PLAN:  - Discussed his MRI was normal without any masses or lesions.   - Otherwise, can plan for a yearly audio due in 5/2024 which was ordered today.   - Follow up in 5/2024, earlier if issues arise.       Radha Polk MD  [facility] Otolaryngology  3:32 PM 07/05/2023

## 2023-07-06 ENCOUNTER — OFFICE VISIT (OUTPATIENT)
Dept: RHEUMATOLOGY | Facility: CLINIC | Age: 68
End: 2023-07-06
Payer: MEDICARE

## 2023-07-06 VITALS
SYSTOLIC BLOOD PRESSURE: 122 MMHG | HEART RATE: 70 BPM | HEIGHT: 71 IN | OXYGEN SATURATION: 97 % | BODY MASS INDEX: 31.78 KG/M2 | DIASTOLIC BLOOD PRESSURE: 75 MMHG | RESPIRATION RATE: 20 BRPM | TEMPERATURE: 98 F | WEIGHT: 227 LBS

## 2023-07-06 DIAGNOSIS — Z86.73 HISTORY OF CVA (CEREBROVASCULAR ACCIDENT): ICD-10-CM

## 2023-07-06 DIAGNOSIS — Z79.899 HIGH RISK MEDICATION USE: ICD-10-CM

## 2023-07-06 DIAGNOSIS — M48.061 SPINAL STENOSIS AT L4-L5 LEVEL: ICD-10-CM

## 2023-07-06 DIAGNOSIS — N18.31 STAGE 3A CHRONIC KIDNEY DISEASE: ICD-10-CM

## 2023-07-06 DIAGNOSIS — J84.9 ILD (INTERSTITIAL LUNG DISEASE): ICD-10-CM

## 2023-07-06 DIAGNOSIS — I48.92 ATRIAL FIBRILLATION AND FLUTTER: ICD-10-CM

## 2023-07-06 DIAGNOSIS — M05.79 SEROPOSITIVE RHEUMATOID ARTHRITIS OF MULTIPLE SITES: Primary | ICD-10-CM

## 2023-07-06 DIAGNOSIS — E78.5 HYPERLIPIDEMIA, UNSPECIFIED HYPERLIPIDEMIA TYPE: ICD-10-CM

## 2023-07-06 DIAGNOSIS — E11.9 CONTROLLED TYPE 2 DIABETES MELLITUS WITHOUT COMPLICATION, WITHOUT LONG-TERM CURRENT USE OF INSULIN: ICD-10-CM

## 2023-07-06 DIAGNOSIS — I48.91 ATRIAL FIBRILLATION AND FLUTTER: ICD-10-CM

## 2023-07-06 DIAGNOSIS — I10 HYPERTENSION, UNSPECIFIED TYPE: ICD-10-CM

## 2023-07-06 DIAGNOSIS — K76.0 STEATOSIS OF LIVER: ICD-10-CM

## 2023-07-06 DIAGNOSIS — G20.A1 PARKINSON'S DISEASE: ICD-10-CM

## 2023-07-06 PROCEDURE — 3074F SYST BP LT 130 MM HG: CPT | Mod: CPTII,,, | Performed by: NURSE PRACTITIONER

## 2023-07-06 PROCEDURE — 1160F PR REVIEW ALL MEDS BY PRESCRIBER/CLIN PHARMACIST DOCUMENTED: ICD-10-PCS | Mod: CPTII,,, | Performed by: NURSE PRACTITIONER

## 2023-07-06 PROCEDURE — 1101F PT FALLS ASSESS-DOCD LE1/YR: CPT | Mod: CPTII,,, | Performed by: NURSE PRACTITIONER

## 2023-07-06 PROCEDURE — 4010F PR ACE/ARB THEARPY RXD/TAKEN: ICD-10-PCS | Mod: CPTII,,, | Performed by: NURSE PRACTITIONER

## 2023-07-06 PROCEDURE — 1159F PR MEDICATION LIST DOCUMENTED IN MEDICAL RECORD: ICD-10-PCS | Mod: CPTII,,, | Performed by: NURSE PRACTITIONER

## 2023-07-06 PROCEDURE — 3008F PR BODY MASS INDEX (BMI) DOCUMENTED: ICD-10-PCS | Mod: CPTII,,, | Performed by: NURSE PRACTITIONER

## 2023-07-06 PROCEDURE — 99215 OFFICE O/P EST HI 40 MIN: CPT | Mod: PBBFAC | Performed by: NURSE PRACTITIONER

## 2023-07-06 PROCEDURE — 3078F DIAST BP <80 MM HG: CPT | Mod: CPTII,,, | Performed by: NURSE PRACTITIONER

## 2023-07-06 PROCEDURE — 3288F FALL RISK ASSESSMENT DOCD: CPT | Mod: CPTII,,, | Performed by: NURSE PRACTITIONER

## 2023-07-06 PROCEDURE — 99214 OFFICE O/P EST MOD 30 MIN: CPT | Mod: S$PBB,,, | Performed by: NURSE PRACTITIONER

## 2023-07-06 PROCEDURE — 4010F ACE/ARB THERAPY RXD/TAKEN: CPT | Mod: CPTII,,, | Performed by: NURSE PRACTITIONER

## 2023-07-06 PROCEDURE — 3078F PR MOST RECENT DIASTOLIC BLOOD PRESSURE < 80 MM HG: ICD-10-PCS | Mod: CPTII,,, | Performed by: NURSE PRACTITIONER

## 2023-07-06 PROCEDURE — 1125F AMNT PAIN NOTED PAIN PRSNT: CPT | Mod: CPTII,,, | Performed by: NURSE PRACTITIONER

## 2023-07-06 PROCEDURE — 1101F PR PT FALLS ASSESS DOC 0-1 FALLS W/OUT INJ PAST YR: ICD-10-PCS | Mod: CPTII,,, | Performed by: NURSE PRACTITIONER

## 2023-07-06 PROCEDURE — 99214 PR OFFICE/OUTPT VISIT, EST, LEVL IV, 30-39 MIN: ICD-10-PCS | Mod: S$PBB,,, | Performed by: NURSE PRACTITIONER

## 2023-07-06 PROCEDURE — 3074F PR MOST RECENT SYSTOLIC BLOOD PRESSURE < 130 MM HG: ICD-10-PCS | Mod: CPTII,,, | Performed by: NURSE PRACTITIONER

## 2023-07-06 PROCEDURE — 3288F PR FALLS RISK ASSESSMENT DOCUMENTED: ICD-10-PCS | Mod: CPTII,,, | Performed by: NURSE PRACTITIONER

## 2023-07-06 PROCEDURE — 1159F MED LIST DOCD IN RCRD: CPT | Mod: CPTII,,, | Performed by: NURSE PRACTITIONER

## 2023-07-06 PROCEDURE — 1125F PR PAIN SEVERITY QUANTIFIED, PAIN PRESENT: ICD-10-PCS | Mod: CPTII,,, | Performed by: NURSE PRACTITIONER

## 2023-07-06 PROCEDURE — 3008F BODY MASS INDEX DOCD: CPT | Mod: CPTII,,, | Performed by: NURSE PRACTITIONER

## 2023-07-06 PROCEDURE — 1160F RVW MEDS BY RX/DR IN RCRD: CPT | Mod: CPTII,,, | Performed by: NURSE PRACTITIONER

## 2023-07-06 RX ORDER — SELEGILINE HYDROCHLORIDE 5 MG/1
5 CAPSULE ORAL 2 TIMES DAILY
COMMUNITY
End: 2024-01-11

## 2023-07-06 RX ORDER — PREDNISONE 5 MG/1
5 TABLET ORAL DAILY
Qty: 15 TABLET | Refills: 0 | Status: SHIPPED | OUTPATIENT
Start: 2023-07-06 | End: 2023-09-21 | Stop reason: ALTCHOICE

## 2023-07-06 NOTE — PROGRESS NOTES
Patient ID: [id number]     Chief Complaint: Seropositive rheumatoid arthritis of multiple sites (Pt stated pain lower back and lt elbow)      HPI:     Melquiades Rehman Jr. is a 67 y.o. male here today for follow-up of rheumatoid arthritis.    CCP (>250) and RF + RA. He recalls his symptoms began 1994 w/ mainly elbow problems. But over a year, his symptoms worsened and involved hands. He was referred to Dr. Beck who started Enbrel 2-3 yrs but developed secondary failure. He then changed to Humira, which he took for 15 yrs but lost efficacy a few yrs ago. He briefly tried Kevzara but had poor rxn. He was then changed to Xeljanz, which worked wonderfully. However, he lost his insurance and started seeing [facility], NP April. He reports that she didn't want to start Xeljanz over concerns of blood clots. Instead, she started Orencia, which didn't work at all, however he reports never taken. He had one bottle left of Xeljanz and restarted it. It has worked great so it was resumed. He did have +Hep C ab but saw GI (Dr. Toledo) - had US and repeat testing for Hep C was negative. Told he may have fatty liver dz.    Chronic back pain and he has DJD and spinal stenosis, no sciatica symptoms. He has seen Dr Montero once, neurosurgery and continues to follow with Dr Simmons, pain management. He has done PT, traction has helped in the past but no longer doing any therapy. Nerve block and VALERIE did not help. Pain worse with bending and better with sitting and lying down.     RA has been in remission, denies red, warm and swollen joints. No recent flares. May be once in a while he takes prednisone, took longer than 6 months back, but that's the only time he had taken in last 2 years. He is taking Xeljanz 11 mg daily, doing well.   He has A fib and on AC. He sees [facility] here at [facility]. CHF was on his problem list, but per patient he does not have HF and had tests done for that and the diagnosis was taken off the list.     He has  "Parkinson's, he sees Dr Barrios. Symptoms are better with Sinemet. He also takes medication for movement disorders.  H/o CVA several years ago- did follow with neurology until dx with Parkinson and now continues to follow with Dr. Barrios. No residual weakness from stroke.    2023: Today he is here for follow, he denies any red/warm/swollen joints, left elbow will swell and is stiff every morning, has also been an ongoing issue, feels that it is more OA and not his RA- knows when he has an RA flare and has not had one in quite some time. Morning stiffness lasting 15 minutes. He will be going to [facility] for a trial study due to his ongoing back pain- will have a "focused u/s", states he has to be accepted into the study.  He is also followed by Cardio here at [facility]- last ECHO with EF of 60% noted. He also has a history of Afib and is on Xarelto.   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      PMH: AFib on Xarelto, HTN, Inc lipids, Parkinsons, Peyronie's dz, T2DM, fatty liver.  Social: 3-4 beers qow w/ playing music. No TBO but marijuana    Denies history of fevers, rashes, photosensitivity, oral or nasal ulcers, h/o MI, seizures, h/o PE or DVT, Raynaud's phenomenon, uveitis, malignancies.   Family history of autoimmune disease: Sister but unsure what "autoimmune" condition she had- reports "attacked her lungs and killed her".  Smoking: Quit smoking - smoked for roughly 15 years ( ppd)    Social History     Tobacco Use   Smoking Status Former    Types: Cigarettes    Quit date:     Years since quittin.5    Passive exposure: Past   Smokeless Tobacco Never          ----------------------------  Central stenosis of spinal canal  CHF (congestive heart failure)  Chronic back pain greater than 3 months duration  Constipation  Degeneration of lumbar intervertebral disc  Diastolic " dysfunction  Dysphonia  Dystrophic nail  Erectile dysfunction  Fatty liver  GERD (gastroesophageal reflux disease)  H/O cataract removal with insertion of prosthetic lens  History of CVA (cerebrovascular accident)  History of radial keratotomy  HTN (hypertension)  Hyperlipidemia  Induratio penis plastica  Microhematuria  Overgrown toenails  Parkinson's disease  Paroxysmal atrial fibrillation  Peyronie disease  Polyneuropathy associated with underlying disease  Rheumatoid arthritis  Rheumatoid arthritis, unspecified  Sixth nerve palsy  Spinal stenosis of lumbar region  Type 2 diabetes mellitus without complications     Past Surgical History:   Procedure Laterality Date    CATARACT EXTRACTION W/  INTRAOCULAR LENS IMPLANT      EPIDURAL STEROID INJECTION INTO LUMBAR SPINE      exc cyst Lt ear      INJECTION OF ANESTHETIC AGENT AROUND MEDIAL BRANCH NERVES INNERVATING LUMBAR FACET JOINT Bilateral 11/16/2022    Procedure: Block-nerve-medial branch-lumbar;  Surgeon: Gertrude Blanco MD;  Location: [facility];  Service: Pain Management;  Laterality: Bilateral;  Bilateral L4-L5...Patient is requesting to be first case    KNEE ARTHROSCOPY Right     REFRACTIVE SURGERY      VASECTOMY  1998       Review of patient's allergies indicates:   Allergen Reactions    Sarilumab Other (See Comments)     Other reaction(s): fainting       Outpatient Medications Marked as Taking for the 7/6/23 encounter (Office Visit) with DAVIDSON Bond   Medication Sig Dispense Refill    alprostadil (CAVERJECT IMPULSE ICAV) by Intracavernosal route.      ascorbic acid, vitamin C, (VITAMIN C) 1000 MG tablet Take 1,000 mg by mouth every evening.      b complex vitamins capsule Take 1 capsule by mouth nightly.      blood sugar diagnostic (ACCU-CHEK GUIDE TEST STRIPS [facility]) Accu-Chek Guide test strips      carbidopa-levodopa  mg (SINEMET)  mg per tablet Take 2 tablets by mouth 2 (two) times a day.      cholecalciferol, vitamin D3, 125  mcg (5,000 unit) capsule Take 1,000 Units by mouth nightly.      coenzyme Q10 100 mg capsule Take 100 mg by mouth every evening.      colchicine (COLCRYS) 0.6 mg tablet Take 1 tablet (0.6 mg total) by mouth 2 (two) times daily. 180 tablet 1    docusate sodium (COLACE) 50 MG capsule Take 100 mg by mouth Daily.      folic acid/multivit-min/lutein (CENTRUM SILVER ORAL) Take 1 tablet by mouth every evening.      gemfibroziL (LOPID) 600 MG tablet Take 1 tablet (600 mg total) by mouth 2 (two) times daily. 180 tablet 1    lancets (ACCU-CHEK FASTCLIX LANCET DRUM MISC) Accu-Chek Fastclix Lancet Drum      lisinopriL-hydrochlorothiazide (PRINZIDE,ZESTORETIC) 20-25 mg Tab Take 1 tablet by mouth as needed.      magnesium 30 mg Tab Take 250 mg by mouth nightly.      NON FORMULARY MEDICATION Take 1 tablet by mouth nightly.      OSENI 25-30 mg Tab Take 1 tablet by mouth once daily. 90 tablet 1    pantoprazole (PROTONIX) 40 MG tablet Take 1 tablet (40 mg total) by mouth once daily. 90 tablet 1    potassium citrate 99 mg Cap Take 1 tablet by mouth nightly.      rosuvastatin (CRESTOR) 20 MG tablet Take 1 tablet (20 mg total) by mouth once daily. 90 tablet 1    selegiline (ELDEPRYL) 5 mg Cap Take 5 mg by mouth 2 (two) times daily.      tofacitinib (XELJANZ XR) 11 mg Tb24 Take 1 tablet by mouth once daily. 30 tablet 4    vitamin E 400 UNIT capsule Take 400 Units by mouth nightly.      XARELTO 20 mg Tab Take 20 mg by mouth once daily.      zinc sulfate (ZINC-15 ORAL) Take 140 mg by mouth nightly.      [DISCONTINUED] rosuvastatin (CRESTOR) 20 MG tablet Take 1 tablet (20 mg total) by mouth every evening. 90 tablet 0    [DISCONTINUED] selegiline (ELDEPRYL) 5 mg Cap Take 5 mg by mouth 2 (two) times daily.         Social History     Socioeconomic History    Marital status:     Number of children: 1   Tobacco Use    Smoking status: Former     Types: Cigarettes     Quit date:      Years since quittin.5     Passive exposure:  Past    Smokeless tobacco: Never   Substance and Sexual Activity    Alcohol use: Not Currently     Alcohol/week: 2.0 - 3.0 standard drinks     Types: 1 - 2 Cans of beer, 1 Shots of liquor per week     Comment: rarely    Drug use: Yes     Types: Marijuana    Sexual activity: Yes     Partners: Female     Social Determinants of Health     Financial Resource Strain: High Risk    Difficulty of Paying Living Expenses: Very hard   Food Insecurity: No Food Insecurity    Worried About Running Out of Food in the Last Year: Never true    Ran Out of Food in the Last Year: Never true   Transportation Needs: No Transportation Needs    Lack of Transportation (Medical): No    Lack of Transportation (Non-Medical): No   Physical Activity: Sufficiently Active    Days of Exercise per Week: 7 days    Minutes of Exercise per Session: 30 min   Stress: No Stress Concern Present    Feeling of Stress : Not at all   Social Connections: Moderately Isolated    Frequency of Communication with Friends and Family: More than three times a week    Frequency of Social Gatherings with Friends and Family: Twice a week    Attends Yazidi Services: Never    Active Member of Clubs or Organizations: Yes    Attends Club or Organization Meetings: More than 4 times per year    Marital Status:    Housing Stability: Low Risk     Unable to Pay for Housing in the Last Year: No    Number of Places Lived in the Last Year: 1    Unstable Housing in the Last Year: No        Family History   Problem Relation Age of Onset    Heart failure Mother     Coronary artery disease Mother     Glaucoma Father     Alzheimer's disease Father     Emphysema Father     Autoimmune disease Sister     Pneumonia Sister     Dementia Sister         Immunization History   Administered Date(s) Administered    COVID-19, vector-nr, rS-Ad26, PF (Danie) 03/08/2021, 10/25/2021    Influenza - Quadrivalent 09/30/2020    Influenza - Trivalent - PF (ADULT) 10/26/2018, 12/04/2019     "Pneumococcal Conjugate - 13 Valent 05/03/2019    Pneumococcal Polysaccharide - 23 Valent 11/24/2020    Zoster Recombinant 11/12/2019, 02/13/2020       Patient Care Team:  DAVIDSON Mandujano as PCP - General (Family Medicine)  Valeria Donovan MD (Rheumatology)  Valeria Donovan MD (Rheumatology)  Valeria Donovan MD (Rheumatology)  Taisha Wood LPN as Care Coordinator     Subjective:     Constitutional:  Denies chills. Denies fever. Denies night sweats. Denies weight loss.   Ophthalmology: Denies blurred vision. Denies dry eyes. Denies eye pain. Denies Itching and redness.   ENT: Denies oral ulcers. Denies epistaxis. Denies dry mouth. Denies swollen glands.   Endocrine: Admits diabetes. Denies thyroid Problems.   Respiratory: Denies cough. Denies shortness of breath. Denies shortness of breath with exertion. Denies hemoptysis.   Cardiovascular: Denies chest pain at rest. Denies chest pain with exertion. Denies palpitations.    Gastrointestinal: Denies abdominal pain. Denies diarrhea. Denies nausea. Denies vomiting. Denies hematemesis or hematochezia. Denies heartburn.  Genitourinary: Denies blood in urine.  Musculoskeletal: See HPI for details  Integumentary: Denies rash. Denies photosensitivity.   Peripheral Vascular: Denies Ulcers of hands and/or feet. Denies Cold extremities.   Neurologic: Denies dizziness. Denies headache.  Denies loss of strength. Denies numbness or tingling.   Psychiatric: Denies depression. Denies anxiety. Denies suicidal/homicidal ideations.      Objective:     /75 (BP Location: Left arm, Patient Position: Sitting, BP Method: Large (Automatic))   Pulse 70   Temp 97.5 °F (36.4 °C) (Oral)   Resp 20   Ht 5' 11" (1.803 m)   Wt 103 kg (227 lb)   SpO2 97%   BMI 31.66 kg/m²     Physical Exam    General Appearance: alert, pleasant, in no acute distress.  Skin: Skin color, texture, turgor normal. No rashes or lesions.  Eyes:  extraocular movement intact (EOMI), pupils equal, round, " reactive to light and accommodation, conjunctiva clear.  ENT: No oral or nasal ulcers.  Neck:  Neck supple. No adenopathy.   Lungs: CTA throughout without crackles, rhonchi, or wheezes.   Heart: RRR w/o murmurs.  No edema.   Neuro: Alert, oriented, CN II-XII GI, sensory and motor innervation intact.  Tremors present.  Rigidity of left upper extremity on exam today.  Musculoskeletal: Radial and pedal pulses 2+ bilaterally, no LE edema. No synovitis or swellings in joints of hands, knees, feet, or elbows. FROM with no pain noted  Psych: Alert, oriented, normal eye contact.      Labs:   3/21/23:  Creatinine 1.52, GFR 51.  CBC okay.  ESR, CRP normal.  HIV, hepatitis B and C, quantiferon tb negative. 1+ protein and no blood. CKD and protenuria could be due to DM and HTN.    5/15/23:  Upc 100 milligram/gram.  Creatinine 1.27, GFR greater than 60.        Lab Results   Component Value Date    WBC 5.5 03/21/2023    HGB 15.2 03/21/2023    HCT 44.5 03/21/2023     03/21/2023    ALT 33 03/21/2023    AST 31 03/21/2023    BUN 17.9 05/15/2023    CREATININE 1.33 (H) 06/21/2023     05/15/2023    K 4.0 05/15/2023    CO2 24 05/15/2023    CRP 1.90 03/21/2023    SEDRATE 12 03/21/2023        Imaging:   Echo 1/15/21:  Left ventricular systolic function normal.  EF 60%.  Grade 1 diastolic dysfunction.  PASP not given.  Right ventricular size and function normal.    3/21/23: CXR showed chronic bilateral basilar interstitial changes.     3/21/23:  Arthritis changes in bilateral feet, erosion of left 5th MTP.    X-ray of right foot showed moderate-to-severe arthritis.    DJD changes in bilateral hands.  No aggressive osseous lesions appreciated.    04/05/2023 Echo:  LVEF 60%, LV hypertrophy noted, mild to moderate.  PASP 5 mmHg    4/05/2023 FVC 94.7%, FEV1 92.2 %, FEV1/FVC 97% TLC 94%, DLCO 79.4%, DLCO/.  FEV1/FVC ratio normal, FEV1, FVC and TLC  normal.  DLCO normal, impression normal PFT     5/4/2023: AAA Screening, No AAA  noted. Some ectasia of proximal abd aorta with no evidence of aneurysmal dilatation.   Assessment:       ICD-10-CM ICD-9-CM   1. Seropositive rheumatoid arthritis of multiple sites  M05.79 714.0   2. Stage 3a chronic kidney disease  N18.31 585.3   3. Spinal stenosis at L4-L5 level  M48.061 724.02   4. Atrial fibrillation and flutter  I48.91 427.31    I48.92 427.32   5. Parkinson's disease  G20 332.0   6. Hypertension, unspecified type  I10 401.9   7. Controlled type 2 diabetes mellitus without complication, without long-term current use of insulin  E11.9 250.00   8. History of CVA (cerebrovascular accident)  Z86.73 V12.54   9. Hyperlipidemia, unspecified hyperlipidemia type  E78.5 272.4   10. ILD (interstitial lung disease)  J84.9 515   11. High risk medication use  Z79.899 V58.69   12. Steatosis of liver  K76.0 571.8          Plan:       1. Strongly + CCP and RF rheumatoid arthritis dx 2014. MTX was not used d/t hx of ETOH use. He had secondary failure of Enbrel after 2-3 yrs. Humira worked for 15 yrs but developed secondary failure. He briefly tried Kevzara but he did not tolerate it, states caused him to pass out after 1 dose. He was changed to Xeljanz, which worked well. Failed Orencia, did not help, however he does not recall taking Orencia in the past after reviewing medication. Restarted Xeljanz around October 2019 and he has been doing very well. 3/21/23:  Arthritis changes in bilateral feet, erosion of left 5th MTP.  Today on exam, no active synovitis noted.   -We had an at length discussion in regards to increase CV risk associated with JAIRO use (he is >51 y/o, history of CVA and CV risk factor of Afib)- after discussion he states he would like to stop it and wishes to see how he does without any treatment.  We had an at length discussion in regards to increased risk flare in the next year if he stops all treatment as he has positive CCP and RF with noted erosions to 5th left MTP on recent X-ray in March.  He does wish trial of taking no medication at this time. He is requesting a low dose Prednisone be sent over for him to have on hand if he needs for potential flare- I advised him to call for any flares so we can discuss treatment options. Use Prednisone sparing due to hx of DM (last A1C 6.5 March 2023)  -I will schedule him for a f/u in 3 months to touch base as he is also due for repeat Cxray at this time.   -Repeat lab monitoring today- if creat remains elevated, will refer to nephrology for eval, possibly related to HTN/T2DM    2. Severe multifactorial spinal canal stenosis at L4-L5, moderate at L2-L3 and L3-L4, mild at L1-L2. Follows Pain Management Dr. Simmons, and is enrolled in trial study that he is hoping to participate in    3. Afib on Xarelto.  Heart failure was ruled out, diagnosis was taken off his problem list per patient.   -Recent ECHO 04/05/2023 Echo:  LVEF 60%, LV hypertrophy noted, mild to moderate.  PASP 5 mmHg  -Continue f/u with Cardio, stable at today's visit and will d/c Arsh due to risk factors.     4. Parkinson's  -On carbidopa-levodopa.    -Follows with neurologist Dr. Barrios.    5. Peyronie's dz. Pt reports using colchicine for this.    6. Fatty liver  -One Hep C ab + but repeat w/ PCR negative. Possibly false +.   -Will follow.   -Infection work up May 2023 negative.     7. High risk med use.   -PPSV-23 11/20, Prevnar 5/19, Shingrix 6/2020. UTD w/ flu shot. UTD COVID vaccine.  -Persons with rheumatoid arthritis, lupus, psoriatic arthritis and other autoimmune diseases are at increased risk of cardiovascular disease including heart attack and stroke. We recommend that all patients with these conditions have annual health maintenance exams including lipid measurements, blood pressure measurements, and smoking cessation counseling when applicable at their primary care provider's office.   -Advised to stay up-to-date on age appropriate vaccinations and malignancy screening, including  yearly skin exams.    -Continued lab monitoring.     8. HLD/HTN/T2DM  -Followed by PCP, currently stable blood pressure at today's visit    9. ILD  -3/21/23: CXR showed chronic bilateral basilar interstitial changes.   -4/05/2023 FVC 94.7%, FEV1 92.2 %, FEV1/FVC 97% TLC 94%, DLCO 79.4%, DLCO/.  FEV1/FVC ratio normal, FEV1, FVC and TLC  normal.  DLCO normal, impression normal PFT   -May be chronic as he reports history of pneumonia x 2 and Covid, monitor with PFT's and CXR every 6 months as he does not have any symptoms.  -Repeat CXRay due in September with PFTs    Follow up in about 3 months (around 10/6/2023) for NP Follow Up. In addition to their scheduled follow up, the patient has also been instructed to follow up on as needed basis.          Total time spent with patient and documentation is more than 60 minutes. All questions were answered to patient's satisfaction and patient verbalized understanding.

## 2023-07-10 DIAGNOSIS — M05.79 SEROPOSITIVE RHEUMATOID ARTHRITIS OF MULTIPLE SITES: Primary | ICD-10-CM

## 2023-07-10 DIAGNOSIS — Z79.899 HIGH RISK MEDICATION USE: ICD-10-CM

## 2023-07-11 ENCOUNTER — TELEPHONE (OUTPATIENT)
Dept: RHEUMATOLOGY | Facility: CLINIC | Age: 68
End: 2023-07-11
Payer: MEDICARE

## 2023-07-11 NOTE — TELEPHONE ENCOUNTER
----- Message from DAVIDSON Bond sent at 7/10/2023  3:43 PM CDT -----  Please advise the patient that all lab are noted to be clinically acceptable with the exception of decrease in his ANC which may have been caused by his Xeljanz- please advise him I would like to repeat lab work (CBC) in 4 weeks, we will continue to monitor at future lab draws

## 2023-07-11 NOTE — TELEPHONE ENCOUNTER
Called and spoke with pt informed him all lab are noted clinically acceptable exception of decrease in his ANC which may have caused by his Xeljanz. CBC in 4 weeks the week of 8/07/23 Enc pt to put in phone/Calender Pt  verbalized under standing

## 2023-07-17 ENCOUNTER — HOSPITAL ENCOUNTER (EMERGENCY)
Facility: HOSPITAL | Age: 68
Discharge: HOME | End: 2023-07-17
Attending: EMERGENCY MEDICINE
Payer: MEDICARE

## 2023-07-17 VITALS
TEMPERATURE: 98 F | SYSTOLIC BLOOD PRESSURE: 160 MMHG | HEIGHT: 71 IN | WEIGHT: 224.88 LBS | BODY MASS INDEX: 31.48 KG/M2 | OXYGEN SATURATION: 96 % | HEART RATE: 80 BPM | DIASTOLIC BLOOD PRESSURE: 103 MMHG | RESPIRATION RATE: 20 BRPM

## 2023-07-17 DIAGNOSIS — B86 SCABIES: Primary | ICD-10-CM

## 2023-07-17 PROCEDURE — 99284 EMERGENCY DEPT VISIT MOD MDM: CPT

## 2023-07-17 RX ORDER — PERMETHRIN 50 MG/G
CREAM TOPICAL ONCE
Qty: 60 G | Refills: 0 | Status: SHIPPED | OUTPATIENT
Start: 2023-07-17 | End: 2023-07-17

## 2023-07-17 RX ORDER — IVERMECTIN 3 MG/1
21 TABLET ORAL ONCE
Qty: 7 TABLET | Refills: 0 | Status: SHIPPED | OUTPATIENT
Start: 2023-07-17 | End: 2023-07-17

## 2023-07-17 RX ORDER — HYDROXYZINE HYDROCHLORIDE 25 MG/1
25 TABLET, FILM COATED ORAL 4 TIMES DAILY PRN
Qty: 28 TABLET | Refills: 0 | Status: SHIPPED | OUTPATIENT
Start: 2023-07-17 | End: 2023-07-24

## 2023-07-17 NOTE — ED PROVIDER NOTES
"Encounter Date: 7/17/2023       History     Chief Complaint   Patient presents with    Rash     Pt reports scabies rash x a few days     Pt is a 67 y.o. male who presents to the [facility] ED requesting medication for his itching. Reports getting a new pet recently and is concerned that he may have either fleas of scabies. Denies chest pain, SOB, weakness, dizziness, fever, abdominal pain, swelling to lips/tongue, or loss of bowel or bladder control. Hx of CHF, GERD, HTN, parkinson's disease, Atrial fib, rheumatoid arthritis. Pt reports having a similar episode approx 1 month ago, used "the cream" at that time with nor relief. Reports his doctor, "gave him pills" before the rash would go away. Pt requesting the pills again.    Review of patient's allergies indicates:   Allergen Reactions    Sarilumab Other (See Comments)     Other reaction(s): fainting     Past Medical History:   Diagnosis Date    Central stenosis of spinal canal     CHF (congestive heart failure)     Chronic back pain greater than 3 months duration 10/14/2022    Constipation 9/30/2022    Degeneration of lumbar intervertebral disc 7/6/2022    Diastolic dysfunction 5/1/2023    Dysphonia 9/30/2022    Dystrophic nail 6/19/2023    Erectile dysfunction 9/30/2022    Fatty liver     GERD (gastroesophageal reflux disease) 9/30/2022    H/O cataract removal with insertion of prosthetic lens     History of CVA (cerebrovascular accident) 9/30/2022    History of radial keratotomy 01/01/1998    HTN (hypertension)     Hyperlipidemia 9/30/2022    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    Microhematuria 3/30/2023    Overgrown toenails 6/19/2023    Parkinson's disease     Paroxysmal atrial fibrillation     Peyronie disease     Polyneuropathy associated with underlying disease 6/19/2023    Rheumatoid arthritis 1/10/2022    Rheumatoid arthritis, unspecified     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    Spinal stenosis of lumbar region 9/30/2022    Type 2 diabetes mellitus without " complications      Past Surgical History:   Procedure Laterality Date    CATARACT EXTRACTION W/  INTRAOCULAR LENS IMPLANT      EPIDURAL STEROID INJECTION INTO LUMBAR SPINE      exc cyst Lt ear      INJECTION OF ANESTHETIC AGENT AROUND MEDIAL BRANCH NERVES INNERVATING LUMBAR FACET JOINT Bilateral 2022    Procedure: Block-nerve-medial branch-lumbar;  Surgeon: Gertrude Blanco MD;  Location: [facility];  Service: Pain Management;  Laterality: Bilateral;  Bilateral L4-L5...Patient is requesting to be first case    KNEE ARTHROSCOPY Right     REFRACTIVE SURGERY      VASECTOMY       Family History   Problem Relation Age of Onset    Heart failure Mother     Coronary artery disease Mother     Glaucoma Father     Alzheimer's disease Father     Emphysema Father     Autoimmune disease Sister     Pneumonia Sister     Dementia Sister      Social History     Tobacco Use    Smoking status: Former     Types: Cigarettes     Quit date:      Years since quittin.5     Passive exposure: Past    Smokeless tobacco: Never   Substance Use Topics    Alcohol use: Not Currently     Alcohol/week: 2.0 - 3.0 standard drinks     Types: 1 - 2 Cans of beer, 1 Shots of liquor per week     Comment: rarely    Drug use: Yes     Types: Marijuana     Review of Systems   Constitutional:  Negative for chills, diaphoresis, fatigue and fever.   HENT:  Negative for facial swelling, postnasal drip, rhinorrhea, sinus pressure, sinus pain, sore throat and trouble swallowing.    Respiratory:  Negative for cough, chest tightness, shortness of breath and wheezing.    Cardiovascular:  Negative for chest pain, palpitations and leg swelling.   Gastrointestinal:  Negative for abdominal pain, diarrhea, nausea and vomiting.   Genitourinary:  Negative for dysuria, flank pain, hematuria and urgency.   Musculoskeletal:  Negative for arthralgias, back pain and myalgias.   Skin:  Positive for rash. Negative for color change.   Neurological:  Negative for  dizziness, syncope, weakness and headaches.   Hematological:  Does not bruise/bleed easily.   All other systems reviewed and are negative.    Physical Exam     Initial Vitals [07/17/23 0656]   BP Pulse Resp Temp SpO2   (!) 160/103 80 20 98.4 °F (36.9 °C) 96 %      MAP       --         Physical Exam    Nursing note and vitals reviewed.  Constitutional: Vital signs are normal. He appears well-developed and well-nourished.   HENT:   Head: Normocephalic.   Nose: Nose normal.   Mouth/Throat: Oropharynx is clear and moist.   Eyes: Conjunctivae and EOM are normal. Pupils are equal, round, and reactive to light.   Neck: Neck supple.   Normal range of motion.  Cardiovascular:  Normal rate, regular rhythm, normal heart sounds and intact distal pulses.           Pulmonary/Chest: Effort normal and breath sounds normal. No respiratory distress. He has no wheezes. He has no rhonchi. He has no rales. He exhibits no tenderness.   Abdominal: Abdomen is soft and flat. Bowel sounds are normal. There is no abdominal tenderness. There is no rebound, no guarding, no tenderness at McBurney's point and negative Sanders's sign.   Musculoskeletal:         General: Normal range of motion.      Cervical back: Normal range of motion and neck supple.     Neurological: He is alert and oriented to person, place, and time. He has normal strength.   Skin: Skin is warm and dry. Capillary refill takes less than 2 seconds. Rash noted.   Scattered lesions with excoriations noted to webbing between fingers of left hand and bilateral legs.    Psychiatric: He has a normal mood and affect. His behavior is normal. Judgment and thought content normal.       ED Course   Procedures  Labs Reviewed - No data to display       Imaging Results    None          Medications - No data to display  Medical Decision Making:   Differential Diagnosis:   Scabies  Insect bites  Skin rash  ED Management:  7:00 AM Reassessed patient at this time. Reports condition has improved.  Discussed with patient all pertinent ED information and results. Discussed diagnosis and treatment plan with patient. Follow up instructions and return to ED instruction have been given. All questions and concerns were addressed at this time. Patient voices understanding of information and instructions. Patient is comfortable with plan and discharge. Patient is stable for discharge.                           Clinical Impression:   Final diagnoses:  [B86] Scabies (Primary)        ED Disposition Condition    Discharge Stable          ED Prescriptions       Medication Sig Dispense Start Date End Date Auth. Provider    permethrin (ELIMITE) 5 % cream (Expires today) Apply topically once. Apply to all exposed skin before bed and wear on skin for 8-10 hours. Then wash off for 1 dose 60 g 7/17/2023 7/17/2023 DAVIDSON Vizcaino Jr.    ivermectin (STROMECTOL) 3 mg Tab (Expires today) Take 7 tablets (21 mg total) by mouth once. Make use if no improvement in rash after cream use for 1 dose 7 tablet 7/17/2023 7/17/2023 DAVIDSON Vizcaino Jr.    hydrOXYzine HCL (ATARAX) 25 MG tablet Take 1 tablet (25 mg total) by mouth 4 (four) times daily as needed for Itching. 28 tablet 7/17/2023 7/24/2023 DAVIDSON Vizcaino Jr.          Follow-up Information       Follow up With Specialties Details Why Contact Info    [facility] In 3 days  [address] [address]  [phone]      [facility] Emergency Medicine In 3 days As needed, If symptoms worsen [address] [facility] [id number] [phone]             DAVIDSON Vizcaino Jr.  07/17/23 0707

## 2023-07-21 ENCOUNTER — TELEPHONE (OUTPATIENT)
Dept: URGENT CARE | Facility: CLINIC | Age: 68
End: 2023-07-21
Payer: MEDICARE

## 2023-07-21 NOTE — TELEPHONE ENCOUNTER
Patient came for a refill on Hydroxyzine Hcl and Ivermectin. OK per provider to phone to [facility] in [place]. Phoned to Mary at [facility].

## 2023-07-31 RX ORDER — RIVAROXABAN 20 MG/1
20 TABLET, FILM COATED ORAL DAILY
Qty: 30 TABLET | Refills: 6 | Status: SHIPPED | OUTPATIENT
Start: 2023-07-31 | End: 2024-02-08 | Stop reason: SDUPTHER

## 2023-08-02 ENCOUNTER — OFFICE VISIT (OUTPATIENT)
Dept: OPHTHALMOLOGY | Facility: CLINIC | Age: 68
End: 2023-08-02
Payer: MEDICARE

## 2023-08-02 VITALS — BODY MASS INDEX: 32.07 KG/M2 | HEIGHT: 70 IN | WEIGHT: 224 LBS

## 2023-08-02 DIAGNOSIS — Z98.890 HISTORY OF RADIAL KERATOTOMY: Primary | ICD-10-CM

## 2023-08-02 PROCEDURE — 99214 OFFICE O/P EST MOD 30 MIN: CPT | Mod: PBBFAC,PO | Performed by: STUDENT IN AN ORGANIZED HEALTH CARE EDUCATION/TRAINING PROGRAM

## 2023-08-02 RX ORDER — IVERMECTIN 3 MG/1
TABLET ORAL
COMMUNITY
Start: 2023-07-21 | End: 2023-10-09

## 2023-08-02 RX ORDER — PERMETHRIN 50 MG/G
CREAM TOPICAL
COMMUNITY
Start: 2023-07-17 | End: 2023-10-09

## 2023-08-02 RX ORDER — GABAPENTIN 300 MG/1
300 CAPSULE ORAL DAILY
COMMUNITY
End: 2023-12-05

## 2023-08-02 RX ORDER — TRAMADOL HYDROCHLORIDE 50 MG/1
TABLET ORAL
COMMUNITY
End: 2023-10-09

## 2023-08-02 RX ORDER — LEVOCETIRIZINE DIHYDROCHLORIDE 5 MG/1
TABLET, FILM COATED ORAL
COMMUNITY
End: 2023-10-09

## 2023-08-02 RX ORDER — LISINOPRIL 10 MG/1
10 TABLET ORAL DAILY PRN
Status: ON HOLD | COMMUNITY
End: 2023-10-27 | Stop reason: HOSPADM

## 2023-08-02 RX ORDER — HYDROCODONE BITARTRATE AND ACETAMINOPHEN 5; 325 MG/1; MG/1
TABLET ORAL
COMMUNITY
End: 2023-10-09

## 2023-08-02 RX ORDER — FLUTICASONE PROPIONATE 50 MCG
SPRAY, SUSPENSION (ML) NASAL
COMMUNITY
End: 2023-10-09

## 2023-08-02 NOTE — PROGRESS NOTES
HPI     Blurred Vision     Additional comments: MRx           Comments    Patient here today for new MRX today also patient states that he is having   trouble driving at night             Last edited by Cammie Ayers MA on 8/2/2023  2:33 PM.          8/2/23  Assessment /Plan     Topography 3/14/23  OD flat, 36.42 simK, 1.8D astigmatism 019  OS flat, 36.50D simK, 0.38D astigmatism     For exam results, see Encounter Report.    History of radial keratotomy        RK  - Fluctuating vision. Gets RX with optometry usually to 20/30-20/40. MRx 3/23 to 20/80 ou  - LOWELL 20/25 OU  - Unable to refract past 20/60 // 20/40 on 8/2/23. Patient reports fluctuating vision, worse in AM  - [facility] day 8/27 for evaluation of options for stabilization of RK. Repeat refraction if no surgical options   - Patient does not want bifocals     2. Iris nevus  - CTM       3. 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

## 2023-08-15 ENCOUNTER — TELEPHONE (OUTPATIENT)
Dept: RHEUMATOLOGY | Facility: CLINIC | Age: 68
End: 2023-08-15
Payer: MEDICARE

## 2023-08-15 NOTE — TELEPHONE ENCOUNTER
Call and spoke to pt has not done  his lab work yet pt stated will do it next week at [facility]

## 2023-08-15 NOTE — TELEPHONE ENCOUNTER
Please reach out to the patient and let him know that his repeat CBC is due now, if he can come by and we will call with results once lab reviewed

## 2023-08-16 ENCOUNTER — TELEPHONE (OUTPATIENT)
Dept: RHEUMATOLOGY | Facility: CLINIC | Age: 68
End: 2023-08-16
Payer: MEDICARE

## 2023-08-16 ENCOUNTER — LAB VISIT (OUTPATIENT)
Dept: LAB | Facility: HOSPITAL | Age: 68
End: 2023-08-16
Attending: NURSE PRACTITIONER
Payer: MEDICARE

## 2023-08-16 DIAGNOSIS — Z79.899 HIGH RISK MEDICATION USE: ICD-10-CM

## 2023-08-16 DIAGNOSIS — M05.79 SEROPOSITIVE RHEUMATOID ARTHRITIS OF MULTIPLE SITES: ICD-10-CM

## 2023-08-16 LAB
BASOPHILS # BLD AUTO: 0.03 X10(3)/MCL
BASOPHILS NFR BLD AUTO: 0.7 %
EOSINOPHIL # BLD AUTO: 0.1 X10(3)/MCL (ref 0–0.9)
EOSINOPHIL NFR BLD AUTO: 2.2 %
ERYTHROCYTE [DISTWIDTH] IN BLOOD BY AUTOMATED COUNT: 13.2 % (ref 11.5–17)
HCT VFR BLD AUTO: 43.7 % (ref 42–52)
HGB BLD-MCNC: 15.1 G/DL (ref 14–18)
IMM GRANULOCYTES # BLD AUTO: 0.02 X10(3)/MCL (ref 0–0.04)
IMM GRANULOCYTES NFR BLD AUTO: 0.4 %
LYMPHOCYTES # BLD AUTO: 1.43 X10(3)/MCL (ref 0.6–4.6)
LYMPHOCYTES NFR BLD AUTO: 31.5 %
MCH RBC QN AUTO: 32.3 PG (ref 27–31)
MCHC RBC AUTO-ENTMCNC: 34.6 G/DL (ref 33–36)
MCV RBC AUTO: 93.4 FL (ref 80–94)
MONOCYTES # BLD AUTO: 0.73 X10(3)/MCL (ref 0.1–1.3)
MONOCYTES NFR BLD AUTO: 16.1 %
NEUTROPHILS # BLD AUTO: 2.23 X10(3)/MCL (ref 2.1–9.2)
NEUTROPHILS NFR BLD AUTO: 49.1 %
NRBC BLD AUTO-RTO: 0 %
PLATELET # BLD AUTO: 241 X10(3)/MCL (ref 130–400)
PMV BLD AUTO: 10.1 FL (ref 7.4–10.4)
RBC # BLD AUTO: 4.68 X10(6)/MCL (ref 4.7–6.1)
WBC # SPEC AUTO: 4.54 X10(3)/MCL (ref 4.5–11.5)

## 2023-08-16 PROCEDURE — 36415 COLL VENOUS BLD VENIPUNCTURE: CPT

## 2023-08-16 PROCEDURE — 85025 COMPLETE CBC W/AUTO DIFF WBC: CPT

## 2023-08-24 ENCOUNTER — OFFICE VISIT (OUTPATIENT)
Dept: OPHTHALMOLOGY | Facility: CLINIC | Age: 68
End: 2023-08-24
Payer: MEDICARE

## 2023-08-24 VITALS — HEIGHT: 70 IN | WEIGHT: 224 LBS | BODY MASS INDEX: 32.07 KG/M2

## 2023-08-24 DIAGNOSIS — E11.9 CONTROLLED TYPE 2 DIABETES MELLITUS WITHOUT COMPLICATION, WITHOUT LONG-TERM CURRENT USE OF INSULIN: ICD-10-CM

## 2023-08-24 DIAGNOSIS — Z98.890 HISTORY OF RADIAL KERATOTOMY: Primary | ICD-10-CM

## 2023-08-24 PROCEDURE — 99214 OFFICE O/P EST MOD 30 MIN: CPT | Mod: PBBFAC,PO | Performed by: STUDENT IN AN ORGANIZED HEALTH CARE EDUCATION/TRAINING PROGRAM

## 2023-08-24 PROCEDURE — 92025 CPTRIZED CORNEAL TOPOGRAPHY: CPT | Mod: PBBFAC,PO | Performed by: STUDENT IN AN ORGANIZED HEALTH CARE EDUCATION/TRAINING PROGRAM

## 2023-08-24 NOTE — PROGRESS NOTES
[facility] day 8/27 to evaluate RK with topography, treatment options   Last edited by Rebecca Fuller MA on 8/24/2023  2:39 PM.            Assessment /Plan     [facility] day 8/27 to evaluate RK with topography, treatment options   Last edited by Rebecca Fuller MA on 8/24/2023  2:39 PM.          Assessment /Plan     Topography 3/14/23  OD flat, 36.42 simK, 1.8D astigmatism 019  OS flat, 36.50D simK, 0.38D astigmatism    K Jun 8/24/23  OD: prolate, flat simmk 35.34, steep simk 37.5, astig 2.16 at 23  OS: prolate, flat simk 36.28, steep simk 37.16, astig 0.87 @ 107    CCT 8/24/23  OD:   OS:      For exam results, see Encounter Report.    There are no diagnoses linked to this encounter.      RK  - Fluctuating vision. Gets RX with optometry usually to 20/30-20/40. MRx 3/23 to 20/80 ou  - LOWELL 20/25 OU  - Unable to refract past 20/60 // 20/40 on 8/2/23. Patient reports fluctuating vision, worse in AM  - [facility]27 for evaluation of options for stabilization of RK. Repeat refraction if no surgical options   8/24/23: Dr Driscoll in OR for emergency, unable to see patient. Slight R eye fluctuation in R eye K jun, but minimal.  Mrx near and distance separate given and patient happy trying this result.   - Patient does not want bifocals     2. Iris nevus  - CTM       3. 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

## 2023-09-05 ENCOUNTER — OFFICE VISIT (OUTPATIENT)
Dept: CARDIOLOGY | Facility: CLINIC | Age: 68
End: 2023-09-05
Payer: MEDICARE

## 2023-09-05 VITALS
WEIGHT: 228.63 LBS | OXYGEN SATURATION: 95 % | BODY MASS INDEX: 32.73 KG/M2 | HEIGHT: 70 IN | DIASTOLIC BLOOD PRESSURE: 80 MMHG | TEMPERATURE: 98 F | RESPIRATION RATE: 20 BRPM | HEART RATE: 75 BPM | SYSTOLIC BLOOD PRESSURE: 132 MMHG

## 2023-09-05 DIAGNOSIS — I48.92 ATRIAL FIBRILLATION AND FLUTTER: ICD-10-CM

## 2023-09-05 DIAGNOSIS — G45.9 TRANSIENT CEREBRAL ISCHEMIA, UNSPECIFIED TYPE: ICD-10-CM

## 2023-09-05 DIAGNOSIS — Z86.73 HISTORY OF CVA (CEREBROVASCULAR ACCIDENT): Primary | ICD-10-CM

## 2023-09-05 DIAGNOSIS — E78.2 MIXED HYPERLIPIDEMIA: ICD-10-CM

## 2023-09-05 DIAGNOSIS — I10 PRIMARY HYPERTENSION: ICD-10-CM

## 2023-09-05 DIAGNOSIS — I51.89 DIASTOLIC DYSFUNCTION: ICD-10-CM

## 2023-09-05 DIAGNOSIS — I48.91 ATRIAL FIBRILLATION AND FLUTTER: ICD-10-CM

## 2023-09-05 DIAGNOSIS — E11.42 TYPE 2 DIABETES MELLITUS WITH PERIPHERAL NEUROPATHY: ICD-10-CM

## 2023-09-05 DIAGNOSIS — G45.9 TIA (TRANSIENT ISCHEMIC ATTACK): ICD-10-CM

## 2023-09-05 PROCEDURE — 99215 OFFICE O/P EST HI 40 MIN: CPT | Mod: PBBFAC | Performed by: INTERNAL MEDICINE

## 2023-09-05 RX ORDER — METOPROLOL SUCCINATE 25 MG/1
25 TABLET, EXTENDED RELEASE ORAL DAILY
Qty: 90 TABLET | Refills: 3 | Status: SHIPPED | OUTPATIENT
Start: 2023-09-05 | End: 2024-02-08 | Stop reason: SDUPTHER

## 2023-09-05 NOTE — PROGRESS NOTES
[facility]   Cardiology Clinic - Follow Up     Date of Visit: 9/5/2023  Reason for Visit/Chief Complaint:   Chief Complaint    f/u sts has been having AFIB attacks no sob questions on th          History of Present Illness:      Melquiades Rehman Jr. is a 68 y.o. male with a PMH significant for RA, paroxysmal afib, HTN, CHF, parkinson's disease, DM, fatty liver, GERD, CVA, HLD who presents to cardiology clinic for follow up. EKG on 5/1/2023 showed normal sinus rhythm with left axis deviation. TTE on 1/15/2021 showed EF 60% with LVH  and Grade 1 diastolic dysfunction. Today the patient complains of palpitations that occur 3x/day for the past few weeks. He denies chest pain, edema, syncope or SOB. He states that last week he had an episode of left upper and lower extremity weakness that radiated to the jaw with a right sided headache that lasted 10min. Patient also states that he checks his BP 2x/day and takes medications as needed.     CP:  The patient has no chest discomfort.      SOB:  The patient denies shortness of breath. No BRAGG    EDEMA:  The patient denies edema.      ORTHOPNEA:  The patient denies orthopnea.  No PND.      SYNCOPE:  The patient denies near syncope.  No syncope.   No dizziness.    PALPITATIONS:  The patient has palpitations.    LEVEL OF EXERTION:  The patient does house work and does not have symptoms with this level of exertion.  The patient's level of exertion is good.    MET Score: 4    Past Medical History:        Past Medical History:   Diagnosis Date    Central stenosis of spinal canal     CHF (congestive heart failure)     Chronic back pain greater than 3 months duration 10/14/2022    Constipation 9/30/2022    Degeneration of lumbar intervertebral disc 7/6/2022    Diastolic dysfunction 5/1/2023    Dysphonia 9/30/2022    Dystrophic nail 6/19/2023    Erectile dysfunction 9/30/2022    Fatty liver     GERD (gastroesophageal reflux disease) 9/30/2022    H/O cataract  removal with insertion of prosthetic lens     History of CVA (cerebrovascular accident) 2022    History of radial keratotomy 1998    HTN (hypertension)     Hyperlipidemia 2022    Induratio penis plastica 2022    Microhematuria 3/30/2023    Overgrown toenails 2023    Parkinson's disease     Paroxysmal atrial fibrillation     Peyronie disease     Polyneuropathy associated with underlying disease 2023    Rheumatoid arthritis 1/10/2022    Rheumatoid arthritis, unspecified     Sixth nerve palsy 2022    Spinal stenosis of lumbar region 2022    Type 2 diabetes mellitus without complications        Surgical History:        Past Surgical History:   Procedure Laterality Date    CATARACT EXTRACTION W/  INTRAOCULAR LENS IMPLANT      EPIDURAL STEROID INJECTION INTO LUMBAR SPINE      exc cyst Lt ear      INJECTION OF ANESTHETIC AGENT AROUND MEDIAL BRANCH NERVES INNERVATING LUMBAR FACET JOINT Bilateral 2022    Procedure: Block-nerve-medial branch-lumbar;  Surgeon: Gertrude Blanco MD;  Location: [facility];  Service: Pain Management;  Laterality: Bilateral;  Bilateral L4-L5...Patient is requesting to be first case    KNEE ARTHROSCOPY Right     REFRACTIVE SURGERY      VASECTOMY         Family History:        Family History   Problem Relation Age of Onset    Heart failure Mother     Coronary artery disease Mother     Glaucoma Father     Alzheimer's disease Father     Emphysema Father     Autoimmune disease Sister     Pneumonia Sister     Dementia Sister        Social History:        Social History     Tobacco Use    Smoking status: Former     Current packs/day: 0.00     Types: Cigarettes     Quit date:      Years since quittin.7     Passive exposure: Past    Smokeless tobacco: Never   Substance Use Topics    Alcohol use: Not Currently     Alcohol/week: 2.0 - 3.0 standard drinks of alcohol     Types: 1 - 2 Cans of beer, 1 Shots of liquor per week     Comment: rarely    Drug  use: Yes     Types: Marijuana       Allergies:         Review of patient's allergies indicates:   Allergen Reactions    Sarilumab Other (See Comments)     Other reaction(s): fainting       Medications:        Current Outpatient Medications   Medication Sig Dispense Refill    carbidopa-levodopa  mg (SINEMET)  mg per tablet Take 2 tablets by mouth 2 (two) times a day.      colchicine (COLCRYS) 0.6 mg tablet Take 1 tablet (0.6 mg total) by mouth 2 (two) times daily. 180 tablet 1    docusate sodium (COLACE) 50 MG capsule Take 100 mg by mouth Daily.      gemfibroziL (LOPID) 600 MG tablet Take 1 tablet (600 mg total) by mouth 2 (two) times daily. 180 tablet 1    levocetirizine (XYZAL) 5 MG tablet       OSENI 25-30 mg Tab Take 1 tablet by mouth once daily. 90 tablet 1    pantoprazole (PROTONIX) 40 MG tablet Take 1 tablet (40 mg total) by mouth once daily. 90 tablet 1    rosuvastatin (CRESTOR) 20 MG tablet Take 1 tablet (20 mg total) by mouth once daily. 90 tablet 1    selegiline (ELDEPRYL) 5 mg Cap Take 5 mg by mouth 2 (two) times daily.      tofacitinib (XELJANZ XR) 11 mg Tb24 Take 1 tablet by mouth once daily. 30 tablet 4    XARELTO 20 mg Tab Take 1 tablet (20 mg total) by mouth once daily. 30 tablet 6    alprostadil (CAVERJECT IMPULSE ICAV) by Intracavernosal route.      ascorbic acid, vitamin C, (VITAMIN C) 1000 MG tablet Take 1,000 mg by mouth every evening.      b complex vitamins capsule Take 1 capsule by mouth nightly.      blood sugar diagnostic (ACCU-CHEK GUIDE TEST STRIPS [facility]) Accu-Chek Guide test strips      cholecalciferol, vitamin D3, 125 mcg (5,000 unit) capsule Take 1,000 Units by mouth nightly.      coenzyme Q10 100 mg capsule Take 100 mg by mouth every evening.      fluticasone propionate (FLONASE) 50 mcg/actuation nasal spray       folic acid/multivit-min/lutein (CENTRUM SILVER ORAL) Take 1 tablet by mouth every evening.      gabapentin (NEURONTIN) 300 MG capsule        HYDROcodone-acetaminophen (NORCO) 5-325 mg per tablet       ivermectin (STROMECTOL) 3 mg Tab SMARTSI Tablet(s) By Mouth Once      lancets (ACCU-CHEK FASTCLIX LANCET DRUM MISC) Accu-Chek Fastclix Lancet Drum      lisinopriL 10 MG tablet       lisinopriL-hydrochlorothiazide (PRINZIDE,ZESTORETIC) 20-25 mg Tab Take 1 tablet by mouth as needed.      magnesium 30 mg Tab Take 250 mg by mouth nightly.      NON FORMULARY MEDICATION Take 1 tablet by mouth nightly.      permethrin (ELIMITE) 5 % cream Apply topically.      potassium citrate 99 mg Cap Take 1 tablet by mouth nightly.      predniSONE (DELTASONE) 5 MG tablet Take 1 tablet (5 mg total) by mouth once daily. AFTER BREAKFAST (Patient not taking: Reported on 2023) 15 tablet 0    traMADoL (ULTRAM) 50 mg tablet Take 1 tablet 4 times a day by oral route as needed.      vitamin E 400 UNIT capsule Take 400 Units by mouth nightly.      zinc sulfate (ZINC-15 ORAL) Take 140 mg by mouth nightly.       No current facility-administered medications for this visit.       I have reviewed and updated the patient's medications, allergies, past medical history, surgical history, social history and family history as needed.    Review of Systems:      Review of Systems   Constitutional:  Negative for chills, fever, malaise/fatigue and weight loss.   HENT:  Negative for congestion, hearing loss and sore throat.    Eyes:  Negative for blurred vision.   Respiratory:  Negative for cough, hemoptysis and shortness of breath.    Cardiovascular:  Positive for palpitations (3x/day). Negative for chest pain, orthopnea, claudication, leg swelling and PND.   Gastrointestinal:  Positive for constipation. Negative for abdominal pain, blood in stool, diarrhea, heartburn, melena, nausea and vomiting.   Genitourinary:  Negative for dysuria, frequency and urgency.   Musculoskeletal:  Negative for myalgias.   Skin:  Negative for rash.   Neurological:  Negative for dizziness and tingling.  "      Objective:        Vitals:    09/05/23 0949   BP: 132/80   Pulse:    Resp:    Temp:      Wt Readings from Last 3 Encounters:   09/05/23 103.7 kg (228 lb 9.9 oz)   08/24/23 101.6 kg (223 lb 15.8 oz)   08/02/23 101.6 kg (224 lb)     Temp Readings from Last 3 Encounters:   09/05/23 97.5 °F (36.4 °C) (Oral)   07/17/23 98.4 °F (36.9 °C)   07/06/23 97.5 °F (36.4 °C) (Oral)     BP Readings from Last 3 Encounters:   09/05/23 132/80   07/17/23 (!) 160/103   07/06/23 122/75     Pulse Readings from Last 3 Encounters:   09/05/23 75   07/17/23 80   07/06/23 70       Vitals:    09/05/23 0928 09/05/23 0949   BP: (!) 165/91 132/80   BP Location: Left arm Left arm   Patient Position: Sitting    BP Method: Medium (Automatic) Large (Manual)   Pulse: 75    Resp: 20    Temp: 97.5 °F (36.4 °C)    TempSrc: Oral    SpO2: 95%    Weight: 103.7 kg (228 lb 9.9 oz)    Height: 5' 10" (1.778 m)      Body mass index is 32.8 kg/m².    Physical Exam  Constitutional:       General: He is not in acute distress.     Appearance: Normal appearance. He is not ill-appearing.   HENT:      Head: Normocephalic and atraumatic.      Nose: Nose normal.   Eyes:      General: No scleral icterus.     Pupils: Pupils are equal, round, and reactive to light.   Neck:      Vascular: No carotid bruit.   Cardiovascular:      Rate and Rhythm: Normal rate and regular rhythm.      Pulses: Normal pulses.      Heart sounds: Normal heart sounds. No murmur heard.     No friction rub. No gallop.   Pulmonary:      Effort: Pulmonary effort is normal. No respiratory distress.      Breath sounds: Normal breath sounds. No stridor. No wheezing, rhonchi or rales.   Chest:      Chest wall: No tenderness.   Abdominal:      General: Abdomen is flat. Bowel sounds are normal. There is no distension.      Palpations: Abdomen is soft.      Tenderness: There is no abdominal tenderness. There is no guarding.   Musculoskeletal:         General: No swelling, tenderness, deformity or signs of " injury.      Cervical back: Normal range of motion and neck supple. No rigidity or tenderness.      Right lower leg: No edema.      Left lower leg: No edema.   Skin:     General: Skin is warm and dry.      Capillary Refill: Capillary refill takes less than 2 seconds.      Coloration: Skin is not jaundiced or pale.      Findings: No bruising, erythema, lesion or rash.   Neurological:      Mental Status: He is alert and oriented to person, place, and time.          Labs:      I have reviewed the following labs below:      CBC:  Lab Results   Component Value Date    WBC 4.54 08/16/2023    HGB 15.1 08/16/2023    HCT 43.7 08/16/2023     08/16/2023    MCV 93.4 08/16/2023    RDW 13.2 08/16/2023     BMP:  Lab Results   Component Value Date     07/06/2023    K 3.7 07/06/2023    CO2 26 07/06/2023    BUN 17.4 07/06/2023    CALCIUM 9.8 07/06/2023    MG 1.60 12/04/2020    PHOS 3.3 12/04/2020     LFTs:  Lab Results   Component Value Date    ALBUMIN 4.5 07/06/2023    BILITOT 0.4 07/06/2023    AST 21 07/06/2023    ALKPHOS 56 07/06/2023    ALT 9 07/06/2023     FLP:  Cholesterol Total   Date Value Ref Range Status   03/21/2023 190 <=200 mg/dL Final     HDL Cholesterol   Date Value Ref Range Status   03/21/2023 52 35 - 60 mg/dL Final     LDL Cholesterol   Date Value Ref Range Status   03/21/2023 113.00 50.00 - 140.00 mg/dL Final     Triglyceride   Date Value Ref Range Status   03/21/2023 124 34 - 140 mg/dL Final     DM:  Lab Results   Component Value Date    HGBA1C 6.5 03/21/2023    HGBA1C 6.4 09/26/2022    HGBA1C 6.0 03/29/2022    CREATININE 1.23 (H) 07/06/2023     Thyroid:  Lab Results   Component Value Date    TSH 1.841 03/21/2023     Anemia:  Lab Results   Component Value Date    IRON 128 02/08/2022    TIBC 382 02/08/2022    FERRITIN 816.80 (H) 12/09/2020    [id number] 556 09/25/2020     Coags:  Lab Results   Component Value Date    INR 0.97 07/02/2018    PROTIME 12.2 07/02/2018      Cardiac:  Lab Results   Component  Value Date    TROPONINI <0.010 12/08/2020    TROPONINI <0.010 12/07/2020    TROPONINI <0.010 12/07/2020    TROPONINI <0.010 12/04/2020       Cardiac Studies/Imaging:        I have reviewed the following studies below:      Echocardiogram  No results found for this or any previous visit.      Stress Test  No results found for this or any previous visit.       Coronary Angiogram  No results found for this or any previous visit.         Assessment & Plan:        Assessment:     There are no diagnoses linked to this encounter.  10 Year Cardiovascular Risk:  The 10-year ASCVD risk score (Aidan WHITESIDE, et al., 2019) is: 28.3%    Values used to calculate the score:      Age: 68 years      Sex: Male      Is Non- : No      Diabetic: Yes      Tobacco smoker: No      Systolic Blood Pressure: 132 mmHg      Is BP treated: Yes      HDL Cholesterol: 52 mg/dL      Total Cholesterol: 190 mg/dL  BMI:  Body mass index is 32.8 kg/m².  Patient is obese (BMI 30-34.9)  Tobacco:  none    Alcohol:  none  Substance abuse:  none    Last PCP visit:  3/30/2023    Plan:     MEDICATIONS:  prescribe Metoprolol-succinate 25mg 1x/day    WORK UP:    Echo:  Ordered    Ultrasound:  Order  carotid US    Head CT: ordered    DIET:  Avoid processed foods and drinks, sugar, salt, fried/greasy foods, and fast foods    Recommend Mediterranean diet and 10 servings of fruits and vegetables per day    EXERCISE:  continue active lifestyle      FOLLOW UP:  4 months    Tabitha YOUNGER 3rd Year Medical Student    Future Appointments   Date Time Provider Department Center   9/21/2023  8:30 AM Monica Chun FNP [facility]    9/28/2023  7:30 AM Damien Dodd FNP [facility]   10/9/2023  8:00 AM Barbara Zelaya FNP [facility]   3/14/2024  9:45 AM [facility] [place]

## 2023-09-05 NOTE — PROGRESS NOTES
[facility]   Cardiology Clinic - Follow Up     Date of Visit: 9/5/2023  Reason for Visit/Chief Complaint:   Chief Complaint    f/u sts has been having AFIB attacks no sob questions on th          History of Present Illness:      Melquiades Rehman Jr. is a 68 y.o. male with a PMH significant for RA, paroxysmal afib, HTN, CHF, parkinson's disease, DM, fatty liver, GERD, CVA, HLD who presents to cardiology clinic for follow up. EKG on 5/1/2023 showed normal sinus rhythm with left axis deviation. TTE on 1/15/2021 showed EF 60% with LVH  and Grade 1 diastolic dysfunction. Today the patient complains of palpitations that occur 3x/day for the past few weeks. He denies chest pain, edema, syncope or SOB. He states that last week he had an episode of left upper and lower extremity weakness that radiated to the jaw with a right sided headache that lasted 10min. Patient also states that he checks his BP 2x/day and takes medications as needed.     CP:  The patient has no chest discomfort.      SOB:  The patient denies shortness of breath. No BRAGG    EDEMA:  The patient denies edema.      ORTHOPNEA:  The patient denies orthopnea.  No PND.      SYNCOPE:  The patient denies near syncope.  No syncope.   No dizziness.    PALPITATIONS:  The patient has palpitations.    LEVEL OF EXERTION:  The patient does house work and does not have symptoms with this level of exertion.  The patient's level of exertion is good.    MET Score: 4    Past Medical History:        Past Medical History:   Diagnosis Date    Central stenosis of spinal canal     CHF (congestive heart failure)     Chronic back pain greater than 3 months duration 10/14/2022    Constipation 9/30/2022    Degeneration of lumbar intervertebral disc 7/6/2022    Diastolic dysfunction 5/1/2023    Dysphonia 9/30/2022    Dystrophic nail 6/19/2023    Erectile dysfunction 9/30/2022    Fatty liver     GERD (gastroesophageal reflux disease) 9/30/2022    H/O cataract  removal with insertion of prosthetic lens     History of CVA (cerebrovascular accident) 2022    History of radial keratotomy 1998    HTN (hypertension)     Hyperlipidemia 2022    Induratio penis plastica 2022    Microhematuria 3/30/2023    Overgrown toenails 2023    Parkinson's disease     Paroxysmal atrial fibrillation     Peyronie disease     Polyneuropathy associated with underlying disease 2023    Rheumatoid arthritis 1/10/2022    Rheumatoid arthritis, unspecified     Sixth nerve palsy 2022    Spinal stenosis of lumbar region 2022    Type 2 diabetes mellitus without complications        Surgical History:        Past Surgical History:   Procedure Laterality Date    CATARACT EXTRACTION W/  INTRAOCULAR LENS IMPLANT      EPIDURAL STEROID INJECTION INTO LUMBAR SPINE      exc cyst Lt ear      INJECTION OF ANESTHETIC AGENT AROUND MEDIAL BRANCH NERVES INNERVATING LUMBAR FACET JOINT Bilateral 2022    Procedure: Block-nerve-medial branch-lumbar;  Surgeon: Gertrude Blanco MD;  Location: [facility];  Service: Pain Management;  Laterality: Bilateral;  Bilateral L4-L5...Patient is requesting to be first case    KNEE ARTHROSCOPY Right     REFRACTIVE SURGERY      VASECTOMY         Family History:        Family History   Problem Relation Age of Onset    Heart failure Mother     Coronary artery disease Mother     Glaucoma Father     Alzheimer's disease Father     Emphysema Father     Autoimmune disease Sister     Pneumonia Sister     Dementia Sister        Social History:        Social History     Tobacco Use    Smoking status: Former     Current packs/day: 0.00     Types: Cigarettes     Quit date:      Years since quittin.7     Passive exposure: Past    Smokeless tobacco: Never   Substance Use Topics    Alcohol use: Not Currently     Alcohol/week: 2.0 - 3.0 standard drinks of alcohol     Types: 1 - 2 Cans of beer, 1 Shots of liquor per week     Comment: rarely    Drug  use: Yes     Types: Marijuana       Allergies:         Review of patient's allergies indicates:   Allergen Reactions    Sarilumab Other (See Comments)     Other reaction(s): fainting       Medications:        Current Outpatient Medications   Medication Sig Dispense Refill    carbidopa-levodopa  mg (SINEMET)  mg per tablet Take 2 tablets by mouth 2 (two) times a day.      colchicine (COLCRYS) 0.6 mg tablet Take 1 tablet (0.6 mg total) by mouth 2 (two) times daily. 180 tablet 1    docusate sodium (COLACE) 50 MG capsule Take 100 mg by mouth Daily.      gemfibroziL (LOPID) 600 MG tablet Take 1 tablet (600 mg total) by mouth 2 (two) times daily. 180 tablet 1    levocetirizine (XYZAL) 5 MG tablet       OSENI 25-30 mg Tab Take 1 tablet by mouth once daily. 90 tablet 1    pantoprazole (PROTONIX) 40 MG tablet Take 1 tablet (40 mg total) by mouth once daily. 90 tablet 1    rosuvastatin (CRESTOR) 20 MG tablet Take 1 tablet (20 mg total) by mouth once daily. 90 tablet 1    selegiline (ELDEPRYL) 5 mg Cap Take 5 mg by mouth 2 (two) times daily.      tofacitinib (XELJANZ XR) 11 mg Tb24 Take 1 tablet by mouth once daily. 30 tablet 4    XARELTO 20 mg Tab Take 1 tablet (20 mg total) by mouth once daily. 30 tablet 6    alprostadil (CAVERJECT IMPULSE ICAV) by Intracavernosal route.      ascorbic acid, vitamin C, (VITAMIN C) 1000 MG tablet Take 1,000 mg by mouth every evening.      b complex vitamins capsule Take 1 capsule by mouth nightly.      blood sugar diagnostic (ACCU-CHEK GUIDE TEST STRIPS [facility]) Accu-Chek Guide test strips      cholecalciferol, vitamin D3, 125 mcg (5,000 unit) capsule Take 1,000 Units by mouth nightly.      coenzyme Q10 100 mg capsule Take 100 mg by mouth every evening.      fluticasone propionate (FLONASE) 50 mcg/actuation nasal spray       folic acid/multivit-min/lutein (CENTRUM SILVER ORAL) Take 1 tablet by mouth every evening.      gabapentin (NEURONTIN) 300 MG capsule        HYDROcodone-acetaminophen (NORCO) 5-325 mg per tablet       ivermectin (STROMECTOL) 3 mg Tab SMARTSI Tablet(s) By Mouth Once      lancets (ACCU-CHEK FASTCLIX LANCET DRUM MISC) Accu-Chek Fastclix Lancet Drum      lisinopriL 10 MG tablet       lisinopriL-hydrochlorothiazide (PRINZIDE,ZESTORETIC) 20-25 mg Tab Take 1 tablet by mouth as needed.      magnesium 30 mg Tab Take 250 mg by mouth nightly.      NON FORMULARY MEDICATION Take 1 tablet by mouth nightly.      permethrin (ELIMITE) 5 % cream Apply topically.      potassium citrate 99 mg Cap Take 1 tablet by mouth nightly.      predniSONE (DELTASONE) 5 MG tablet Take 1 tablet (5 mg total) by mouth once daily. AFTER BREAKFAST (Patient not taking: Reported on 2023) 15 tablet 0    traMADoL (ULTRAM) 50 mg tablet Take 1 tablet 4 times a day by oral route as needed.      vitamin E 400 UNIT capsule Take 400 Units by mouth nightly.      zinc sulfate (ZINC-15 ORAL) Take 140 mg by mouth nightly.       No current facility-administered medications for this visit.       I have reviewed and updated the patient's medications, allergies, past medical history, surgical history, social history and family history as needed.    Review of Systems:      Review of Systems   Constitutional:  Negative for chills, fever, malaise/fatigue and weight loss.   HENT:  Negative for congestion, hearing loss and sore throat.    Eyes:  Negative for blurred vision.   Respiratory:  Negative for cough, hemoptysis and shortness of breath.    Cardiovascular:  Positive for palpitations (3x/day). Negative for chest pain, orthopnea, claudication, leg swelling and PND.   Gastrointestinal:  Positive for constipation. Negative for abdominal pain, blood in stool, diarrhea, heartburn, melena, nausea and vomiting.   Genitourinary:  Negative for dysuria, frequency and urgency.   Musculoskeletal:  Negative for myalgias.   Skin:  Negative for rash.   Neurological:  Negative for dizziness and tingling.  "      Objective:        Vitals:    09/05/23 0949   BP: 132/80   Pulse:    Resp:    Temp:      Wt Readings from Last 3 Encounters:   09/05/23 103.7 kg (228 lb 9.9 oz)   08/24/23 101.6 kg (223 lb 15.8 oz)   08/02/23 101.6 kg (224 lb)     Temp Readings from Last 3 Encounters:   09/05/23 97.5 °F (36.4 °C) (Oral)   07/17/23 98.4 °F (36.9 °C)   07/06/23 97.5 °F (36.4 °C) (Oral)     BP Readings from Last 3 Encounters:   09/05/23 132/80   07/17/23 (!) 160/103   07/06/23 122/75     Pulse Readings from Last 3 Encounters:   09/05/23 75   07/17/23 80   07/06/23 70       Vitals:    09/05/23 0928 09/05/23 0949   BP: (!) 165/91 132/80   BP Location: Left arm Left arm   Patient Position: Sitting    BP Method: Medium (Automatic) Large (Manual)   Pulse: 75    Resp: 20    Temp: 97.5 °F (36.4 °C)    TempSrc: Oral    SpO2: 95%    Weight: 103.7 kg (228 lb 9.9 oz)    Height: 5' 10" (1.778 m)      Body mass index is 32.8 kg/m².    Physical Exam  Constitutional:       General: He is not in acute distress.     Appearance: Normal appearance. He is not ill-appearing.   HENT:      Head: Normocephalic and atraumatic.      Nose: Nose normal.   Eyes:      General: No scleral icterus.     Pupils: Pupils are equal, round, and reactive to light.   Neck:      Vascular: No carotid bruit.   Cardiovascular:      Rate and Rhythm: Normal rate and regular rhythm.      Pulses: Normal pulses.      Heart sounds: Normal heart sounds. No murmur heard.     No friction rub. No gallop.   Pulmonary:      Effort: Pulmonary effort is normal. No respiratory distress.      Breath sounds: Normal breath sounds. No stridor. No wheezing, rhonchi or rales.   Chest:      Chest wall: No tenderness.   Abdominal:      General: Abdomen is flat. Bowel sounds are normal. There is no distension.      Palpations: Abdomen is soft.      Tenderness: There is no abdominal tenderness. There is no guarding.   Musculoskeletal:         General: No swelling, tenderness, deformity or signs of " injury.      Cervical back: Normal range of motion and neck supple. No rigidity or tenderness.      Right lower leg: No edema.      Left lower leg: No edema.   Skin:     General: Skin is warm and dry.      Capillary Refill: Capillary refill takes less than 2 seconds.      Coloration: Skin is not jaundiced or pale.      Findings: No bruising, erythema, lesion or rash.   Neurological:      Mental Status: He is alert and oriented to person, place, and time.          Labs:      I have reviewed the following labs below:      CBC:  Lab Results   Component Value Date    WBC 4.54 08/16/2023    HGB 15.1 08/16/2023    HCT 43.7 08/16/2023     08/16/2023    MCV 93.4 08/16/2023    RDW 13.2 08/16/2023     BMP:  Lab Results   Component Value Date     07/06/2023    K 3.7 07/06/2023    CO2 26 07/06/2023    BUN 17.4 07/06/2023    CALCIUM 9.8 07/06/2023    MG 1.60 12/04/2020    PHOS 3.3 12/04/2020     LFTs:  Lab Results   Component Value Date    ALBUMIN 4.5 07/06/2023    BILITOT 0.4 07/06/2023    AST 21 07/06/2023    ALKPHOS 56 07/06/2023    ALT 9 07/06/2023     FLP:  Cholesterol Total   Date Value Ref Range Status   03/21/2023 190 <=200 mg/dL Final     HDL Cholesterol   Date Value Ref Range Status   03/21/2023 52 35 - 60 mg/dL Final     LDL Cholesterol   Date Value Ref Range Status   03/21/2023 113.00 50.00 - 140.00 mg/dL Final     Triglyceride   Date Value Ref Range Status   03/21/2023 124 34 - 140 mg/dL Final     DM:  Lab Results   Component Value Date    HGBA1C 6.5 03/21/2023    HGBA1C 6.4 09/26/2022    HGBA1C 6.0 03/29/2022    CREATININE 1.23 (H) 07/06/2023     Thyroid:  Lab Results   Component Value Date    TSH 1.841 03/21/2023     Anemia:  Lab Results   Component Value Date    IRON 128 02/08/2022    TIBC 382 02/08/2022    FERRITIN 816.80 (H) 12/09/2020    BVKVFAGC50 556 09/25/2020     Coags:  Lab Results   Component Value Date    INR 0.97 07/02/2018    PROTIME 12.2 07/02/2018      Cardiac:  Lab Results   Component  Value Date    TROPONINI <0.010 12/08/2020    TROPONINI <0.010 12/07/2020    TROPONINI <0.010 12/07/2020    TROPONINI <0.010 12/04/2020       Cardiac Studies/Imaging:        I have reviewed the following studies below:      Echocardiogram  No results found for this or any previous visit.      Stress Test  No results found for this or any previous visit.       Coronary Angiogram  No results found for this or any previous visit.         Assessment & Plan:        Assessment:     1. History of CVA (cerebrovascular accident)    2. Atrial fibrillation and flutter    3. Diastolic dysfunction    4. Primary hypertension    5. Mixed hyperlipidemia    6. Type 2 diabetes mellitus with peripheral neuropathy    7. TIA (transient ischemic attack)  10 Year Cardiovascular Risk:  The 10-year ASCVD risk score (Aidan WHITESIDE, et al., 2019) is: 28.3%    Values used to calculate the score:      Age: 68 years      Sex: Male      Is Non- : No      Diabetic: Yes      Tobacco smoker: No      Systolic Blood Pressure: 132 mmHg      Is BP treated: Yes      HDL Cholesterol: 52 mg/dL      Total Cholesterol: 190 mg/dL  BMI:  Body mass index is 32.8 kg/m².  Patient is obese (BMI 30-34.9)  Tobacco:  none    Alcohol:  none  Substance abuse:  none    Last PCP visit:  3/30/2023    Plan:     MEDICATIONS:  prescribe Metoprolol-succinate 25mg 1x/day    WORK UP:    Echo:  Ordered    Ultrasound:  Order  carotid US , echocardiogram, CT of head due to patient's episode of TIA    Head CT: ordered    DIET:  Avoid processed foods and drinks, sugar, salt, fried/greasy foods, and fast foods    Recommend Mediterranean diet and 10 servings of fruits and vegetables per day    EXERCISE:  continue active lifestyle      FOLLOW UP:  4 months    Quincy Conrad MD      Future Appointments   Date Time Provider Department Center   9/21/2023  8:30 AM [facility]    9/28/2023  7:30 AM Damien Dodd FNP [facility]  Paul Peña   10/9/2023  8:00 AM Barbara Zelaya FNP [facility] BR[facility]NI Peña   1/9/2024  9:00 AM Adriane Raygoza MD [facility] ASHELY Peña   3/14/2024  9:45 AM [facility] Paul Ey

## 2023-09-08 ENCOUNTER — TELEPHONE (OUTPATIENT)
Dept: RHEUMATOLOGY | Facility: CLINIC | Age: 68
End: 2023-09-08
Payer: MEDICARE

## 2023-09-08 NOTE — TELEPHONE ENCOUNTER
Please advise Mr Arnett that his repeat CXRay is due, order is in the computer if he can come by and we will call once results reviewed.

## 2023-09-21 ENCOUNTER — HOSPITAL ENCOUNTER (OUTPATIENT)
Dept: WOUND CARE | Facility: HOSPITAL | Age: 68
Discharge: HOME | End: 2023-09-21
Attending: NURSE PRACTITIONER
Payer: MEDICARE

## 2023-09-21 VITALS
SYSTOLIC BLOOD PRESSURE: 134 MMHG | OXYGEN SATURATION: 96 % | TEMPERATURE: 98 F | HEART RATE: 77 BPM | DIASTOLIC BLOOD PRESSURE: 85 MMHG | RESPIRATION RATE: 18 BRPM

## 2023-09-21 DIAGNOSIS — L60.3 DYSTROPHIC NAIL: ICD-10-CM

## 2023-09-21 DIAGNOSIS — L60.2 OVERGROWN TOENAILS: ICD-10-CM

## 2023-09-21 DIAGNOSIS — E11.9 ENCOUNTER FOR COMPREHENSIVE DIABETIC FOOT EXAMINATION, TYPE 2 DIABETES MELLITUS: Primary | ICD-10-CM

## 2023-09-21 DIAGNOSIS — E11.42 TYPE 2 DIABETES MELLITUS WITH PERIPHERAL NEUROPATHY: ICD-10-CM

## 2023-09-21 DIAGNOSIS — G63 POLYNEUROPATHY ASSOCIATED WITH UNDERLYING DISEASE: ICD-10-CM

## 2023-09-21 PROCEDURE — 11719 TRIM NAIL(S) ANY NUMBER: CPT | Mod: 59

## 2023-09-21 PROCEDURE — 99211 OFF/OP EST MAY X REQ PHY/QHP: CPT | Mod: 25

## 2023-09-21 PROCEDURE — 27000999 HC MEDICAL RECORD PHOTO DOCUMENTATION

## 2023-09-21 PROCEDURE — 11719 TRIM NAIL(S) ANY NUMBER: CPT | Mod: Q8,,, | Performed by: NURSE PRACTITIONER

## 2023-09-21 PROCEDURE — 99499 UNLISTED E&M SERVICE: CPT | Mod: Q9,,, | Performed by: NURSE PRACTITIONER

## 2023-09-21 PROCEDURE — 99499 NO LOS: ICD-10-PCS | Mod: Q9,,, | Performed by: NURSE PRACTITIONER

## 2023-09-21 PROCEDURE — 11719 PR TRIM NAIL(S): ICD-10-PCS | Mod: Q8,,, | Performed by: NURSE PRACTITIONER

## 2023-09-21 NOTE — PROGRESS NOTES
TODAY'S VISIT NOTE WAS IMPORTED FROM LAST WOUND CLINIC VISIT OF 7/5/23.  I ATTEST THAT I REVIEWED THE HPI, ROS, LABS, WOUND-RELATED IMAGING, PHYSICAL EXAMINATION, EVALUATION AND PLAN SECTIONS OF IMPORTED NOTE AND REVISED TODAY'S VISIT NOTE TO REFLECT TODAY'S NEW ASSESSMENT FINDINGS AND TODAY'S UPDATES TO WOUND TREATMENT PLAN.          CHIEF COMPLAINT:    Routine diabetic foot care - Medicare      HISTORY OF  PRESENT ILLNESS:  68 y.o. White male being seen today for routine diabetic foot care.    Followed by Damien Dodd FNP for PCP.   Last visit with Ms. Dodd was on 6/7/23.   No hx of vascular work-ups or interventions.  Never smoker.  .  Disabled .  Quit smoking in 1987; was a light smoker.  History includes:        Past Medical History:   Diagnosis Date    Central stenosis of spinal canal     CHF (congestive heart failure)     Chronic back pain greater than 3 months duration 10/14/2022    Constipation 9/30/2022    Degeneration of lumbar intervertebral disc 7/6/2022    Diastolic dysfunction 5/1/2023    Dysphonia 9/30/2022    Dystrophic nail 6/19/2023    Erectile dysfunction 9/30/2022    Fatty liver     GERD (gastroesophageal reflux disease) 9/30/2022    H/O cataract removal with insertion of prosthetic lens     History of CVA (cerebrovascular accident) 9/30/2022    History of radial keratotomy 01/01/1998    HTN (hypertension)     Hyperlipidemia 9/30/2022    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    Microhematuria 3/30/2023    Overgrown toenails 6/19/2023    Parkinson's disease     Paroxysmal atrial fibrillation     Peyronie disease     Polyneuropathy associated with underlying disease 6/19/2023    Rheumatoid arthritis 1/10/2022    Rheumatoid arthritis, unspecified     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    Spinal stenosis of lumbar region 9/30/2022    Type 2 diabetes mellitus without complications       Past Surgical History:   Procedure Laterality Date    CATARACT EXTRACTION W/  INTRAOCULAR LENS IMPLANT       EPIDURAL STEROID INJECTION INTO LUMBAR SPINE      exc cyst Lt ear      INJECTION OF ANESTHETIC AGENT AROUND MEDIAL BRANCH NERVES INNERVATING LUMBAR FACET JOINT Bilateral 2022    Procedure: Block-nerve-medial branch-lumbar;  Surgeon: Gertrude Blanco MD;  Location: [facility];  Service: Pain Management;  Laterality: Bilateral;  Bilateral L4-L5...Patient is requesting to be first case    KNEE ARTHROSCOPY Right     REFRACTIVE SURGERY      VASECTOMY        Social History     Socioeconomic History    Marital status:     Number of children: 1   Tobacco Use    Smoking status: Former     Current packs/day: 0.00     Types: Cigarettes     Quit date:      Years since quittin.7     Passive exposure: Past    Smokeless tobacco: Never   Substance and Sexual Activity    Alcohol use: Not Currently     Alcohol/week: 2.0 - 3.0 standard drinks of alcohol     Types: 1 - 2 Cans of beer, 1 Shots of liquor per week     Comment: rarely    Drug use: Yes     Types: Marijuana    Sexual activity: Yes     Partners: Female     Social Determinants of Health     Financial Resource Strain: High Risk (2022)    Overall Financial Resource Strain (CARDIA)     Difficulty of Paying Living Expenses: Very hard   Food Insecurity: No Food Insecurity (2022)    Hunger Vital Sign     Worried About Running Out of Food in the Last Year: Never true     Ran Out of Food in the Last Year: Never true   Transportation Needs: No Transportation Needs (2022)    PRAPARE - Transportation     Lack of Transportation (Medical): No     Lack of Transportation (Non-Medical): No   Physical Activity: Sufficiently Active (2022)    Exercise Vital Sign     Days of Exercise per Week: 7 days     Minutes of Exercise per Session: 30 min   Stress: No Stress Concern Present (2022)    [employer] [place] of Occupational [employer] - Occupational Stress Questionnaire     Feeling of Stress : Not at all   Social Connections: Moderately Isolated  (9/30/2022)    Social Connection and Isolation Panel [NHANES]     Frequency of Communication with Friends and Family: More than three times a week     Frequency of Social Gatherings with Friends and Family: Twice a week     Attends Jehovah's witness Services: Never     Active Member of Clubs or Organizations: Yes     Attends Club or Organization Meetings: More than 4 times per year     Marital Status:    Housing Stability: Low Risk  (9/30/2022)    Housing Stability Vital Sign     Unable to Pay for Housing in the Last Year: No     Number of Places Lived in the Last Year: 1     Unstable Housing in the Last Year: No       Review of Most Recent Wound Care-Related Labs:  Lab Results   Component Value Date/Time    WBC 4.54 08/16/2023 09:34 AM    RBC 4.68 (L) 08/16/2023 09:34 AM    HGB 15.1 08/16/2023 09:34 AM    HCT 43.7 08/16/2023 09:34 AM    IRON 128 02/08/2022 09:14 AM    FERRITIN 816.80 (H) 12/09/2020 06:35 AM    MCV 93.4 08/16/2023 09:34 AM    MCH 32.3 (H) 08/16/2023 09:34 AM    CREATININE 1.23 (H) 07/06/2023 02:33 PM    HGBA1C 6.5 03/21/2023 10:43 AM    CRP <0.40 07/06/2023 02:33 PM        Today 9/21/23:   Reports numbness and burning in all toes on daily basis.  Does have pain in calves with walking distances, as well as the left thigh now.  Not aware of his feet feeling cold.  Has occasional swelling in bilateral outer and inner ankles.  Continues going barefoot; however, doing that half as much as he used to.  Checking his feet and toes more often than before; however, not daily, as instructed.   He no longer wears CROCs; however, is wearing shoes without socks.      REVIEW OF SYSTEMS:  Except as stated in HPI, all other 10 body systems normal.       PHYSICAL EXAMINATION AND VITAL SIGNS:    Vitals:    09/21/23 0844   BP: 134/85   Pulse: 77   Resp: 18   Temp: 97.9 °F (36.6 °C)       There is no height or weight on file to calculate BMI.      General:  Hypertensive with diabetes, asymptomatic with; afebrile.   Overweight, in no acute distress.   Respiratory:   Breathing even, quiet, and unlabored at rest.  No coughing.  Cardiovascular:   Mild non-pitting edema in bilateral ankles.  Bilateral DP pulses palpated.  Bilateral PT pulses dopplered.  No hair distribution over BLE and toes.  Generalized pallor in feet and toes.  Distal toes cool to touch.       Musculoskeletal:    Full range of motion of all extremities.  Ambulates without assistive device.       Neurologic:  A&O X 3.  Cranial nerves grossly intact.  Normal monofilament testing.  No LOPS in either foot.    Psychiatric:  Calm, cooperative.  Mood and effect normal.  Responses appropriate.   Integumentary:        10 overgrown and dystrophic toenails.                              ASSESSMENT AND PLAN:    Encounter for diabetic foot care, non-insulin dependent:  Last visit with PCP:  6/7/23  Diabetes well-controlled per last HgbA1C.     Nail care last done on:  6/16/23.    Diabetic foot care teaching reinforced with focused teaching towards proper foot and socks wear, and to not go barefoot while out of bed, with rationale.  Wound clinic and UCC precautions reinforced.      Class B Findings:  3 of the following are present:  Abnormal hair growth  Thickened nails  Pigmentary changes  Skin color (redness or rubor)  Q8 - modifier    Class C Findings:  4 of the following are present:  Claudication  Cold Feet  Edema  Burning in Feet  Paresthesias (abnormal spontaneous sensations in feet  Q9 - modifier:  One Class B and two Class C findings                                      PROCEDURE NOTE    Procedure Today:  cutting and filing of 10 toenails.  Rationale:  Overgrown toenails, as patient presented today, can lead to diabetic foot/toe ulcers, osteomyelitis, and lower limb amputations.   Informed verbal consent obtained.  Using standard podiatry clippers and [place] boards, I cut and filed 10 toenails.  No bleeding or discomfort during procedure.  Patient tolerated procedure  without complications.                                 Return to clinic in 3 months.  Teaching reinforced on s/s to call wound clinic for promptly.

## 2023-10-02 ENCOUNTER — HOSPITAL ENCOUNTER (OUTPATIENT)
Dept: RADIOLOGY | Facility: HOSPITAL | Age: 68
Discharge: HOME | End: 2023-10-02
Attending: INTERNAL MEDICINE
Payer: MEDICARE

## 2023-10-02 ENCOUNTER — HOSPITAL ENCOUNTER (OUTPATIENT)
Dept: CARDIOLOGY | Facility: HOSPITAL | Age: 68
Discharge: HOME | End: 2023-10-02
Attending: INTERNAL MEDICINE
Payer: MEDICARE

## 2023-10-02 VITALS
DIASTOLIC BLOOD PRESSURE: 89 MMHG | BODY MASS INDEX: 32.64 KG/M2 | SYSTOLIC BLOOD PRESSURE: 151 MMHG | HEIGHT: 70 IN | WEIGHT: 228 LBS

## 2023-10-02 DIAGNOSIS — G45.9 TIA (TRANSIENT ISCHEMIC ATTACK): ICD-10-CM

## 2023-10-02 DIAGNOSIS — G45.9 TRANSIENT CEREBRAL ISCHEMIA, UNSPECIFIED TYPE: ICD-10-CM

## 2023-10-02 LAB
AV INDEX (PROSTH): 0.73
AV MEAN GRADIENT: 2 MMHG
AV PEAK GRADIENT: 5 MMHG
AV VALVE AREA BY VELOCITY RATIO: 3.25 CM²
AV VALVE AREA: 3.05 CM²
AV VELOCITY RATIO: 0.78
BSA FOR ECHO PROCEDURE: 2.26 M2
CV ECHO LV RWT: 0.58 CM
DOP CALC AO PEAK VEL: 1.07 M/S
DOP CALC AO VTI: 22.9 CM
DOP CALC LVOT AREA: 4.2 CM2
DOP CALC LVOT DIAMETER: 2.31 CM
DOP CALC LVOT PEAK VEL: 0.83 M/S
DOP CALC LVOT STROKE VOLUME: 69.95 CM3
DOP CALC MV VTI: 23.9 CM
DOP CALCLVOT PEAK VEL VTI: 16.7 CM
E WAVE DECELERATION TIME: 187.49 MSEC
E/A RATIO: 0.76
E/E' RATIO: 9.38 M/S
ECHO LV POSTERIOR WALL: 1.38 CM (ref 0.6–1.1)
FRACTIONAL SHORTENING: 34 % (ref 28–44)
INTERVENTRICULAR SEPTUM: 1.41 CM (ref 0.6–1.1)
LEFT ATRIUM SIZE: 4.25 CM
LEFT INTERNAL DIMENSION IN SYSTOLE: 3.12 CM (ref 2.1–4)
LEFT VENTRICLE DIASTOLIC VOLUME INDEX: 47.33 ML/M2
LEFT VENTRICLE DIASTOLIC VOLUME: 104.59 ML
LEFT VENTRICLE MASS INDEX: 121 G/M2
LEFT VENTRICLE SYSTOLIC VOLUME INDEX: 17.4 ML/M2
LEFT VENTRICLE SYSTOLIC VOLUME: 38.55 ML
LEFT VENTRICULAR INTERNAL DIMENSION IN DIASTOLE: 4.74 CM (ref 3.5–6)
LEFT VENTRICULAR MASS: 267.22 G
LV LATERAL E/E' RATIO: 10.17 M/S
LV SEPTAL E/E' RATIO: 8.71 M/S
LVOT MG: 1.47 MMHG
LVOT MV: 0.56 CM/S
MV MEAN GRADIENT: 1 MMHG
MV PEAK A VEL: 0.8 M/S
MV PEAK E VEL: 0.61 M/S
MV PEAK GRADIENT: 4 MMHG
MV STENOSIS PRESSURE HALF TIME: 54.37 MS
MV VALVE AREA BY CONTINUITY EQUATION: 2.93 CM2
MV VALVE AREA P 1/2 METHOD: 4.05 CM2
RA PRESSURE ESTIMATED: 3 MMHG
RIGHT VENTRICULAR END-DIASTOLIC DIMENSION: 4.09 CM
TDI LATERAL: 0.06 M/S
TDI SEPTAL: 0.07 M/S
TDI: 0.07 M/S
Z-SCORE OF LEFT VENTRICULAR DIMENSION IN END DIASTOLE: -4.76
Z-SCORE OF LEFT VENTRICULAR DIMENSION IN END SYSTOLE: -3.13

## 2023-10-02 PROCEDURE — 70450 CT HEAD/BRAIN W/O DYE: CPT | Mod: TC

## 2023-10-02 PROCEDURE — 93880 EXTRACRANIAL BILAT STUDY: CPT

## 2023-10-02 PROCEDURE — 93306 TTE W/DOPPLER COMPLETE: CPT

## 2023-10-03 LAB
LEFT CCA DIST DIAS: 12 CM/S
LEFT CCA DIST SYS: 67 CM/S
LEFT CCA PROX DIAS: 16 CM/S
LEFT CCA PROX SYS: 78 CM/S
LEFT ECA DIAS: 8 CM/S
LEFT ECA SYS: 55 CM/S
LEFT ICA DIST DIAS: 28 CM/S
LEFT ICA DIST SYS: 78 CM/S
LEFT ICA MID DIAS: 18 CM/S
LEFT ICA MID SYS: 45 CM/S
LEFT ICA PROX DIAS: 13 CM/S
LEFT ICA PROX SYS: 44 CM/S
LEFT VERTEBRAL DIAS: 13 CM/S
LEFT VERTEBRAL SYS: 40 CM/S
OHS CV CAROTID RIGHT ICA EDV HIGHEST: 23
OHS CV CAROTID ULTRASOUND LEFT ICA/CCA RATIO: 1.16
OHS CV CAROTID ULTRASOUND RIGHT ICA/CCA RATIO: 1.26
OHS CV PV CAROTID LEFT HIGHEST CCA: 78
OHS CV PV CAROTID LEFT HIGHEST ICA: 78
OHS CV PV CAROTID RIGHT HIGHEST CCA: 87
OHS CV PV CAROTID RIGHT HIGHEST ICA: 63
OHS CV US CAROTID LEFT HIGHEST EDV: 28
RIGHT CCA DIST DIAS: 11 CM/S
RIGHT CCA DIST SYS: 50 CM/S
RIGHT CCA PROX DIAS: 14 CM/S
RIGHT CCA PROX SYS: 87 CM/S
RIGHT ECA DIAS: 12 CM/S
RIGHT ECA SYS: 77 CM/S
RIGHT ICA DIST DIAS: 23 CM/S
RIGHT ICA DIST SYS: 63 CM/S
RIGHT ICA MID DIAS: 11 CM/S
RIGHT ICA MID SYS: 41 CM/S
RIGHT ICA PROX DIAS: 11 CM/S
RIGHT ICA PROX SYS: 39 CM/S
RIGHT VERTEBRAL DIAS: 13 CM/S
RIGHT VERTEBRAL SYS: 43 CM/S

## 2023-10-04 NOTE — PROGRESS NOTES
Patient ID: [id number]     Chief Complaint: Seropositive rheumatoid arthritis of multiple sites (Pt stated pain lower pain)      HPI:     Melquiades Rehman Jr. is a 68 y.o. male here today for follow-up of rheumatoid arthritis.    CCP (>250) and RF + RA. He recalls his symptoms began 1994 w/ mainly elbow problems. But over a year, his symptoms worsened and involved hands. He was referred to Dr. Beck who started Enbrel 2-3 yrs but developed secondary failure. He then changed to Humira, which he took for 15 yrs but lost efficacy a few yrs ago. He briefly tried Kevzara but had poor rxn. He was then changed to Xeljanz, which worked wonderfully. However, he lost his insurance and started seeing [facility], NP April. He reports that she didn't want to start Xeljanz over concerns of blood clots. Instead, she started Orencia, which didn't work at all, however he reports never taken. He had one bottle left of Xeljanz and restarted it. It has worked great so it was resumed. He did have +Hep C ab but saw GI (Dr. Toledo) - had US and repeat testing for Hep C was negative. Told he may have fatty liver dz.    Chronic back pain and he has DJD and spinal stenosis, no sciatica symptoms. He has seen Dr Montero once, neurosurgery and continues to follow with Dr Simmons, pain management. He has done PT, traction has helped in the past but no longer doing any therapy. Nerve block and VALERIE did not help. Pain worse with bending and better with sitting and lying down.     RA has been in remission, denies red, warm and swollen joints. No recent flares. May be once in a while he takes prednisone, took longer than 6 months back, but that's the only time he had taken in last 2 years. He is taking Xeljanz 11 mg daily, doing well.   He has A fib and on AC. He sees [facility] here at [facility]. CHF was on his problem list, but per patient he does not have HF and had tests done for that and the diagnosis was taken off the list.      October 2023: Here today  "for follow up. He denies any red/warm/swollen joints. Morning stiffness lasting roughly 15 minutes. Continues to take Xeljanz 11 mg po qd and tolerating well with no issues- we had discussed stopping this medication at his last visit due to CV hx and potential risk, however he states he did hold but resumed about a month ago as he started having a flare and wishes to remain on this medication as it has been beneficial. He states roughly 3 weeks ago he had an episode of left shoulder pain with some jaw pain and weakness, he admits to having a headache this time, reports resolved, he did not present to the ER for evaluation, instead he called his cardiologist the following week and recently had CT of the head, repeat ECHO and carotid u/s completed. He denies any further issues at this time, denies any residual weakness, questionable TID.     Denies any recent illness or hospitalizations since last visit.     PMH: AFib on Xarelto, HTN, Inc lipids, Parkinsons, Peyronie's dz, T2DM, fatty liver.  He has Parkinson's, he sees Dr Barrios. Symptoms are better with Sinemet. He also takes medication for movement disorders.  H/o CVA several years ago- did follow with Neurology until dx with Parkinson and now continues to follow with Dr. Barrios. No residual weakness from stroke.    Social: 3-4 beers qow w/ playing music. No TBO but marijuana    Denies history of fevers, rashes, photosensitivity, oral or nasal ulcers, h/o MI, seizures, h/o PE or DVT, Raynaud's phenomenon, uveitis, malignancies.   Family history of autoimmune disease: Sister but unsure what "autoimmune" condition she had- reports "attacked her lungs and killed her".  Smoking: Quit smoking - smoked for roughly 15 years (1/2 ppd)    Social History     Tobacco Use   Smoking Status Former    Current packs/day: 0.00    Types: Cigarettes    Quit date:     Years since quittin.7    Passive exposure: Past   Smokeless Tobacco Never    "       ----------------------------  Central stenosis of spinal canal  CHF (congestive heart failure)  Chronic back pain greater than 3 months duration  Constipation  Degeneration of lumbar intervertebral disc  Diastolic dysfunction  Dysphonia  Dystrophic nail  Erectile dysfunction  Fatty liver  GERD (gastroesophageal reflux disease)  H/O cataract removal with insertion of prosthetic lens  History of CVA (cerebrovascular accident)  History of radial keratotomy  HTN (hypertension)  Hyperlipidemia  Induratio penis plastica  Microhematuria  Overgrown toenails  Parkinson's disease  Paroxysmal atrial fibrillation  Peyronie disease  Polyneuropathy associated with underlying disease  Rheumatoid arthritis  Rheumatoid arthritis, unspecified  Sixth nerve palsy  Spinal stenosis of lumbar region  Type 2 diabetes mellitus without complications     Past Surgical History:   Procedure Laterality Date    CATARACT EXTRACTION W/  INTRAOCULAR LENS IMPLANT      EPIDURAL STEROID INJECTION INTO LUMBAR SPINE      exc cyst Lt ear      INJECTION OF ANESTHETIC AGENT AROUND MEDIAL BRANCH NERVES INNERVATING LUMBAR FACET JOINT Bilateral 11/16/2022    Procedure: Block-nerve-medial branch-lumbar;  Surgeon: Gertrude Blanco MD;  Location: [facility];  Service: Pain Management;  Laterality: Bilateral;  Bilateral L4-L5...Patient is requesting to be first case    KNEE ARTHROSCOPY Right     REFRACTIVE SURGERY      VASECTOMY  1998       Review of patient's allergies indicates:   Allergen Reactions    Sarilumab Other (See Comments)     Other reaction(s): fainting       Outpatient Medications Marked as Taking for the 10/9/23 encounter (Office Visit) with Barbara Zelaya FNP   Medication Sig Dispense Refill    ascorbic acid, vitamin C, (VITAMIN C) 1000 MG tablet Take 1,000 mg by mouth every evening.      b complex vitamins capsule Take 1 capsule by mouth nightly.      blood sugar diagnostic (ACCU-CHEK GUIDE TEST STRIPS [facility]) Accu-Chek Guide test  strips      carbidopa-levodopa  mg (SINEMET)  mg per tablet Take 2 tablets by mouth 2 (two) times a day.      cholecalciferol, vitamin D3, 125 mcg (5,000 unit) capsule Take 1,000 Units by mouth nightly.      coenzyme Q10 100 mg capsule Take 100 mg by mouth every evening.      colchicine (COLCRYS) 0.6 mg tablet Take 1 tablet (0.6 mg total) by mouth 2 (two) times daily. 180 tablet 1    folic acid/multivit-min/lutein (CENTRUM SILVER ORAL) Take 1 tablet by mouth every evening.      gabapentin (NEURONTIN) 300 MG capsule Take 300 mg by mouth once daily.      gemfibroziL (LOPID) 600 MG tablet Take 1 tablet (600 mg total) by mouth 2 (two) times daily. 180 tablet 1    lancets (ACCU-CHEK FASTCLIX LANCET DRUM MISC) Accu-Chek Fastclix Lancet Drum      lisinopriL 10 MG tablet Take 10 mg by mouth daily as needed.      lisinopriL-hydrochlorothiazide (PRINZIDE,ZESTORETIC) 20-25 mg Tab Take 1 tablet by mouth as needed.      magnesium 30 mg Tab Take 250 mg by mouth nightly.      metoprolol succinate (TOPROL-XL) 25 MG 24 hr tablet Take 1 tablet (25 mg total) by mouth once daily. 90 tablet 3    NON FORMULARY MEDICATION Take 1 tablet by mouth nightly.      pantoprazole (PROTONIX) 40 MG tablet Take 1 tablet (40 mg total) by mouth once daily. 90 tablet 1    potassium citrate 99 mg Cap Take 1 tablet by mouth nightly.      rosuvastatin (CRESTOR) 20 MG tablet Take 1 tablet (20 mg total) by mouth once daily. 90 tablet 1    selegiline (ELDEPRYL) 5 mg Cap Take 5 mg by mouth 2 (two) times daily.      vitamin E 400 UNIT capsule Take 400 Units by mouth nightly.      XARELTO 20 mg Tab Take 1 tablet (20 mg total) by mouth once daily. 30 tablet 6    zinc sulfate (ZINC-15 ORAL) Take 140 mg by mouth nightly.      [DISCONTINUED] tofacitinib (XELJANZ XR) 11 mg Tb24 Take 1 tablet by mouth once daily. 30 tablet 4       Social History     Socioeconomic History    Marital status:     Number of children: 1   Tobacco Use    Smoking status:  Former     Current packs/day: 0.00     Types: Cigarettes     Quit date:      Years since quittin.7     Passive exposure: Past    Smokeless tobacco: Never   Substance and Sexual Activity    Alcohol use: Not Currently     Alcohol/week: 2.0 - 3.0 standard drinks of alcohol     Types: 1 - 2 Cans of beer, 1 Shots of liquor per week     Comment: rarely    Drug use: Yes     Types: Marijuana    Sexual activity: Yes     Partners: Female     Social Determinants of Health     Financial Resource Strain: High Risk (2022)    Overall Financial Resource Strain (CARDIA)     Difficulty of Paying Living Expenses: Very hard   Food Insecurity: No Food Insecurity (2022)    Hunger Vital Sign     Worried About Running Out of Food in the Last Year: Never true     Ran Out of Food in the Last Year: Never true   Transportation Needs: No Transportation Needs (2022)    PRAPARE - Transportation     Lack of Transportation (Medical): No     Lack of Transportation (Non-Medical): No   Physical Activity: Sufficiently Active (2022)    Exercise Vital Sign     Days of Exercise per Week: 7 days     Minutes of Exercise per Session: 30 min   Stress: No Stress Concern Present (2022)    [employer] [place] [employer] - Occupational Stress Questionnaire     Feeling of Stress : Not at all   Social Connections: Moderately Isolated (2022)    Social Connection and Isolation Panel [NHANES]     Frequency of Communication with Friends and Family: More than three times a week     Frequency of Social Gatherings with Friends and Family: Twice a week     Attends Caodaism Services: Never     Active Member of Clubs or Organizations: Yes     Attends Club or Organization Meetings: More than 4 times per year     Marital Status:    Housing Stability: Low Risk  (2022)    Housing Stability Vital Sign     Unable to Pay for Housing in the Last Year: No     Number of Places Lived in the Last Year: 1     Unstable Housing  in the Last Year: No        Family History   Problem Relation Age of Onset    Heart failure Mother     Coronary artery disease Mother     Glaucoma Father     Alzheimer's disease Father     Emphysema Father     Autoimmune disease Sister     Pneumonia Sister     Dementia Sister         Immunization History   Administered Date(s) Administered    COVID-19, vector-nr, rS-Ad26, PF (Danie) 03/08/2021, 10/25/2021    Influenza - Quadrivalent 09/30/2020    Influenza - Trivalent - PF (ADULT) 10/26/2018, 12/04/2019    Pneumococcal Conjugate - 13 Valent 05/03/2019    Pneumococcal Polysaccharide - 23 Valent 11/24/2020    Zoster Recombinant 11/12/2019, 02/13/2020       Patient Care Team:  Damien Dodd FNP as PCP - General (Family Medicine)  Valeria Donovan MD (Rheumatology)  Valeria Donovan MD (Rheumatology)  Valeria Donovan MD (Rheumatology)  Taisha Wood LPN as Care Coordinator     Subjective:     Constitutional:  Denies chills. Denies fever. Denies night sweats. Denies weight loss.   Ophthalmology: Denies blurred vision. Denies dry eyes. Denies eye pain. Denies Itching and redness.   ENT: Denies oral ulcers. Denies epistaxis. Denies dry mouth. Denies swollen glands.   Endocrine: Admits diabetes. Denies thyroid Problems.   Respiratory: Denies cough. Denies shortness of breath. Denies shortness of breath with exertion. Denies hemoptysis.   Cardiovascular: Denies chest pain at rest. Denies chest pain with exertion. Admits palpitations from A-fib  Gastrointestinal: Denies abdominal pain. Denies diarrhea. Denies nausea. Denies vomiting. Denies hematemesis or hematochezia. Denies heartburn. Admits constipation, uses OTC meds and helps  Genitourinary: Denies blood in urine.  Musculoskeletal: See HPI for details  Integumentary: Denies rash. Denies photosensitivity.   Peripheral Vascular: Denies Ulcers of hands and/or feet. Denies Cold extremities.   Neurologic: Denies dizziness. Denies headache.  Denies loss of  "strength. Denies numbness or tingling.   Psychiatric: Denies depression. Denies anxiety. Denies suicidal/homicidal ideations.      Objective:     /84 (BP Location: Left arm, Patient Position: Sitting, BP Method: Large (Manual))   Pulse 81   Temp 97.5 °F (36.4 °C) (Oral)   Resp 20   Ht 5' 11" (1.803 m)   Wt 107 kg (236 lb)   SpO2 96%   BMI 32.92 kg/m²     Physical Exam    General Appearance: alert, pleasant, in no acute distress.  Skin: Skin color, texture, turgor normal. No rashes or lesions.  Eyes:  extraocular movement intact (EOMI), pupils equal, round, reactive to light and accommodation, conjunctiva clear.  ENT: No oral or nasal ulcers.  Neck:  Neck supple. No adenopathy.   Lungs: CTA throughout without crackles, rhonchi, or wheezes.   Heart: RRR w/o murmurs.  No edema.   Neuro: Alert, oriented, CN II-XII GI, sensory and motor innervation intact.  Tremors present.  Rigidity of left upper extremity on exam today.  Musculoskeletal: No pain to bilateral MCPs, FROM noted to bilateral wrists, elbows, shoulders with no pain, no pain to bilateral MTPs.    Psych: Alert, oriented, normal eye contact.    Labs:   3/21/23:  Creatinine 1.52, GFR 51.  CBC okay.  ESR, CRP normal.  HIV, hepatitis B and C, quantiferon tb negative. 1+ protein and no blood. CKD and protenuria could be due to DM and HTN.    5/15/23:  Upc 100 milligram/gram.  Creatinine 1.27, GFR greater than 60.     7/6/2023 CMP Creat 1.23 (previously 1.33), CBC WBC 4.33 (previous 5.5), ANC 1.78 (previously 3.47), CRP <0.40 (<5), Sed Rate 3 (<15). - stopped Xeljanz    8/16/2023 CBC ANC now 2.23, otherwise ok.  ANC improved.   Lab Results   Component Value Date    WBC 4.54 08/16/2023    HGB 15.1 08/16/2023    HCT 43.7 08/16/2023     08/16/2023    ALT 9 07/06/2023    AST 21 07/06/2023    BUN 17.4 07/06/2023    CREATININE 1.23 (H) 07/06/2023     07/06/2023    K 3.7 07/06/2023    CO2 26 07/06/2023    CRP <0.40 07/06/2023    SEDRATE 3 " 07/06/2023        Imaging:   Echo 1/15/21:  Left ventricular systolic function normal.  EF 60%.  Grade 1 diastolic dysfunction.  PASP not given.  Right ventricular size and function normal.    3/21/23: CXR showed chronic bilateral basilar interstitial changes.     3/21/23:  Arthritis changes in bilateral feet, erosion of left 5th MTP.    X-ray of right foot showed moderate-to-severe arthritis.    DJD changes in bilateral hands.  No aggressive osseous lesions appreciated.    04/05/2023 Echo:  LVEF 60%, LV hypertrophy noted, mild to moderate.  PASP 5 mmHg    4/05/2023 FVC 94.7%, FEV1 92.2 %, FEV1/FVC 97% TLC 94%, DLCO 79.4%, DLCO/.  FEV1/FVC ratio normal, FEV1, FVC and TLC  normal.  DLCO normal, impression normal PFT     5/4/2023: AAA Screening, No AAA noted. Some ectasia of proximal abd aorta with no evidence of aneurysmal dilatation.     10/02/2023; ECHO ejection fraction 60-65%, normal left ventricular systolic and diastolic function.  PASP not given.    10/3/2023: Carotid u/s Left and rigth Internal Cardotid arteries less than 50% stenosis.     10/2/2023 CT Head w/o Contrast:  Impression:  No acute intracranial findings identified.  Chronic microangiopathic ischemia  Assessment:       ICD-10-CM ICD-9-CM   1. Seropositive rheumatoid arthritis of multiple sites  M05.79 714.0   2. Spinal stenosis at L4-L5 level  M48.061 724.02   3. Atrial fibrillation and flutter  I48.91 427.31    I48.92 427.32   4. Parkinson's disease, unspecified whether dyskinesia present, unspecified whether manifestations fluctuate  G20.A1 332.0   5. Steatosis of liver  K76.0 571.8   6. High risk medication use  Z79.899 V58.69   7. Hypertension, unspecified type  I10 401.9   8. Hyperlipidemia, unspecified hyperlipidemia type  E78.5 272.4   9. Controlled type 2 diabetes mellitus without complication, without long-term current use of insulin  E11.9 250.00   10. ILD (interstitial lung disease)  J84.9 515   11. History of CVA (cerebrovascular  accident)  Z86.73 V12.54   12. Gastroesophageal reflux disease, unspecified whether esophagitis present  K21.9 530.81   13. Spinal stenosis of lumbar region with neurogenic claudication  M48.062 724.03        Plan:       1. Strongly + CCP and RF rheumatoid arthritis dx 2014. MTX was not used d/t hx of ETOH use. He had secondary failure of Enbrel after 2-3 yrs. Humira worked for 15 yrs but developed secondary failure. He briefly tried Kevzara but he did not tolerate it, states caused him to pass out after 1 dose. He was changed to Xeljanz, which worked well. Failed Orencia, did not help, however he does not recall taking Orencia in the past after reviewing medication. Restarted Xeljanz around October 2019 and he has been doing very well. 3/21/23:  Arthritis changes in bilateral feet, erosion of left 5th MTP.  Today on exam, no active synovitis noted.   -We had an at length discussion again in regards to increase CV risk associated with JAIRO use (he is >53 y/o, history of CVA and CV risk factor of Afib) at his last visit, he agreed to d/c however resumed roughly 1 month ago due to flare. We had another at length discussion in regards to increased risk however he wishes to continue medication as this has been beneficial for him   -Repeat Cxray due at this time.   -Repeat lab monitoring today- if creat remains elevated, will refer to nephrology for eval, possibly related to HTN/T2DM  -Infection w/u negative March 2023.     2. Severe multifactorial spinal canal stenosis at L4-L5, moderate at L2-L3 and L3-L4, mild at L1-L2. Follows Pain Management Dr. Simmons, was not able to participate in clinical trial study as previously discussed.   -He also smokes marijuana to help with pain    3. Afib on Xarelto.  Heart failure was ruled out, diagnosis was taken off his problem list per patient.   -Recent ECHO 10/02/2023; ejection fraction 60-65%, normal left ventricular systolic and diastolic function.  PASP not given  -Continue  f/u with Cardio, stable at today's visit, discussion in regards to meds and CV history at length again today, wishes to continue- has been made aware of risks.     4. Parkinson's/History of CVA  -On carbidopa-levodopa.    -Follows with Neurologist Dr. Barrios.  -See above risk associated with Arsh- has been discussed several times in the past however he wishes to continue medication.     5. Peyronie's dz. Pt reports using colchicine for this.    6. Fatty liver  -One Hep C ab + but repeat w/ PCR negative. Possibly false +.   -Will follow.   -Infection work up May 2023 negative.     7. High risk med use.   -PPSV-23 11/20, Prevnar 5/19, Shingrix 6/2020. UTD w/ flu shot. UTD COVID vaccine.  -Persons with rheumatoid arthritis, lupus, psoriatic arthritis and other autoimmune diseases are at increased risk of cardiovascular disease including heart attack and stroke. We recommend that all patients with these conditions have annual health maintenance exams including lipid measurements, blood pressure measurements, and smoking cessation counseling when applicable at their primary care provider's office.   -Due for colonoscopy, has spoken with his PCP but has not heard back yet in regards to apt  -Advised to stay up-to-date on age appropriate vaccinations and malignancy screening, including yearly skin exams.    -Continued lab monitoring.     8. HLD/HTN/T2DM  -Followed by PCP, currently stable blood pressure at today's visit    9. ILD?  -3/21/23: CXR showed chronic bilateral basilar interstitial changes.   -4/05/2023 FVC 94.7%, FEV1 92.2 %, FEV1/FVC 97% TLC 94%, DLCO 79.4%, DLCO/.  FEV1/FVC ratio normal, FEV1, FVC and TLC  normal.  DLCO normal, impression normal PFT   -May be chronic as he reports history of pneumonia x 2 and Covid, monitor with PFT's and CXR every 6 months as he does not have any symptoms.  -Repeat CXRay due, pending we will repeat PFTs    Follow up in about 3 months (around 1/9/2024), or 6 months MD,  for NP Follow Up. In addition to their scheduled follow up, the patient has also been instructed to follow up on as needed basis.          Total time spent with patient and documentation is more than 45 minutes. All questions were answered to patient's satisfaction and patient verbalized understanding.

## 2023-10-09 ENCOUNTER — TELEPHONE (OUTPATIENT)
Dept: INTERNAL MEDICINE | Facility: CLINIC | Age: 68
End: 2023-10-09
Payer: MEDICARE

## 2023-10-09 ENCOUNTER — OFFICE VISIT (OUTPATIENT)
Dept: RHEUMATOLOGY | Facility: CLINIC | Age: 68
End: 2023-10-09
Payer: MEDICARE

## 2023-10-09 ENCOUNTER — HOSPITAL ENCOUNTER (OUTPATIENT)
Dept: RADIOLOGY | Facility: HOSPITAL | Age: 68
Discharge: HOME | End: 2023-10-09
Attending: NURSE PRACTITIONER
Payer: MEDICARE

## 2023-10-09 VITALS
BODY MASS INDEX: 33.04 KG/M2 | RESPIRATION RATE: 20 BRPM | TEMPERATURE: 98 F | SYSTOLIC BLOOD PRESSURE: 127 MMHG | HEIGHT: 71 IN | HEART RATE: 81 BPM | OXYGEN SATURATION: 96 % | DIASTOLIC BLOOD PRESSURE: 84 MMHG | WEIGHT: 236 LBS

## 2023-10-09 DIAGNOSIS — K76.0 STEATOSIS OF LIVER: ICD-10-CM

## 2023-10-09 DIAGNOSIS — M05.79 SEROPOSITIVE RHEUMATOID ARTHRITIS OF MULTIPLE SITES: Primary | ICD-10-CM

## 2023-10-09 DIAGNOSIS — G20.A1 PARKINSON'S DISEASE, UNSPECIFIED WHETHER DYSKINESIA PRESENT, UNSPECIFIED WHETHER MANIFESTATIONS FLUCTUATE: ICD-10-CM

## 2023-10-09 DIAGNOSIS — I48.92 ATRIAL FIBRILLATION AND FLUTTER: ICD-10-CM

## 2023-10-09 DIAGNOSIS — Z86.73 HISTORY OF CVA (CEREBROVASCULAR ACCIDENT): ICD-10-CM

## 2023-10-09 DIAGNOSIS — Z79.899 HIGH RISK MEDICATION USE: ICD-10-CM

## 2023-10-09 DIAGNOSIS — K21.9 GASTROESOPHAGEAL REFLUX DISEASE, UNSPECIFIED WHETHER ESOPHAGITIS PRESENT: ICD-10-CM

## 2023-10-09 DIAGNOSIS — R82.89 ABNORMAL URINE CYTOLOGY: Primary | ICD-10-CM

## 2023-10-09 DIAGNOSIS — M48.061 SPINAL STENOSIS AT L4-L5 LEVEL: ICD-10-CM

## 2023-10-09 DIAGNOSIS — I48.91 ATRIAL FIBRILLATION AND FLUTTER: ICD-10-CM

## 2023-10-09 DIAGNOSIS — J84.9 ILD (INTERSTITIAL LUNG DISEASE): ICD-10-CM

## 2023-10-09 DIAGNOSIS — R31.29 MICROHEMATURIA: ICD-10-CM

## 2023-10-09 DIAGNOSIS — M48.062 SPINAL STENOSIS OF LUMBAR REGION WITH NEUROGENIC CLAUDICATION: ICD-10-CM

## 2023-10-09 DIAGNOSIS — I10 HYPERTENSION, UNSPECIFIED TYPE: ICD-10-CM

## 2023-10-09 DIAGNOSIS — E78.5 HYPERLIPIDEMIA, UNSPECIFIED HYPERLIPIDEMIA TYPE: ICD-10-CM

## 2023-10-09 DIAGNOSIS — E11.9 CONTROLLED TYPE 2 DIABETES MELLITUS WITHOUT COMPLICATION, WITHOUT LONG-TERM CURRENT USE OF INSULIN: ICD-10-CM

## 2023-10-09 DIAGNOSIS — M05.79 SEROPOSITIVE RHEUMATOID ARTHRITIS OF MULTIPLE SITES: ICD-10-CM

## 2023-10-09 PROCEDURE — 3044F HG A1C LEVEL LT 7.0%: CPT | Mod: CPTII,,, | Performed by: NURSE PRACTITIONER

## 2023-10-09 PROCEDURE — 1125F PR PAIN SEVERITY QUANTIFIED, PAIN PRESENT: ICD-10-PCS | Mod: CPTII,,, | Performed by: NURSE PRACTITIONER

## 2023-10-09 PROCEDURE — 4010F PR ACE/ARB THEARPY RXD/TAKEN: ICD-10-PCS | Mod: CPTII,,, | Performed by: NURSE PRACTITIONER

## 2023-10-09 PROCEDURE — 3008F PR BODY MASS INDEX (BMI) DOCUMENTED: ICD-10-PCS | Mod: CPTII,,, | Performed by: NURSE PRACTITIONER

## 2023-10-09 PROCEDURE — 3074F PR MOST RECENT SYSTOLIC BLOOD PRESSURE < 130 MM HG: ICD-10-PCS | Mod: CPTII,,, | Performed by: NURSE PRACTITIONER

## 2023-10-09 PROCEDURE — 1159F PR MEDICATION LIST DOCUMENTED IN MEDICAL RECORD: ICD-10-PCS | Mod: CPTII,,, | Performed by: NURSE PRACTITIONER

## 2023-10-09 PROCEDURE — 4010F ACE/ARB THERAPY RXD/TAKEN: CPT | Mod: CPTII,,, | Performed by: NURSE PRACTITIONER

## 2023-10-09 PROCEDURE — 3008F BODY MASS INDEX DOCD: CPT | Mod: CPTII,,, | Performed by: NURSE PRACTITIONER

## 2023-10-09 PROCEDURE — 3288F PR FALLS RISK ASSESSMENT DOCUMENTED: ICD-10-PCS | Mod: CPTII,,, | Performed by: NURSE PRACTITIONER

## 2023-10-09 PROCEDURE — 99215 OFFICE O/P EST HI 40 MIN: CPT | Mod: PBBFAC,25 | Performed by: NURSE PRACTITIONER

## 2023-10-09 PROCEDURE — 3288F FALL RISK ASSESSMENT DOCD: CPT | Mod: CPTII,,, | Performed by: NURSE PRACTITIONER

## 2023-10-09 PROCEDURE — 71046 X-RAY EXAM CHEST 2 VIEWS: CPT | Mod: TC

## 2023-10-09 PROCEDURE — 3074F SYST BP LT 130 MM HG: CPT | Mod: CPTII,,, | Performed by: NURSE PRACTITIONER

## 2023-10-09 PROCEDURE — 1101F PR PT FALLS ASSESS DOC 0-1 FALLS W/OUT INJ PAST YR: ICD-10-PCS | Mod: CPTII,,, | Performed by: NURSE PRACTITIONER

## 2023-10-09 PROCEDURE — 1159F MED LIST DOCD IN RCRD: CPT | Mod: CPTII,,, | Performed by: NURSE PRACTITIONER

## 2023-10-09 PROCEDURE — 3044F PR MOST RECENT HEMOGLOBIN A1C LEVEL <7.0%: ICD-10-PCS | Mod: CPTII,,, | Performed by: NURSE PRACTITIONER

## 2023-10-09 PROCEDURE — 99214 OFFICE O/P EST MOD 30 MIN: CPT | Mod: S$PBB,,, | Performed by: NURSE PRACTITIONER

## 2023-10-09 PROCEDURE — 1160F RVW MEDS BY RX/DR IN RCRD: CPT | Mod: CPTII,,, | Performed by: NURSE PRACTITIONER

## 2023-10-09 PROCEDURE — 3079F PR MOST RECENT DIASTOLIC BLOOD PRESSURE 80-89 MM HG: ICD-10-PCS | Mod: CPTII,,, | Performed by: NURSE PRACTITIONER

## 2023-10-09 PROCEDURE — 1160F PR REVIEW ALL MEDS BY PRESCRIBER/CLIN PHARMACIST DOCUMENTED: ICD-10-PCS | Mod: CPTII,,, | Performed by: NURSE PRACTITIONER

## 2023-10-09 PROCEDURE — 1125F AMNT PAIN NOTED PAIN PRSNT: CPT | Mod: CPTII,,, | Performed by: NURSE PRACTITIONER

## 2023-10-09 PROCEDURE — 3079F DIAST BP 80-89 MM HG: CPT | Mod: CPTII,,, | Performed by: NURSE PRACTITIONER

## 2023-10-09 PROCEDURE — 1101F PT FALLS ASSESS-DOCD LE1/YR: CPT | Mod: CPTII,,, | Performed by: NURSE PRACTITIONER

## 2023-10-09 PROCEDURE — 99214 PR OFFICE/OUTPT VISIT, EST, LEVL IV, 30-39 MIN: ICD-10-PCS | Mod: S$PBB,,, | Performed by: NURSE PRACTITIONER

## 2023-10-09 RX ORDER — TOFACITINIB 11 MG/1
1 TABLET, FILM COATED, EXTENDED RELEASE ORAL DAILY
Qty: 30 TABLET | Refills: 4 | Status: SHIPPED | OUTPATIENT
Start: 2023-10-09 | End: 2024-01-11 | Stop reason: SDUPTHER

## 2023-10-09 NOTE — TELEPHONE ENCOUNTER
Please inform patient that I am sending to Uro for further eval of blood in urine. Cytology revealed atypical bladder cells. He will need further eval.

## 2023-10-12 ENCOUNTER — TELEPHONE (OUTPATIENT)
Dept: RHEUMATOLOGY | Facility: CLINIC | Age: 68
End: 2023-10-12
Payer: MEDICARE

## 2023-10-12 NOTE — TELEPHONE ENCOUNTER
Called patient and was notified of clinically acceptable labs. Verbalized understanding. Patient also voiced that he wanted you to know that internal medicine will be sending a urology referral for small blood in urine.

## 2023-10-12 NOTE — TELEPHONE ENCOUNTER
----- Message from DAVIDSON Bond sent at 10/11/2023  4:12 PM CDT -----  Please advise the patient that all lab are noted to be clinically acceptable, we will continue to monitor at future lab draws

## 2023-10-21 ENCOUNTER — OFFICE VISIT (OUTPATIENT)
Dept: INTERNAL MEDICINE | Facility: CLINIC | Age: 68
End: 2023-10-21
Payer: MEDICARE

## 2023-10-21 VITALS
SYSTOLIC BLOOD PRESSURE: 120 MMHG | DIASTOLIC BLOOD PRESSURE: 72 MMHG | BODY MASS INDEX: 32.57 KG/M2 | HEIGHT: 71 IN | RESPIRATION RATE: 18 BRPM | TEMPERATURE: 98 F | OXYGEN SATURATION: 97 % | WEIGHT: 232.63 LBS | HEART RATE: 78 BPM

## 2023-10-21 DIAGNOSIS — Z00.00 ENCOUNTER FOR PREVENTIVE HEALTH EXAMINATION: Primary | ICD-10-CM

## 2023-10-21 DIAGNOSIS — E11.9 TYPE 2 DIABETES MELLITUS WITHOUT COMPLICATION, WITHOUT LONG-TERM CURRENT USE OF INSULIN: ICD-10-CM

## 2023-10-21 DIAGNOSIS — I10 HYPERTENSION, UNSPECIFIED TYPE: ICD-10-CM

## 2023-10-21 DIAGNOSIS — E78.5 HYPERLIPIDEMIA, UNSPECIFIED HYPERLIPIDEMIA TYPE: ICD-10-CM

## 2023-10-21 DIAGNOSIS — M51.36 DEGENERATION OF LUMBAR INTERVERTEBRAL DISC: ICD-10-CM

## 2023-10-21 DIAGNOSIS — G20.A1 PARKINSON'S DISEASE, UNSPECIFIED WHETHER DYSKINESIA PRESENT, UNSPECIFIED WHETHER MANIFESTATIONS FLUCTUATE: ICD-10-CM

## 2023-10-21 PROCEDURE — 99215 OFFICE O/P EST HI 40 MIN: CPT | Mod: PBBFAC,25 | Performed by: STUDENT IN AN ORGANIZED HEALTH CARE EDUCATION/TRAINING PROGRAM

## 2023-10-21 PROCEDURE — G0008 ADMIN INFLUENZA VIRUS VAC: HCPCS | Mod: PBBFAC

## 2023-10-21 NOTE — PATIENT INSTRUCTIONS
Counseling and Referral of Other Preventative  (Italic type indicates deductible and co-insurance are waived)    Patient Name: Melquiades Rehman  Today's Date: 10/21/2023    Health Maintenance       Date Due Completion Date    Foot Exam Never done ---    Colorectal Cancer Screening 03/12/2017 3/12/2012    Influenza Vaccine (1) 09/01/2023 9/30/2020    COVID-19 Vaccine (3 - 2023-24 season) 09/01/2023 10/25/2021    Diabetes Urine Screening 09/26/2023 9/26/2022    TETANUS VACCINE 03/30/2024 (Originally 9/5/1973) ---    Hemoglobin A1c 04/09/2024 10/9/2023    Eye Exam 08/24/2024 8/24/2023    High Dose Statin 10/09/2024 10/9/2023    Lipid Panel 10/09/2024 10/9/2023    Pneumococcal Vaccines (Age 65+) (3 - PPSV23 or PCV20) 11/24/2025 11/24/2020        Orders Placed This Encounter   Procedures    Flu Vaccine - Quadrivalent *Preferred* (PF) (6 months & older)         The following information is provided to all patients.  This information is to help you find resources for any of the problems found today that may be affecting your health:                Living healthy guide: www.[facility].[place].gov      Understanding Diabetes: www.diabetes.org      Eating healthy: www.cdc.gov/healthyweight      CDC home safety checklist: www.cdc.gov/steadi/patient.html      Agency on Aging: www.goea.[place].[facility]      Alcoholics anonymous (AA): www.aa.org      Physical Activity: www.jaky.nih.gov/kx6dxbk      Tobacco use: www.quitwithusla.org

## 2023-10-21 NOTE — PROGRESS NOTES
"    Melquiades Rehman presented for a  Medicare AWV and comprehensive Health Risk Assessment today. The following components were reviewed and updated:    Medical history  Family History  Social history  Allergies and Current Medications  Health Risk Assessment  Health Maintenance  Care Team         ** See Completed Assessments for Annual Wellness Visit within the encounter summary.**         The following assessments were completed:  Living Situation  CAGE  Depression Screening  Timed Get Up and Go  Whisper Test  Cognitive Function Screening  Nutrition Screening  ADL Screening  PAQ Screening        Vitals:    10/21/23 0747   BP: 120/72   BP Location: Right arm   Patient Position: Sitting   BP Method: Large (Automatic)   Pulse: 78   Resp: 18   Temp: 98 °F (36.7 °C)   TempSrc: Oral   SpO2: 97%   Weight: 105.5 kg (232 lb 9.6 oz)   Height: 5' 11" (1.803 m)     Body mass index is 32.44 kg/m².  Physical Exam        General: Awake, alert, & oriented to person, place & time. No acute distress  Psychiatric: Mood and affect normal  HEENT: Normocephalic, atraumatic. Face symmetric. Mucous membranes moist.   Cardiovascular: Regular rate & rhythm. Normal S1 & S2 w/out murmurs, rubs or gallops.  Pulmonary: Bilateral symmetric chest rise. Non-labored, CTAB  Abdominal:  Soft, nontender, nondistended. Bowel sounds present  Extremities: No clubbing, cyanosis or edema  Skin:  Warm & dry.  Neuro:   (+) resting tremor, L hand; Strength 5/5, DTR 2+ & tone normal throughout. Sensation intact bilateraly.  - Diabetic foot exam performed  -- Circulation intact in both feet bilaterally, strong pedal pulses  -- Sensation intact in both feet bilaterally, on dorsal and plantar surfaces as well as in all present digits  -- Condition: no pressure ulcers, open wounds or areas of poor circulation evident on both feet bilaterally.          Diagnoses and health risks identified today and associated recommendations/orders:  Encounter for preventive " health examination  -Screenings performed, as noted above. Personal preventative testing needs reviewed  -flu shot today  -reports pcp is arranging for colonoscopy. Advised to follow up status of this with pcp     Hypertension  -BP at goal  -continue home medications    Parkinson's disease  -stable, followed by Neurology    Rheumatoid Arthritis  -followed by Rheumatology    Type 2 DM- controlled  -continue home medications  -foot exam ytd 10/2023  -eye exam utd 8/2023    Paroxysmal Atrial fibrillation  -currently NSR; followed by Cardiology    Hyperlipidemia  -controlled, followed by PCP    Chronic Pain  Degeneration of lumbar intervertebral disc  -pain currently controlled, has appt with pain management next week    Provided Melquiades with a 5-10 year written screening schedule and personal prevention plan. Recommendations were developed using the USPSTF age appropriate recommendations. Education, counseling, and referrals were provided as needed. After Visit Summary printed and given to patient which includes a list of additional screenings\tests needed.    Follow up with PCP, neurology, cardiology, rheum, pain management as scheduled    Erendira Quiroz MD

## 2023-10-24 ENCOUNTER — TELEPHONE (OUTPATIENT)
Dept: INTERNAL MEDICINE | Facility: CLINIC | Age: 68
End: 2023-10-24
Payer: MEDICARE

## 2023-10-24 ENCOUNTER — OFFICE VISIT (OUTPATIENT)
Dept: PAIN MEDICINE | Facility: CLINIC | Age: 68
End: 2023-10-24
Payer: MEDICARE

## 2023-10-24 VITALS
HEART RATE: 80 BPM | HEIGHT: 71 IN | BODY MASS INDEX: 32.48 KG/M2 | DIASTOLIC BLOOD PRESSURE: 78 MMHG | WEIGHT: 232 LBS | TEMPERATURE: 98 F | SYSTOLIC BLOOD PRESSURE: 122 MMHG

## 2023-10-24 DIAGNOSIS — M51.36 DEGENERATION OF LUMBAR INTERVERTEBRAL DISC: Primary | ICD-10-CM

## 2023-10-24 DIAGNOSIS — M48.061 SPINAL STENOSIS AT L4-L5 LEVEL: ICD-10-CM

## 2023-10-24 DIAGNOSIS — M54.9 CHRONIC BACK PAIN GREATER THAN 3 MONTHS DURATION: ICD-10-CM

## 2023-10-24 DIAGNOSIS — E11.9 TYPE 2 DIABETES MELLITUS WITHOUT COMPLICATION, WITHOUT LONG-TERM CURRENT USE OF INSULIN: Primary | ICD-10-CM

## 2023-10-24 DIAGNOSIS — G89.29 CHRONIC BACK PAIN GREATER THAN 3 MONTHS DURATION: ICD-10-CM

## 2023-10-24 DIAGNOSIS — M47.816 LUMBAR SPONDYLOSIS: ICD-10-CM

## 2023-10-24 PROCEDURE — 1101F PR PT FALLS ASSESS DOC 0-1 FALLS W/OUT INJ PAST YR: ICD-10-PCS | Mod: CPTII,,, | Performed by: ANESTHESIOLOGY

## 2023-10-24 PROCEDURE — 3074F PR MOST RECENT SYSTOLIC BLOOD PRESSURE < 130 MM HG: ICD-10-PCS | Mod: CPTII,,, | Performed by: ANESTHESIOLOGY

## 2023-10-24 PROCEDURE — 3078F DIAST BP <80 MM HG: CPT | Mod: CPTII,,, | Performed by: ANESTHESIOLOGY

## 2023-10-24 PROCEDURE — 1159F PR MEDICATION LIST DOCUMENTED IN MEDICAL RECORD: ICD-10-PCS | Mod: CPTII,,, | Performed by: ANESTHESIOLOGY

## 2023-10-24 PROCEDURE — 3288F FALL RISK ASSESSMENT DOCD: CPT | Mod: CPTII,,, | Performed by: ANESTHESIOLOGY

## 2023-10-24 PROCEDURE — 3074F SYST BP LT 130 MM HG: CPT | Mod: CPTII,,, | Performed by: ANESTHESIOLOGY

## 2023-10-24 PROCEDURE — 1125F AMNT PAIN NOTED PAIN PRSNT: CPT | Mod: CPTII,,, | Performed by: ANESTHESIOLOGY

## 2023-10-24 PROCEDURE — 4010F PR ACE/ARB THEARPY RXD/TAKEN: ICD-10-PCS | Mod: CPTII,,, | Performed by: ANESTHESIOLOGY

## 2023-10-24 PROCEDURE — 3044F PR MOST RECENT HEMOGLOBIN A1C LEVEL <7.0%: ICD-10-PCS | Mod: CPTII,,, | Performed by: ANESTHESIOLOGY

## 2023-10-24 PROCEDURE — 3078F PR MOST RECENT DIASTOLIC BLOOD PRESSURE < 80 MM HG: ICD-10-PCS | Mod: CPTII,,, | Performed by: ANESTHESIOLOGY

## 2023-10-24 PROCEDURE — 99214 PR OFFICE/OUTPT VISIT, EST, LEVL IV, 30-39 MIN: ICD-10-PCS | Mod: ,,, | Performed by: ANESTHESIOLOGY

## 2023-10-24 PROCEDURE — 4010F ACE/ARB THERAPY RXD/TAKEN: CPT | Mod: CPTII,,, | Performed by: ANESTHESIOLOGY

## 2023-10-24 PROCEDURE — 3288F PR FALLS RISK ASSESSMENT DOCUMENTED: ICD-10-PCS | Mod: CPTII,,, | Performed by: ANESTHESIOLOGY

## 2023-10-24 PROCEDURE — 3008F BODY MASS INDEX DOCD: CPT | Mod: CPTII,,, | Performed by: ANESTHESIOLOGY

## 2023-10-24 PROCEDURE — 1159F MED LIST DOCD IN RCRD: CPT | Mod: CPTII,,, | Performed by: ANESTHESIOLOGY

## 2023-10-24 PROCEDURE — 1125F PR PAIN SEVERITY QUANTIFIED, PAIN PRESENT: ICD-10-PCS | Mod: CPTII,,, | Performed by: ANESTHESIOLOGY

## 2023-10-24 PROCEDURE — 1101F PT FALLS ASSESS-DOCD LE1/YR: CPT | Mod: CPTII,,, | Performed by: ANESTHESIOLOGY

## 2023-10-24 PROCEDURE — 3008F PR BODY MASS INDEX (BMI) DOCUMENTED: ICD-10-PCS | Mod: CPTII,,, | Performed by: ANESTHESIOLOGY

## 2023-10-24 PROCEDURE — 99214 OFFICE O/P EST MOD 30 MIN: CPT | Mod: ,,, | Performed by: ANESTHESIOLOGY

## 2023-10-24 PROCEDURE — 3044F HG A1C LEVEL LT 7.0%: CPT | Mod: CPTII,,, | Performed by: ANESTHESIOLOGY

## 2023-10-24 RX ORDER — ALOGLIPTIN BENZOATE AND PIOGLITAZONE HYDROCHLORIDE 25; 30 MG/1; MG/1
1 TABLET, FILM COATED ORAL DAILY
Qty: 90 TABLET | Refills: 1 | Status: SHIPPED | OUTPATIENT
Start: 2023-10-24 | End: 2023-11-08 | Stop reason: SDUPTHER

## 2023-10-24 NOTE — H&P (VIEW-ONLY)
Gertrude Blanco MD        PATIENT NAME: Melquiades Rehman Jr.  : 1955  DATE: 10/24/23  MRN: [id number]      Billing Provider: Gertrude Blanco MD  Level of Service:   Patient PCP Information       Provider PCP Type    DAVIDSON Mandujano [facility]            Reason for Visit / Chief Complaint: Back Pain (F/u to discuss treatment options, completed P.T., pt states P.T did not help, OTC medication for relief,  pain level 8/10)       Update PCP  Update Chief Complaint         History of Present Illness / Problem Focused Workflow     Melquiades Rehman Jr. presents to the clinic with Back Pain (F/u to discuss treatment options, completed P.T., pt states P.T did not help, OTC medication for relief,  pain level 8/10)       This is a 68-year-old male who returns to clinic today for follow up of his chronic low back pain.  He does not really have much pain when he is sitting or lying down, but states that when he has been standing at the sink to wash dishes for just 5 minutes, the pain is so severe that he has to sit down.  He started doing traction at PT which was helping at first, but then stopped working.      Back Pain      Review of Systems     Review of Systems   Musculoskeletal:  Positive for back pain.   All other systems reviewed and are negative.     Medical / Social / Family History     Past Medical History:   Diagnosis Date    Central stenosis of spinal canal     CHF (congestive heart failure)     Chronic back pain greater than 3 months duration 10/14/2022    Constipation 2022    Degeneration of lumbar intervertebral disc 2022    Diastolic dysfunction 2023    Dysphonia 2022    Dystrophic nail 2023    Erectile dysfunction 2022    Fatty liver     GERD (gastroesophageal reflux disease) 2022    H/O cataract removal with insertion of prosthetic lens     History of CVA (cerebrovascular accident) 2022    History of radial keratotomy 1998    HTN (hypertension)      Hyperlipidemia 9/30/2022    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    Microhematuria 3/30/2023    Overgrown toenails 6/19/2023    Parkinson's disease     Paroxysmal atrial fibrillation     Peyronie disease     Polyneuropathy associated with underlying disease 6/19/2023    Rheumatoid arthritis 1/10/2022    Rheumatoid arthritis, unspecified     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    Spinal stenosis of lumbar region 9/30/2022    Type 2 diabetes mellitus without complications        Past Surgical History:   Procedure Laterality Date    CATARACT EXTRACTION W/  INTRAOCULAR LENS IMPLANT      EPIDURAL STEROID INJECTION INTO LUMBAR SPINE      exc cyst Lt ear      INJECTION OF ANESTHETIC AGENT AROUND MEDIAL BRANCH NERVES INNERVATING LUMBAR FACET JOINT Bilateral 11/16/2022    Procedure: Block-nerve-medial branch-lumbar;  Surgeon: Gertrude Blanco MD;  Location: [facility];  Service: Pain Management;  Laterality: Bilateral;  Bilateral L4-L5...Patient is requesting to be first case    KNEE ARTHROSCOPY Right     REFRACTIVE SURGERY      VASECTOMY  1998       Social History  Mr. Rehman  reports that he quit smoking about 36 years ago. His smoking use included cigarettes. He has been exposed to tobacco smoke. He has never used smokeless tobacco. He reports that he does not currently use alcohol after a past usage of about 2.0 - 3.0 standard drinks of alcohol per week. He reports current drug use. Drug: Marijuana.    Family History  Mr.'s Rehman family history includes Alzheimer's disease in his father; Autoimmune disease in his sister; Coronary artery disease in his mother; Dementia in his sister; Emphysema in his father; Glaucoma in his father; Heart failure in his mother; Pneumonia in his sister.    Medications and Allergies     Medications  Outpatient Medications Marked as Taking for the 10/24/23 encounter (Office Visit) with Gertrude Blanco MD   Medication Sig Dispense Refill    alogliptin-pioglitazone (OSENI) 25-30 mg Tab Take 1 tablet  by mouth once daily. 90 tablet 1    alprostadil (CAVERJECT IMPULSE ICAV) by Intracavernosal route.      ascorbic acid, vitamin C, (VITAMIN C) 1000 MG tablet Take 1,000 mg by mouth every evening.      b complex vitamins capsule Take 1 capsule by mouth nightly.      blood sugar diagnostic (ACCU-CHEK GUIDE TEST STRIPS MISC) Accu-Chek Guide test strips      carbidopa-levodopa  mg (SINEMET)  mg per tablet Take 2 tablets by mouth 2 (two) times a day.      cholecalciferol, vitamin D3, 125 mcg (5,000 unit) capsule Take 1,000 Units by mouth nightly.      coenzyme Q10 100 mg capsule Take 100 mg by mouth every evening.      folic acid/multivit-min/lutein (CENTRUM SILVER ORAL) Take 1 tablet by mouth every evening.      gabapentin (NEURONTIN) 300 MG capsule Take 300 mg by mouth once daily.      lancets (ACCU-CHEK FASTCLIX LANCET DRUM MISC) Accu-Chek Fastclix Lancet Drum      lisinopriL-hydrochlorothiazide (PRINZIDE,ZESTORETIC) 20-25 mg Tab Take 1 tablet by mouth as needed.      magnesium 30 mg Tab Take 250 mg by mouth nightly.      metoprolol succinate (TOPROL-XL) 25 MG 24 hr tablet Take 1 tablet (25 mg total) by mouth once daily. 90 tablet 3    NON FORMULARY MEDICATION Take 1 tablet by mouth nightly.      pantoprazole (PROTONIX) 40 MG tablet Take 1 tablet (40 mg total) by mouth once daily. 90 tablet 1    potassium citrate 99 mg Cap Take 1 tablet by mouth nightly.      rosuvastatin (CRESTOR) 20 MG tablet Take 1 tablet (20 mg total) by mouth once daily. 90 tablet 1    selegiline (ELDEPRYL) 5 mg Cap Take 5 mg by mouth 2 (two) times daily.      tofacitinib (XELJANZ XR) 11 mg Tb24 Take 1 tablet by mouth once daily. 30 tablet 4    vitamin E 400 UNIT capsule Take 400 Units by mouth nightly.      XARELTO 20 mg Tab Take 1 tablet (20 mg total) by mouth once daily. 30 tablet 6    zinc sulfate (ZINC-15 ORAL) Take 140 mg by mouth nightly.         Allergies  Review of patient's allergies indicates:   Allergen Reactions     Verónica Other (See Comments)     Other reaction(s): fainting       Physical Examination     Vitals:    10/24/23 1146   BP: 122/78   Pulse: 80   Temp: 98.1 °F (36.7 °C)   Pain Disability Index (PDI): 68       Spine Musculoskeletal Exam    Gait    Gait is normal.    Inspection    Thoracolumbar    Thoracolumbar inspection is normal.    Range of Motion    Thoracolumbar        Thoracolumbar range of motion additional comments:   Restricted range of motion in the lower back due to pain    Strength    Thoracolumbar    Thoracolumbar motor exam is normal.       Sensory    Thoracolumbar    Thoracolumbar sensation is normal.    General      Constitutional: appears stated age, well-developed and well-nourished    Scleral icterus: no    Labored breathing: no    Psychiatric: normal mood and affect and no acute distress    Neurological: alert and oriented x3    Skin: intact    Lymphadenopathy: none   CV: extremities warm, well-perfused  Resp: nonlabored breathing    Assessment and Plan (including Health Maintenance)      Problem List  Smart Sets  Document Outside HM   :    Plan:   Degeneration of lumbar intervertebral disc    Lumbar spondylosis    Spinal stenosis at L4-L5 level    Chronic back pain greater than 3 months duration      He is being scheduled for bilateral L3-5 diagnostic MBB for his axial low back pain consistent with findings of facet hypertrophy on his MRI.  The plan was discussed with the patient and he wishes to proceed.    Problem List Items Addressed This Visit       Degeneration of lumbar intervertebral disc - Primary    Spinal stenosis at L4-L5 level    Chronic back pain greater than 3 months duration    Lumbar spondylosis         Future Appointments   Date Time Provider Department Center   11/8/2023  1:00 PM Damien Dodd FNP [facility]    1/9/2024  9:00 AM Adriane Raygoza MD [facility]    1/10/2024  8:00 AM Monica Chun FNP [facility] [place] [facility]    1/11/2024 11:00 AM  Barbara Zelaya FNP [facility]   3/14/2024  9:30 AM MEDINA, CONY Stallings   4/9/2024  9:30 AM Valeria Donovan MD [facility]           There are no Patient Instructions on file for this visit.  No follow-ups on file.     Signature:  Gertrude Blanco MD      Date of encounter: 10/24/23

## 2023-10-24 NOTE — PROGRESS NOTES
Gertrude Blanco MD        PATIENT NAME: Melquiades Rehman Jr.  : 1955  DATE: 10/24/23  MRN: [id number]      Billing Provider: Gertrude Blanco MD  Level of Service:   Patient PCP Information       Provider PCP Type    DAVIDSON Mandujano [facility]            Reason for Visit / Chief Complaint: Back Pain (F/u to discuss treatment options, completed P.T., pt states P.T did not help, OTC medication for relief,  pain level 8/10)       Update PCP  Update Chief Complaint         History of Present Illness / Problem Focused Workflow     Melquiades Rehman Jr. presents to the clinic with Back Pain (F/u to discuss treatment options, completed P.T., pt states P.T did not help, OTC medication for relief,  pain level 8/10)       This is a 68-year-old male who returns to clinic today for follow up of his chronic low back pain.  He does not really have much pain when he is sitting or lying down, but states that when he has been standing at the sink to wash dishes for just 5 minutes, the pain is so severe that he has to sit down.  He started doing traction at PT which was helping at first, but then stopped working.      Back Pain      Review of Systems     Review of Systems   Musculoskeletal:  Positive for back pain.   All other systems reviewed and are negative.     Medical / Social / Family History     Past Medical History:   Diagnosis Date    Central stenosis of spinal canal     CHF (congestive heart failure)     Chronic back pain greater than 3 months duration 10/14/2022    Constipation 2022    Degeneration of lumbar intervertebral disc 2022    Diastolic dysfunction 2023    Dysphonia 2022    Dystrophic nail 2023    Erectile dysfunction 2022    Fatty liver     GERD (gastroesophageal reflux disease) 2022    H/O cataract removal with insertion of prosthetic lens     History of CVA (cerebrovascular accident) 2022    History of radial keratotomy 1998    HTN (hypertension)      Hyperlipidemia 9/30/2022    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    Microhematuria 3/30/2023    Overgrown toenails 6/19/2023    Parkinson's disease     Paroxysmal atrial fibrillation     Peyronie disease     Polyneuropathy associated with underlying disease 6/19/2023    Rheumatoid arthritis 1/10/2022    Rheumatoid arthritis, unspecified     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    Spinal stenosis of lumbar region 9/30/2022    Type 2 diabetes mellitus without complications        Past Surgical History:   Procedure Laterality Date    CATARACT EXTRACTION W/  INTRAOCULAR LENS IMPLANT      EPIDURAL STEROID INJECTION INTO LUMBAR SPINE      exc cyst Lt ear      INJECTION OF ANESTHETIC AGENT AROUND MEDIAL BRANCH NERVES INNERVATING LUMBAR FACET JOINT Bilateral 11/16/2022    Procedure: Block-nerve-medial branch-lumbar;  Surgeon: Gertrude Blanco MD;  Location: [facility];  Service: Pain Management;  Laterality: Bilateral;  Bilateral L4-L5...Patient is requesting to be first case    KNEE ARTHROSCOPY Right     REFRACTIVE SURGERY      VASECTOMY  1998       Social History  Mr. Rehman  reports that he quit smoking about 36 years ago. His smoking use included cigarettes. He has been exposed to tobacco smoke. He has never used smokeless tobacco. He reports that he does not currently use alcohol after a past usage of about 2.0 - 3.0 standard drinks of alcohol per week. He reports current drug use. Drug: Marijuana.    Family History  Mr.'s Rehman family history includes Alzheimer's disease in his father; Autoimmune disease in his sister; Coronary artery disease in his mother; Dementia in his sister; Emphysema in his father; Glaucoma in his father; Heart failure in his mother; Pneumonia in his sister.    Medications and Allergies     Medications  Outpatient Medications Marked as Taking for the 10/24/23 encounter (Office Visit) with Gertrude Blanco MD   Medication Sig Dispense Refill    alogliptin-pioglitazone (OSENI) 25-30 mg Tab Take 1 tablet  by mouth once daily. 90 tablet 1    alprostadil (CAVERJECT IMPULSE ICAV) by Intracavernosal route.      ascorbic acid, vitamin C, (VITAMIN C) 1000 MG tablet Take 1,000 mg by mouth every evening.      b complex vitamins capsule Take 1 capsule by mouth nightly.      blood sugar diagnostic (ACCU-CHEK GUIDE TEST STRIPS MISC) Accu-Chek Guide test strips      carbidopa-levodopa  mg (SINEMET)  mg per tablet Take 2 tablets by mouth 2 (two) times a day.      cholecalciferol, vitamin D3, 125 mcg (5,000 unit) capsule Take 1,000 Units by mouth nightly.      coenzyme Q10 100 mg capsule Take 100 mg by mouth every evening.      folic acid/multivit-min/lutein (CENTRUM SILVER ORAL) Take 1 tablet by mouth every evening.      gabapentin (NEURONTIN) 300 MG capsule Take 300 mg by mouth once daily.      lancets (ACCU-CHEK FASTCLIX LANCET DRUM MISC) Accu-Chek Fastclix Lancet Drum      lisinopriL-hydrochlorothiazide (PRINZIDE,ZESTORETIC) 20-25 mg Tab Take 1 tablet by mouth as needed.      magnesium 30 mg Tab Take 250 mg by mouth nightly.      metoprolol succinate (TOPROL-XL) 25 MG 24 hr tablet Take 1 tablet (25 mg total) by mouth once daily. 90 tablet 3    NON FORMULARY MEDICATION Take 1 tablet by mouth nightly.      pantoprazole (PROTONIX) 40 MG tablet Take 1 tablet (40 mg total) by mouth once daily. 90 tablet 1    potassium citrate 99 mg Cap Take 1 tablet by mouth nightly.      rosuvastatin (CRESTOR) 20 MG tablet Take 1 tablet (20 mg total) by mouth once daily. 90 tablet 1    selegiline (ELDEPRYL) 5 mg Cap Take 5 mg by mouth 2 (two) times daily.      tofacitinib (XELJANZ XR) 11 mg Tb24 Take 1 tablet by mouth once daily. 30 tablet 4    vitamin E 400 UNIT capsule Take 400 Units by mouth nightly.      XARELTO 20 mg Tab Take 1 tablet (20 mg total) by mouth once daily. 30 tablet 6    zinc sulfate (ZINC-15 ORAL) Take 140 mg by mouth nightly.         Allergies  Review of patient's allergies indicates:   Allergen Reactions     Verónica Other (See Comments)     Other reaction(s): fainting       Physical Examination     Vitals:    10/24/23 1146   BP: 122/78   Pulse: 80   Temp: 98.1 °F (36.7 °C)   Pain Disability Index (PDI): 68       Spine Musculoskeletal Exam    Gait    Gait is normal.    Inspection    Thoracolumbar    Thoracolumbar inspection is normal.    Range of Motion    Thoracolumbar        Thoracolumbar range of motion additional comments:   Restricted range of motion in the lower back due to pain    Strength    Thoracolumbar    Thoracolumbar motor exam is normal.       Sensory    Thoracolumbar    Thoracolumbar sensation is normal.    General      Constitutional: appears stated age, well-developed and well-nourished    Scleral icterus: no    Labored breathing: no    Psychiatric: normal mood and affect and no acute distress    Neurological: alert and oriented x3    Skin: intact    Lymphadenopathy: none   CV: extremities warm, well-perfused  Resp: nonlabored breathing    Assessment and Plan (including Health Maintenance)      Problem List  Smart Sets  Document Outside HM   :    Plan:   Degeneration of lumbar intervertebral disc    Lumbar spondylosis    Spinal stenosis at L4-L5 level    Chronic back pain greater than 3 months duration      He is being scheduled for bilateral L3-5 diagnostic MBB for his axial low back pain consistent with findings of facet hypertrophy on his MRI.  The plan was discussed with the patient and he wishes to proceed.    Problem List Items Addressed This Visit       Degeneration of lumbar intervertebral disc - Primary    Spinal stenosis at L4-L5 level    Chronic back pain greater than 3 months duration    Lumbar spondylosis         Future Appointments   Date Time Provider Department Center   11/8/2023  1:00 PM Damien Dodd FNP [facility]    1/9/2024  9:00 AM Adriane Raygoza MD [facility]    1/10/2024  8:00 AM Monica Chun FNP [facility]    1/11/2024 11:00 AM  Barbara Zelaya FNP [facility] BRITNI Peña   3/14/2024  9:30 AM MEDINA, CONY Stallings   4/9/2024  9:30 AM Valeria Donovan MD [facility]           There are no Patient Instructions on file for this visit.  No follow-ups on file.     Signature:  Gertrude Blanco MD      Date of encounter: 10/24/23

## 2023-10-25 DIAGNOSIS — K21.9 GASTROESOPHAGEAL REFLUX DISEASE, UNSPECIFIED WHETHER ESOPHAGITIS PRESENT: ICD-10-CM

## 2023-10-26 ENCOUNTER — HOSPITAL ENCOUNTER (INPATIENT)
Facility: HOSPITAL | Age: 68
LOS: 1 days | Discharge: HOME | DRG: 069 | End: 2023-10-27
Attending: EMERGENCY MEDICINE | Admitting: INTERNAL MEDICINE
Payer: MEDICARE

## 2023-10-26 DIAGNOSIS — G45.9 TIA (TRANSIENT ISCHEMIC ATTACK): Primary | ICD-10-CM

## 2023-10-26 DIAGNOSIS — I63.9 STROKE: ICD-10-CM

## 2023-10-26 LAB
ALBUMIN SERPL-MCNC: 4.3 G/DL (ref 3.4–4.8)
ALBUMIN/GLOB SERPL: 1.2 RATIO (ref 1.1–2)
ALP SERPL-CCNC: 61 UNIT/L (ref 40–150)
ALT SERPL-CCNC: 21 UNIT/L (ref 0–55)
APTT PPP: 28.8 SECONDS (ref 23.2–33.7)
AST SERPL-CCNC: 26 UNIT/L (ref 5–34)
BASOPHILS # BLD AUTO: 0.02 X10(3)/MCL
BASOPHILS NFR BLD AUTO: 0.4 %
BILIRUB SERPL-MCNC: 0.5 MG/DL
BUN SERPL-MCNC: 19.3 MG/DL (ref 8.4–25.7)
CALCIUM SERPL-MCNC: 9.7 MG/DL (ref 8.8–10)
CHLORIDE SERPL-SCNC: 108 MMOL/L (ref 98–107)
CO2 SERPL-SCNC: 26 MMOL/L (ref 23–31)
CREAT SERPL-MCNC: 1.17 MG/DL (ref 0.73–1.18)
EOSINOPHIL # BLD AUTO: 0.05 X10(3)/MCL (ref 0–0.9)
EOSINOPHIL NFR BLD AUTO: 1 %
ERYTHROCYTE [DISTWIDTH] IN BLOOD BY AUTOMATED COUNT: 14 % (ref 11.5–17)
EST. AVERAGE GLUCOSE BLD GHB EST-MCNC: 128.4 MG/DL
GFR SERPLBLD CREATININE-BSD FMLA CKD-EPI: >60 MLS/MIN/1.73/M2
GLOBULIN SER-MCNC: 3.6 GM/DL (ref 2.4–3.5)
GLUCOSE SERPL-MCNC: 138 MG/DL (ref 82–115)
HBA1C MFR BLD: 6.1 %
HCT VFR BLD AUTO: 41.9 % (ref 42–52)
HGB BLD-MCNC: 14.6 G/DL (ref 14–18)
IMM GRANULOCYTES # BLD AUTO: 0.02 X10(3)/MCL (ref 0–0.04)
IMM GRANULOCYTES NFR BLD AUTO: 0.4 %
INR PPP: 1.1
INR PPP: 1.2
LYMPHOCYTES # BLD AUTO: 1.06 X10(3)/MCL (ref 0.6–4.6)
LYMPHOCYTES NFR BLD AUTO: 21.3 %
MCH RBC QN AUTO: 32.7 PG (ref 27–31)
MCHC RBC AUTO-ENTMCNC: 34.8 G/DL (ref 33–36)
MCV RBC AUTO: 93.7 FL (ref 80–94)
MONOCYTES # BLD AUTO: 0.76 X10(3)/MCL (ref 0.1–1.3)
MONOCYTES NFR BLD AUTO: 15.3 %
NEUTROPHILS # BLD AUTO: 3.06 X10(3)/MCL (ref 2.1–9.2)
NEUTROPHILS NFR BLD AUTO: 61.6 %
NRBC BLD AUTO-RTO: 0 %
PLATELET # BLD AUTO: 222 X10(3)/MCL (ref 130–400)
PMV BLD AUTO: 10.6 FL (ref 7.4–10.4)
POCT GLUCOSE: 128 MG/DL (ref 70–110)
POCT GLUCOSE: 154 MG/DL (ref 70–110)
POCT GLUCOSE: 86 MG/DL (ref 70–110)
POTASSIUM SERPL-SCNC: 3.8 MMOL/L (ref 3.5–5.1)
PROT SERPL-MCNC: 7.9 GM/DL (ref 5.8–7.6)
PROTHROMBIN TIME: 14.2 SECONDS (ref 12.5–14.5)
PROTHROMBIN TIME: 14.7 SECONDS (ref 12.5–14.5)
RBC # BLD AUTO: 4.47 X10(6)/MCL (ref 4.7–6.1)
SODIUM SERPL-SCNC: 141 MMOL/L (ref 136–145)
TSH SERPL-ACNC: 1.61 UIU/ML (ref 0.35–4.94)
WBC # SPEC AUTO: 4.97 X10(3)/MCL (ref 4.5–11.5)

## 2023-10-26 PROCEDURE — 85610 PROTHROMBIN TIME: CPT

## 2023-10-26 PROCEDURE — 93010 EKG 12-LEAD: ICD-10-PCS | Mod: ,,, | Performed by: INTERNAL MEDICINE

## 2023-10-26 PROCEDURE — 82962 GLUCOSE BLOOD TEST: CPT

## 2023-10-26 PROCEDURE — 85730 THROMBOPLASTIN TIME PARTIAL: CPT | Performed by: EMERGENCY MEDICINE

## 2023-10-26 PROCEDURE — 25500020 PHARM REV CODE 255: Performed by: INTERNAL MEDICINE

## 2023-10-26 PROCEDURE — 84443 ASSAY THYROID STIM HORMONE: CPT

## 2023-10-26 PROCEDURE — 80053 COMPREHEN METABOLIC PANEL: CPT

## 2023-10-26 PROCEDURE — 85025 COMPLETE CBC W/AUTO DIFF WBC: CPT

## 2023-10-26 PROCEDURE — 93010 ELECTROCARDIOGRAM REPORT: CPT | Mod: ,,, | Performed by: INTERNAL MEDICINE

## 2023-10-26 PROCEDURE — 25000003 PHARM REV CODE 250: Performed by: INTERNAL MEDICINE

## 2023-10-26 PROCEDURE — 93005 ELECTROCARDIOGRAM TRACING: CPT

## 2023-10-26 PROCEDURE — 83036 HEMOGLOBIN GLYCOSYLATED A1C: CPT | Performed by: NURSE PRACTITIONER

## 2023-10-26 PROCEDURE — 99223 PR INITIAL HOSPITAL CARE,LEVL III: ICD-10-PCS | Mod: ,,, | Performed by: PSYCHIATRY & NEUROLOGY

## 2023-10-26 PROCEDURE — 85610 PROTHROMBIN TIME: CPT | Performed by: EMERGENCY MEDICINE

## 2023-10-26 PROCEDURE — 63600175 PHARM REV CODE 636 W HCPCS: Performed by: PHYSICIAN ASSISTANT

## 2023-10-26 PROCEDURE — 99223 1ST HOSP IP/OBS HIGH 75: CPT | Mod: ,,, | Performed by: PSYCHIATRY & NEUROLOGY

## 2023-10-26 PROCEDURE — 99285 EMERGENCY DEPT VISIT HI MDM: CPT | Mod: 25

## 2023-10-26 PROCEDURE — 11000001 HC ACUTE MED/SURG PRIVATE ROOM

## 2023-10-26 RX ORDER — IBUPROFEN 200 MG
16 TABLET ORAL
Status: DISCONTINUED | OUTPATIENT
Start: 2023-10-26 | End: 2023-10-27 | Stop reason: HOSPADM

## 2023-10-26 RX ORDER — GLUCAGON 1 MG
1 KIT INJECTION
Status: DISCONTINUED | OUTPATIENT
Start: 2023-10-26 | End: 2023-10-27 | Stop reason: HOSPADM

## 2023-10-26 RX ORDER — GABAPENTIN 300 MG/1
300 CAPSULE ORAL DAILY
Status: DISCONTINUED | OUTPATIENT
Start: 2023-10-26 | End: 2023-10-27 | Stop reason: HOSPADM

## 2023-10-26 RX ORDER — CARBIDOPA AND LEVODOPA 25; 100 MG/1; MG/1
2 TABLET ORAL 2 TIMES DAILY
Status: DISCONTINUED | OUTPATIENT
Start: 2023-10-26 | End: 2023-10-27 | Stop reason: HOSPADM

## 2023-10-26 RX ORDER — PANTOPRAZOLE SODIUM 40 MG/1
40 TABLET, DELAYED RELEASE ORAL DAILY
Status: DISCONTINUED | OUTPATIENT
Start: 2023-10-26 | End: 2023-10-27 | Stop reason: HOSPADM

## 2023-10-26 RX ORDER — ACETAMINOPHEN 325 MG/1
650 TABLET ORAL EVERY 4 HOURS PRN
Status: DISCONTINUED | OUTPATIENT
Start: 2023-10-26 | End: 2023-10-27 | Stop reason: HOSPADM

## 2023-10-26 RX ORDER — METOPROLOL SUCCINATE 25 MG/1
25 TABLET, EXTENDED RELEASE ORAL DAILY
Status: DISCONTINUED | OUTPATIENT
Start: 2023-10-26 | End: 2023-10-27 | Stop reason: HOSPADM

## 2023-10-26 RX ORDER — ONDANSETRON 2 MG/ML
4 INJECTION INTRAMUSCULAR; INTRAVENOUS EVERY 6 HOURS PRN
Status: DISCONTINUED | OUTPATIENT
Start: 2023-10-26 | End: 2023-10-27 | Stop reason: HOSPADM

## 2023-10-26 RX ORDER — ATORVASTATIN CALCIUM 40 MG/1
40 TABLET, FILM COATED ORAL NIGHTLY
Status: DISCONTINUED | OUTPATIENT
Start: 2023-10-26 | End: 2023-10-27 | Stop reason: HOSPADM

## 2023-10-26 RX ORDER — INSULIN ASPART 100 [IU]/ML
0-10 INJECTION, SOLUTION INTRAVENOUS; SUBCUTANEOUS
Status: DISCONTINUED | OUTPATIENT
Start: 2023-10-26 | End: 2023-10-27 | Stop reason: HOSPADM

## 2023-10-26 RX ORDER — IBUPROFEN 200 MG
24 TABLET ORAL
Status: DISCONTINUED | OUTPATIENT
Start: 2023-10-26 | End: 2023-10-27 | Stop reason: HOSPADM

## 2023-10-26 RX ORDER — SELEGILINE HYDROCHLORIDE 5 MG/1
5 CAPSULE ORAL 2 TIMES DAILY
Status: DISCONTINUED | OUTPATIENT
Start: 2023-10-26 | End: 2023-10-27 | Stop reason: HOSPADM

## 2023-10-26 RX ADMIN — RIVAROXABAN 20 MG: 10 TABLET, FILM COATED ORAL at 02:10

## 2023-10-26 RX ADMIN — GABAPENTIN 300 MG: 300 CAPSULE ORAL at 02:10

## 2023-10-26 RX ADMIN — IOPAMIDOL 50 ML: 755 INJECTION, SOLUTION INTRAVENOUS at 04:10

## 2023-10-26 RX ADMIN — SELEGILINE HYDROCHLORIDE 5 MG: 5 CAPSULE ORAL at 09:10

## 2023-10-26 RX ADMIN — PANTOPRAZOLE SODIUM 40 MG: 40 TABLET, DELAYED RELEASE ORAL at 02:10

## 2023-10-26 RX ADMIN — ONDANSETRON 4 MG: 2 INJECTION INTRAMUSCULAR; INTRAVENOUS at 03:10

## 2023-10-26 RX ADMIN — CARBIDOPA AND LEVODOPA 2 TABLET: 25; 100 TABLET ORAL at 09:10

## 2023-10-26 RX ADMIN — METOPROLOL SUCCINATE 25 MG: 25 TABLET, EXTENDED RELEASE ORAL at 02:10

## 2023-10-26 RX ADMIN — CARBIDOPA AND LEVODOPA 2 TABLET: 25; 100 TABLET ORAL at 02:10

## 2023-10-26 RX ADMIN — ATORVASTATIN CALCIUM 40 MG: 40 TABLET, FILM COATED ORAL at 09:10

## 2023-10-26 NOTE — HPI
68 year old male with a past medical history of HTN, HLD, CHF, Afib (on Xarelto), DM, RA, PD, lumbar back pain, VI nerve palsy, and fatty liver disease presented to ED on 10/26 for confusion and right hand numbness. He woke up this morning, felt fine, went to the bank where he felt anxious. He had some difficulty reading his text messages as well as right hand numbness. The symptoms lasted 10-15 minutes. He did walk across the street and told his neighbor to call 911. He also reported 3-4 weeks ago he had a 15-20 minute episode of left arm and hand numbness that resolved spontaneously. Upon arrival to ED, /80. CT head was negative for acute intracranial findings. Neurology was consulted for TIA workup.

## 2023-10-26 NOTE — ASSESSMENT & PLAN NOTE
-presented with episode of right hand numbness and confusion  -stroke RF: Afib (on Xarelto), HTN, HLD, DM  -intervention: none      Plan  -Continue stroke workup  -CT head: negative  -Reports compliance with xarelto  -Followed in [place] by Dr. Barrios for PD  -Will await MRI brain and CTA head and neck

## 2023-10-26 NOTE — ED PROVIDER NOTES
Encounter Date: 10/26/2023    SCRIBE #1 NOTE: I, Rhea Ya, am scribing for, and in the presence of,  Shilo Del Cid MD. I have scribed the following portions of the note - the EKG reading. Other sections scribed: HPI, ROS, PE, MDM.       History     Chief Complaint   Patient presents with    Cerebrovascular Accident     AASI with trouble comprehending written words this morning and tingling to R hand. Reports all symptoms have resolved. Moves all extremities with equal strength, no facial droop present, speech clear, GCS 15.     68 Y.O. male with a history of CHF, CVA, HTN, HLD, Parkinson's disease, paroxysmal atrial fibrillation, and DM II presents to the ED via EMS for AMS onset 0700 this morning. Pt states that he was at the bank this morning when he suddenly could not comprehend words at the [employer] or use his phone properly. Admits that he was not able to fall asleep until 0200 this morning. Currently reports that all of his symptoms have resolved. He denies facial asymmetry, slurred speech, and headache. Further states that he was seen at [facility] 3 weeks ago for a right-sided headache, left-sided numbness, and left-sided weakness, but was discharged after a normal CT, echocardiogram, and carotid ultrasound. Pt takes Xarelto 20 mg daily. Pt's PCP is DAVIDSON Mandujano.     The history is provided by the patient.     Review of patient's allergies indicates:   Allergen Reactions    Sarilumab Other (See Comments)     Other reaction(s): fainting     Past Medical History:   Diagnosis Date    Central stenosis of spinal canal     CHF (congestive heart failure)     Chronic back pain greater than 3 months duration 10/14/2022    Constipation 9/30/2022    Degeneration of lumbar intervertebral disc 7/6/2022    Diastolic dysfunction 5/1/2023    Dysphonia 9/30/2022    Dystrophic nail 6/19/2023    Erectile dysfunction 9/30/2022    Fatty liver     GERD (gastroesophageal reflux disease) 9/30/2022    H/O cataract removal  with insertion of prosthetic lens     History of CVA (cerebrovascular accident) 2022    History of radial keratotomy 1998    HTN (hypertension)     Hyperlipidemia 2022    Induratio penis plastica 2022    Microhematuria 3/30/2023    Overgrown toenails 2023    Parkinson's disease     Paroxysmal atrial fibrillation     Peyronie disease     Polyneuropathy associated with underlying disease 2023    Rheumatoid arthritis 1/10/2022    Rheumatoid arthritis, unspecified     Sixth nerve palsy 2022    Spinal stenosis of lumbar region 2022    Type 2 diabetes mellitus without complications      Past Surgical History:   Procedure Laterality Date    CATARACT EXTRACTION W/  INTRAOCULAR LENS IMPLANT      EPIDURAL STEROID INJECTION INTO LUMBAR SPINE      exc cyst Lt ear      INJECTION OF ANESTHETIC AGENT AROUND MEDIAL BRANCH NERVES INNERVATING LUMBAR FACET JOINT Bilateral 2022    Procedure: Block-nerve-medial branch-lumbar;  Surgeon: Gertrude Blanco MD;  [facility]: [facility];  Service: Pain Management;  Laterality: Bilateral;  Bilateral L4-L5...Patient is requesting to be first case    KNEE ARTHROSCOPY Right     REFRACTIVE SURGERY      VASECTOMY       Family History   Problem Relation Age of Onset    Heart failure Mother     Coronary artery disease Mother     Glaucoma Father     Alzheimer's disease Father     Emphysema Father     Autoimmune disease Sister     Pneumonia Sister     Dementia Sister      Social History     Tobacco Use    Smoking status: Former     Current packs/day: 0.00     Types: Cigarettes     Quit date:      Years since quittin.8     Passive exposure: Past    Smokeless tobacco: Never   Substance Use Topics    Alcohol use: Not Currently     Alcohol/week: 2.0 - 3.0 standard drinks of alcohol     Types: 1 - 2 Cans of beer, 1 Shots of liquor per week     Comment: rarely    Drug use: Yes     Types: Marijuana     Review of Systems   Neurological:  Negative for facial  asymmetry, speech difficulty and headaches.   Psychiatric/Behavioral:  Positive for confusion.        Physical Exam     Initial Vitals [10/26/23 0852]   BP Pulse Resp Temp SpO2   (!) 144/80 90 20 98.1 °F (36.7 °C) 98 %      MAP       --         Physical Exam    Constitutional: He appears well-developed and well-nourished.   HENT:   Head: Normocephalic and atraumatic.   Mouth/Throat: Oropharynx is clear and moist.   Eyes: Pupils are equal, round, and reactive to light.   Neck: Neck supple.   Normal range of motion.  Cardiovascular:  Normal rate, regular rhythm, normal heart sounds and intact distal pulses.           Pulmonary/Chest: Breath sounds normal. No respiratory distress.   Abdominal: Abdomen is soft. Bowel sounds are normal. There is no abdominal tenderness. There is no rebound and no guarding.   Musculoskeletal:         General: No tenderness or edema. Normal range of motion.      Cervical back: Normal range of motion and neck supple.     Neurological: He is alert and oriented to person, place, and time. He has normal strength. No sensory deficit. GCS score is 15. GCS eye subscore is 4. GCS verbal subscore is 5. GCS motor subscore is 6.   Left hand tremor. No facial droop or slurred speech. 5/5 strength to bilateral lower extremities.    Skin: Skin is warm and dry. Capillary refill takes less than 2 seconds.   Psychiatric: He has a normal mood and affect.         ED Course   Procedures  Labs Reviewed   COMPREHENSIVE METABOLIC PANEL - Abnormal; Notable for the following components:       Result Value    Chloride 108 (*)     Glucose Level 138 (*)     Protein Total 7.9 (*)     Globulin 3.6 (*)     All other components within normal limits   PROTIME-INR - Abnormal; Notable for the following components:    PT 14.7 (*)     All other components within normal limits   CBC WITH DIFFERENTIAL - Abnormal; Notable for the following components:    RBC 4.47 (*)     Hct 41.9 (*)     MCH 32.7 (*)     MPV 10.6 (*)     All  other components within normal limits   POCT GLUCOSE - Abnormal; Notable for the following components:    POCT Glucose 128 (*)     All other components within normal limits   TSH - Normal   PROTIME-INR - Normal   APTT - Normal   CBC W/ AUTO DIFFERENTIAL    Narrative:     The following orders were created for panel order CBC W/ AUTO DIFFERENTIAL.  Procedure                               Abnormality         Status                     ---------                               -----------         ------                     CBC with Differential[[id number]]       Abnormal            Final result                 Please view results for these tests on the individual orders.   POCT GLUCOSE, HAND-HELD DEVICE   POCT GLUCOSE MONITORING CONTINUOUS     EKG Readings: (Independently Interpreted)   Initial Reading: No STEMI. Rhythm: Normal Sinus Rhythm. Heart Rate: 81. Ectopy: No Ectopy. ST Segments: Normal ST Segments. T Waves: Normal. Axis: Left Axis Deviation. Clinical Impression: Normal Sinus Rhythm   EKG performed at 0959 on 10/26/2023.        Imaging Results              CT Head Without Contrast (Final result)  Result time 10/26/23 12:25:19      Final result by Brandon Storm MD (10/26/23 12:25:19)                   Impression:      No acute intracranial findings or significant interval change compared to earlier this month.      Electronically signed by: Brandon Storm  Date:    10/26/2023  Time:    12:25               Narrative:    EXAMINATION:  CT HEAD WITHOUT CONTRAST    CLINICAL HISTORY:  Neuro deficit, acute, stroke suspected;    TECHNIQUE:  CT imaging of the head performed from the skull base to the vertex without intravenous contrast. DLP 1001 mGycm. Automatic exposure control, adjustment of mA/kV or iterative reconstruction technique was used to reduce radiation.    COMPARISON:  2 October 2023    FINDINGS:  There is no acute cortical infarct, hemorrhage or mass lesion.  No new parenchymal attenuation abnormality.   Ventricular size is stable.  There are vascular calcifications.    Similar mild left maxillary sinus inflammation.  The mastoid air cells are clear.                                       Medications   glucose chewable tablet 16 g (has no administration in time range)   glucose chewable tablet 24 g (has no administration in time range)   glucagon (human recombinant) injection 1 mg (has no administration in time range)   insulin aspart U-100 injection 0-10 Units (has no administration in time range)   ondansetron injection 4 mg (has no administration in time range)   acetaminophen tablet 650 mg (has no administration in time range)   dextrose 10% bolus 125 mL 125 mL (has no administration in time range)   dextrose 10% bolus 250 mL 250 mL (has no administration in time range)     Medical Decision Making  The differential diagnosis includes, but is not limited to: CVA, partial seizure, migraine, and multiple sclerosis.     Amount and/or Complexity of Data Reviewed  Labs: ordered.    Risk  Decision regarding hospitalization.            Scribe Attestation:   Scribe #1: I performed the above scribed service and the documentation accurately describes the services I performed. I attest to the accuracy of the note.    Attending Attestation:           Physician Attestation for Scribe:  Physician Attestation Statement for Scribe #1: I, Shilo Del Cid MD, reviewed documentation, as scribed by Rhea Ya in my presence, and it is both accurate and complete.             ED Course as of 10/26/23 1337   Thu Oct 26, 2023   1233 HM paged   [NL]      ED Course User Index  [NL] Shilo Del Cid MD                    Clinical Impression:   Final diagnoses:  [I63.9] Stroke  [G45.9] TIA (transient ischemic attack)        ED Disposition Condition    Admit                 Shilo Del Cid MD  10/26/23 8602

## 2023-10-26 NOTE — FIRST PROVIDER EVALUATION
Medical screening examination initiated.  I have conducted a focused provider triage encounter, findings are as follows:    Brief history of present illness: assessed patient at 0947 am. Arrived to ED via EMS due to not being able to comprehend what was on his screen. States he had a similar situation happen several weeks ago with a negative workup. Patient on Xarelto. States all symptoms have subsided.     Vitals:    10/26/23 0852   BP: (!) 144/80   Pulse: 90   Resp: 20   Temp: 98.1 °F (36.7 °C)   TempSrc: Temporal   SpO2: 98%       Pertinent physical exam:  awake, alert, GCS 15. No extremity weakness, facial droop, or slurred speech noted.     Brief workup plan:  labs & imaging     Preliminary workup initiated; this workup will be continued and followed by the physician or advanced practice provider that is assigned to the patient when roomed.

## 2023-10-26 NOTE — CONSULTS
[facility] - Emergency Dept  Neurology  Consult Note    Patient Name: Melquiades Rehman Jr.  MRN: [id number]  Admission Date: 10/26/2023  Hospital Length of Stay: 0 days  Code Status: Prior   Attending Provider: José Antonio Fry MD   Consulting Provider: Debi Mota NP  Primary Care Physician: Damien Dodd FNP  Principal Problem:<principal problem not specified>    Inpatient consult to Neurology  Consult performed by: Debi Mota NP  Consult ordered by: José Antonio Fry MD         Subjective:     Chief Complaint:  Right hand numbness, confusion      HPI:   68 year old male with a past medical history of HTN, HLD, CHF, Afib (on Xarelto), DM, RA, PD, lumbar back pain, VI nerve palsy, and fatty liver disease presented to ED on 10/26 for confusion and right hand numbness. He woke up this morning, felt fine, went to the bank where he felt anxious. He had some difficulty reading his text messages as well as right hand numbness. He reports he was unable to use his phone but was able to walk over to the [facility] house who called 911. The symptoms lasted 10-15 minutes. He also reported 3-4 weeks ago he had a 15-20 minute episode of left arm and hand numbness that resolved spontaneously. Upon arrival to ED, /80. CT head was negative for acute intracranial findings. Neurology was consulted for TIA workup.          Past Medical History:   Diagnosis Date    Central stenosis of spinal canal     CHF (congestive heart failure)     Chronic back pain greater than 3 months duration 10/14/2022    Constipation 9/30/2022    Degeneration of lumbar intervertebral disc 7/6/2022    Diastolic dysfunction 5/1/2023    Dysphonia 9/30/2022    Dystrophic nail 6/19/2023    Erectile dysfunction 9/30/2022    Fatty liver     GERD (gastroesophageal reflux disease) 9/30/2022    H/O cataract removal with insertion of prosthetic lens     History of CVA (cerebrovascular accident) 9/30/2022    History of  radial keratotomy 01/01/1998    HTN (hypertension)     Hyperlipidemia 9/30/2022    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    Microhematuria 3/30/2023    Overgrown toenails 6/19/2023    Parkinson's disease     Paroxysmal atrial fibrillation     Peyronie disease     Polyneuropathy associated with underlying disease 6/19/2023    Rheumatoid arthritis 1/10/2022    Rheumatoid arthritis, unspecified     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    Spinal stenosis of lumbar region 9/30/2022    Type 2 diabetes mellitus without complications        Past Surgical History:   Procedure Laterality Date    CATARACT EXTRACTION W/  INTRAOCULAR LENS IMPLANT      EPIDURAL STEROID INJECTION INTO LUMBAR SPINE      exc cyst Lt ear      INJECTION OF ANESTHETIC AGENT AROUND MEDIAL BRANCH NERVES INNERVATING LUMBAR FACET JOINT Bilateral 11/16/2022    Procedure: Block-nerve-medial branch-lumbar;  Surgeon: Gertrude Blanco MD;  Location: [facility];  Service: Pain Management;  Laterality: Bilateral;  Bilateral L4-L5...Patient is requesting to be first case    KNEE ARTHROSCOPY Right     REFRACTIVE SURGERY      VASECTOMY  1998       Review of patient's allergies indicates:   Allergen Reactions    Sarilumab Other (See Comments)     Other reaction(s): fainting       Current Neurological Medications:     No current facility-administered medications on file prior to encounter.     Current Outpatient Medications on File Prior to Encounter   Medication Sig    alogliptin-pioglitazone (OSENI) 25-30 mg Tab Take 1 tablet by mouth once daily.    alprostadil (CAVERJECT IMPULSE ICAV) by Intracavernosal route.    ascorbic acid, vitamin C, (VITAMIN C) 1000 MG tablet Take 1,000 mg by mouth every evening.    b complex vitamins capsule Take 1 capsule by mouth nightly.    blood sugar diagnostic (ACCU-CHEK GUIDE TEST STRIPS [facility]) Accu-Chek Guide test strips    carbidopa-levodopa  mg (SINEMET)  mg per tablet Take 2 tablets by mouth 2 (two) times a  day.    cholecalciferol, vitamin D3, 125 mcg (5,000 unit) capsule Take 1,000 Units by mouth nightly.    coenzyme Q10 100 mg capsule Take 100 mg by mouth every evening.    colchicine (COLCRYS) 0.6 mg tablet Take 1 tablet (0.6 mg total) by mouth 2 (two) times daily.    folic acid/multivit-min/lutein (CENTRUM SILVER ORAL) Take 1 tablet by mouth every evening.    gabapentin (NEURONTIN) 300 MG capsule Take 300 mg by mouth once daily.    gemfibroziL (LOPID) 600 MG tablet Take 1 tablet (600 mg total) by mouth 2 (two) times daily.    lancets (ACCU-CHEK FASTCLIX LANCET DRUM MISC) Accu-Chek Fastclix Lancet Drum    lisinopriL 10 MG tablet Take 10 mg by mouth daily as needed.    lisinopriL-hydrochlorothiazide (PRINZIDE,ZESTORETIC) 20-25 mg Tab Take 1 tablet by mouth as needed.    magnesium 30 mg Tab Take 250 mg by mouth nightly.    metoprolol succinate (TOPROL-XL) 25 MG 24 hr tablet Take 1 tablet (25 mg total) by mouth once daily.    NON FORMULARY MEDICATION Take 1 tablet by mouth nightly.    pantoprazole (PROTONIX) 40 MG tablet Take 1 tablet (40 mg total) by mouth once daily.    potassium citrate 99 mg Cap Take 1 tablet by mouth nightly.    rosuvastatin (CRESTOR) 20 MG tablet Take 1 tablet (20 mg total) by mouth once daily.    selegiline (ELDEPRYL) 5 mg Cap Take 5 mg by mouth 2 (two) times daily.    tofacitinib (XELJANZ XR) 11 mg Tb24 Take 1 tablet by mouth once daily.    vitamin E 400 UNIT capsule Take 400 Units by mouth nightly.    XARELTO 20 mg Tab Take 1 tablet (20 mg total) by mouth once daily.    zinc sulfate (ZINC-15 ORAL) Take 140 mg by mouth nightly.     Family History       Problem Relation (Age of Onset)    Alzheimer's disease Father    Autoimmune disease Sister    Coronary artery disease Mother    Dementia Sister    Emphysema Father    Glaucoma Father    Heart failure Mother    Pneumonia Sister          Tobacco Use    Smoking status: Former     Current packs/day: 0.00     Types: Cigarettes      Quit date:      Years since quittin.8     Passive exposure: Past    Smokeless tobacco: Never   Substance and Sexual Activity    Alcohol use: Not Currently     Alcohol/week: 2.0 - 3.0 standard drinks of alcohol     Types: 1 - 2 Cans of beer, 1 Shots of liquor per week     Comment: rarely    Drug use: Yes     Types: Marijuana    Sexual activity: Yes     Partners: Female     Review of Systems   Neurological:  Negative for dizziness, tremors, seizures, syncope, facial asymmetry, speech difficulty, weakness, light-headedness, numbness (resolved) and headaches.   All other systems reviewed and are negative.    Objective:     Vital Signs (Most Recent):  Temp: 98.1 °F (36.7 °C) (10/26/23 0852)  Pulse: 77 (10/26/23 1302)  Resp: 19 (10/26/23 1023)  BP: (!) 147/87 (10/26/23 1413)  SpO2: (!) 92 % (10/26/23 1302) Vital Signs (24h Range):  Temp:  [98.1 °F (36.7 °C)] 98.1 °F (36.7 °C)  Pulse:  [72-90] 77  Resp:  [19-20] 19  SpO2:  [91 %-98 %] 92 %  BP: (144-179)/(80-95) 147/87     Weight: 104.3 kg (230 lb)  Body mass index is 32.08 kg/m².     Physical Exam  Vitals and nursing note reviewed.   Constitutional:       General: He is not in acute distress.     Appearance: Normal appearance. He is not ill-appearing or toxic-appearing.   HENT:      Head: Normocephalic.   Eyes:      General: No visual field deficit.     Extraocular Movements:      Right eye: Normal extraocular motion and no nystagmus.      Left eye: Normal extraocular motion and no nystagmus.      Pupils: Pupils are equal, round, and reactive to light.   Cardiovascular:      Rate and Rhythm: Normal rate.   Pulmonary:      Effort: Pulmonary effort is normal.      Breath sounds: Normal breath sounds.   Musculoskeletal:         General: No swelling. Normal range of motion.      Cervical back: Normal range of motion.      Right lower leg: No edema.      Left lower leg: No edema.   Skin:     General: Skin is warm and dry.      Capillary Refill: Capillary refill  takes less than 2 seconds.   Neurological:      General: No focal deficit present.      Mental Status: He is alert and oriented to person, place, and time.      Cranial Nerves: No dysarthria or facial asymmetry.      Sensory: Sensation is intact. No sensory deficit.      Motor: Tremor (PD tremor left hand) present. No weakness, atrophy, abnormal muscle tone, seizure activity or pronator drift.      Coordination: Coordination normal. Finger-Nose-Finger Test normal.   Psychiatric:         Attention and Perception: Attention normal.         Mood and Affect: Affect normal.         Speech: Speech normal.         Behavior: Behavior normal.          NEUROLOGICAL EXAMINATION:     MENTAL STATUS   Oriented to person, place, and time.   Speech: speech is normal     CRANIAL NERVES     CN III, IV, VI   Pupils are equal, round, and reactive to light.    GAIT AND COORDINATION      Coordination   Finger to nose coordination: normal      Significant Labs:   Recent Lab Results         10/26/23  1246   10/26/23  0955   10/26/23  0952        Albumin/Globulin Ratio     1.2       Albumin     4.3       ALP     61       ALT     21       aPTT     28.8  Comment: For Minimal Heparin Infusion, the goal aPTT 64-85 seconds corresponds to an anti-Xa of 0.3-0.5.    For Low Intensity and High Intensity Heparin, the goal aPTT  seconds corresponds to an anti-Xa of 0.3-0.7       AST     26       Baso #     0.02       Basophil %     0.4       BILIRUBIN TOTAL     0.5       BUN     19.3       Calcium     9.7       Chloride     108       CO2     26       Creatinine     1.17       eGFR     >60       Eos #     0.05       Eosinophil %     1.0       Globulin, Total     3.6       Glucose     138       Hematocrit     41.9       Hemoglobin     14.6       Immature Grans (Abs)     0.02       Immature Granulocytes     0.4       INR 1.1     1.2       Lymph #     1.06       LYMPH %     21.3       MCH     32.7       MCHC     34.8       MCV     93.7       Mono #      0.76       Mono %     15.3       MPV     10.6       Neut #     3.06       Neut %     61.6       nRBC     0.0       Platelet Count     222       POCT Glucose   128         Potassium     3.8       PROTEIN TOTAL     7.9       Protime 14.2     14.7       RBC     4.47       RDW     14.0       Sodium     141       TSH     1.608       WBC     4.97               Significant Imaging: I have reviewed all pertinent imaging results/findings within the past 24 hours.    Assessment and Plan:     TIA (transient ischemic attack)  -presented with episode of right hand numbness and confusion  -stroke RF: Afib (on Xarelto), HTN, HLD, DM  -intervention: none      Plan  -Continue stroke workup  -CT head: negative  -Reports compliance with xarelto  -Followed in [place] by Dr. Barrios for PD  -Will await MRI brain and CTA head and neck  -?possible PD medications SE?          VTE Risk Mitigation (From admission, onward)         Ordered     rivaroxaban tablet 20 mg  Daily         10/26/23 1349     Place sequential compression device  Until discontinued         10/26/23 1327                Thank you for your consult. Further recommendations to follow from MD Debi Mota, NP  Neurology  [facility] - Emergency Dept

## 2023-10-26 NOTE — H&P
[facility] Medicine History & Physical Examination       Patient Name: Melquiades Rehman Jr.  MRN: [id number]  Patient Class: Emergency   Admission Date: 10/26/2023   Admitting Physician: José Antonio Fry MD  Length of Stay: 0  Attending Physician: José Antonio Fry MD  Primary Care Provider: Damien Dodd FNP  Face-to-Face encounter date: 10/26/2023  Code Status: Full Code   Chief Complaint: Cerebrovascular Accident (AASI with trouble comprehending written words this morning and tingling to R hand. Reports all symptoms have resolved. Moves all extremities with equal strength, no facial droop present, speech clear, GCS 15.)        Patient information was obtained from patient, patient's family, past medical records and ER records.     HISTORY OF PRESENT ILLNESS:   Melquiades Rehman Jr. is a 68 y.o. White male with a past medical history of hypertension, hyperlipidemia, congestive heart failure, atrial fibrillation on Xarelto, diabetes mellitus type 2, rheumatoid arthritis, Parkinson's disease, 6 nerve palsy, fatty liver disease, lumbar back pain. The patient presented to [facility] on 10/26/2023 with a primary complaint of confusion and right hand numbness.  Patient reports this morning he woke up fine and went to the bank where he was very anxious.  When getting home patient reports he was confused, having difficulty comprehending his text messages and numbness to the right hand and wrist.  He was able to walk across history to his neighbor to tell them to call 911. He reports symptoms lasted approximately 10-15 minutes and resolved.  He denies complaints of speech changes, blurry vision, headache, pain, focal weakness.  Patient reports approximately 3-4 weeks ago he was experiencing a right-sided headache with numbness to the left shoulder, arm and hand which also lasted approximately 15-20 minutes in duration for self-resolving.    Upon presentation to the ED, temperature 98.1° F, heart rate  90, blood pressure 144/80, respiratory 20 and SpO2 98% on room air. CBC and CMP unremarkable.  EKG normal sinus rhythm.  CT of the head with no acute intracranial findings or significant interval change compared to previous exam this month.  Patient is admitted to hospital medicine services for further medical management.    PAST MEDICAL HISTORY:     Past Medical History:   Diagnosis Date    Central stenosis of spinal canal     CHF (congestive heart failure)     Chronic back pain greater than 3 months duration 10/14/2022    Constipation 9/30/2022    Degeneration of lumbar intervertebral disc 7/6/2022    Diastolic dysfunction 5/1/2023    Dysphonia 9/30/2022    Dystrophic nail 6/19/2023    Erectile dysfunction 9/30/2022    Fatty liver     GERD (gastroesophageal reflux disease) 9/30/2022    H/O cataract removal with insertion of prosthetic lens     History of CVA (cerebrovascular accident) 9/30/2022    History of radial keratotomy 01/01/1998    HTN (hypertension)     Hyperlipidemia 9/30/2022    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    Microhematuria 3/30/2023    Overgrown toenails 6/19/2023    Parkinson's disease     Paroxysmal atrial fibrillation     Peyronie disease     Polyneuropathy associated with underlying disease 6/19/2023    Rheumatoid arthritis 1/10/2022    Rheumatoid arthritis, unspecified     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    Spinal stenosis of lumbar region 9/30/2022    Type 2 diabetes mellitus without complications        PAST SURGICAL HISTORY:     Past Surgical History:   Procedure Laterality Date    CATARACT EXTRACTION W/  INTRAOCULAR LENS IMPLANT      EPIDURAL STEROID INJECTION INTO LUMBAR SPINE      exc cyst Lt ear      INJECTION OF ANESTHETIC AGENT AROUND MEDIAL BRANCH NERVES INNERVATING LUMBAR FACET JOINT Bilateral 11/16/2022    Procedure: Block-nerve-medial branch-lumbar;  Surgeon: Gertrude Blanco MD;  Location: [facility];  Service: Pain Management;  Laterality: Bilateral;  Bilateral L4-L5...Patient is  requesting to be first case    KNEE ARTHROSCOPY Right     REFRACTIVE SURGERY      VASECTOMY  1998       ALLERGIES:   Sarilumab    FAMILY HISTORY:   Mother: Congestive heart failure   Father:  COPD    SOCIAL HISTORY:   Denies tobacco use   Drinks alcohol occasionally  Uses marijuana    HOME MEDICATIONS:     Prior to Admission medications    Medication Sig Start Date End Date Taking? Authorizing Provider   alogliptin-pioglitazone (OSENI) 25-30 mg Tab Take 1 tablet by mouth once daily. 10/24/23 4/21/24  Damien Dodd FNP   alprostadil (CAVERJECT IMPULSE ICAV) by Intracavernosal route.    Provider, Historical   ascorbic acid, vitamin C, (VITAMIN C) 1000 MG tablet Take 1,000 mg by mouth every evening.    Provider, Historical   b complex vitamins capsule Take 1 capsule by mouth nightly. 6/15/21   Provider, Historical   blood sugar diagnostic (ACCU-CHEK GUIDE TEST STRIPS [facility]) Accu-Chek Guide test strips    Provider, Historical   carbidopa-levodopa  mg (SINEMET)  mg per tablet Take 2 tablets by mouth 2 (two) times a day. 6/17/22   Provider, Historical   cholecalciferol, vitamin D3, 125 mcg (5,000 unit) capsule Take 1,000 Units by mouth nightly.    Provider, Historical   coenzyme Q10 100 mg capsule Take 100 mg by mouth every evening. 6/15/21   Provider, Historical   colchicine (COLCRYS) 0.6 mg tablet Take 1 tablet (0.6 mg total) by mouth 2 (two) times daily. 3/30/23 10/9/23  Damien Dodd FNP   folic acid/multivit-min/lutein (CENTRUM SILVER ORAL) Take 1 tablet by mouth every evening. 6/15/21   Provider, Historical   gabapentin (NEURONTIN) 300 MG capsule Take 300 mg by mouth once daily.    Provider, Historical   gemfibroziL (LOPID) 600 MG tablet Take 1 tablet (600 mg total) by mouth 2 (two) times daily. 3/30/23 10/9/23  Damien Dodd FNP   lancets (ACCU-CHEK FASTCLIX LANCET DRUM [facility]) Accu-Chek Fastclix Lancet Drum    Provider, Historical   lisinopriL 10 MG tablet Take 10 mg by mouth daily as needed.     Provider, Historical   lisinopriL-hydrochlorothiazide (PRINZIDE,ZESTORETIC) 20-25 mg Tab Take 1 tablet by mouth as needed.    Provider, Historical   magnesium 30 mg Tab Take 250 mg by mouth nightly.    Provider, Historical   metoprolol succinate (TOPROL-XL) 25 MG 24 hr tablet Take 1 tablet (25 mg total) by mouth once daily. 9/5/23 9/4/24  Quincy Conrad MD   NON FORMULARY MEDICATION Take 1 tablet by mouth nightly.    Provider, Historical   pantoprazole (PROTONIX) 40 MG tablet Take 1 tablet (40 mg total) by mouth once daily. 9/30/22   Damien Dodd FNP   potassium citrate 99 mg Cap Take 1 tablet by mouth nightly.    Provider, Historical   rosuvastatin (CRESTOR) 20 MG tablet Take 1 tablet (20 mg total) by mouth once daily. 3/30/23 3/29/24  Damien Dodd FNP   selegiline (ELDEPRYL) 5 mg Cap Take 5 mg by mouth 2 (two) times daily.    Provider, Historical   tofacitinib (XELJANZ XR) 11 mg Tb24 Take 1 tablet by mouth once daily. 10/9/23   Barbara Zelaya FNP   vitamin E 400 UNIT capsule Take 400 Units by mouth nightly.    Provider, Historical   XARELTO 20 mg Tab Take 1 tablet (20 mg total) by mouth once daily. 7/31/23   Princess Calderon PA-C   zinc sulfate (ZINC-15 ORAL) Take 140 mg by mouth nightly. 2/8/22   Provider, Historical       REVIEW OF SYSTEMS:   Except as documented, all other systems reviewed and negative     PHYSICAL EXAM:     VITAL SIGNS: 24 HRS MIN & MAX LAST   Temp  Min: 98.1 °F (36.7 °C)  Max: 98.1 °F (36.7 °C) 98.1 °F (36.7 °C)   BP  Min: 144/80  Max: 179/95 (!) 166/89   Pulse  Min: 72  Max: 90  77   Resp  Min: 19  Max: 20 19   SpO2  Min: 91 %  Max: 98 % (!) 92 %       General appearance: Well-developed, well-nourished male in no apparent distress. No family at bedside.  HEENT: Atraumatic head. Moist mucous membranes of oral cavity.  Lungs: Clear to auscultation bilaterally.   Heart: Regular rate and rhythm.   Abdomen: Soft, non-distended.  Extremities: No cyanosis, clubbing. No  deformities.  Skin: No Rash. Warm and dry.  Neuro: Awake, alert and oriented.  5/5 strength to all extremities.  Normal speech.  No facial  Psych/mental status: Appropriate mood and affect. Cooperative. Responds appropriately to questions.       LABS AND IMAGING:     Recent Labs   Lab 10/26/23  0952   WBC 4.97   RBC 4.47*   HGB 14.6   HCT 41.9*   MCV 93.7   MCH 32.7*   MCHC 34.8   RDW 14.0      MPV 10.6*       Recent Labs   Lab 10/26/23  0952      K 3.8   CO2 26   BUN 19.3   CREATININE 1.17   CALCIUM 9.7   ALBUMIN 4.3   ALKPHOS 61   ALT 21   AST 26   BILITOT 0.5       Microbiology Results (last 7 days)       ** No results found for the last 168 hours. **             CT Head Without Contrast  Narrative: EXAMINATION:  CT HEAD WITHOUT CONTRAST    CLINICAL HISTORY:  Neuro deficit, acute, stroke suspected;    TECHNIQUE:  CT imaging of the head performed from the skull base to the vertex without intravenous contrast. DLP 1001 mGycm. Automatic exposure control, adjustment of mA/kV or iterative reconstruction technique was used to reduce radiation.    COMPARISON:  2 October 2023    FINDINGS:  There is no acute cortical infarct, hemorrhage or mass lesion.  No new parenchymal attenuation abnormality.  Ventricular size is stable.  There are vascular calcifications.    Similar mild left maxillary sinus inflammation.  The mastoid air cells are clear.  Impression: No acute intracranial findings or significant interval change compared to earlier this month.    Electronically signed by: Brandon Storm  Date:    10/26/2023  Time:    12:25        ASSESSMENT & PLAN:   Assessment:  CVA versus TIA  History of hypertension, hyperlipidemia, congestive heart failure, atrial fibrillation on Xarelto, diabetes mellitus type 2, 6 nerve palsy, fatty liver disease, lumbar back pain, rheumatoid arthritis, Parkinson's disease    Plan:  - MRI of the brain ordered.  If positive for stroke consider further CVA workup  - CTA of the head and  neck ordered   - Accu-Cheks and sliding scale   - Diabetic diet  - Physical and occupational therapy  - Resume appropriate home medications when deemed necessary   - Labs in AM      VTE Prophylaxis: will be placed on  SCD for DVT prophylaxis and will be advised to be as mobile as possible and sit in a chair as tolerated      __________________________________________________________________________  INPATIENT LIST OF MEDICATIONS     Current Facility-Administered Medications:     acetaminophen tablet 650 mg, 650 mg, Oral, Q4H PRN, Kathrin Klein, PA-GRZEGORZ    dextrose 10% bolus 125 mL 125 mL, 12.5 g, Intravenous, PRN, Kathrin Klein, PA-C    dextrose 10% bolus 250 mL 250 mL, 25 g, Intravenous, PRN, Kathrin Klein, PA-GRZEGORZ    glucagon (human recombinant) injection 1 mg, 1 mg, Intramuscular, PRN, Kathrin Klein, PA-GRZEGORZ    glucose chewable tablet 16 g, 16 g, Oral, PRN, Kathrin Klein, PA-GRZEGORZ    glucose chewable tablet 24 g, 24 g, Oral, PRN, Kathrin Klein, PA-GRZEGORZ    insulin aspart U-100 injection 0-10 Units, 0-10 Units, Subcutaneous, QID (AC + HS) PRN, Kathrin Klein, PA-GRZEGORZ    ondansetron injection 4 mg, 4 mg, Intravenous, Q6H PRN, Kathrin Klein, PA-GRZEGORZ    Current Outpatient Medications:     alogliptin-pioglitazone (OSENI) 25-30 mg Tab, Take 1 tablet by mouth once daily., Disp: 90 tablet, Rfl: 1    alprostadil (CAVERJECT IMPULSE ICAV), by Intracavernosal route., Disp: , Rfl:     ascorbic acid, vitamin C, (VITAMIN C) 1000 MG tablet, Take 1,000 mg by mouth every evening., Disp: , Rfl:     b complex vitamins capsule, Take 1 capsule by mouth nightly., Disp: , Rfl:     blood sugar diagnostic (ACCU-CHEK GUIDE TEST STRIPS MISC), Accu-Chek Guide test strips, Disp: , Rfl:     carbidopa-levodopa  mg (SINEMET)  mg per tablet, Take 2 tablets by mouth 2 (two) times a day., Disp: , Rfl:     cholecalciferol, vitamin D3, 125 mcg (5,000 unit) capsule, Take 1,000 Units by mouth nightly., Disp: , Rfl:     coenzyme Q10  100 mg capsule, Take 100 mg by mouth every evening., Disp: , Rfl:     colchicine (COLCRYS) 0.6 mg tablet, Take 1 tablet (0.6 mg total) by mouth 2 (two) times daily., Disp: 180 tablet, Rfl: 1    folic acid/multivit-min/lutein (CENTRUM SILVER ORAL), Take 1 tablet by mouth every evening., Disp: , Rfl:     gabapentin (NEURONTIN) 300 MG capsule, Take 300 mg by mouth once daily., Disp: , Rfl:     gemfibroziL (LOPID) 600 MG tablet, Take 1 tablet (600 mg total) by mouth 2 (two) times daily., Disp: 180 tablet, Rfl: 1    lancets (ACCU-CHEK FASTCLIX LANCET DRUM [facility]), Accu-Chek Fastclix Lancet Drum, Disp: , Rfl:     lisinopriL 10 MG tablet, Take 10 mg by mouth daily as needed., Disp: , Rfl:     lisinopriL-hydrochlorothiazide (PRINZIDE,ZESTORETIC) 20-25 mg Tab, Take 1 tablet by mouth as needed., Disp: , Rfl:     magnesium 30 mg Tab, Take 250 mg by mouth nightly., Disp: , Rfl:     metoprolol succinate (TOPROL-XL) 25 MG 24 hr tablet, Take 1 tablet (25 mg total) by mouth once daily., Disp: 90 tablet, Rfl: 3    NON FORMULARY MEDICATION, Take 1 tablet by mouth nightly., Disp: , Rfl:     pantoprazole (PROTONIX) 40 MG tablet, Take 1 tablet (40 mg total) by mouth once daily., Disp: 90 tablet, Rfl: 1    potassium citrate 99 mg Cap, Take 1 tablet by mouth nightly., Disp: , Rfl:     rosuvastatin (CRESTOR) 20 MG tablet, Take 1 tablet (20 mg total) by mouth once daily., Disp: 90 tablet, Rfl: 1    selegiline (ELDEPRYL) 5 mg Cap, Take 5 mg by mouth 2 (two) times daily., Disp: , Rfl:     tofacitinib (XELJANZ XR) 11 mg Tb24, Take 1 tablet by mouth once daily., Disp: 30 tablet, Rfl: 4    vitamin E 400 UNIT capsule, Take 400 Units by mouth nightly., Disp: , Rfl:     XARELTO 20 mg Tab, Take 1 tablet (20 mg total) by mouth once daily., Disp: 30 tablet, Rfl: 6    zinc sulfate (ZINC-15 ORAL), Take 140 mg by mouth nightly., Disp: , Rfl:       Scheduled Meds:  Continuous Infusions:  PRN Meds:.acetaminophen, dextrose 10%, dextrose 10%, glucagon (human  recombinant), glucose, glucose, insulin aspart U-100, ondansetron      Discharge Planning and Disposition: Anticipated discharge to be determined.    IKathrin PA, have reviewed and discussed the case with Dr. José Antonio Fry.    Please see the following addendum for further assessment and plan from there attending MD.    Kathrin Klein PA-C  10/26/2023

## 2023-10-26 NOTE — SUBJECTIVE & OBJECTIVE
Past Medical History:   Diagnosis Date    Central stenosis of spinal canal     CHF (congestive heart failure)     Chronic back pain greater than 3 months duration 10/14/2022    Constipation 9/30/2022    Degeneration of lumbar intervertebral disc 7/6/2022    Diastolic dysfunction 5/1/2023    Dysphonia 9/30/2022    Dystrophic nail 6/19/2023    Erectile dysfunction 9/30/2022    Fatty liver     GERD (gastroesophageal reflux disease) 9/30/2022    H/O cataract removal with insertion of prosthetic lens     History of CVA (cerebrovascular accident) 9/30/2022    History of radial keratotomy 01/01/1998    HTN (hypertension)     Hyperlipidemia 9/30/2022    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    Microhematuria 3/30/2023    Overgrown toenails 6/19/2023    Parkinson's disease     Paroxysmal atrial fibrillation     Peyronie disease     Polyneuropathy associated with underlying disease 6/19/2023    Rheumatoid arthritis 1/10/2022    Rheumatoid arthritis, unspecified     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    Spinal stenosis of lumbar region 9/30/2022    Type 2 diabetes mellitus without complications        Past Surgical History:   Procedure Laterality Date    CATARACT EXTRACTION W/  INTRAOCULAR LENS IMPLANT      EPIDURAL STEROID INJECTION INTO LUMBAR SPINE      exc cyst Lt ear      INJECTION OF ANESTHETIC AGENT AROUND MEDIAL BRANCH NERVES INNERVATING LUMBAR FACET JOINT Bilateral 11/16/2022    Procedure: Block-nerve-medial branch-lumbar;  Surgeon: Gertrude Blanco MD;  Location: [facility];  Service: Pain Management;  Laterality: Bilateral;  Bilateral L4-L5...Patient is requesting to be first case    KNEE ARTHROSCOPY Right     REFRACTIVE SURGERY      VASECTOMY  1998       Review of patient's allergies indicates:   Allergen Reactions    Sarilumab Other (See Comments)     Other reaction(s): fainting       Current Neurological Medications:     No current facility-administered medications on file prior to encounter.     Current Outpatient  Medications on File Prior to Encounter   Medication Sig    alogliptin-pioglitazone (OSENI) 25-30 mg Tab Take 1 tablet by mouth once daily.    alprostadil (CAVERJECT IMPULSE ICAV) by Intracavernosal route.    ascorbic acid, vitamin C, (VITAMIN C) 1000 MG tablet Take 1,000 mg by mouth every evening.    b complex vitamins capsule Take 1 capsule by mouth nightly.    blood sugar diagnostic (ACCU-CHEK GUIDE TEST STRIPS MISC) Accu-Chek Guide test strips    carbidopa-levodopa  mg (SINEMET)  mg per tablet Take 2 tablets by mouth 2 (two) times a day.    cholecalciferol, vitamin D3, 125 mcg (5,000 unit) capsule Take 1,000 Units by mouth nightly.    coenzyme Q10 100 mg capsule Take 100 mg by mouth every evening.    colchicine (COLCRYS) 0.6 mg tablet Take 1 tablet (0.6 mg total) by mouth 2 (two) times daily.    folic acid/multivit-min/lutein (CENTRUM SILVER ORAL) Take 1 tablet by mouth every evening.    gabapentin (NEURONTIN) 300 MG capsule Take 300 mg by mouth once daily.    gemfibroziL (LOPID) 600 MG tablet Take 1 tablet (600 mg total) by mouth 2 (two) times daily.    lancets (ACCU-CHEK FASTCLIX LANCET DRUM [facility]) Accu-Chek Fastclix Lancet Drum    lisinopriL 10 MG tablet Take 10 mg by mouth daily as needed.    lisinopriL-hydrochlorothiazide (PRINZIDE,ZESTORETIC) 20-25 mg Tab Take 1 tablet by mouth as needed.    magnesium 30 mg Tab Take 250 mg by mouth nightly.    metoprolol succinate (TOPROL-XL) 25 MG 24 hr tablet Take 1 tablet (25 mg total) by mouth once daily.    NON FORMULARY MEDICATION Take 1 tablet by mouth nightly.    pantoprazole (PROTONIX) 40 MG tablet Take 1 tablet (40 mg total) by mouth once daily.    potassium citrate 99 mg Cap Take 1 tablet by mouth nightly.    rosuvastatin (CRESTOR) 20 MG tablet Take 1 tablet (20 mg total) by mouth once daily.    selegiline (ELDEPRYL) 5 mg Cap Take 5 mg by mouth 2 (two) times daily.    tofacitinib (XELJANZ XR) 11 mg Tb24 Take 1 tablet by mouth once daily.    vitamin E  400 UNIT capsule Take 400 Units by mouth nightly.    XARELTO 20 mg Tab Take 1 tablet (20 mg total) by mouth once daily.    zinc sulfate (ZINC-15 ORAL) Take 140 mg by mouth nightly.     Family History       Problem Relation (Age of Onset)    Alzheimer's disease Father    Autoimmune disease Sister    Coronary artery disease Mother    Dementia Sister    Emphysema Father    Glaucoma Father    Heart failure Mother    Pneumonia Sister          Tobacco Use    Smoking status: Former     Current packs/day: 0.00     Types: Cigarettes     Quit date:      Years since quittin.8     Passive exposure: Past    Smokeless tobacco: Never   Substance and Sexual Activity    Alcohol use: Not Currently     Alcohol/week: 2.0 - 3.0 standard drinks of alcohol     Types: 1 - 2 Cans of beer, 1 Shots of liquor per week     Comment: rarely    Drug use: Yes     Types: Marijuana    Sexual activity: Yes     Partners: Female     Review of Systems   Neurological:  Negative for dizziness, tremors, seizures, syncope, facial asymmetry, speech difficulty, weakness, light-headedness, numbness (resolved) and headaches.   All other systems reviewed and are negative.    Objective:     Vital Signs (Most Recent):  Temp: 98.1 °F (36.7 °C) (10/26/23 0852)  Pulse: 77 (10/26/23 1302)  Resp: 19 (10/26/23 1023)  BP: (!) 147/87 (10/26/23 1413)  SpO2: (!) 92 % (10/26/23 1302) Vital Signs (24h Range):  Temp:  [98.1 °F (36.7 °C)] 98.1 °F (36.7 °C)  Pulse:  [72-90] 77  Resp:  [19-20] 19  SpO2:  [91 %-98 %] 92 %  BP: (144-179)/(80-95) 147/87     Weight: 104.3 kg (230 lb)  Body mass index is 32.08 kg/m².     Physical Exam  Vitals and nursing note reviewed.   Constitutional:       General: He is not in acute distress.     Appearance: Normal appearance. He is not ill-appearing or toxic-appearing.   HENT:      Head: Normocephalic.   Eyes:      General: No visual field deficit.     Extraocular Movements:      Right eye: Normal extraocular motion and no nystagmus.       Left eye: Normal extraocular motion and no nystagmus.      Pupils: Pupils are equal, round, and reactive to light.   Cardiovascular:      Rate and Rhythm: Normal rate.   Pulmonary:      Effort: Pulmonary effort is normal.      Breath sounds: Normal breath sounds.   Musculoskeletal:         General: No swelling. Normal range of motion.      Cervical back: Normal range of motion.      Right lower leg: No edema.      Left lower leg: No edema.   Skin:     General: Skin is warm and dry.      Capillary Refill: Capillary refill takes less than 2 seconds.   Neurological:      General: No focal deficit present.      Mental Status: He is alert and oriented to person, place, and time.      Cranial Nerves: No dysarthria or facial asymmetry.      Sensory: Sensation is intact. No sensory deficit.      Motor: Tremor (PD tremor left hand) present. No weakness, atrophy, abnormal muscle tone, seizure activity or pronator drift.      Coordination: Coordination normal. Finger-Nose-Finger Test normal.   Psychiatric:         Attention and Perception: Attention normal.         Mood and Affect: Affect normal.         Speech: Speech normal.         Behavior: Behavior normal.          NEUROLOGICAL EXAMINATION:     MENTAL STATUS   Oriented to person, place, and time.   Speech: speech is normal     CRANIAL NERVES     CN III, IV, VI   Pupils are equal, round, and reactive to light.    GAIT AND COORDINATION      Coordination   Finger to nose coordination: normal      Significant Labs:   Recent Lab Results         10/26/23  1246   10/26/23  0955   10/26/23  0952        Albumin/Globulin Ratio     1.2       Albumin     4.3       ALP     61       ALT     21       aPTT     28.8  Comment: For Minimal Heparin Infusion, the goal aPTT 64-85 seconds corresponds to an anti-Xa of 0.3-0.5.    For Low Intensity and High Intensity Heparin, the goal aPTT  seconds corresponds to an anti-Xa of 0.3-0.7       AST     26       Baso #     0.02       Basophil %      0.4       BILIRUBIN TOTAL     0.5       BUN     19.3       Calcium     9.7       Chloride     108       CO2     26       Creatinine     1.17       eGFR     >60       Eos #     0.05       Eosinophil %     1.0       Globulin, Total     3.6       Glucose     138       Hematocrit     41.9       Hemoglobin     14.6       Immature Grans (Abs)     0.02       Immature Granulocytes     0.4       INR 1.1     1.2       Lymph #     1.06       LYMPH %     21.3       MCH     32.7       MCHC     34.8       MCV     93.7       Mono #     0.76       Mono %     15.3       MPV     10.6       Neut #     3.06       Neut %     61.6       nRBC     0.0       Platelet Count     222       POCT Glucose   128         Potassium     3.8       PROTEIN TOTAL     7.9       Protime 14.2     14.7       RBC     4.47       RDW     14.0       Sodium     141       TSH     1.608       WBC     4.97               Significant Imaging: I have reviewed all pertinent imaging results/findings within the past 24 hours.

## 2023-10-27 VITALS
HEART RATE: 109 BPM | WEIGHT: 230 LBS | TEMPERATURE: 98 F | BODY MASS INDEX: 32.2 KG/M2 | HEIGHT: 71 IN | DIASTOLIC BLOOD PRESSURE: 89 MMHG | SYSTOLIC BLOOD PRESSURE: 166 MMHG | OXYGEN SATURATION: 96 % | RESPIRATION RATE: 16 BRPM

## 2023-10-27 PROBLEM — G45.9 TIA (TRANSIENT ISCHEMIC ATTACK): Status: RESOLVED | Noted: 2023-09-05 | Resolved: 2023-10-27

## 2023-10-27 LAB
ALBUMIN SERPL-MCNC: 3.9 G/DL (ref 3.4–4.8)
ALBUMIN/GLOB SERPL: 1.4 RATIO (ref 1.1–2)
ALP SERPL-CCNC: 54 UNIT/L (ref 40–150)
ALT SERPL-CCNC: <5 UNIT/L (ref 0–55)
AST SERPL-CCNC: 19 UNIT/L (ref 5–34)
BASOPHILS # BLD AUTO: 0.02 X10(3)/MCL
BASOPHILS NFR BLD AUTO: 0.4 %
BILIRUB SERPL-MCNC: 0.4 MG/DL
BUN SERPL-MCNC: 17.4 MG/DL (ref 8.4–25.7)
CALCIUM SERPL-MCNC: 9 MG/DL (ref 8.8–10)
CHLORIDE SERPL-SCNC: 108 MMOL/L (ref 98–107)
CO2 SERPL-SCNC: 27 MMOL/L (ref 23–31)
CREAT SERPL-MCNC: 1.09 MG/DL (ref 0.73–1.18)
EOSINOPHIL # BLD AUTO: 0.08 X10(3)/MCL (ref 0–0.9)
EOSINOPHIL NFR BLD AUTO: 1.5 %
ERYTHROCYTE [DISTWIDTH] IN BLOOD BY AUTOMATED COUNT: 13.9 % (ref 11.5–17)
GFR SERPLBLD CREATININE-BSD FMLA CKD-EPI: >60 MLS/MIN/1.73/M2
GLOBULIN SER-MCNC: 2.8 GM/DL (ref 2.4–3.5)
GLUCOSE SERPL-MCNC: 162 MG/DL (ref 82–115)
HCT VFR BLD AUTO: 40 % (ref 42–52)
HGB BLD-MCNC: 13.7 G/DL (ref 14–18)
IMM GRANULOCYTES # BLD AUTO: 0.01 X10(3)/MCL (ref 0–0.04)
IMM GRANULOCYTES NFR BLD AUTO: 0.2 %
LYMPHOCYTES # BLD AUTO: 1 X10(3)/MCL (ref 0.6–4.6)
LYMPHOCYTES NFR BLD AUTO: 18.6 %
MCH RBC QN AUTO: 32.2 PG (ref 27–31)
MCHC RBC AUTO-ENTMCNC: 34.3 G/DL (ref 33–36)
MCV RBC AUTO: 94.1 FL (ref 80–94)
MONOCYTES # BLD AUTO: 0.84 X10(3)/MCL (ref 0.1–1.3)
MONOCYTES NFR BLD AUTO: 15.6 %
NEUTROPHILS # BLD AUTO: 3.44 X10(3)/MCL (ref 2.1–9.2)
NEUTROPHILS NFR BLD AUTO: 63.7 %
NRBC BLD AUTO-RTO: 0 %
PLATELET # BLD AUTO: 200 X10(3)/MCL (ref 130–400)
PMV BLD AUTO: 10.5 FL (ref 7.4–10.4)
POCT GLUCOSE: 179 MG/DL (ref 70–110)
POTASSIUM SERPL-SCNC: 4.2 MMOL/L (ref 3.5–5.1)
PROT SERPL-MCNC: 6.7 GM/DL (ref 5.8–7.6)
RBC # BLD AUTO: 4.25 X10(6)/MCL (ref 4.7–6.1)
SODIUM SERPL-SCNC: 141 MMOL/L (ref 136–145)
WBC # SPEC AUTO: 5.39 X10(3)/MCL (ref 4.5–11.5)

## 2023-10-27 PROCEDURE — 25000003 PHARM REV CODE 250: Performed by: INTERNAL MEDICINE

## 2023-10-27 PROCEDURE — 85025 COMPLETE CBC W/AUTO DIFF WBC: CPT | Performed by: PHYSICIAN ASSISTANT

## 2023-10-27 PROCEDURE — 99233 PR SUBSEQUENT HOSPITAL CARE,LEVL III: ICD-10-PCS | Mod: ,,, | Performed by: PSYCHIATRY & NEUROLOGY

## 2023-10-27 PROCEDURE — 97165 OT EVAL LOW COMPLEX 30 MIN: CPT

## 2023-10-27 PROCEDURE — 80053 COMPREHEN METABOLIC PANEL: CPT | Performed by: PHYSICIAN ASSISTANT

## 2023-10-27 PROCEDURE — 99233 SBSQ HOSP IP/OBS HIGH 50: CPT | Mod: ,,, | Performed by: PSYCHIATRY & NEUROLOGY

## 2023-10-27 RX ORDER — AMLODIPINE BESYLATE 5 MG/1
5 TABLET ORAL DAILY
Status: DISCONTINUED | OUTPATIENT
Start: 2023-10-27 | End: 2023-10-27 | Stop reason: HOSPADM

## 2023-10-27 RX ADMIN — CARBIDOPA AND LEVODOPA 2 TABLET: 25; 100 TABLET ORAL at 08:10

## 2023-10-27 RX ADMIN — GABAPENTIN 300 MG: 300 CAPSULE ORAL at 08:10

## 2023-10-27 RX ADMIN — METOPROLOL SUCCINATE 25 MG: 25 TABLET, EXTENDED RELEASE ORAL at 08:10

## 2023-10-27 RX ADMIN — RIVAROXABAN 20 MG: 10 TABLET, FILM COATED ORAL at 08:10

## 2023-10-27 RX ADMIN — AMLODIPINE BESYLATE 5 MG: 5 TABLET ORAL at 10:10

## 2023-10-27 RX ADMIN — PANTOPRAZOLE SODIUM 40 MG: 40 TABLET, DELAYED RELEASE ORAL at 08:10

## 2023-10-27 RX ADMIN — SELEGILINE HYDROCHLORIDE 5 MG: 5 CAPSULE ORAL at 09:10

## 2023-10-27 NOTE — SUBJECTIVE & OBJECTIVE
Subjective:     Interval History:  No new issues overnight.  MRI brain negative for acute infarct.      Current Facility-Administered Medications   Medication Dose Route Frequency Provider Last Rate Last Admin    acetaminophen tablet 650 mg  650 mg Oral Q4H PRN Kathrin Klein PA-C        amLODIPine tablet 5 mg  5 mg Oral Daily Dixon Larios MD   5 mg at 10/27/23 1025    atorvastatin tablet 40 mg  40 mg Oral QHS José Antonio Fry MD   40 mg at 10/26/23 2140    carbidopa-levodopa  mg per tablet 2 tablet  2 tablet Oral BID José Antoino rFy MD   2 tablet at 10/27/23 0821    dextrose 10% bolus 125 mL 125 mL  12.5 g Intravenous PRN Kathrin Klein, PA-GRZEGORZ        dextrose 10% bolus 250 mL 250 mL  25 g Intravenous PRN Kathrin Klein, VIVIEN        gabapentin capsule 300 mg  300 mg Oral Daily José Antonio Fry MD   300 mg at 10/27/23 0822    glucagon (human recombinant) injection 1 mg  1 mg Intramuscular PRN Kathrin Klein, VIVIEN        glucose chewable tablet 16 g  16 g Oral PRN Kathrin Klein, VIVIEN        glucose chewable tablet 24 g  24 g Oral PRN Kathrin Klein PA-C        insulin aspart U-100 injection 0-10 Units  0-10 Units Subcutaneous QID (AC + HS) PRN Kathrin Klein PA-C        metoprolol succinate (TOPROL-XL) 24 hr tablet 25 mg  25 mg Oral Daily José Antonio Fry MD   25 mg at 10/27/23 0822    ondansetron injection 4 mg  4 mg Intravenous Q6H PRN Kathrin Klein PA-C   4 mg at 10/26/23 1505    pantoprazole EC tablet 40 mg  40 mg Oral Daily José Antonio Fry MD   40 mg at 10/27/23 0822    rivaroxaban tablet 20 mg  20 mg Oral Daily José Antonio Fry MD   20 mg at 10/27/23 0822    selegiline capsule 5 mg  5 mg Oral BID José Antonio Fry MD   5 mg at 10/27/23 0931       Review of Systems   Neurological:  Negative for dizziness, tremors, seizures, syncope, facial asymmetry, speech difficulty, weakness, light-headedness, numbness (resolved) and headaches.   All other systems reviewed and are negative.    Objective:      Vital Signs (Most Recent):  Temp: 97.5 °F (36.4 °C) (10/27/23 1117)  Pulse: 109 (10/27/23 1117)  Resp: 16 (10/27/23 1117)  BP: (!) 166/89 (10/27/23 1150)  SpO2: 96 % (10/27/23 1117) Vital Signs (24h Range):  Temp:  [97.5 °F (36.4 °C)-97.7 °F (36.5 °C)] 97.5 °F (36.4 °C)  Pulse:  [] 109  Resp:  [14-21] 16  SpO2:  [92 %-99 %] 96 %  BP: (120-168)/(63-95) 166/89     Weight: 104.3 kg (230 lb)  Body mass index is 32.08 kg/m².     Physical Exam  Vitals reviewed.   Constitutional:       General: He is not in acute distress.     Appearance: Normal appearance. He is not ill-appearing or toxic-appearing.   HENT:      Head: Normocephalic.   Eyes:      General: No visual field deficit.     Extraocular Movements: Extraocular movements intact.      Pupils: Pupils are equal, round, and reactive to light.   Cardiovascular:      Rate and Rhythm: Normal rate.   Pulmonary:      Effort: Pulmonary effort is normal.   Skin:     General: Skin is warm and dry.      Capillary Refill: Capillary refill takes less than 2 seconds.   Neurological:      General: No focal deficit present.      Mental Status: He is alert and oriented to person, place, and time.      Cranial Nerves: No dysarthria or facial asymmetry.      Sensory: Sensation is intact.      Motor: Tremor (PD tremor left hand) present. No weakness, atrophy, abnormal muscle tone, seizure activity or pronator drift.      Coordination: Coordination normal. Finger-Nose-Finger Test normal.   Psychiatric:         Attention and Perception: Attention normal.         Mood and Affect: Affect normal.         Speech: Speech normal.         Behavior: Behavior normal. Behavior is cooperative.        Significant Labs: Hemoglobin A1c:   Recent Labs   Lab 10/09/23  0855 10/26/23  0952   HGBA1C 6.1 6.1     BMP:   Recent Labs   Lab 10/26/23  0952 10/27/23  0525    141   K 3.8 4.2   CO2 26 27   BUN 19.3 17.4   CREATININE 1.17 1.09   CALCIUM 9.7 9.0     CBC:   Recent Labs   Lab  "10/26/23  0952 10/27/23  0525   WBC 4.97 5.39   HGB 14.6 13.7*   HCT 41.9* 40.0*    200     Inflammatory Markers: No results for input(s): "SEDRATE", "CRP", "PROCAL" in the last 48 hours.    Significant Imaging: I have reviewed all pertinent imaging results/findings within the past 24 hours.    "

## 2023-10-27 NOTE — PROGRESS NOTES
[facility] - Neurology  Neurology  Progress Note    Patient Name: Melquiades Rehman Jr.  MRN: [id number]  Admission Date: 10/26/2023  Hospital Length of Stay: 1 days  Code Status: Prior   Attending Provider: José Antonio Fry MD  Primary Care Physician: Damien Dodd FNP   Principal Problem:TIA (transient ischemic attack)    HPI:   68 year old male with a past medical history of HTN, HLD, CHF, Afib (on Xarelto), DM, RA, PD, lumbar back pain, VI nerve palsy, and fatty liver disease presented to ED on 10/26 for confusion and right hand numbness. He woke up this morning, felt fine, went to the bank where he felt anxious. He had some difficulty reading his text messages as well as right hand numbness. The symptoms lasted 10-15 minutes. He did walk across the street and told his neighbor to call 911. He also reported 3-4 weeks ago he had a 15-20 minute episode of left arm and hand numbness that resolved spontaneously. Upon arrival to ED, /80. CT head was negative for acute intracranial findings. Neurology was consulted for TIA workup.         Overview/Hospital Course:  No notes on file        Subjective:     Interval History:  No new issues overnight.  MRI brain negative for acute infarct.      Current Facility-Administered Medications   Medication Dose Route Frequency Provider Last Rate Last Admin    acetaminophen tablet 650 mg  650 mg Oral Q4H PRN Kathrin Klein PA-C        amLODIPine tablet 5 mg  5 mg Oral Daily Dixon Larios MD   5 mg at 10/27/23 1025    atorvastatin tablet 40 mg  40 mg Oral QHS José Antonio Fry MD   40 mg at 10/26/23 2140    carbidopa-levodopa  mg per tablet 2 tablet  2 tablet Oral BID José Antonio Fry MD   2 tablet at 10/27/23 0821    dextrose 10% bolus 125 mL 125 mL  12.5 g Intravenous PRN Kathrin Klein PA-C        dextrose 10% bolus 250 mL 250 mL  25 g Intravenous PRN Kathrin Klein PA-C        gabapentin capsule 300 mg  300 mg Oral Daily José Antonio Fry MD   300  mg at 10/27/23 0822    glucagon (human recombinant) injection 1 mg  1 mg Intramuscular PRN Kathrin Klein PA-C        glucose chewable tablet 16 g  16 g Oral PRN Kathrin Klein PA-C        glucose chewable tablet 24 g  24 g Oral PRN Kathrin Klein PA-C        insulin aspart U-100 injection 0-10 Units  0-10 Units Subcutaneous QID (AC + HS) PRN Kathrin Klein PA-C        metoprolol succinate (TOPROL-XL) 24 hr tablet 25 mg  25 mg Oral Daily José Antonio Fry MD   25 mg at 10/27/23 0822    ondansetron injection 4 mg  4 mg Intravenous Q6H PRN Kathrin Klein PA-C   4 mg at 10/26/23 1505    pantoprazole EC tablet 40 mg  40 mg Oral Daily José Antonio Fry MD   40 mg at 10/27/23 0822    rivaroxaban tablet 20 mg  20 mg Oral Daily José Antonio Fry MD   20 mg at 10/27/23 0822    selegiline capsule 5 mg  5 mg Oral BID José Antonio Fry MD   5 mg at 10/27/23 0931       Review of Systems   Neurological:  Negative for dizziness, tremors, seizures, syncope, facial asymmetry, speech difficulty, weakness, light-headedness, numbness (resolved) and headaches.   All other systems reviewed and are negative.    Objective:     Vital Signs (Most Recent):  Temp: 97.5 °F (36.4 °C) (10/27/23 1117)  Pulse: 109 (10/27/23 1117)  Resp: 16 (10/27/23 1117)  BP: (!) 166/89 (10/27/23 1150)  SpO2: 96 % (10/27/23 1117) Vital Signs (24h Range):  Temp:  [97.5 °F (36.4 °C)-97.7 °F (36.5 °C)] 97.5 °F (36.4 °C)  Pulse:  [] 109  Resp:  [14-21] 16  SpO2:  [92 %-99 %] 96 %  BP: (120-168)/(63-95) 166/89     Weight: 104.3 kg (230 lb)  Body mass index is 32.08 kg/m².     Physical Exam  Vitals reviewed.   Constitutional:       General: He is not in acute distress.     Appearance: Normal appearance. He is not ill-appearing or toxic-appearing.   HENT:      Head: Normocephalic.   Eyes:      General: No visual field deficit.     Extraocular Movements: Extraocular movements intact.      Pupils: Pupils are equal, round, and reactive to light.  "  Cardiovascular:      Rate and Rhythm: Normal rate.   Pulmonary:      Effort: Pulmonary effort is normal.   Skin:     General: Skin is warm and dry.      Capillary Refill: Capillary refill takes less than 2 seconds.   Neurological:      General: No focal deficit present.      Mental Status: He is alert and oriented to person, place, and time.      Cranial Nerves: No dysarthria or facial asymmetry.      Sensory: Sensation is intact.      Motor: Tremor (PD tremor left hand) present. No weakness, atrophy, abnormal muscle tone, seizure activity or pronator drift.      Coordination: Coordination normal. Finger-Nose-Finger Test normal.   Psychiatric:         Attention and Perception: Attention normal.         Mood and Affect: Affect normal.         Speech: Speech normal.         Behavior: Behavior normal. Behavior is cooperative.        Significant Labs: Hemoglobin A1c:   Recent Labs   Lab 10/09/23  0855 10/26/23  0952   HGBA1C 6.1 6.1     BMP:   Recent Labs   Lab 10/26/23  0952 10/27/23  0525    141   K 3.8 4.2   CO2 26 27   BUN 19.3 17.4   CREATININE 1.17 1.09   CALCIUM 9.7 9.0     CBC:   Recent Labs   Lab 10/26/23  0952 10/27/23  0525   WBC 4.97 5.39   HGB 14.6 13.7*   HCT 41.9* 40.0*    200     Inflammatory Markers: No results for input(s): "SEDRATE", "CRP", "PROCAL" in the last 48 hours.    Significant Imaging: I have reviewed all pertinent imaging results/findings within the past 24 hours.      Assessment and Plan:     * TIA (transient ischemic attack)  -presented with episode of right hand numbness and confusion  -stroke RF: Afib (on Xarelto), HTN, HLD, DM  -intervention: none    Stroke workup negative for acute infarct.  -CTh:  No acute intracranial findings or significant interval change compared to earlier this month.  -CTA h/n:  Mild atherosclerotic changes seen as outlined above with no hemodynamically significant stenosis seen and no large plaque burden is seen.  -MRI brain:  Chronic age related " changes.  Otherwise unremarkable.  -ECHO (10/2/23): EF 60-65%, bubble study negative, normal LA  -CUS (10/2/23): bilateral ICA less than 50% stenosis  -A1c: 6.1  -TSH: 1.608      Plan  -Continue home medication  -Reports compliance with xarelto  -Follow up with Dr. Barrios in [place] for PD    No further stroke specific recommendations at this time.  Signing off for now ... Please call if there are any further questions or concerns.          VTE Risk Mitigation (From admission, onward)         Ordered     rivaroxaban tablet 20 mg  Daily         10/26/23 1349     Place sequential compression device  Until discontinued         10/26/23 1327                Shannon Elam, DAVIDSON  Neurology  [facility] - Neurology

## 2023-10-27 NOTE — NURSING
Nurses Note -- 4 Eyes      10/27/2023   1:54 AM      Skin assessed during: Admit      [x] No Altered Skin Integrity Present    []Prevention Measures Documented      [] Yes- Altered Skin Integrity Present or Discovered   [] LDA Added if Not in Epic (Describe Wound)   [] New Altered Skin Integrity was Present on Admit and Documented in LDA   [] Wound Image Taken    Wound Care Consulted? No    Attending Nurse:  Daniela Ruiz RN/Staff Member:   Sancho

## 2023-10-27 NOTE — DISCHARGE SUMMARY
[facility] Hospital Medicine Discharge Summary    Admit Date: 10/26/2023  Discharge Date and Time: 10/27/345416:55 AM  Admitting Physician:  Team  Discharging Physician: Dixon Larios MD.  Primary Care Physician: Damien Dodd FNP  Consults: Neurology    Discharge Diagnoses:  TIA, CVA ruled out - MRI brain negative   History of hypertension, hyperlipidemia, congestive heart failure, atrial fibrillation on Xarelto, diabetes mellitus type 2, 6 nerve palsy, fatty liver disease, lumbar back pain, rheumatoid arthritis, Parkinson's disease    Hospital Course:   Melquiades Rehman Jr. is a 68 y.o. White male with a past medical history of hypertension, hyperlipidemia, congestive heart failure, atrial fibrillation on Xarelto, diabetes mellitus type 2, rheumatoid arthritis, Parkinson's disease, 6 nerve palsy, fatty liver disease, lumbar back pain. The patient presented to [facility] on 10/26/2023 with a primary complaint of confusion and right hand numbness.  Patient reports this morning he woke up fine and went to the bank where he was very anxious.  When getting home patient reports he was confused, having difficulty comprehending his text messages and numbness to the right hand and wrist.  He was able to walk across history to his neighbor to tell them to call 911. He reports symptoms lasted approximately 10-15 minutes and resolved.  He denies complaints of speech changes, blurry vision, headache, pain, focal weakness.  Patient reports approximately 3-4 weeks ago he was experiencing a right-sided headache with numbness to the left shoulder, arm and hand which also lasted approximately 15-20 minutes in duration for self-resolving.  Upon presentation to the ED, temperature 98.1° F, heart rate 90, blood pressure 144/80, respiratory 20 and SpO2 98% on room air. CBC and CMP unremarkable.  EKG normal sinus rhythm.  CT of the head with no acute intracranial findings or significant interval change  compared to previous exam this month.  Patient is admitted to hospital medicine services for further medical management.  Neurology on board  MRI of the brain- no acute changes, chronic age-related changes  CTA of the head and neck- mild atherosclerotic changes seen as outlined above with no hemodynamically significant stenosis and no large plaque burden is seen  Diabetic diet  Moving independently in the room  Noted blood pressure elevated this morning, added amlodipine 5 mg daily, patient wants to continue his lisinopril HCTZ on discharge.  Blood pressure improved to 166/89  Neurology reached out around 11:40 a.m., cleared patient for discharge home with recommendation to follow-up with primary neurologist in [place] in 1 week  Resume appropriate home medications when deemed necessary     Pt was seen and examined on the day of discharge  Vitals:  VITAL SIGNS: 24 HRS MIN & MAX LAST   Temp  Min: 97.5 °F (36.4 °C)  Max: 97.7 °F (36.5 °C) 97.5 °F (36.4 °C)   BP  Min: 120/79  Max: 168/95 (!) 166/89   Pulse  Min: 68  Max: 109  109   Resp  Min: 14  Max: 21 16   SpO2  Min: 92 %  Max: 99 % 96 %     Physical Exam:  General: In no acute distress, afebrile  Chest: Clear to auscultation bilaterally  Heart: RRR, +S1, S2, no appreciable murmur  Abdomen: Soft, nontender, BS +  MSK: Warm, no lower extremity edema, no clubbing or cyanosis, tremors left upper extremity more than right  Neurologic: Alert and oriented x4, Cranial nerve II-XII intact, Strength 5/5 in all 4 extremities       Recent Labs   Lab 10/26/23  0952 10/27/23  0525   WBC 4.97 5.39   RBC 4.47* 4.25*   HGB 14.6 13.7*   HCT 41.9* 40.0*   MCV 93.7 94.1*   MCH 32.7* 32.2*   MCHC 34.8 34.3   RDW 14.0 13.9    200   MPV 10.6* 10.5*       Recent Labs   Lab 10/26/23  0952 10/27/23  0525    141   K 3.8 4.2   CO2 26 27   BUN 19.3 17.4   CREATININE 1.17 1.09   CALCIUM 9.7 9.0   ALBUMIN 4.3 3.9   ALKPHOS 61 54   ALT 21 <5   AST 26 19   BILITOT 0.5 0.4         Microbiology Results (last 7 days)       ** No results found for the last 168 hours. **             MRI Brain Without Contrast  Narrative: EXAMINATION:  MRI BRAIN WITHOUT CONTRAST    CLINICAL HISTORY:  Stroke, follow up;    TECHNIQUE:  Multiplanar multisequence MR imaging of the brain was performed without contrast.    COMPARISON:  None    FINDINGS:  No intracranial mass or lesion is seen.  No hemorrhage is seen.  No acute infarct is seen.  No diffusion abnormality seen.  There is diffuse cerebral atrophy seen along with some compensatory ventricular dilatation and periventricular white matter change consistent with patient's age.  Posterior fossa appears normal.  Calvarium is intact.  Paranasal sinuses appear grossly unremarkable.  Impression: Chronic age related changes    Otherwise unremarkable    Electronically signed by: Lidia Dominguez  Date:    10/26/2023  Time:    17:39  CTA Head and Neck (xpd)  Narrative: EXAMINATION:  CTA HEAD AND NECK (XPD)    CLINICAL HISTORY:  Stroke/TIA, determine embolic source;    TECHNIQUE:  Non contrast low dose axial images were obtained through the head.  CT angiogram was performed from the level of the arely to the top of the head following the IV administration 100 cc of Isovue 370 contrast .  Sagittal and coronal reconstructions and maximum intensity projection reconstructions were performed. Arterial stenosis percentages are based on NASCET measurement criteria.  Additional multiplanar reconstructions were performed on post processed imaging.  Automatic exposure control (AEC) is utilized to reduce patient radiation exposure.    COMPARISON:  None    FINDINGS:  Source images: No intracranial mass lesion is seen.  No hemorrhage is seen.  No infarct is seen.  There is some cerebral atrophy seen consistent with patient's age.  Ventricles and basal cisterns appear normal..  No cervical mass or lesion is seen.  No abnormal lymphadenopathy seen.  Thyroid appears normal.  Larynx  appears normal.  Trachea is midline.  Thoracic inlet appears normal.    Vascular images: The thoracic aortic arch is normal in caliber.  Some atherosclerotic plaque is seen.  There is a 3 vessel arch.  The proximal great vessels are widely patent.  There is some calcified plaque at the brachiocephalic artery on the right side but no significant stenosis is seen.    The common carotid arteries minimal plaque within but no significant stenosis.  There is calcified plaque at the carotid bulb and proximal internal carotid artery on the left but no significant stenosis is seen.  There is calcified plaque at the carotid bulb and proximal internal carotid artery on the right with 30% stenosis seen.  The distal internal carotid arteries are widely patent.  They are seen to the petrous ridge and cavernous and clinoid and supraclinoid portions.  Minimal plaque is seen.  No significant stenosis is seen.  MCAs are patent bilaterally.  M1 segments, M2 segments M3 segments are patent.  No aneurysm or obstruction is seen.  A1 segments are patent.  Anterior communicating arteries patent.  Anterior cerebral arteries are patent.  No aneurysm or obstruction is seen.    Both vertebral arteries are patent.  They are relatively free of plaque.  The left vertebral artery is slightly smaller than the right.  Both vertebral arteries are seen to the base of the skull and both vertebral arteries perfuse a normal appearing basilar artery.  No basilar artery aneurysm is seen.  No obstruction is seen.  Posterior cerebral arteries are widely patent with no obstruction or aneurysm seen.    .  Impression: Mild atherosclerotic changes seen as outlined above with no hemodynamically significant stenosis seen and no large plaque burden is seen.    Electronically signed by: Lidia Dominguez  Date:    10/26/2023  Time:    16:57  CT Head Without Contrast  Narrative: EXAMINATION:  CT HEAD WITHOUT CONTRAST    CLINICAL HISTORY:  Neuro deficit, acute,  stroke suspected;    TECHNIQUE:  CT imaging of the head performed from the skull base to the vertex without intravenous contrast. DLP 1001 mGycm. Automatic exposure control, adjustment of mA/kV or iterative reconstruction technique was used to reduce radiation.    COMPARISON:  2 October 2023    FINDINGS:  There is no acute cortical infarct, hemorrhage or mass lesion.  No new parenchymal attenuation abnormality.  Ventricular size is stable.  There are vascular calcifications.    Similar mild left maxillary sinus inflammation.  The mastoid air cells are clear.  Impression: No acute intracranial findings or significant interval change compared to earlier this month.    Electronically signed by: Brandon Storm  Date:    10/26/2023  Time:    12:25         Medication List        CONTINUE taking these medications      ACCU-CHEK FASTCLIX LANCET DRUM [facility]     ACCU-CHEK GUIDE TEST STRIPS MISC     alogliptin-pioglitazone 25-30 mg Tab  Commonly known as: OSENI  Take 1 tablet by mouth once daily.     ascorbic acid (vitamin C) 1000 MG tablet  Commonly known as: VITAMIN C     b complex vitamins capsule     carbidopa-levodopa  mg  mg per tablet  Commonly known as: SINEMET     CAVERJECT IMPULSE ICAV     CENTRUM SILVER ORAL     cholecalciferol (vitamin D3) 125 mcg (5,000 unit) capsule     coenzyme Q10 100 mg capsule     colchicine (gout) 0.6 mg tablet  Commonly known as: COLCRYS  Take 1 tablet (0.6 mg total) by mouth 2 (two) times daily.     gabapentin 300 MG capsule  Commonly known as: NEURONTIN     gemfibroziL 600 MG tablet  Commonly known as: LOPID  Take 1 tablet (600 mg total) by mouth 2 (two) times daily.     lisinopriL-hydrochlorothiazide 20-25 mg Tab  Commonly known as: PRINZIDE,ZESTORETIC     magnesium 30 mg Tab     metoprolol succinate 25 MG 24 hr tablet  Commonly known as: TOPROL-XL  Take 1 tablet (25 mg total) by mouth once daily.     NON FORMULARY MEDICATION     pantoprazole 40 MG tablet  Commonly known as:  PROTONIX  Take 1 tablet (40 mg total) by mouth once daily.     potassium citrate 99 mg Cap     rosuvastatin 20 MG tablet  Commonly known as: CRESTOR  Take 1 tablet (20 mg total) by mouth once daily.     selegiline 5 mg Cap  Commonly known as: ELDEPRYL     vitamin E 400 UNIT capsule     XARELTO 20 mg Tab  Generic drug: rivaroxaban  Take 1 tablet (20 mg total) by mouth once daily.     XELJANZ XR 11 mg Tb24  Generic drug: tofacitinib  Take 1 tablet by mouth once daily.     ZINC-15 ORAL            STOP taking these medications      lisinopriL 10 MG tablet               Explained in detail to the patient about the discharge plan, medications, and follow-up visits. Pt understands and agrees with the treatment plan  Discharge Disposition: Home or Self Care   Discharged Condition: stable  Diet- diabetic, cardiac     Medications Per [place] med rec  Activities as tolerated   Follow-up Information       Damien Dodd FNP. Schedule an appointment as soon as possible for a visit in 2 week(s).    Specialty: Family Medicine  Why: post discharge  Contact information:  Replaced by [facility] [phone]               primary neurologist in [place]. Schedule an appointment as soon as possible for a visit in 1 week(s).    Why: post discharge for TIA                         For further questions contact hospitalist office    Discharge time 34 minutes    For worsening symptoms, chest pain, shortness of breath, increased abdominal pain, high grade fever, stroke or stroke like symptoms, immediately go to the nearest Emergency Room or call 911 as soon as possible.      Dixon Larios MD  Department of Hospital Medicine   [facility]   10/27/2023 11:55 AM

## 2023-10-27 NOTE — PLAN OF CARE
Problem: Adult Inpatient Plan of Care  Goal: Optimal Comfort and Wellbeing  Outcome: Ongoing, Progressing     Problem: Adult Inpatient Plan of Care  Goal: Readiness for Transition of Care  Outcome: Ongoing, Progressing     Problem: Diabetes Comorbidity  Goal: Blood Glucose Level Within Targeted Range  Outcome: Ongoing, Progressing

## 2023-10-27 NOTE — PT/OT/SLP EVAL
Occupational Therapy   Evaluation and Discharge Note    Name: Melquiades Rehman Jr.  MRN: [id number]  Admitting Diagnosis: confusion, R hand numbness  Recent Surgery: * No surgery found *      Recommendations:     Discharge therapy intensity: No Therapy Indicated   Discharge Equipment Recommendations: bath bench  Barriers to discharge:  None    Assessment:     Melquiades Rehman Jr. is a 68 y.o. male with a medical diagnosis of TIA. On eval, patient presents with independence with self-care skills. Pt reports feeling comfortable with ADLs and reports not needing assistance upon d/c. Skilled OT services are not warranted at this time.    Plan:     OT to sign off as acute OT services are not warranted at this time.  Please re-consult if situation changes during this hospitalization.    Plan of Care Reviewed with: patient    Subjective     Chief Complaint: No complaints of pain this date.   Patient/Family Comments/goals: To return home.     Occupational Profile:  Living Environment: Pt reports living alone in a single level home with x3 steps to enter and no hand rails. Pt reports having a standard tub with no shower chair. Pt reports having a dog and a bird.   Previous level of function: Pt reports being independent with ADLs prior.   Roles and Routines: Retired , plays piano   Equipment Used at Home: none  Assistance upon Discharge: Pt reports he has a cleaning lady that comes once per week.     Pain/Comfort:  Pain Rating 1: 0/10    Patients cultural, spiritual, Hindu conflicts given the current situation: no    Objective:     OT communicated with RN prior to session. RN administered BP medication prior to OT session.     Patient was found HOB elevated with telemetry, peripheral IV upon OT entry to room.    General Precautions: Standard,    Orthopedic Precautions: N/A  Braces: N/A    Vital Signs: Blood Pressure: 172/96  HR: 65    Bed Mobility:    Patient completed Supine to Sit with  independence  Patient completed Sit to Supine with independence    Functional Mobility/Transfers:  Patient completed Sit <> Stand Transfer with stand by assistance  with  no assistive device   Patient completed Toilet Transfer Step Transfer technique with stand by assistance with  no AD  Functional Mobility: Pt ambulated from bedside to bathroom with SBA using no AD to complete toilet transfer.     Activities of Daily Living:  Grooming: stand by assistance to complete grooming tasks (brushing teeth) while standing at the sink.   Upper Body Dressing: independence to don gown while sitting EOB.   Lower Body Dressing: stand by assistance to doff/don socks while sitting EOB using figure four technique.   Toileting: stand by assistance to complete toilet transfer using no AD.     [facility] 6 Click ADL:  AMPAC Total Score: 24    Functional Cognition:  Intact    Visual Perceptual Skills:  Intact    Range of Motion/Manual Muscle Test    RUE  ROM Limitations  5 4+ 4 4- 3+ 3 3- 2+ 2 2- 1 0   Shoulder Flexion WFL [x]   []   []   []   []  []  []  []  []  []  []  []    Shoulder Extension WFL [x]   []   []   []   []  []  []  []  []  []  []  []    Elbow Flexion WFL [x]   []   []   []   []  []  []  []  []  []  []  []    Elbow Extension WFL [x]   []   []   []   []  []  []  []  []  []  []  []      LUE  ROM Limitations 5 4+ 4 4- 3+ 3 3- 2+ 2 2- 1 0   Shoulder Flexion WFL [x]   []   []   []   []  []  []  []  []  []  []  []    Shoulder Extension WFL [x]   []   []   []   []  []  []  []  []  []  []  []    Elbow Flexion WFL [x]   []   []   []   []  []  []  []  []  []  []  []    Elbow Extension WFL [x]   []   []   []   []  []  []  []  []  []  []  []        Sensation  RUE:pt c/o numbness at hand.       Balance:   Intact    Therapeutic Positioning  Risk for acquired pressure injuries is decreased due to ability to mobilize independently .    OT interventions performed during the course of today's session in an effort to prevent and/or reduce  acquired pressure injuries:   Education was provided on benefits of and recommendations for therapeutic positioning    Skin assessment: all bony prominences were assessed    Findings: no redness or breakdown noted    Patient Education:  Patient provided with verbal education education regarding OT role/goals/POC, safety awareness, and Discharge/DME recommendations.  Understanding was verbalized.     Patient left HOB elevated with all lines intact and call button in reach    History:     Past Medical History:   Diagnosis Date    Central stenosis of spinal canal     CHF (congestive heart failure)     Chronic back pain greater than 3 months duration 10/14/2022    Constipation 9/30/2022    Degeneration of lumbar intervertebral disc 7/6/2022    Diastolic dysfunction 5/1/2023    Dysphonia 9/30/2022    Dystrophic nail 6/19/2023    Erectile dysfunction 9/30/2022    Fatty liver     GERD (gastroesophageal reflux disease) 9/30/2022    H/O cataract removal with insertion of prosthetic lens     History of CVA (cerebrovascular accident) 9/30/2022    History of radial keratotomy 01/01/1998    HTN (hypertension)     Hyperlipidemia 9/30/2022    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    Microhematuria 3/30/2023    Overgrown toenails 6/19/2023    Parkinson's disease     Paroxysmal atrial fibrillation     Peyronie disease     Polyneuropathy associated with underlying disease 6/19/2023    Rheumatoid arthritis 1/10/2022    Rheumatoid arthritis, unspecified     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    Spinal stenosis of lumbar region 9/30/2022    Type 2 diabetes mellitus without complications          Past Surgical History:   Procedure Laterality Date    CATARACT EXTRACTION W/  INTRAOCULAR LENS IMPLANT      EPIDURAL STEROID INJECTION INTO LUMBAR SPINE      exc cyst Lt ear      INJECTION OF ANESTHETIC AGENT AROUND MEDIAL BRANCH NERVES INNERVATING LUMBAR FACET JOINT Bilateral 11/16/2022    Procedure: Block-nerve-medial branch-lumbar;  Surgeon: Gertrude VELARDE  MD Bella;  Location: [facility];  Service: Pain Management;  Laterality: Bilateral;  Bilateral L4-L5...Patient is requesting to be first case    KNEE ARTHROSCOPY Right     REFRACTIVE SURGERY      VASECTOMY  1998       Time Tracking:     OT Date of Treatment: 10/27/23  OT Start Time: 1046  OT Stop Time: 1100  OT Total Time (min): 14 min    Billable Minutes:Evaluation Low Complexity.     10/27/2023

## 2023-10-27 NOTE — PLAN OF CARE
Problem: Adult Inpatient Plan of Care  Goal: Plan of Care Review  10/27/2023 1000 by Padilla Farnsworth RN  Outcome: Ongoing, Progressing  10/27/2023 0951 by Padilla Farnsworth RN  Outcome: Ongoing, Progressing  Goal: Patient-Specific Goal (Individualized)  10/27/2023 1000 by Padilla Farnsworth RN  Outcome: Ongoing, Progressing  10/27/2023 0951 by Padilla Farnsworth RN  Outcome: Ongoing, Progressing  Goal: Absence of Hospital-Acquired Illness or Injury  10/27/2023 1000 by Padilla Farnsworth RN  Outcome: Ongoing, Progressing  10/27/2023 0951 by Padilla Farnsworth RN  Outcome: Ongoing, Progressing  Goal: Optimal Comfort and Wellbeing  10/27/2023 1000 by Padilla Farnsworth RN  Outcome: Ongoing, Progressing  10/27/2023 0951 by Padilla Farnsworth RN  Outcome: Ongoing, Progressing  Goal: Readiness for Transition of Care  10/27/2023 1000 by Padilla Farnsworth RN  Outcome: Ongoing, Progressing  10/27/2023 0951 by Padilla Farnsworth RN  Outcome: Ongoing, Progressing     Problem: Diabetes Comorbidity  Goal: Blood Glucose Level Within Targeted Range  10/27/2023 1000 by Padilla Farnsworth RN  Outcome: Ongoing, Progressing  10/27/2023 0951 by Padilla Farnsworth RN  Outcome: Ongoing, Progressing

## 2023-10-27 NOTE — PT/OT/SLP PROGRESS
Physical Therapy      Patient Name:  Melquiades Rehman Jr.   MRN:  [id number]    Attempted PT evaluation this AM - pt's blood pressure was elevated at 171/95, rechecked to 184/95. Notified RN who was to administer BP medication. Pt discharged prior to PT return to room.

## 2023-10-27 NOTE — ASSESSMENT & PLAN NOTE
-presented with episode of right hand numbness and confusion  -stroke RF: Afib (on Xarelto), HTN, HLD, DM  -intervention: none    Stroke workup negative for acute infarct.  -CTh:  No acute intracranial findings or significant interval change compared to earlier this month.  -CTA h/n:  Mild atherosclerotic changes seen as outlined above with no hemodynamically significant stenosis seen and no large plaque burden is seen.  -MRI brain:  Chronic age related changes.  Otherwise unremarkable.  -ECHO (10/2/23): EF 60-65%, bubble study negative, normal LA  -CUS (10/2/23): bilateral ICA less than 50% stenosis  -A1c: 6.1  -TSH: 1.608      Plan  -Continue home medication  -Reports compliance with xarelto  -Follow up with Dr. Barrios in [place] for PD    No further stroke specific recommendations at this time.  Signing off for now ... Please call if there are any further questions or concerns.

## 2023-10-30 ENCOUNTER — PATIENT OUTREACH (OUTPATIENT)
Dept: ADMINISTRATIVE | Facility: CLINIC | Age: 68
End: 2023-10-30
Payer: MEDICARE

## 2023-10-30 NOTE — PROGRESS NOTES
C3 nurse attempted to contact patient for a TCC post hospital discharge follow-up call. The patient declined call at this time, as he does not have any more minutes on his phone and will not have more until 11/8 or 11/9. He does see PCP on 11/8/23, and stated that he is doing well.

## 2023-10-31 RX ORDER — PANTOPRAZOLE SODIUM 40 MG/1
40 TABLET, DELAYED RELEASE ORAL
Qty: 90 TABLET | Refills: 1 | Status: SHIPPED | OUTPATIENT
Start: 2023-10-31 | End: 2023-11-08

## 2023-11-08 ENCOUNTER — OFFICE VISIT (OUTPATIENT)
Dept: INTERNAL MEDICINE | Facility: CLINIC | Age: 68
End: 2023-11-08
Payer: MEDICARE

## 2023-11-08 VITALS
HEIGHT: 71 IN | TEMPERATURE: 98 F | RESPIRATION RATE: 20 BRPM | WEIGHT: 234.81 LBS | HEART RATE: 76 BPM | SYSTOLIC BLOOD PRESSURE: 133 MMHG | BODY MASS INDEX: 32.87 KG/M2 | DIASTOLIC BLOOD PRESSURE: 81 MMHG

## 2023-11-08 DIAGNOSIS — K21.9 GASTROESOPHAGEAL REFLUX DISEASE, UNSPECIFIED WHETHER ESOPHAGITIS PRESENT: ICD-10-CM

## 2023-11-08 DIAGNOSIS — E11.9 TYPE 2 DIABETES MELLITUS WITHOUT COMPLICATION, WITHOUT LONG-TERM CURRENT USE OF INSULIN: Primary | ICD-10-CM

## 2023-11-08 DIAGNOSIS — E11.42 TYPE 2 DIABETES MELLITUS WITH PERIPHERAL NEUROPATHY: ICD-10-CM

## 2023-11-08 DIAGNOSIS — G45.9 TIA (TRANSIENT ISCHEMIC ATTACK): ICD-10-CM

## 2023-11-08 DIAGNOSIS — E78.2 MIXED HYPERLIPIDEMIA: ICD-10-CM

## 2023-11-08 DIAGNOSIS — D53.9 MACROCYTIC ANEMIA: ICD-10-CM

## 2023-11-08 DIAGNOSIS — R31.29 MICROHEMATURIA: ICD-10-CM

## 2023-11-08 DIAGNOSIS — Z12.11 SCREENING FOR COLON CANCER: ICD-10-CM

## 2023-11-08 PROCEDURE — 3008F BODY MASS INDEX DOCD: CPT | Mod: CPTII,,, | Performed by: NURSE PRACTITIONER

## 2023-11-08 PROCEDURE — 99215 OFFICE O/P EST HI 40 MIN: CPT | Mod: PBBFAC | Performed by: NURSE PRACTITIONER

## 2023-11-08 PROCEDURE — 3075F SYST BP GE 130 - 139MM HG: CPT | Mod: CPTII,,, | Performed by: NURSE PRACTITIONER

## 2023-11-08 PROCEDURE — 1111F PR DISCHARGE MEDS RECONCILED W/ CURRENT OUTPATIENT MED LIST: ICD-10-PCS | Mod: CPTII,,, | Performed by: NURSE PRACTITIONER

## 2023-11-08 PROCEDURE — 4010F ACE/ARB THERAPY RXD/TAKEN: CPT | Mod: CPTII,,, | Performed by: NURSE PRACTITIONER

## 2023-11-08 PROCEDURE — 3044F HG A1C LEVEL LT 7.0%: CPT | Mod: CPTII,,, | Performed by: NURSE PRACTITIONER

## 2023-11-08 PROCEDURE — 1159F MED LIST DOCD IN RCRD: CPT | Mod: CPTII,,, | Performed by: NURSE PRACTITIONER

## 2023-11-08 PROCEDURE — 1101F PR PT FALLS ASSESS DOC 0-1 FALLS W/OUT INJ PAST YR: ICD-10-PCS | Mod: CPTII,,, | Performed by: NURSE PRACTITIONER

## 2023-11-08 PROCEDURE — 3288F FALL RISK ASSESSMENT DOCD: CPT | Mod: CPTII,,, | Performed by: NURSE PRACTITIONER

## 2023-11-08 PROCEDURE — 3008F PR BODY MASS INDEX (BMI) DOCUMENTED: ICD-10-PCS | Mod: CPTII,,, | Performed by: NURSE PRACTITIONER

## 2023-11-08 PROCEDURE — 3075F PR MOST RECENT SYSTOLIC BLOOD PRESS GE 130-139MM HG: ICD-10-PCS | Mod: CPTII,,, | Performed by: NURSE PRACTITIONER

## 2023-11-08 PROCEDURE — 99214 PR OFFICE/OUTPT VISIT, EST, LEVL IV, 30-39 MIN: ICD-10-PCS | Mod: S$PBB,,, | Performed by: NURSE PRACTITIONER

## 2023-11-08 PROCEDURE — 3079F PR MOST RECENT DIASTOLIC BLOOD PRESSURE 80-89 MM HG: ICD-10-PCS | Mod: CPTII,,, | Performed by: NURSE PRACTITIONER

## 2023-11-08 PROCEDURE — 1101F PT FALLS ASSESS-DOCD LE1/YR: CPT | Mod: CPTII,,, | Performed by: NURSE PRACTITIONER

## 2023-11-08 PROCEDURE — 1126F PR PAIN SEVERITY QUANTIFIED, NO PAIN PRESENT: ICD-10-PCS | Mod: CPTII,,, | Performed by: NURSE PRACTITIONER

## 2023-11-08 PROCEDURE — 99214 OFFICE O/P EST MOD 30 MIN: CPT | Mod: S$PBB,,, | Performed by: NURSE PRACTITIONER

## 2023-11-08 PROCEDURE — 3288F PR FALLS RISK ASSESSMENT DOCUMENTED: ICD-10-PCS | Mod: CPTII,,, | Performed by: NURSE PRACTITIONER

## 2023-11-08 PROCEDURE — 1111F DSCHRG MED/CURRENT MED MERGE: CPT | Mod: CPTII,,, | Performed by: NURSE PRACTITIONER

## 2023-11-08 PROCEDURE — 3079F DIAST BP 80-89 MM HG: CPT | Mod: CPTII,,, | Performed by: NURSE PRACTITIONER

## 2023-11-08 PROCEDURE — 1160F RVW MEDS BY RX/DR IN RCRD: CPT | Mod: CPTII,,, | Performed by: NURSE PRACTITIONER

## 2023-11-08 PROCEDURE — 3044F PR MOST RECENT HEMOGLOBIN A1C LEVEL <7.0%: ICD-10-PCS | Mod: CPTII,,, | Performed by: NURSE PRACTITIONER

## 2023-11-08 PROCEDURE — 4010F PR ACE/ARB THEARPY RXD/TAKEN: ICD-10-PCS | Mod: CPTII,,, | Performed by: NURSE PRACTITIONER

## 2023-11-08 PROCEDURE — 1160F PR REVIEW ALL MEDS BY PRESCRIBER/CLIN PHARMACIST DOCUMENTED: ICD-10-PCS | Mod: CPTII,,, | Performed by: NURSE PRACTITIONER

## 2023-11-08 PROCEDURE — 1126F AMNT PAIN NOTED NONE PRSNT: CPT | Mod: CPTII,,, | Performed by: NURSE PRACTITIONER

## 2023-11-08 PROCEDURE — 1159F PR MEDICATION LIST DOCUMENTED IN MEDICAL RECORD: ICD-10-PCS | Mod: CPTII,,, | Performed by: NURSE PRACTITIONER

## 2023-11-08 RX ORDER — ROSUVASTATIN CALCIUM 20 MG/1
20 TABLET, COATED ORAL DAILY
Qty: 90 TABLET | Refills: 1 | Status: SHIPPED | OUTPATIENT
Start: 2023-11-08 | End: 2024-02-08 | Stop reason: SDUPTHER

## 2023-11-08 RX ORDER — GEMFIBROZIL 600 MG/1
600 TABLET, FILM COATED ORAL 2 TIMES DAILY
Qty: 180 TABLET | Refills: 1 | Status: SHIPPED | OUTPATIENT
Start: 2023-11-08 | End: 2024-02-08 | Stop reason: SDUPTHER

## 2023-11-08 RX ORDER — ALOGLIPTIN BENZOATE AND PIOGLITAZONE HYDROCHLORIDE 25; 30 MG/1; MG/1
1 TABLET, FILM COATED ORAL DAILY
Qty: 90 TABLET | Refills: 1 | Status: SHIPPED | OUTPATIENT
Start: 2023-11-08 | End: 2024-02-11 | Stop reason: SDUPTHER

## 2023-11-08 RX ORDER — PANTOPRAZOLE SODIUM 40 MG/1
40 TABLET, DELAYED RELEASE ORAL DAILY
Qty: 90 TABLET | Refills: 1 | Status: SHIPPED | OUTPATIENT
Start: 2023-11-08 | End: 2024-02-09 | Stop reason: SDUPTHER

## 2023-11-08 NOTE — PROGRESS NOTES
DAVIDSON Mandujano   [facility]  [address] [facility] [facility] [address]      PATIENT NAME: Melquiades Rehman Jr.  : 1955  DATE: 23  MRN: [id number]        Reason for Visit / Chief Complaint: Follow-up (ED visit 10/26/23)       History of Present Illness / Problem Focused Workflow     Melquiades Rehman Jr. presents to the clinic with Follow-up (ED visit 10/26/23)       22: 66 y.o. male presenting to [facility] to establish primary care.   Previous PCP: Dr. Woodrow Cobb, last visit > 6 mths   PmHx: h/o CVA (), HTN, HLD, CHF (patient denies such hx, though documented in record mx times), A fib managed on OAC, Type 2 DM, hepatic steatosis, gout, asymmetric hearing loss on left side--h/o infection, tinnitus, h/o right eye cataract, Parkinson's disease (), Peyronie's disease, RA (), obesity, h/o tobacco abuse, chronic lower back pain (following pain mgmt), GERD  SHx: cataract sx, knee arthroscopy, LHC, lumbar spine injections  FHx: Alzheimer's Disease and Emphysema in Father, Heart Failure and colon polyps in mother, PNA in sister  Complaints today: Establish primary care. Previously following Dr. Cobb. States seen x 2. Now requesting to establish at [facility]. Extensive PmHX as documented. He follows [facility]; has been seen in Cards at [facility] in the past. H/o AFIB/denies h/o CHF. Type 2 DM managed with Oseni. States taking x ~10 years. Reports CBGs 90s-130s. Tolerating medication well. Taking Rosuvastatin and Gemfibrozil for HLD. He follows ENT at [facility]. Follows Rheum at [facility] for RA. Awaiting an appt with new rheumatologist as previous MD moved out of state. Following prescribed management. Parkinson's Dz managed by [facility]. Pt reports recently seen by neurologist. Scheduled to completed labs today as ordered by Neuro. H/o lumbar spine stenosis and lower back pain. Apparently followed by Dr. Simpson for pain mgmt. He  "was referred to Dr. Perez for neurosx eval, however MD no longer with same practice. Pt now waiting to see Dr. Erik Love. When he was seen by Dr. Perez, it was not recommended that he undergo a multilevel lumbar fusion. He did suggest a spinal cord stimulator, however, pt alleges he was told he'd be sent for some type of stem cell treatment. This information has not been verified. He follows Dr. Chintan Benjamin for podiatry needs. No acute concerns.  Colon Cancer Screening: Reported colonoscopy 3/2012  Prostate Cancer Screening: PSA 2.26 (5/2021)  Eye Exam: Following [facility]  Vaccinations: Refuses Tdap; Shingrix series previously started    (3/29/22): Pt presenting for routine f/u. He was seen in ED 3/1/22 with c/o epistaxis. Apparently was in a [facility] and had a syncopal episode due to nosebleed x 48hs. He was then transferred to [facility]. States they were able to control/stop the bleeding ~5 hrs later in the ED. On OAC; states Xarelto was decreased from 20 mg po daily to 15 mg po daily. Following ENT. States cancelled recent appt because he hadn't had a nose bleed in 2 weeks, however, he had some bleeding from right nare last night x 10 min. Controlled with manual pressure. States will f/u with ENT if recurs. He also reports having some issues with low blood pressures (asymptomatic) lately. States cards told him Parkinson's could play a role. So, he is currently holding his HCTZ-Lisinopril at present. Labs completed and noted stable. Renal indices stable compared to baseline. HgA1c 6%. Taking Oseni. Denies overt s/s of hypoglycemia or hyperglycemia. No other concerns.    (9/30/22): Pt presenting for routine f/u. Labs completed earlier this week. Significant for: triglycerides 211, glucose 122, A1c 6.4%. Otherwise, labs are stable compared to baseline. He was referred to Dr. Armen Reyes in May for chronic lower back pain. Alleges their office has "been trying to get in touch with you for some more " "paperwork, but they can't get through." He continues to follows Cards at Dr. Susana PÉREZ.  has been taken off of ACE-I d/t hypotension that is reportedly 2/2 PD. BP at goal today. Parkinson's Dz managed by [facility]. He will see Rheum in November. Asking for refill on colchicine. He mentions h/o hard, infrequent stools from "when I was taking that pain medicine."  plans to obtain Miralax OTC for PRN use. No other complaints.    3/30/23: Pt presenting for routine f/u. Labs completed last week. Significant for: , A1c 6.5%, Creatinine 1.5, eGFR 50s, RBCs on UA. Otherwise, labs are stable compared to baseline. He was referred to Dr. Armen Reyes in May 2022 for chronic lower back pain. Alleges unable to be seen due to wait > 1 year. He is still following Dr. Simpson for pain mgmt. States seeking alternative measures to manage his back pain definitely as he is against surgical intervention. He continues to follows Cards at Dr. Susana PÉREZ.  has been taken off of ACE-I d/t hypotension that is reportedly 2/2 PD. BP at goal today. Parkinson's Dz managed by [facility]; Dr. Barrios. He is following Dr. Donovan for Rheum services here at [facility]. Last visit 3/21/23. He also underwent an eye exam 3/14/23, no diabetic retinopathy. Denies any glucose readings < 110. Admits drinking Steven at least 2-3x/day. He completed a CXR per Rheum on 3/21/23. [facility] was told he had some scarring so he's being sent for PFTs. Appt 4/5/23. Admits 15-year h/o smoking and quit in the 80s. Also had PNA twice, but denies trouble breathing, cough, wheezing, CP, etc. No other complaints.    03/21/23 10:43  Sodium: 142  Potassium: 4.0  Chloride: 103  CO2: 28  BUN: 20.2  Creatinine: 1.52 (H)  eGFR: 51  Glucose: 140 (H)  Calcium: 10.3 (H)  Alkaline Phosphatase: 62  PROTEIN TOTAL: 8.2 (H)  Albumin: 4.5  Albumin/Globulin Ratio: 1.2  BILIRUBIN TOTAL: 0.5  AST: 31  ALT: 33  CRP: 1.90  Globulin, Total: 3.7 " "(H)    Mr. Rehman is presenting for a hospital f/u. Of note, he's not been seen since March. He had a f/u scheduled 9/28/23, but the appt was cancelled via SMS. States doesn't recall cancelling the appt.    He presented to the ER on 10/26/2023 with a primary complaint of confusion and right hand numbness. "Patient reports this morning he woke up fine and went to the bank where he was very anxious.  When getting home patient reports he was confused, having difficulty comprehending his text messages and numbness to the right hand and wrist.  He was able to walk across history to his neighbor to tell them to call 911. He reports symptoms lasted approximately 10-15 minutes and resolved.  He denies complaints of speech changes, blurry vision, headache, pain, focal weakness.  Patient reports approximately 3-4 weeks ago he was experiencing a right-sided headache with numbness to the left shoulder, arm and hand which also lasted approximately 15-20 minutes in duration for self-resolving.  Upon presentation to the ED, temperature 98.1° F, heart rate 90, blood pressure 144/80, respiratory 20 and SpO2 98% on room air. CBC and CMP unremarkable.  EKG normal sinus rhythm.  CT of the head with no acute intracranial findings or significant interval change compared to previous exam this month.  Patient is admitted to hospital medicine services for further medical management.  Neurology on board  MRI of the brain- no acute changes, chronic age-related changes  CTA of the head and neck- mild atherosclerotic changes seen as outlined above with no hemodynamically significant stenosis and no large plaque burden is seen  Diabetic diet  Moving independently in the room  Noted blood pressure elevated this morning, added amlodipine 5 mg daily, patient wants to continue his lisinopril HCTZ on discharge.  Blood pressure improved to 166/89  Neurology reached out around 11:40 a.m., cleared patient for discharge home with recommendation to " "follow-up with primary neurologist in [place] in 1 week  Resume appropriate home medications when deemed necessary."    He was discharged on 10/27/23. Admits that prior to this episode of confusion and subsequent numbness, he was very anxious due to his account being overdrawn. When he got back home and tried to use his phone, he couldn't and this made him more anxious. This is when he went to his neighbor's home. States was told he was likely dealing with some anxiety. He's not had any further episodes since his ER visit, but admits that he's not contacted his neurologist with [facility] [place] since discharge.    He did have some labs done in October. See lipid, A1c, and TSH below.     10/09/23 08:55  Cholesterol Total: 167  HDL: 53  Total Cholesterol/HDL Ratio: 3  Triglycerides: 174 (H)  LDL Cholesterol: 79.00  Very Low Density Lipoprotein: 35  Hemoglobin A1C External: 6.1  Estimated Avg Glucose: 128.4  TSH: 2.002    His A1c remains at goal. His total cholesterol and LDL did improve from previous after his Crestor was increased a last visit. Recent CBC did reveal some macrocytic anemia. CMP stable. He's previously had some hidden blood on a past UA but he attributed this to consumption of VB12 at the time. Repeat UA in October unremarkable, but the urine cytology returned with atypical cells. He was referred to Uro but relates never received an appt. He was previously referred to GI lab for screening colonoscopy due to family hx and need for updated screening, but he states he was told referral was never received.             Review of Systems     Review of Systems   Constitutional: Negative.    HENT: Negative.     Eyes: Negative.    Respiratory: Negative.  Negative for apnea, cough, choking, chest tightness, shortness of breath, wheezing and stridor.    Cardiovascular: Negative.  Negative for chest pain, palpitations and leg swelling.   Endocrine: Negative.    Genitourinary: Negative.  Negative for " decreased urine volume, difficulty urinating, dysuria, enuresis, flank pain, frequency, genital sores, hematuria, penile discharge, penile pain, penile swelling, scrotal swelling, testicular pain and urgency.   Skin: Negative.    Allergic/Immunologic: Negative.    Neurological:  Positive for tremors and weakness. Negative for dizziness, seizures, syncope, facial asymmetry, speech difficulty, light-headedness, numbness and headaches.   Hematological: Negative.    Psychiatric/Behavioral: Negative.         Medical / Social / Family History     Past Medical History:   Diagnosis Date    Central stenosis of spinal canal     CHF (congestive heart failure)     Chronic back pain greater than 3 months duration 10/14/2022    Constipation 9/30/2022    Degeneration of lumbar intervertebral disc 7/6/2022    Diastolic dysfunction 5/1/2023    Dysphonia 9/30/2022    Dystrophic nail 6/19/2023    Erectile dysfunction 9/30/2022    Fatty liver     GERD (gastroesophageal reflux disease) 9/30/2022    H/O cataract removal with insertion of prosthetic lens     History of CVA (cerebrovascular accident) 9/30/2022    History of radial keratotomy 01/01/1998    HTN (hypertension)     Hyperlipidemia 9/30/2022    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    Microhematuria 3/30/2023    Overgrown toenails 6/19/2023    Parkinson's disease     Paroxysmal atrial fibrillation     Peyronie disease     Polyneuropathy associated with underlying disease 6/19/2023    Rheumatoid arthritis 1/10/2022    Rheumatoid arthritis, unspecified     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    Spinal stenosis of lumbar region 9/30/2022    Type 2 diabetes mellitus without complications        Past Surgical History:   Procedure Laterality Date    CATARACT EXTRACTION W/  INTRAOCULAR LENS IMPLANT      EPIDURAL STEROID INJECTION INTO LUMBAR SPINE      exc cyst Lt ear      EYE SURGERY      INJECTION OF ANESTHETIC AGENT AROUND MEDIAL BRANCH NERVES INNERVATING LUMBAR FACET JOINT Bilateral  11/16/2022    Procedure: Block-nerve-medial branch-lumbar;  Surgeon: Gertrude Blanco MD;  Location: [facility] OR;  Service: Pain Management;  Laterality: Bilateral;  Bilateral L4-L5...Patient is requesting to be first case    KNEE ARTHROSCOPY Right     REFRACTIVE SURGERY      VASECTOMY  1998       Social History    reports that he quit smoking about 36 years ago. His smoking use included cigarettes. He has been exposed to tobacco smoke. He has never used smokeless tobacco. He reports that he does not currently use drugs after having used the following drugs: Marijuana. He reports that he does not drink alcohol.    Family History  's family history includes Alzheimer's disease in his father; Autoimmune disease in his sister; Coronary artery disease in his mother; Dementia in his sister; Emphysema in his father; Glaucoma in his father; Heart failure in his mother; Pneumonia in his sister.    Medications and Allergies     Medications  Current Outpatient Medications   Medication Instructions    alogliptin-pioglitazone (OSENI) 25-30 mg Tab 1 tablet, Oral, Daily    alprostadil (CAVERJECT IMPULSE ICAV) Intracavernosal    ascorbic acid (vitamin C) (VITAMIN C) 1,000 mg, Oral, Nightly    b complex vitamins capsule 1 capsule, Oral, Nightly    blood sugar diagnostic (ACCU-CHEK GUIDE TEST STRIPS MISC) Accu-Chek Guide test strips    carbidopa-levodopa  mg (SINEMET)  mg per tablet 2 tablets, Oral, 2 times daily    cholecalciferol (vitamin D3) 1,000 Units, Oral, Nightly    coenzyme Q10 100 mg, Oral, Nightly    colchicine (gout) (COLCRYS) 0.6 mg, Oral, 2 times daily    folic acid/multivit-min/lutein (CENTRUM SILVER ORAL) 1 tablet, Oral, Nightly    gabapentin (NEURONTIN) 300 mg, Oral, Daily    gemfibroziL (LOPID) 600 mg, Oral, 2 times daily    lancets (ACCU-CHEK FASTCLIX LANCET DRUM MIS) Accu-Chek Fastclix Lancet Drum    lisinopriL-hydrochlorothiazide (PRINZIDE,ZESTORETIC) 20-25 mg Tab 1 tablet, Oral, As needed (PRN)  "   magnesium 250 mg, Oral, Nightly    metoprolol succinate (TOPROL-XL) 25 mg, Oral, Daily    NON FORMULARY MEDICATION 1 tablet, Oral, Nightly    pantoprazole (PROTONIX) 40 mg, Oral, Daily    potassium citrate 99 mg Cap 1 tablet, Oral, Nightly    rosuvastatin (CRESTOR) 20 mg, Oral, Daily    selegiline (ELDEPRYL) 5 mg, Oral, 2 times daily    tofacitinib (XELJANZ XR) 11 mg Tb24 1 tablet, Oral, Daily    vitamin E 400 Units, Oral, Nightly    XARELTO 20 mg, Oral, Daily    zinc sulfate (ZINC-15 ORAL) 140 mg, Oral, Nightly       Allergies  Review of patient's allergies indicates:   Allergen Reactions    Sarilumab Other (See Comments)     Other reaction(s): fainting       Physical Examination   Visit Vitals  /81 (BP Location: Left arm, Patient Position: Sitting, BP Method: Large (Automatic))   Pulse 76   Temp 97.8 °F (36.6 °C) (Oral)   Resp 20   Ht 5' 11" (1.803 m)   Wt 106.5 kg (234 lb 12.8 oz)   BMI 32.75 kg/m²     Physical Exam  Constitutional:       Appearance: Normal appearance.   HENT:      Head: Normocephalic and atraumatic.      Right Ear: External ear normal.      Left Ear: External ear normal.   Eyes:      Extraocular Movements: Extraocular movements intact.   Cardiovascular:      Rate and Rhythm: Normal rate and regular rhythm.      Heart sounds: Normal heart sounds.   Pulmonary:      Effort: Pulmonary effort is normal.      Breath sounds: Normal breath sounds.   Abdominal:      General: Bowel sounds are normal.      Palpations: Abdomen is soft.   Musculoskeletal:         General: Normal range of motion.      Cervical back: Normal range of motion.      Right lower leg: No edema.      Left lower leg: No edema.   Skin:     General: Skin is warm and dry.   Neurological:      General: No focal deficit present.      Mental Status: He is alert and oriented to person, place, and time.      Motor: Tremor present.   Psychiatric:         Mood and Affect: Mood normal.         Speech: Speech normal.         Behavior: " Behavior normal.         Thought Content: Thought content normal.         Judgment: Judgment normal.           Results     Chemistry:  Lab Results   Component Value Date     10/27/2023    K 4.2 10/27/2023    CHLORIDE 108 (H) 10/27/2023    BUN 17.4 10/27/2023    CREATININE 1.09 10/27/2023    EGFRNORACEVR >60 10/27/2023    GLUCOSE 162 (H) 10/27/2023    CALCIUM 9.0 10/27/2023    ALKPHOS 54 10/27/2023    LABPROT 6.7 10/27/2023    ALBUMIN 3.9 10/27/2023    BILIDIR 0.2 01/27/2022    IBILI 0.40 01/27/2022    AST 19 10/27/2023    ALT <5 10/27/2023    MG 1.60 12/04/2020    PHOS 3.3 12/04/2020    ZEBLYRAQ40SL 43.5 05/25/2021        Lab Results   Component Value Date    HGBA1C 6.1 10/26/2023        Hematology:  Lab Results   Component Value Date    WBC 5.39 10/27/2023    HGB 13.7 (L) 10/27/2023    HCT 40.0 (L) 10/27/2023     10/27/2023       Lipid Panel:  Lab Results   Component Value Date    CHOL 167 10/09/2023    HDL 53 10/09/2023    LDL 79.00 10/09/2023    TRIG 174 (H) 10/09/2023    TOTALCHOLEST 3 10/09/2023        Urine:  Lab Results   Component Value Date    COLORUA Yellow 10/09/2023    APPEARANCEUA Clear 10/09/2023    SGUA 1.025 10/09/2023    PHUA 5.5 10/09/2023    PROTEINUA Trace (A) 10/09/2023    GLUCOSEUA Normal 10/09/2023    KETONESUA Negative 10/09/2023    BLOODUA Negative 10/09/2023    NITRITESUA Negative 10/09/2023    LEUKOCYTESUR Negative 10/09/2023    RBCUA 0-5 10/09/2023    WBCUA 0-5 10/09/2023    BACTERIA None Seen 10/09/2023    SQEPUA None Seen 10/09/2023    HYALINECASTS None Seen 10/09/2023    CREATRANDUR 194.9 (H) 05/15/2023    PROTEINURINE 18.0 05/15/2023    UPROTCREA 0.1 05/15/2023          Assessment        ICD-10-CM ICD-9-CM   1. Type 2 diabetes mellitus without complication, without long-term current use of insulin  E11.9 250.00   2. Screening for colon cancer  Z12.11 V76.51   3. Microhematuria  R31.29 599.72   4. Gastroesophageal reflux disease, unspecified whether esophagitis present   K21.9 530.81   5. TIA (transient ischemic attack)  G45.9 435.9   6. Mixed hyperlipidemia  E78.2 272.2   7. Macrocytic anemia  D53.9 281.9   8. Type 2 diabetes mellitus with peripheral neuropathy  E11.42 250.60     357.2          Plan (including Health Maintenance)     Problem List Items Addressed This Visit          Neuro    TIA (transient ischemic attack)    Current Assessment & Plan     Continue current medications  Strongly encouraged to call Dr. Barrios's office for a f/u appt            Cardiac/Vascular    Mixed hyperlipidemia    Current Assessment & Plan     Total cholesterol and LDL improved after increased Crestor at last visit  Continue Crestor and Gemfibrozil  Educated on a low-fat, low-cholesterol diet and aerobic exercise (20-30 mins/day x 5 days a week). Encouraged healthy fruits and veggie intake. Increase water intake. Avoid excess ETOH intake and smoking           Relevant Medications    rosuvastatin (CRESTOR) 20 MG tablet    gemfibroziL (LOPID) 600 MG tablet       Renal/    Microhematuria    Current Assessment & Plan     Repeat UA 10/2023,  Unremarkable, but urine cytology revealed atypical cells in urine last month so he was referred to Uro. [facility] hasn't received an appt. Will have staff f/u   Latest Reference Range & Units 10/09/23 09:00   Color, UA Yellow, Light-Yellow, Dark Yellow, Imelda, Straw  Yellow   Appearance, UA Clear  Clear   Specific Gravity,UA 1.005 - 1.030  1.025   pH, UA 5.0 - 8.5  5.5   Protein, UA Negative  Trace !   Glucose, UA Negative, Normal  Normal   Ketones, UA Negative  Negative   Occult Blood UA Negative  Negative   NITRITE UA Negative  Negative   Bilirubin, UA Negative  Negative   Urobilinogen, UA 0.2, 1.0, Normal  Normal   Leukocytes, UA Negative  Negative   RBC, UA None Seen, 0-2, 3-5, 0-5 /HPF 0-5   WBC, UA None Seen, 0-2, 3-5, 0-5 /HPF 0-5   Bacteria, UA None Seen /HPF None Seen   Squamous Epithelial Cells, UA None Seen /HPF None Seen   Hyaline Casts, UA None Seen  /lpf None Seen   Mucous, UA None Seen /LPF Trace !               Oncology    Macrocytic anemia    Current Assessment & Plan     Will need VB12 and folate checked. Can f/u with new PCP            Endocrine    Type 2 diabetes mellitus with peripheral neuropathy    Current Assessment & Plan      Current medications: Oseni  A1c level: 6.1%, at goal  CBG trends: no log provided  Educated on diabetic diet: Low-fat, Low-carb, Low-cholesterol. Avoid sugary/dark drinks/sodas and white foods (i.e., bread, pasta, potatoes, rice)  Aerobic exercise: 20-30 min/day x 5 days/week  Diabetic Eye Exam: Following Eye Clinic. 3/14/23, no DR  Diabetic Foot Exam: 3/30/23, WNL  Microalbumin-U: slightly elevated. H/o ACE-I therapy  Kidney Protection: ACE-I previously stopped per Cards at [facility] d/t hypotension; back on HCTZ-ACE-I combo  Statin Therapy: yes, Crestor      Continue regimen              GI    GERD (gastroesophageal reflux disease)    Relevant Medications    pantoprazole (PROTONIX) 40 MG tablet     Other Visit Diagnoses       Type 2 diabetes mellitus without complication, without long-term current use of insulin    -  Primary    Relevant Medications    alogliptin-pioglitazone (OSENI) 25-30 mg Tab    Screening for colon cancer        Relevant Orders    Ambulatory referral/consult to Endo Procedure                 Health Maintenance Due   Topic Date Due    Colorectal Cancer Screening  03/12/2017    RSV Vaccine (Age 60+) (1 - 1-dose 60+ series) Never done    Diabetes Urine Screening  09/26/2023       Future Appointments   Date Time Provider Department Center   12/5/2023 12:30 PM Pau Gomes [facility] [place] [place]   12/8/2023  8:00 AM Gertrude Blanco MD [facility] SABRINA [place]   1/9/2024  9:00 AM Adriane Raygoza MD [facility] ASHELY Pe[place]   1/10/2024  8:00 AM Monica Chun [facility] RADHA Pe[place]   1/11/2024 11:00 AM Barbara Zelaya CHARI [facility] RHECOLIN [place]   3/14/2024  9:30 AM  PROVIDERS, CONY Stallings   4/9/2024  9:30 AM Valeria Donovan MD [facility] BRITNI Pe[place]      I spent a total of 39 minutes on the day of the visit.  This includes face to face time and non-face to face time preparing to see the patient (eg, review of tests), obtaining and/or reviewing separately obtained history, documenting clinical information in the electronic or other health record, independently interpreting results and communicating results to the patient/family/caregiver, or care coordinator.    Follow up if symptoms worsen or fail to improve.    Signature:  DAVIDSON Mandujano  [facility]  [address] [id number]    Date of encounter: 11/8/23

## 2023-11-08 NOTE — ASSESSMENT & PLAN NOTE
Repeat UA 10/2023,  Unremarkable, but urine cytology revealed atypical cells in urine last month so he was referred to Uro. [facility] hasn't received an appt. Will have staff f/u   Latest Reference Range & Units 10/09/23 09:00   Color, UA Yellow, Light-Yellow, Dark Yellow, Imelda, Straw  Yellow   Appearance, UA Clear  Clear   Specific Gravity,UA 1.005 - 1.030  1.025   pH, UA 5.0 - 8.5  5.5   Protein, UA Negative  Trace !   Glucose, UA Negative, Normal  Normal   Ketones, UA Negative  Negative   Occult Blood UA Negative  Negative   NITRITE UA Negative  Negative   Bilirubin, UA Negative  Negative   Urobilinogen, UA 0.2, 1.0, Normal  Normal   Leukocytes, UA Negative  Negative   RBC, UA None Seen, 0-2, 3-5, 0-5 /HPF 0-5   WBC, UA None Seen, 0-2, 3-5, 0-5 /HPF 0-5   Bacteria, UA None Seen /HPF None Seen   Squamous Epithelial Cells, UA None Seen /HPF None Seen   Hyaline Casts, UA None Seen /lpf None Seen   Mucous, UA None Seen /LPF Trace !

## 2023-11-08 NOTE — ASSESSMENT & PLAN NOTE
Current medications: Oseni  A1c level: 6.1%, at goal  CBG trends: no log provided  Educated on diabetic diet: Low-fat, Low-carb, Low-cholesterol. Avoid sugary/dark drinks/sodas and white foods (i.e., bread, pasta, potatoes, rice)  Aerobic exercise: 20-30 min/day x 5 days/week  Diabetic Eye Exam: Following [facility]. 3/14/23, no DR  Diabetic Foot Exam: 3/30/23, WNL  Microalbumin-U: slightly elevated. H/o ACE-I therapy  Kidney Protection: ACE-I previously stopped per Cards at [facility] d/t hypotension; back on HCTZ-ACE-I combo  Statin Therapy: yes, Crestor      Continue regimen

## 2023-11-08 NOTE — ASSESSMENT & PLAN NOTE
Total cholesterol and LDL improved after increased Crestor at last visit  Continue Crestor and Gemfibrozil  Educated on a low-fat, low-cholesterol diet and aerobic exercise (20-30 mins/day x 5 days a week). Encouraged healthy fruits and veggie intake. Increase water intake. Avoid excess ETOH intake and smoking

## 2023-11-09 ENCOUNTER — TELEPHONE (OUTPATIENT)
Dept: PREADMISSION TESTING | Facility: HOSPITAL | Age: 68
End: 2023-11-09
Payer: MEDICARE

## 2023-11-09 ENCOUNTER — TELEPHONE (OUTPATIENT)
Dept: ADMINISTRATIVE | Facility: HOSPITAL | Age: 68
End: 2023-11-09
Payer: MEDICARE

## 2023-11-09 NOTE — TELEPHONE ENCOUNTER
Do you know when Mr. Rehman is suppose to stop his Xarelto for this procedure?  He apparently sees cardiology at [facility].

## 2023-11-10 ENCOUNTER — OFFICE VISIT (OUTPATIENT)
Dept: UROLOGY | Facility: CLINIC | Age: 68
End: 2023-11-10
Payer: MEDICARE

## 2023-11-10 VITALS
RESPIRATION RATE: 20 BRPM | OXYGEN SATURATION: 95 % | HEIGHT: 71 IN | WEIGHT: 232.63 LBS | DIASTOLIC BLOOD PRESSURE: 83 MMHG | TEMPERATURE: 98 F | BODY MASS INDEX: 32.57 KG/M2 | HEART RATE: 76 BPM | SYSTOLIC BLOOD PRESSURE: 125 MMHG

## 2023-11-10 DIAGNOSIS — R31.29 MICROHEMATURIA: ICD-10-CM

## 2023-11-10 DIAGNOSIS — N40.0 BENIGN PROSTATIC HYPERPLASIA WITHOUT LOWER URINARY TRACT SYMPTOMS: ICD-10-CM

## 2023-11-10 DIAGNOSIS — R82.89 ABNORMAL URINE CYTOLOGY: Primary | ICD-10-CM

## 2023-11-10 PROCEDURE — 3008F BODY MASS INDEX DOCD: CPT | Mod: CPTII,,, | Performed by: NURSE PRACTITIONER

## 2023-11-10 PROCEDURE — 1111F PR DISCHARGE MEDS RECONCILED W/ CURRENT OUTPATIENT MED LIST: ICD-10-PCS | Mod: CPTII,,, | Performed by: NURSE PRACTITIONER

## 2023-11-10 PROCEDURE — 1101F PT FALLS ASSESS-DOCD LE1/YR: CPT | Mod: CPTII,,, | Performed by: NURSE PRACTITIONER

## 2023-11-10 PROCEDURE — 1125F AMNT PAIN NOTED PAIN PRSNT: CPT | Mod: CPTII,,, | Performed by: NURSE PRACTITIONER

## 2023-11-10 PROCEDURE — 4010F ACE/ARB THERAPY RXD/TAKEN: CPT | Mod: CPTII,,, | Performed by: NURSE PRACTITIONER

## 2023-11-10 PROCEDURE — 1111F DSCHRG MED/CURRENT MED MERGE: CPT | Mod: CPTII,,, | Performed by: NURSE PRACTITIONER

## 2023-11-10 PROCEDURE — 3008F PR BODY MASS INDEX (BMI) DOCUMENTED: ICD-10-PCS | Mod: CPTII,,, | Performed by: NURSE PRACTITIONER

## 2023-11-10 PROCEDURE — 99215 OFFICE O/P EST HI 40 MIN: CPT | Mod: PBBFAC | Performed by: NURSE PRACTITIONER

## 2023-11-10 PROCEDURE — 1160F PR REVIEW ALL MEDS BY PRESCRIBER/CLIN PHARMACIST DOCUMENTED: ICD-10-PCS | Mod: CPTII,,, | Performed by: NURSE PRACTITIONER

## 2023-11-10 PROCEDURE — 1159F PR MEDICATION LIST DOCUMENTED IN MEDICAL RECORD: ICD-10-PCS | Mod: CPTII,,, | Performed by: NURSE PRACTITIONER

## 2023-11-10 PROCEDURE — 99213 PR OFFICE/OUTPT VISIT, EST, LEVL III, 20-29 MIN: ICD-10-PCS | Mod: S$PBB,,, | Performed by: NURSE PRACTITIONER

## 2023-11-10 PROCEDURE — 4010F PR ACE/ARB THEARPY RXD/TAKEN: ICD-10-PCS | Mod: CPTII,,, | Performed by: NURSE PRACTITIONER

## 2023-11-10 PROCEDURE — 3288F PR FALLS RISK ASSESSMENT DOCUMENTED: ICD-10-PCS | Mod: CPTII,,, | Performed by: NURSE PRACTITIONER

## 2023-11-10 PROCEDURE — 3074F PR MOST RECENT SYSTOLIC BLOOD PRESSURE < 130 MM HG: ICD-10-PCS | Mod: CPTII,,, | Performed by: NURSE PRACTITIONER

## 2023-11-10 PROCEDURE — 3044F HG A1C LEVEL LT 7.0%: CPT | Mod: CPTII,,, | Performed by: NURSE PRACTITIONER

## 2023-11-10 PROCEDURE — 3079F PR MOST RECENT DIASTOLIC BLOOD PRESSURE 80-89 MM HG: ICD-10-PCS | Mod: CPTII,,, | Performed by: NURSE PRACTITIONER

## 2023-11-10 PROCEDURE — 1125F PR PAIN SEVERITY QUANTIFIED, PAIN PRESENT: ICD-10-PCS | Mod: CPTII,,, | Performed by: NURSE PRACTITIONER

## 2023-11-10 PROCEDURE — 1101F PR PT FALLS ASSESS DOC 0-1 FALLS W/OUT INJ PAST YR: ICD-10-PCS | Mod: CPTII,,, | Performed by: NURSE PRACTITIONER

## 2023-11-10 PROCEDURE — 1159F MED LIST DOCD IN RCRD: CPT | Mod: CPTII,,, | Performed by: NURSE PRACTITIONER

## 2023-11-10 PROCEDURE — 3079F DIAST BP 80-89 MM HG: CPT | Mod: CPTII,,, | Performed by: NURSE PRACTITIONER

## 2023-11-10 PROCEDURE — 99213 OFFICE O/P EST LOW 20 MIN: CPT | Mod: S$PBB,,, | Performed by: NURSE PRACTITIONER

## 2023-11-10 PROCEDURE — 3074F SYST BP LT 130 MM HG: CPT | Mod: CPTII,,, | Performed by: NURSE PRACTITIONER

## 2023-11-10 PROCEDURE — 3044F PR MOST RECENT HEMOGLOBIN A1C LEVEL <7.0%: ICD-10-PCS | Mod: CPTII,,, | Performed by: NURSE PRACTITIONER

## 2023-11-10 PROCEDURE — 1160F RVW MEDS BY RX/DR IN RCRD: CPT | Mod: CPTII,,, | Performed by: NURSE PRACTITIONER

## 2023-11-10 PROCEDURE — 3288F FALL RISK ASSESSMENT DOCD: CPT | Mod: CPTII,,, | Performed by: NURSE PRACTITIONER

## 2023-11-10 NOTE — PROGRESS NOTES
Chief Complaint:   Chief Complaint   Patient presents with    Hematuria       HPI:  Patient is a 68-year-old male referred to Urology for persistent hematuria.  Patient states PCP noted a urinalysis with trace hematuria.  None listed on chart.  Patient has a history of BPH and weak urine stream he was followed by PCP in the past with yearly PSA levels but has not had 1 in the past 2 years.  FATIMAH exam as noted below.  Patient denies any urinary frequency, urinary urgency, gross hematuria, urinary hesitancy, urinary retention, urinary incontinence.  Positive urine cytology per nephrology.    Allergies:  Review of patient's allergies indicates:   Allergen Reactions    Sarilumab Other (See Comments)     Other reaction(s): fainting       Medications:  Current Outpatient Medications   Medication Sig Dispense Refill    alogliptin-pioglitazone (OSENI) 25-30 mg Tab Take 1 tablet by mouth once daily. 90 tablet 1    alprostadil (CAVERJECT IMPULSE ICAV) by Intracavernosal route.      ascorbic acid, vitamin C, (VITAMIN C) 1000 MG tablet Take 1,000 mg by mouth every evening.      b complex vitamins capsule Take 1 capsule by mouth nightly.      blood sugar diagnostic (ACCU-CHEK GUIDE TEST STRIPS MISC) Accu-Chek Guide test strips      carbidopa-levodopa  mg (SINEMET)  mg per tablet Take 2 tablets by mouth 2 (two) times a day.      cholecalciferol, vitamin D3, 125 mcg (5,000 unit) capsule Take 1,000 Units by mouth nightly.      coenzyme Q10 100 mg capsule Take 100 mg by mouth every evening.      folic acid/multivit-min/lutein (CENTRUM SILVER ORAL) Take 1 tablet by mouth every evening.      gabapentin (NEURONTIN) 300 MG capsule Take 300 mg by mouth once daily.      gemfibroziL (LOPID) 600 MG tablet Take 1 tablet (600 mg total) by mouth 2 (two) times daily. 180 tablet 1    lancets (ACCU-CHEK FASTCLIX LANCET DRUM MISC) Accu-Chek Fastclix Lancet Drum      lisinopriL-hydrochlorothiazide (PRINZIDE,ZESTORETIC) 20-25 mg Tab Take  1 tablet by mouth as needed.      magnesium 30 mg Tab Take 250 mg by mouth nightly.      metoprolol succinate (TOPROL-XL) 25 MG 24 hr tablet Take 1 tablet (25 mg total) by mouth once daily. 90 tablet 3    NON FORMULARY MEDICATION Take 1 tablet by mouth nightly.      pantoprazole (PROTONIX) 40 MG tablet Take 1 tablet (40 mg total) by mouth once daily. 90 tablet 1    potassium citrate 99 mg Cap Take 1 tablet by mouth nightly.      rosuvastatin (CRESTOR) 20 MG tablet Take 1 tablet (20 mg total) by mouth once daily. 90 tablet 1    selegiline (ELDEPRYL) 5 mg Cap Take 5 mg by mouth 2 (two) times daily.      tofacitinib (XELJANZ XR) 11 mg Tb24 Take 1 tablet by mouth once daily. 30 tablet 4    vitamin E 400 UNIT capsule Take 400 Units by mouth nightly.      XARELTO 20 mg Tab Take 1 tablet (20 mg total) by mouth once daily. 30 tablet 6    zinc sulfate (ZINC-15 ORAL) Take 140 mg by mouth nightly.      colchicine (COLCRYS) 0.6 mg tablet Take 1 tablet (0.6 mg total) by mouth 2 (two) times daily. 180 tablet 1     No current facility-administered medications for this visit.       Review of Systems:  General: No fever, chills, fatigability, or weight loss.  Skin: No rashes, itching, or changes in color or texture of skin.  Chest: Denies BRAGG, cyanosis, wheezing, cough, and sputum production.  Abdomen: Appetite fine. No weight loss. Denies diarrhea, abdominal pain, hematemesis, or blood in stool.  Musculoskeletal: No joint stiffness or swelling. Denies back pain.  : As above.  All other review of systems negative.    PMH:  Past Medical History:   Diagnosis Date    Atrial fibrillation     Central stenosis of spinal canal     Chronic back pain greater than 3 months duration 10/14/2022    Constipation 09/30/2022    Degeneration of lumbar intervertebral disc 07/06/2022    Diastolic dysfunction 05/01/2023    Dysphonia 09/30/2022    Dystrophic nail 06/19/2023    Erectile dysfunction 09/30/2022    Fatty liver     GERD (gastroesophageal  reflux disease) 2022    H/O cataract removal with insertion of prosthetic lens     History of CVA (cerebrovascular accident) 2022    History of radial keratotomy 1998    HTN (hypertension)     Hyperlipidemia 2022    Induratio penis plastica 2022    Microhematuria 2023    Overgrown toenails 2023    Parkinson's disease     Peyronie disease     Polyneuropathy associated with underlying disease 2023    Rheumatoid arthritis 01/10/2022    Rheumatoid arthritis, unspecified     Sixth nerve palsy 2022    Spinal stenosis of lumbar region 2022    Type 2 diabetes mellitus without complications        PSH:  Past Surgical History:   Procedure Laterality Date    CATARACT EXTRACTION W/  INTRAOCULAR LENS IMPLANT      EPIDURAL STEROID INJECTION INTO LUMBAR SPINE      exc cyst Lt ear      EYE SURGERY      INJECTION OF ANESTHETIC AGENT AROUND MEDIAL BRANCH NERVES INNERVATING LUMBAR FACET JOINT Bilateral 2022    Procedure: Block-nerve-medial branch-lumbar;  Surgeon: Gertrude Blanco MD;  Location: [facility];  Service: Pain Management;  Laterality: Bilateral;  Bilateral L4-L5...Patient is requesting to be first case    KNEE ARTHROSCOPY Right     REFRACTIVE SURGERY      VASECTOMY         FamHx:  Family History   Problem Relation Age of Onset    Heart failure Mother     Coronary artery disease Mother     Glaucoma Father     Alzheimer's disease Father     Emphysema Father     Autoimmune disease Sister     Pneumonia Sister     Dementia Sister        SocHx:  Social History     Socioeconomic History    Marital status:     Number of children: 1   Tobacco Use    Smoking status: Former     Current packs/day: 0.00     Types: Cigarettes     Quit date:      Years since quittin.8     Passive exposure: Past    Smokeless tobacco: Never   Substance and Sexual Activity    Alcohol use: Not Currently    Drug use: Not Currently     Types: Marijuana    Sexual activity: Yes      Partners: Female     Social Determinants of Health     Financial Resource Strain: Medium Risk (10/21/2023)    Overall Financial Resource Strain (CARDIA)     Difficulty of Paying Living Expenses: Somewhat hard   Food Insecurity: No Food Insecurity (9/30/2022)    Hunger Vital Sign     Worried About Running Out of Food in the Last Year: Never true     Ran Out of Food in the Last Year: Never true   Transportation Needs: No Transportation Needs (9/30/2022)    PRAPARE - Transportation     Lack of Transportation (Medical): No     Lack of Transportation (Non-Medical): No   Physical Activity: Sufficiently Active (9/30/2022)    Exercise Vital Sign     Days of Exercise per Week: 7 days     Minutes of Exercise per Session: 30 min   Stress: No Stress Concern Present (9/30/2022)    [employer] [place] [employer] - Occupational Stress Questionnaire     Feeling of Stress : Not at all   Social Connections: Moderately Isolated (10/21/2023)    Social Connection and Isolation Panel [NHANES]     Frequency of Communication with Friends and Family: More than three times a week     Frequency of Social Gatherings with Friends and Family: More than three times a week     Attends Pentecostalism Services: Never     Active Member of Clubs or Organizations: Yes     Attends Club or Organization Meetings: More than 4 times per year     Marital Status:    Housing Stability: Low Risk  (9/30/2022)    Housing Stability Vital Sign     Unable to Pay for Housing in the Last Year: No     Number of Places Lived in the Last Year: 1     Unstable Housing in the Last Year: No       Physical Exam:  There were no vitals filed for this visit.  General: A&Ox3, no apparent distress, no deformities  Neck: No masses, normal thyroid  Lungs: CTA estelita, no use of accessory muscles  Heart: RRR, no arrhythmias  Abdomen: Soft, NT, ND, no masses, no hernias, no hepatosplenomegaly  Lymphatic: Neck and groin nodes negative  Skin: The skin is warm and dry. No  jaundice.  Ext: No c/c/e.    GUMale: Test desc estelita, no abnormalities of epididymus. Penis uncircumcised, with normal penile and scrotal skin. Meatus normal. Normal rectal tone, no hemorrhoids. Prostate:  1-1/2 finger breadth wide, flat, smooth, soft, nontender, no nodules or masses appreciated. SV not palpable. Perineum and anus normal.      Urinalysis:  Pending urinalysis, PSA.      Impression:  1. Abnormal urine cytology  - Ambulatory referral/consult to Urology    2. Microhematuria  - Ambulatory referral/consult to Urology  3. BPH    Plan:  Instructed patient will notify him of PSA and urinalysis results.  Instructed patient RTC 6 months with PSA.  If patient denotes any abnormal urinalysis symptoms notify clinic to be re-evaluate treated.  CT of the abdomen pelvis with oral contrast due to positive urine cytology and previous history of hematuria we will send patient to Dr. Morocho to get a cystoscope for evaluation.

## 2023-11-11 ENCOUNTER — PATIENT MESSAGE (OUTPATIENT)
Dept: ADMINISTRATIVE | Facility: OTHER | Age: 68
End: 2023-11-11
Payer: MEDICARE

## 2023-11-14 ENCOUNTER — ANESTHESIA EVENT (OUTPATIENT)
Dept: SURGERY | Facility: HOSPITAL | Age: 68
End: 2023-11-14
Payer: MEDICARE

## 2023-11-15 ENCOUNTER — TELEPHONE (OUTPATIENT)
Dept: ENDOSCOPY | Facility: HOSPITAL | Age: 68
End: 2023-11-15

## 2023-11-16 ENCOUNTER — OFFICE VISIT (OUTPATIENT)
Dept: CARDIOLOGY | Facility: CLINIC | Age: 68
End: 2023-11-16
Payer: MEDICARE

## 2023-11-16 VITALS
TEMPERATURE: 98 F | HEIGHT: 70 IN | BODY MASS INDEX: 33.5 KG/M2 | WEIGHT: 234 LBS | DIASTOLIC BLOOD PRESSURE: 89 MMHG | OXYGEN SATURATION: 96 % | HEART RATE: 84 BPM | RESPIRATION RATE: 20 BRPM | SYSTOLIC BLOOD PRESSURE: 145 MMHG

## 2023-11-16 DIAGNOSIS — E78.2 MIXED HYPERLIPIDEMIA: ICD-10-CM

## 2023-11-16 DIAGNOSIS — Z86.73 HISTORY OF CVA (CEREBROVASCULAR ACCIDENT): Primary | ICD-10-CM

## 2023-11-16 DIAGNOSIS — G89.4 CHRONIC PAIN SYNDROME: ICD-10-CM

## 2023-11-16 DIAGNOSIS — I48.91 ATRIAL FIBRILLATION AND FLUTTER: ICD-10-CM

## 2023-11-16 DIAGNOSIS — I10 PRIMARY HYPERTENSION: ICD-10-CM

## 2023-11-16 DIAGNOSIS — I48.92 ATRIAL FIBRILLATION AND FLUTTER: ICD-10-CM

## 2023-11-16 DIAGNOSIS — I51.89 DIASTOLIC DYSFUNCTION: ICD-10-CM

## 2023-11-16 PROCEDURE — 99215 OFFICE O/P EST HI 40 MIN: CPT | Mod: PBBFAC | Performed by: INTERNAL MEDICINE

## 2023-11-16 RX ORDER — PLANT STANOL ESTER 450 MG
8000 TABLET ORAL DAILY
COMMUNITY
End: 2024-01-11

## 2023-11-16 NOTE — MEDICAL/APP STUDENT
11/16/2023 9:13 AM    Subjective:     CHIEF COMPLAINT: Paroxsymal a fib   Chief Complaint   Patient presents with    f/u denies chest pain or sob since LV has questions                            HPI:    Mr. Melquiades Rehman Jr. is a 68 y.o. male with a past medical history of a fib and flutter, CVA, TIA, Parkinson's, spinal stenosis, T2DM, HTN, HLD, CKD stage 3a, RA, and fatty liver here for follow-up after echo and carotid US performed on 10/2/23. His echo done on 10/2/23 showed an EF of 60-65% with normal diastolic function. The carotid ultrasound on 10/2/23 showed less than 50% stenosis in both the right and left internal carotid arteries. He denies chest pain, SOB, BRAGG, dizziness, syncope, orthopnea, and edema. He says he will feel his heart skip a beat every week or so, but it only lasts about 1-2 seconds and he can feel his heart go back into normal sinus rhythm. He says he has been limited by his back pain and RA; he says he can walk for about a quarter mile at a time and does all of his ADLs. He is having a nerve block procedure done next week and was asking about if it is okay to hold his Xarelto for 2 days; he was worried because he has a friend who had a stroke after holding his blood thinners.     CP:  The patient has no chest discomfort.      SOB:  The patient denies shortness of breath No BRAGG    EDEMA:  The patient denies edema.        ORTHOPNEA:  The patient denies orthopnea.  No PND.      SYNCOPE:  The patient denies near syncope.  No syncope.   No dizziness.    PALPITATIONS:  The patient has palpitations.    LEVEL OF EXERTION:  The patient does house work and does not have symptoms with this level of exertion.  The patient's level of exertion is adequate. He says he is limited by his back pain and RA; he can walk about a quarter mile at a time. He does all his house work independently.     The patient had recent cardiac testing.     Past Medical History    Patient Active Problem List   Diagnosis     Family history of colonic polyps    High risk medication use    History of CVA (cerebrovascular accident)    Atrial fibrillation and flutter    Chronic pain syndrome    Degeneration of lumbar intervertebral disc    Controlled type 2 diabetes mellitus without complication, without long-term current use of insulin    Dysphonia    Erectile dysfunction    GERD (gastroesophageal reflux disease)    History of right cataract extraction    Mixed hyperlipidemia    Primary hypertension    Induratio penis plastica    Obesity    Parkinson's disease    Seropositive rheumatoid arthritis of multiple sites    Sixth nerve palsy    Spinal stenosis at L4-L5 level    Steatosis of liver    Wellness examination    Constipation    Chronic back pain greater than 3 months duration    Lumbar spondylosis    Decreased GFR    Microhematuria    Diastolic dysfunction    Encounter for comprehensive diabetic foot examination, type 2 diabetes mellitus    Dystrophic nail    Overgrown toenails    Type 2 diabetes mellitus with peripheral neuropathy    Polyneuropathy associated with underlying disease    Stage 3a chronic kidney disease    ILD (interstitial lung disease)    Foreign body in right foot    TIA (transient ischemic attack)    Macrocytic anemia       Surgical History    Past Surgical History:   Procedure Laterality Date    CATARACT EXTRACTION W/  INTRAOCULAR LENS IMPLANT      EPIDURAL STEROID INJECTION INTO LUMBAR SPINE      exc cyst Lt ear      EYE SURGERY      INJECTION OF ANESTHETIC AGENT AROUND MEDIAL BRANCH NERVES INNERVATING LUMBAR FACET JOINT Bilateral 11/16/2022    Procedure: Block-nerve-medial branch-lumbar;  Surgeon: Gertrude Blanco MD;  Location: [facility];  Service: Pain Management;  Laterality: Bilateral;  Bilateral L4-L5...Patient is requesting to be first case    KNEE ARTHROSCOPY Right     REFRACTIVE SURGERY      VASECTOMY  1998       Social History    Social History     Socioeconomic History    Marital status:     Number  of children: 1   Tobacco Use    Smoking status: Former     Current packs/day: 0.00     Types: Cigarettes     Quit date:      Years since quittin.8     Passive exposure: Past    Smokeless tobacco: Never   Substance and Sexual Activity    Alcohol use: Not Currently    Drug use: Not Currently     Types: Marijuana    Sexual activity: Yes     Partners: Female     Social Determinants of Health     Financial Resource Strain: Medium Risk (10/21/2023)    Overall Financial Resource Strain (CARDIA)     Difficulty of Paying Living Expenses: Somewhat hard   Food Insecurity: No Food Insecurity (2022)    Hunger Vital Sign     Worried About Running Out of Food in the Last Year: Never true     Ran Out of Food in the Last Year: Never true   Transportation Needs: No Transportation Needs (2022)    PRAPARE - Transportation     Lack of Transportation (Medical): No     Lack of Transportation (Non-Medical): No   Physical Activity: Sufficiently Active (2022)    Exercise Vital Sign     Days of Exercise per Week: 7 days     Minutes of Exercise per Session: 30 min   Stress: No Stress Concern Present (2022)    [employer] [place] [employer] - Occupational Stress Questionnaire     Feeling of Stress : Not at all   Social Connections: Moderately Isolated (10/21/2023)    Social Connection and Isolation Panel [NHANES]     Frequency of Communication with Friends and Family: More than three times a week     Frequency of Social Gatherings with Friends and Family: More than three times a week     Attends Bahai Services: Never     Active Member of Clubs or Organizations: Yes     Attends Club or Organization Meetings: More than 4 times per year     Marital Status:    Housing Stability: Low Risk  (2022)    Housing Stability Vital Sign     Unable to Pay for Housing in the Last Year: No     Number of Places Lived in the Last Year: 1     Unstable Housing in the Last Year: No       Family History     Family History   Problem Relation Age of Onset    Heart failure Mother     Coronary artery disease Mother     Glaucoma Father     Alzheimer's disease Father     Emphysema Father     Autoimmune disease Sister     Pneumonia Sister     Dementia Sister        Allergy    Review of patient's allergies indicates:   Allergen Reactions    Sarilumab Other (See Comments)     Other reaction(s): fainting       Current Medications    Current Outpatient Medications:     alogliptin-pioglitazone (OSENI) 25-30 mg Tab, Take 1 tablet by mouth once daily., Disp: 90 tablet, Rfl: 1    ascorbic acid, vitamin C, (VITAMIN C) 1000 MG tablet, Take 1,000 mg by mouth every evening., Disp: , Rfl:     b complex vitamins capsule, Take 1 capsule by mouth nightly., Disp: , Rfl:     carbidopa-levodopa  mg (SINEMET)  mg per tablet, Take 2 tablets by mouth 2 (two) times a day., Disp: , Rfl:     cholecalciferol, vitamin D3, 125 mcg (5,000 unit) capsule, Take 1,000 Units by mouth nightly., Disp: , Rfl:     coenzyme Q10 100 mg capsule, Take 100 mg by mouth every evening., Disp: , Rfl:     colchicine (COLCRYS) 0.6 mg tablet, Take 1 tablet (0.6 mg total) by mouth 2 (two) times daily., Disp: 180 tablet, Rfl: 1    folic acid/multivit-min/lutein (CENTRUM SILVER ORAL), Take 1 tablet by mouth every evening., Disp: , Rfl:     gemfibroziL (LOPID) 600 MG tablet, Take 1 tablet (600 mg total) by mouth 2 (two) times daily., Disp: 180 tablet, Rfl: 1    lisinopriL-hydrochlorothiazide (PRINZIDE,ZESTORETIC) 20-25 mg Tab, Take 1 tablet by mouth as needed., Disp: , Rfl:     magnesium 30 mg Tab, Take 250 mg by mouth nightly., Disp: , Rfl:     metoprolol succinate (TOPROL-XL) 25 MG 24 hr tablet, Take 1 tablet (25 mg total) by mouth once daily., Disp: 90 tablet, Rfl: 3    pantoprazole (PROTONIX) 40 MG tablet, Take 1 tablet (40 mg total) by mouth once daily., Disp: 90 tablet, Rfl: 1    potassium citrate 99 mg Cap, Take 1 tablet by mouth nightly., Disp: , Rfl:      "rosuvastatin (CRESTOR) 20 MG tablet, Take 1 tablet (20 mg total) by mouth once daily., Disp: 90 tablet, Rfl: 1    selegiline (ELDEPRYL) 5 mg Cap, Take 5 mg by mouth 2 (two) times daily., Disp: , Rfl:     tofacitinib (XELJANZ XR) 11 mg Tb24, Take 1 tablet by mouth once daily., Disp: 30 tablet, Rfl: 4    vitamin A 8000 UNIT capsule, Take 8,000 Units by mouth once daily., Disp: , Rfl:     vitamin E 400 UNIT capsule, Take 400 Units by mouth nightly., Disp: , Rfl:     XARELTO 20 mg Tab, Take 1 tablet (20 mg total) by mouth once daily., Disp: 30 tablet, Rfl: 6    zinc sulfate (ZINC-15 ORAL), Take 140 mg by mouth nightly., Disp: , Rfl:     alprostadil (CAVERJECT IMPULSE ICAV), by Intracavernosal route., Disp: , Rfl:     blood sugar diagnostic (ACCU-CHEK GUIDE TEST STRIPS [facility] Accu-Chek Guide test strips, Disp: , Rfl:     gabapentin (NEURONTIN) 300 MG capsule, Take 300 mg by mouth once daily., Disp: , Rfl:     lancets (ACCU-CHEK FASTCLIX LANCET DRUM MIS), Accu-Chek Fastclix Lancet Drum, Disp: , Rfl:     NON FORMULARY MEDICATION, Take 1 tablet by mouth nightly., Disp: , Rfl:     ROS:   Please see HPI    Objective:     PE:  Blood pressure (!) 145/89, pulse 84, temperature 98.1 °F (36.7 °C), temperature source Oral, resp. rate 20, height 5' 10" (1.778 m), weight 106.1 kg (234 lb), SpO2 96 %.   BP Readings from Last 3 Encounters:   11/16/23 (!) 145/89   11/10/23 125/83   11/08/23 133/81       Wt Readings from Last 3 Encounters:   11/16/23 106.1 kg (234 lb)   11/10/23 105.5 kg (232 lb 9.6 oz)   11/08/23 106.5 kg (234 lb 12.8 oz)     BMI 33.58 kg/m^2    GENERAL:  Ambulates.  CONSTITUTIONAL:  No acute distress.  Not ill appearing.  NECK:  No cervical adenopathy.   CARDIOVASCULAR:  Normal rate.  Regular rhythm.  No murmur.  PULMONARY:  No respiratory distress.  No wheezing.  No crackles.  ABDOMINAL: Deferred.  MUSCULOSKELETAL:  No deformity.  No edema.  SKIN:  No bruising.  No rash.  NEUROLOGICAL:  Oriented x 3.                  "                                                                                                                                                                                                                                                                                                                                                                                                                                                              CARDIAC TESTING:  EKG  From 10/26/23:  Normal sinus rhythm   Left axis deviation   Abnormal ECG   When compared with ECG of 01-MAY-2023 09:12,   No significant change was found     Echocardiogram  Results for orders placed during the hospital encounter of 10/02/23    Echo    Interpretation Summary    Left Ventricle: There is normal systolic function with a visually estimated ejection fraction of 60 - 65%. There is normal diastolic function.    Right Ventricle: Normal right ventricular cavity size. Systolic function is normal.    Less than mild valvular stenosis/regurgitation.    Agitated saline study of the atrial septum is negative after vasalva maneuver, suggesting absence of intracardiac shunt at the atrial level.    Stress Test  No results found for this or any previous visit.     Coronary Angiogram  No results found for this or any previous visit.         Last BMP  BMP  Lab Results   Component Value Date     10/27/2023    K 4.2 10/27/2023    CO2 27 10/27/2023    BUN 17.4 10/27/2023    CREATININE 1.09 10/27/2023    CALCIUM 9.0 10/27/2023    EGFRNORACEVR >60 10/27/2023     Last CBC     Lab Results   Component Value Date    WBC 5.39 10/27/2023    HGB 13.7 (L) 10/27/2023    HCT 40.0 (L) 10/27/2023    MCV 94.1 (H) 10/27/2023     10/27/2023         Last lipids    Lab Results   Component Value Date    CHOL 167 10/09/2023    CHOL 190 03/21/2023    CHOL 174 09/26/2022     Lab Results   Component Value Date    HDL 53 10/09/2023    HDL 52 03/21/2023    HDL 47 09/26/2022  "    No results found for: "LDLCALC"  Lab Results   Component Value Date    TRIG 174 (H) 10/09/2023    TRIG 124 03/21/2023    TRIG 211 (H) 09/26/2022       No results found for: "CHOLHDL"    Assessment:   Mr. Melquiades Rehman Jr. is a 68 y.o. male with a past medical history of a fib and flutter, CVA, TIA, Parkinson's, spinal stenosis, T2DM, HTN, HLD, CKD stage 3a, RA, and fatty liver here for follow-up; patient is doing well from a cardiac standpoint. Palpitations have decreased from last visit and only occur every few weeks for about 1-2 seconds.     Last PCP visit:  11/8/2023      Plan:   Paroxsymal A fib   -Hold Xarelto for 2 days before bilateral diagnostic median nerve block procedure  -Continue Toprol 25mg and Xarelto 20mg daily (aside from days prior to procedure)    HTN  -Continue Lisinopril-hydrochlorothaizide 20-25mg daily      Follow up:  6 months     [facility] Appointments   Date Time Provider Department Center   11/16/2023  2:30 PM Quincy Conrad MD [facility] CARD [place]   12/5/2023 12:30 PM Pau Gomes [facility] INTMED [place]   12/8/2023  8:00 AM Gertrude Blanco MD [facility] [place]ABRINA [place]   1/10/2024  8:00 AM Monica Chun [facility] PAWND [place]   1/11/2024 11:00 AM Barbara Zelaya CHARI [facility] RHEU [place]   1/29/2024 11:15 AM Jennifer Loomis DO [facility] UROLO [place]   3/14/2024  9:30 AM [facility] [place]   4/9/2024  9:30 AM Valeria Donovan MD [facility] RHEU [place]   5/10/2024 12:30 PM Quincy Pearce NP [facility] UROLO [place]   5/16/2024  1:00 PM Quincy Conrad MD [facility] CARD [place]        "

## 2023-11-16 NOTE — PROGRESS NOTES
Cardiology Attending Addendum to Medical Student Note    I evaluated Melquiades Rehman Jr. and discussed the patient's symptoms, findings, and management plan with the medical student.  Please see the Cardiology note for details.    Mr. Melquiades Rehman Jr. is a 68 y.o. male with:   Chief Complaint   Patient presents with    f/u denies chest pain or sob since LV has questions          HPI  Mr. Melquiades Rehman Jr. was seen in Cardiology Clinic.  The patient has Afib and HTN  Today the patient is seen for a routine appointment    ROS:  Please see HPI    PMH:    Patient Active Problem List   Diagnosis    Family history of colonic polyps    High risk medication use    History of CVA (cerebrovascular accident)    Atrial fibrillation and flutter    Chronic pain syndrome    Degeneration of lumbar intervertebral disc    Controlled type 2 diabetes mellitus without complication, without long-term current use of insulin    Dysphonia    Erectile dysfunction    GERD (gastroesophageal reflux disease)    History of right cataract extraction    Mixed hyperlipidemia    Primary hypertension    Induratio penis plastica    Obesity    Parkinson's disease    Seropositive rheumatoid arthritis of multiple sites    Sixth nerve palsy    Spinal stenosis at L4-L5 level    Steatosis of liver    Wellness examination    Constipation    Chronic back pain greater than 3 months duration    Lumbar spondylosis    Decreased GFR    Microhematuria    Diastolic dysfunction    Encounter for comprehensive diabetic foot examination, type 2 diabetes mellitus    Dystrophic nail    Overgrown toenails    Type 2 diabetes mellitus with peripheral neuropathy    Polyneuropathy associated with underlying disease    Stage 3a chronic kidney disease    ILD (interstitial lung disease)    Foreign body in right foot    TIA (transient ischemic attack)    Macrocytic anemia     Surgical Hx:    Past Surgical History:   Procedure Laterality Date    CATARACT EXTRACTION W/   INTRAOCULAR LENS IMPLANT      EPIDURAL STEROID INJECTION INTO LUMBAR SPINE      exc cyst Lt ear      EYE SURGERY      INJECTION OF ANESTHETIC AGENT AROUND MEDIAL BRANCH NERVES INNERVATING LUMBAR FACET JOINT Bilateral 2022    Procedure: Block-nerve-medial branch-lumbar;  Surgeon: Gertrude Blanco MD;  Location: [facility];  Service: Pain Management;  Laterality: Bilateral;  Bilateral L4-L5...Patient is requesting to be first case    KNEE ARTHROSCOPY Right     REFRACTIVE SURGERY      VASECTOMY         Social History     Socioeconomic History    Marital status:     Number of children: 1   Tobacco Use    Smoking status: Former     Current packs/day: 0.00     Types: Cigarettes     Quit date:      Years since quittin.8     Passive exposure: Past    Smokeless tobacco: Never   Substance and Sexual Activity    Alcohol use: Not Currently    Drug use: Not Currently     Types: Marijuana    Sexual activity: Yes     Partners: Female     Social Determinants of Health     Financial Resource Strain: Medium Risk (10/21/2023)    Overall Financial Resource Strain (CARDIA)     Difficulty of Paying Living Expenses: Somewhat hard   Food Insecurity: No Food Insecurity (2022)    Hunger Vital Sign     Worried About Running Out of Food in the Last Year: Never true     Ran Out of Food in the Last Year: Never true   Transportation Needs: No Transportation Needs (2022)    PRAPARE - Transportation     Lack of Transportation (Medical): No     Lack of Transportation (Non-Medical): No   Physical Activity: Sufficiently Active (2022)    Exercise Vital Sign     Days of Exercise per Week: 7 days     Minutes of Exercise per Session: 30 min   Stress: No Stress Concern Present (2022)    [employer] [place] [employer] - Occupational Stress Questionnaire     Feeling of Stress : Not at all   Social Connections: Moderately Isolated (10/21/2023)    Social Connection and Isolation Panel [NHANES]     Frequency  of Communication with Friends and Family: More than three times a week     Frequency of Social Gatherings with Friends and Family: More than three times a week     Attends Caodaism Services: Never     Active Member of Clubs or Organizations: Yes     Attends Club or Organization Meetings: More than 4 times per year     Marital Status:    Housing Stability: Low Risk  (9/30/2022)    Housing Stability Vital Sign     Unable to Pay for Housing in the Last Year: No     Number of Places Lived in the Last Year: 1     Unstable Housing in the Last Year: No       Family History   Problem Relation Age of Onset    Heart failure Mother     Coronary artery disease Mother     Glaucoma Father     Alzheimer's disease Father     Emphysema Father     Autoimmune disease Sister     Pneumonia Sister     Dementia Sister        Review of patient's allergies indicates:   Allergen Reactions    Sarilumab Other (See Comments)     Other reaction(s): fainting       Current Medications:    Current Outpatient Medications:     alogliptin-pioglitazone (OSENI) 25-30 mg Tab, Take 1 tablet by mouth once daily., Disp: 90 tablet, Rfl: 1    ascorbic acid, vitamin C, (VITAMIN C) 1000 MG tablet, Take 1,000 mg by mouth every evening., Disp: , Rfl:     b complex vitamins capsule, Take 1 capsule by mouth nightly., Disp: , Rfl:     carbidopa-levodopa  mg (SINEMET)  mg per tablet, Take 2 tablets by mouth 2 (two) times a day., Disp: , Rfl:     cholecalciferol, vitamin D3, 125 mcg (5,000 unit) capsule, Take 1,000 Units by mouth nightly., Disp: , Rfl:     coenzyme Q10 100 mg capsule, Take 100 mg by mouth every evening., Disp: , Rfl:     colchicine (COLCRYS) 0.6 mg tablet, Take 1 tablet (0.6 mg total) by mouth 2 (two) times daily., Disp: 180 tablet, Rfl: 1    folic acid/multivit-min/lutein (CENTRUM SILVER ORAL), Take 1 tablet by mouth every evening., Disp: , Rfl:     gemfibroziL (LOPID) 600 MG tablet, Take 1 tablet (600 mg total) by mouth 2 (two)  times daily., Disp: 180 tablet, Rfl: 1    lisinopriL-hydrochlorothiazide (PRINZIDE,ZESTORETIC) 20-25 mg Tab, Take 1 tablet by mouth as needed., Disp: , Rfl:     magnesium 30 mg Tab, Take 250 mg by mouth nightly., Disp: , Rfl:     metoprolol succinate (TOPROL-XL) 25 MG 24 hr tablet, Take 1 tablet (25 mg total) by mouth once daily., Disp: 90 tablet, Rfl: 3    pantoprazole (PROTONIX) 40 MG tablet, Take 1 tablet (40 mg total) by mouth once daily., Disp: 90 tablet, Rfl: 1    potassium citrate 99 mg Cap, Take 1 tablet by mouth nightly., Disp: , Rfl:     rosuvastatin (CRESTOR) 20 MG tablet, Take 1 tablet (20 mg total) by mouth once daily., Disp: 90 tablet, Rfl: 1    selegiline (ELDEPRYL) 5 mg Cap, Take 5 mg by mouth 2 (two) times daily., Disp: , Rfl:     tofacitinib (XELJANZ XR) 11 mg Tb24, Take 1 tablet by mouth once daily., Disp: 30 tablet, Rfl: 4    vitamin A 8000 UNIT capsule, Take 8,000 Units by mouth once daily., Disp: , Rfl:     vitamin E 400 UNIT capsule, Take 400 Units by mouth nightly., Disp: , Rfl:     XARELTO 20 mg Tab, Take 1 tablet (20 mg total) by mouth once daily., Disp: 30 tablet, Rfl: 6    zinc sulfate (ZINC-15 ORAL), Take 140 mg by mouth nightly., Disp: , Rfl:     alprostadil (CAVERJECT IMPULSE ICAV), by Intracavernosal route., Disp: , Rfl:     blood sugar diagnostic (ACCU-CHEK GUIDE TEST STRIPS [facility] [place] Accu-Chek Guide test strips, Disp: , Rfl:     gabapentin (NEURONTIN) 300 MG capsule, Take 300 mg by mouth once daily., Disp: , Rfl:     lancets (ACCU-CHEK FASTCLIX LANCET DRUM [facility] [place] Accu-Chek Fastclix Lancet Drum, Disp: , Rfl:     NON FORMULARY MEDICATION, Take 1 tablet by mouth nightly., Disp: , Rfl:   Current Outpatient Medications   Medication Instructions    alogliptin-pioglitazone (OSENI) 25-30 mg Tab 1 tablet, Oral, Daily    alprostadil (CAVERJECT IMPULSE ICAV) Intracavernosal    ascorbic acid (vitamin C) (VITAMIN C) 1,000 mg, Oral, Nightly    b complex vitamins capsule 1 capsule, Oral, Nightly     "blood sugar diagnostic (ACCU-CHEK GUIDE TEST STRIPS [facility] [place]) Accu-Chek Guide test strips    carbidopa-levodopa  mg (SINEMET)  mg per tablet 2 tablets, Oral, 2 times daily    cholecalciferol (vitamin D3) 1,000 Units, Oral, Nightly    coenzyme Q10 100 mg, Oral, Nightly    colchicine (COLCRYS) 0.6 mg, Oral, 2 times daily    folic acid/multivit-min/lutein (CENTRUM SILVER ORAL) 1 tablet, Oral, Nightly    gabapentin (NEURONTIN) 300 mg, Oral, Daily    gemfibroziL (LOPID) 600 mg, Oral, 2 times daily    lancets (ACCU-CHEK FASTCLIX LANCET DRUM [facility]) Accu-Chek Fastclix Lancet Drum    lisinopriL-hydrochlorothiazide (PRINZIDE,ZESTORETIC) 20-25 mg Tab 1 tablet, Oral, As needed (PRN)    magnesium 250 mg, Oral, Nightly    metoprolol succinate (TOPROL-XL) 25 mg, Oral, Daily    NON FORMULARY MEDICATION 1 tablet, Oral, Nightly    pantoprazole (PROTONIX) 40 mg, Oral, Daily    potassium citrate 99 mg Cap 1 tablet, Oral, Nightly    rosuvastatin (CRESTOR) 20 mg, Oral, Daily    selegiline (ELDEPRYL) 5 mg, Oral, 2 times daily    tofacitinib (XELJANZ XR) 11 mg Tb24 1 tablet, Oral, Daily    vitamin A 8,000 Units, Oral, Daily    vitamin E 400 Units, Oral, Nightly    XARELTO 20 mg, Oral, Daily    zinc sulfate (ZINC-15 ORAL) 140 mg, Oral, Nightly       ROS:  Please see HPI.    BP Readings from Last 3 Encounters:   11/16/23 (!) 145/89   11/10/23 125/83   11/08/23 133/81        Pulse Readings from Last 3 Encounters:   11/16/23 84   11/10/23 76   11/08/23 76        Temp Readings from Last 3 Encounters:   11/16/23 98.1 °F (36.7 °C) (Oral)   11/10/23 98.2 °F (36.8 °C) (Oral)   11/08/23 97.8 °F (36.6 °C) (Oral)     Wt Readings from Last 3 Encounters:   11/16/23 106.1 kg (234 lb)   11/10/23 105.5 kg (232 lb 9.6 oz)   11/08/23 106.5 kg (234 lb 12.8 oz)     BMI:  33.58 kg/m^2    PE  Blood pressure (!) 145/89, pulse 84, temperature 98.1 °F (36.7 °C), temperature source Oral, resp. rate 20, height 5' 10" (1.778 m), weight 106.1 kg (234 lb), SpO2 " "96 %.  CONSTITUTIONAL:  No acute distress.  Not ill appearing.  GENERAL:  Pleasant  NECK:  supple  RESPIRATIONS:  no apparent distress  ABDOMEN:   obese  SKIN:  dry     CARDIAC TESTS  ECHO  Results for orders placed during the hospital encounter of 10/02/23    Echo    Interpretation Summary    Left Ventricle: There is normal systolic function with a visually estimated ejection fraction of 60 - 65%. There is normal diastolic function.    Right Ventricle: Normal right ventricular cavity size. Systolic function is normal.    Less than mild valvular stenosis/regurgitation.    Agitated saline study of the atrial septum is negative after vasalva maneuver, suggesting absence of intracardiac shunt at the atrial level.    STRESS TEST  No results found for this or any previous visit.    [facility]  No results found for this or any previous visit.      LABS  Last BMP  Lab Results   Component Value Date     10/27/2023    K 4.2 10/27/2023    CO2 27 10/27/2023    BUN 17.4 10/27/2023    CREATININE 1.09 10/27/2023    CALCIUM 9.0 10/27/2023    EGFRNORACEVR >60 10/27/2023       Lab Results   Component Value Date    CREATININE 1.09 10/27/2023    CREATININE 1.17 10/26/2023    CREATININE 1.28 (H) 10/09/2023     No results found for: "BNP"  Lab Results   Component Value Date    TROPONINI <0.010 12/08/2020    TROPONINI <0.010 12/07/2020    TROPONINI <0.010 12/07/2020     Last CBC     Lab Results   Component Value Date    WBC 5.39 10/27/2023    HGB 13.7 (L) 10/27/2023    HCT 40.0 (L) 10/27/2023    MCV 94.1 (H) 10/27/2023     10/27/2023           Last lipids    Lab Results   Component Value Date    CHOL 167 10/09/2023    CHOL 190 03/21/2023    CHOL 174 09/26/2022    HDL 53 10/09/2023    HDL 52 03/21/2023    HDL 47 09/26/2022    LDL 79.00 10/09/2023    .00 03/21/2023    LDL 85.00 09/26/2022    TRIG 174 (H) 10/09/2023    TRIG 124 03/21/2023    TRIG 211 (H) 09/26/2022    TOTALCHOLEST 3 10/09/2023    TOTALCHOLEST 4 03/21/2023    " "TOTALCHOLEST 4 09/26/2022       LFT   No components found for: "LFT"    ASSESSMENT  1. History of CVA (cerebrovascular accident)    2. Chronic pain syndrome    3. Atrial fibrillation and flutter    4. Mixed hyperlipidemia    5. Primary hypertension    6. Diastolic dysfunction    10 Year Cardiovascular Risk:  The ASCVD Risk score (Aidan WHITESIDE, et al., 2019) failed to calculate for the following reasons:    The patient has a prior MI or stroke diagnosis  BMI:  Body mass index is 33.58 kg/m².  Patient is obese (BMI 30-34.9)    Last PCP visit:  11/8/2023      PLAN  Medications:  Continue the current cardiac medications  Follow up:   6 months      Preop Risk Assessment    Anticoagulation:  The patient is at low to moderate cardiac risk for holding xarelto for 2 days prior to the procedure.       PRE-OP RECOMMENDATIONS  After the procedure please resume the Xarelto when it is safe to do so from a procedural standpoint.               "

## 2023-11-22 ENCOUNTER — ANESTHESIA (OUTPATIENT)
Dept: SURGERY | Facility: HOSPITAL | Age: 68
End: 2023-11-22
Payer: MEDICARE

## 2023-11-22 ENCOUNTER — HOSPITAL ENCOUNTER (OUTPATIENT)
Facility: HOSPITAL | Age: 68
Discharge: HOME | End: 2023-11-22
Attending: ANESTHESIOLOGY | Admitting: ANESTHESIOLOGY
Payer: MEDICARE

## 2023-11-22 DIAGNOSIS — G89.29 CHRONIC BACK PAIN GREATER THAN 3 MONTHS DURATION: ICD-10-CM

## 2023-11-22 DIAGNOSIS — M54.9 CHRONIC BACK PAIN GREATER THAN 3 MONTHS DURATION: ICD-10-CM

## 2023-11-22 LAB — POCT GLUCOSE: 104 MG/DL (ref 70–110)

## 2023-11-22 PROCEDURE — 25000003 PHARM REV CODE 250: Performed by: ANESTHESIOLOGY

## 2023-11-22 PROCEDURE — D9220A PRA ANESTHESIA: Mod: ANES,,, | Performed by: ANESTHESIOLOGY

## 2023-11-22 PROCEDURE — 64493 PR INJ DX/THER AGNT PARAVERT FACET JOINT,IMG GUIDE,LUMBAR/SAC,1ST LVL: ICD-10-PCS | Mod: 50,,, | Performed by: ANESTHESIOLOGY

## 2023-11-22 PROCEDURE — 64494 PR INJ DX/THER AGNT PARAVERT FACET JOINT,IMG GUIDE,LUMBAR/SAC, 2ND LEVEL: ICD-10-PCS | Mod: 50,,, | Performed by: ANESTHESIOLOGY

## 2023-11-22 PROCEDURE — 37000008 HC ANESTHESIA 1ST 15 MINUTES: Performed by: ANESTHESIOLOGY

## 2023-11-22 PROCEDURE — D9220A PRA ANESTHESIA: ICD-10-PCS | Mod: ANES,,, | Performed by: ANESTHESIOLOGY

## 2023-11-22 PROCEDURE — 64493 INJ PARAVERT F JNT L/S 1 LEV: CPT | Mod: 50 | Performed by: ANESTHESIOLOGY

## 2023-11-22 PROCEDURE — 63600175 PHARM REV CODE 636 W HCPCS: Performed by: NURSE ANESTHETIST, CERTIFIED REGISTERED

## 2023-11-22 PROCEDURE — 64494 INJ PARAVERT F JNT L/S 2 LEV: CPT | Mod: 50,,, | Performed by: ANESTHESIOLOGY

## 2023-11-22 PROCEDURE — 64494 INJ PARAVERT F JNT L/S 2 LEV: CPT | Mod: 50 | Performed by: ANESTHESIOLOGY

## 2023-11-22 PROCEDURE — 37000009 HC ANESTHESIA EA ADD 15 MINS: Performed by: ANESTHESIOLOGY

## 2023-11-22 PROCEDURE — 63600175 PHARM REV CODE 636 W HCPCS: Performed by: ANESTHESIOLOGY

## 2023-11-22 PROCEDURE — 64493 INJ PARAVERT F JNT L/S 1 LEV: CPT | Mod: 50,,, | Performed by: ANESTHESIOLOGY

## 2023-11-22 PROCEDURE — 25000003 PHARM REV CODE 250: Performed by: NURSE ANESTHETIST, CERTIFIED REGISTERED

## 2023-11-22 PROCEDURE — D9220A PRA ANESTHESIA: Mod: CRNA,,, | Performed by: NURSE ANESTHETIST, CERTIFIED REGISTERED

## 2023-11-22 PROCEDURE — 82962 GLUCOSE BLOOD TEST: CPT | Performed by: ANESTHESIOLOGY

## 2023-11-22 PROCEDURE — D9220A PRA ANESTHESIA: ICD-10-PCS | Mod: CRNA,,, | Performed by: NURSE ANESTHETIST, CERTIFIED REGISTERED

## 2023-11-22 RX ORDER — TRIAMCINOLONE ACETONIDE 40 MG/ML
INJECTION, SUSPENSION INTRA-ARTICULAR; INTRAMUSCULAR
Status: DISCONTINUED
Start: 2023-11-22 | End: 2023-11-22 | Stop reason: WASHOUT

## 2023-11-22 RX ORDER — BUPIVACAINE HYDROCHLORIDE 2.5 MG/ML
INJECTION, SOLUTION EPIDURAL; INFILTRATION; INTRACAUDAL
Status: DISCONTINUED
Start: 2023-11-22 | End: 2023-11-22 | Stop reason: HOSPADM

## 2023-11-22 RX ORDER — LIDOCAINE HYDROCHLORIDE 10 MG/ML
INJECTION, SOLUTION EPIDURAL; INFILTRATION; INTRACAUDAL; PERINEURAL
Status: DISCONTINUED
Start: 2023-11-22 | End: 2023-11-22 | Stop reason: HOSPADM

## 2023-11-22 RX ORDER — LIDOCAINE HYDROCHLORIDE 10 MG/ML
INJECTION INFILTRATION; PERINEURAL
Status: DISCONTINUED | OUTPATIENT
Start: 2023-11-22 | End: 2023-11-22

## 2023-11-22 RX ORDER — PROPOFOL 10 MG/ML
VIAL (ML) INTRAVENOUS
Status: DISCONTINUED | OUTPATIENT
Start: 2023-11-22 | End: 2023-11-22

## 2023-11-22 RX ORDER — SODIUM CHLORIDE, SODIUM GLUCONATE, SODIUM ACETATE, POTASSIUM CHLORIDE AND MAGNESIUM CHLORIDE 30; 37; 368; 526; 502 MG/100ML; MG/100ML; MG/100ML; MG/100ML; MG/100ML
INJECTION, SOLUTION INTRAVENOUS CONTINUOUS
Status: CANCELLED | OUTPATIENT
Start: 2023-11-22 | End: 2023-12-22

## 2023-11-22 RX ORDER — KETAMINE HYDROCHLORIDE 50 MG/ML
INJECTION, SOLUTION INTRAMUSCULAR; INTRAVENOUS
Status: DISCONTINUED | OUTPATIENT
Start: 2023-11-22 | End: 2023-11-22

## 2023-11-22 RX ORDER — LIDOCAINE HYDROCHLORIDE 10 MG/ML
1 INJECTION, SOLUTION EPIDURAL; INFILTRATION; INTRACAUDAL; PERINEURAL ONCE
Status: CANCELLED | OUTPATIENT
Start: 2023-11-22 | End: 2023-11-22

## 2023-11-22 RX ORDER — BUPIVACAINE HYDROCHLORIDE 2.5 MG/ML
INJECTION, SOLUTION EPIDURAL; INFILTRATION; INTRACAUDAL
Status: DISCONTINUED | OUTPATIENT
Start: 2023-11-22 | End: 2023-11-22 | Stop reason: HOSPADM

## 2023-11-22 RX ORDER — LIDOCAINE HYDROCHLORIDE 10 MG/ML
INJECTION INFILTRATION; PERINEURAL
Status: DISCONTINUED | OUTPATIENT
Start: 2023-11-22 | End: 2023-11-22 | Stop reason: HOSPADM

## 2023-11-22 RX ADMIN — LIDOCAINE HYDROCHLORIDE 50 MG: 10 INJECTION, SOLUTION INFILTRATION; PERINEURAL at 09:11

## 2023-11-22 RX ADMIN — PROPOFOL 20 MG: 10 INJECTION, EMULSION INTRAVENOUS at 09:11

## 2023-11-22 RX ADMIN — PROPOFOL 30 MG: 10 INJECTION, EMULSION INTRAVENOUS at 09:11

## 2023-11-22 RX ADMIN — KETAMINE HYDROCHLORIDE 10 MG: 50 INJECTION INTRAMUSCULAR; INTRAVENOUS at 09:11

## 2023-11-22 RX ADMIN — PROPOFOL 40 MG: 10 INJECTION, EMULSION INTRAVENOUS at 09:11

## 2023-11-22 NOTE — DISCHARGE SUMMARY
[facility] - Periop Services  Discharge Note  Short Stay    Procedure(s) (LRB):  Block-nerve-medial branch-lumbar (Bilateral)      OUTCOME: Patient tolerated treatment/procedure well without complication and is now ready for discharge.    DISPOSITION: Home or Self Care    FINAL DIAGNOSIS:  <principal problem not specified>    FOLLOWUP: In clinic    DISCHARGE INSTRUCTIONS:  No discharge procedures on file.     TIME SPENT ON DISCHARGE: 5 minutes

## 2023-11-22 NOTE — ANESTHESIA PREPROCEDURE EVALUATION
11/22/2023  Melquiades Rehman Jr. is a 68 y.o., male who presents with chronic back pain.      The pt. Comes to [facility] for the noted pain management procedure under GA/TIVA.          PMHx:  Chronic back pain greater than 3 months duration    Other Medical History   Rheumatoid arthritis, unspecified Central stenosis of spinal canal   HTN (hypertension) Parkinson's disease   Peyronie disease Type 2 diabetes mellitus without complications   Fatty liver History of radial keratotomy   H/O cataract removal with insertion of prosthetic lens Constipation   Degeneration of lumbar intervertebral disc Diastolic dysfunction   Dysphonia Dystrophic nail   Erectile dysfunction GERD (gastroesophageal reflux disease)   History of CVA (cerebrovascular accident) Hyperlipidemia   Induratio penis plastica Microhematuria   Overgrown toenails Polyneuropathy associated with underlying disease   Rheumatoid arthritis Sixth nerve palsy   Spinal stenosis of lumbar region Atrial fibrillation     Surgical History:  CATARACT EXTRACTION W/  INTRAOCULAR LENS IMPLANT EPIDURAL STEROID INJECTION INTO LUMBAR SPINE   VASECTOMY KNEE ARTHROSCOPY   exc cyst Lt ear INJECTION OF ANESTHETIC AGENT AROUND MEDIAL BRANCH NERVES INNERVATING LUMBAR FACET JOINT   REFRACTIVE SURGERY EYE SURGERY         Vital signs:        Lab Data:        Echo:          EKG:.      Pre-op Assessment    I have reviewed the Patient Summary Reports.     I have reviewed the Nursing Notes. I have reviewed the NPO Status.   I have reviewed the Medications.     Review of Systems  Anesthesia Hx:  No problems with previous Anesthesia                Social:  Non-Smoker       Hematology/Oncology:  Hematology Normal   Oncology Normal                                   EENT/Dental:  EENT/Dental Normal           Cardiovascular:  Exercise tolerance: good   Hypertension                Functional  Capacity good / => 4 METS                         Pulmonary:  Pulmonary Normal                       Renal/:  Chronic Renal Disease                Hepatic/GI:     GERD Liver Disease,            Musculoskeletal:  Arthritis               Neurological:  TIA,  Neuromuscular Disease,                                   Endocrine:  Diabetes           Dermatological:  Skin Normal    Psych:  Psychiatric Normal                    Physical Exam  General: Alert, Oriented, Well nourished and Cooperative    Airway:  Mallampati: II   Mouth Opening: Normal  TM Distance: Normal  Tongue: Normal  Neck ROM: Normal ROM    Dental:  Intact    Chest/Lungs:  Clear to auscultation, Normal Respiratory Rate    Heart:  Rate: Normal  Rhythm: Regular Rhythm        Anesthesia Plan  Type of Anesthesia, risks & benefits discussed:    Anesthesia Type: Gen Natural Airway  Intra-op Monitoring Plan: Standard ASA Monitors  Post Op Pain Control Plan: IV/PO Opioids PRN  Induction:  IV  Informed Consent: Informed consent signed with the Patient and all parties understand the risks and agree with anesthesia plan.  All questions answered.   ASA Score: 3  Day of Surgery Review of History & Physical: H&P Update referred to the surgeon/provider.    Ready For Surgery From Anesthesia Perspective.     .

## 2023-11-22 NOTE — OP NOTE
Bilateral L3-5 diagnostic medial branch blocks    Pre-Procedure Diagnoses:  1. Chronic pain syndrome  2. Chronic Low back pain  3. Lumbar facet arthropathy    Post-Procedure Diagnoses:  1. Chronic pain syndrome  2. Chronic Low back pain  3. Lumbar facet arthropathy    Anesthesia:  Local and MAC    Estimated Blood Loss:  None    Complications:  None    Informed Consent:  The procedure, risks, benefits, and alternatives were discussed with the patient. There were no contraindications to the procedure. The patient expressed understanding and agreed to proceed. Fully informed written consent was obtained.     Description of the Procedure:  The patient was taken to the operating room. IV access was obtained prior to the start of the procedure. The patient was positioned prone on the fluoroscopy table. Continuous hemodynamic monitoring was initiated and continued throughout the duration of the procedure. The skin overlying the lumbosacral spine was prepped with Chloraprep and draped into a sterile field. Fluoroscopy was used to identify the locations of the left side L3, L4, and L5 medial branch nerves at the junctions of the superior articular processes and transverse processes of L4, L5, and the sacral ala, respectively. Skin anesthesia was achieved using 0.5 mL of 1% lidocaine over each injection site. A 22-gauge 3.5-inch Quinke spinal needle was slowly inserted and advanced under intermittent fluoroscopic guidance until it made bony contact at the target sites. Proper needle position was confirmed under AP, oblique, and lateral fluoroscopic views. Negative aspiration for blood or CSF was confirmed. Then 0.5 mL of 0.25% bupivacaine was easily injected at each level. There was no pain on injection. The needles were removed intact and bleeding was nil. The same procedure was repeated in identical fashion on the right side. Sterile bandages were applied. The patient was taken to the recovery room for further observation  in stable condition. The patient was then discharged home without any complications.

## 2023-11-22 NOTE — ANESTHESIA POSTPROCEDURE EVALUATION
Anesthesia Post Evaluation    Patient: Melquiades Rehman Jr.    Procedure(s) Performed: Procedure(s) (LRB):  Block-nerve-medial branch-lumbar (Bilateral)    Final Anesthesia Type: MAC      Patient location during evaluation: OPS  Patient participation: Yes- Able to Participate  Level of consciousness: awake and alert and oriented  Post-procedure vital signs: reviewed and stable  Pain management: adequate  Airway patency: patent    PONV status at discharge: No PONV, vomiting (controlled) and nausea (controlled)  Anesthetic complications: no      Cardiovascular status: stable and blood pressure returned to baseline  Respiratory status: unassisted  Hydration status: euvolemic            Vitals Value Taken Time   /97 11/22/23 1003   Temp 36.9 °C (98.4 °F) 11/22/23 1004   Pulse 77 11/22/23 1004   Resp 19 11/22/23 0953   SpO2 98 % 11/22/23 1004   Vitals shown include unvalidated device data.      No case tracking events are documented in the log.      Pain/Reese Score: No data recorded

## 2023-11-24 ENCOUNTER — HOSPITAL ENCOUNTER (OUTPATIENT)
Dept: RADIOLOGY | Facility: HOSPITAL | Age: 68
Discharge: HOME | End: 2023-11-24
Attending: NURSE PRACTITIONER
Payer: MEDICARE

## 2023-11-24 DIAGNOSIS — R31.29 MICROHEMATURIA: ICD-10-CM

## 2023-11-24 DIAGNOSIS — R82.89 ABNORMAL URINE CYTOLOGY: ICD-10-CM

## 2023-11-24 PROCEDURE — 74178 CT ABD&PLV WO CNTR FLWD CNTR: CPT | Mod: TC

## 2023-11-24 PROCEDURE — 25500020 PHARM REV CODE 255

## 2023-11-24 RX ADMIN — IOHEXOL 100 ML: 350 INJECTION, SOLUTION INTRAVENOUS at 07:11

## 2023-11-27 VITALS
DIASTOLIC BLOOD PRESSURE: 76 MMHG | RESPIRATION RATE: 19 BRPM | HEART RATE: 77 BPM | HEIGHT: 71 IN | SYSTOLIC BLOOD PRESSURE: 150 MMHG | TEMPERATURE: 98 F | WEIGHT: 233.69 LBS | BODY MASS INDEX: 32.72 KG/M2 | OXYGEN SATURATION: 98 %

## 2023-12-05 ENCOUNTER — OFFICE VISIT (OUTPATIENT)
Dept: INTERNAL MEDICINE | Facility: CLINIC | Age: 68
End: 2023-12-05
Payer: MEDICARE

## 2023-12-05 VITALS
HEIGHT: 71 IN | SYSTOLIC BLOOD PRESSURE: 158 MMHG | BODY MASS INDEX: 33.04 KG/M2 | TEMPERATURE: 98 F | RESPIRATION RATE: 18 BRPM | WEIGHT: 236 LBS | DIASTOLIC BLOOD PRESSURE: 80 MMHG | HEART RATE: 73 BPM

## 2023-12-05 DIAGNOSIS — I48.91 ATRIAL FIBRILLATION AND FLUTTER: ICD-10-CM

## 2023-12-05 DIAGNOSIS — I10 PRIMARY HYPERTENSION: ICD-10-CM

## 2023-12-05 DIAGNOSIS — D53.9 MACROCYTIC ANEMIA: ICD-10-CM

## 2023-12-05 DIAGNOSIS — I48.92 ATRIAL FIBRILLATION AND FLUTTER: ICD-10-CM

## 2023-12-05 DIAGNOSIS — Z12.11 COLON CANCER SCREENING: ICD-10-CM

## 2023-12-05 DIAGNOSIS — E78.2 MIXED HYPERLIPIDEMIA: ICD-10-CM

## 2023-12-05 DIAGNOSIS — M48.061 SPINAL STENOSIS AT L4-L5 LEVEL: ICD-10-CM

## 2023-12-05 DIAGNOSIS — K21.9 GASTROESOPHAGEAL REFLUX DISEASE, UNSPECIFIED WHETHER ESOPHAGITIS PRESENT: ICD-10-CM

## 2023-12-05 DIAGNOSIS — Z00.00 WELL ADULT EXAM: ICD-10-CM

## 2023-12-05 DIAGNOSIS — E11.42 TYPE 2 DIABETES MELLITUS WITH PERIPHERAL NEUROPATHY: Primary | ICD-10-CM

## 2023-12-05 DIAGNOSIS — R31.29 MICROHEMATURIA: ICD-10-CM

## 2023-12-05 DIAGNOSIS — G45.9 TIA (TRANSIENT ISCHEMIC ATTACK): ICD-10-CM

## 2023-12-05 PROCEDURE — 1160F PR REVIEW ALL MEDS BY PRESCRIBER/CLIN PHARMACIST DOCUMENTED: ICD-10-PCS | Mod: CPTII,,, | Performed by: NURSE PRACTITIONER

## 2023-12-05 PROCEDURE — 3008F BODY MASS INDEX DOCD: CPT | Mod: CPTII,,, | Performed by: NURSE PRACTITIONER

## 2023-12-05 PROCEDURE — 3066F PR DOCUMENTATION OF TREATMENT FOR NEPHROPATHY: ICD-10-PCS | Mod: CPTII,,, | Performed by: NURSE PRACTITIONER

## 2023-12-05 PROCEDURE — 1126F PR PAIN SEVERITY QUANTIFIED, NO PAIN PRESENT: ICD-10-PCS | Mod: CPTII,,, | Performed by: NURSE PRACTITIONER

## 2023-12-05 PROCEDURE — 4010F PR ACE/ARB THEARPY RXD/TAKEN: ICD-10-PCS | Mod: CPTII,,, | Performed by: NURSE PRACTITIONER

## 2023-12-05 PROCEDURE — 3079F DIAST BP 80-89 MM HG: CPT | Mod: CPTII,,, | Performed by: NURSE PRACTITIONER

## 2023-12-05 PROCEDURE — 1101F PR PT FALLS ASSESS DOC 0-1 FALLS W/OUT INJ PAST YR: ICD-10-PCS | Mod: CPTII,,, | Performed by: NURSE PRACTITIONER

## 2023-12-05 PROCEDURE — 3008F PR BODY MASS INDEX (BMI) DOCUMENTED: ICD-10-PCS | Mod: CPTII,,, | Performed by: NURSE PRACTITIONER

## 2023-12-05 PROCEDURE — 99215 OFFICE O/P EST HI 40 MIN: CPT | Mod: PBBFAC | Performed by: NURSE PRACTITIONER

## 2023-12-05 PROCEDURE — 3079F PR MOST RECENT DIASTOLIC BLOOD PRESSURE 80-89 MM HG: ICD-10-PCS | Mod: CPTII,,, | Performed by: NURSE PRACTITIONER

## 2023-12-05 PROCEDURE — 3077F PR MOST RECENT SYSTOLIC BLOOD PRESSURE >= 140 MM HG: ICD-10-PCS | Mod: CPTII,,, | Performed by: NURSE PRACTITIONER

## 2023-12-05 PROCEDURE — 1159F MED LIST DOCD IN RCRD: CPT | Mod: CPTII,,, | Performed by: NURSE PRACTITIONER

## 2023-12-05 PROCEDURE — 4010F ACE/ARB THERAPY RXD/TAKEN: CPT | Mod: CPTII,,, | Performed by: NURSE PRACTITIONER

## 2023-12-05 PROCEDURE — 3288F PR FALLS RISK ASSESSMENT DOCUMENTED: ICD-10-PCS | Mod: CPTII,,, | Performed by: NURSE PRACTITIONER

## 2023-12-05 PROCEDURE — 3061F PR NEG MICROALBUMINURIA RESULT DOCUMENTED/REVIEW: ICD-10-PCS | Mod: CPTII,,, | Performed by: NURSE PRACTITIONER

## 2023-12-05 PROCEDURE — 1126F AMNT PAIN NOTED NONE PRSNT: CPT | Mod: CPTII,,, | Performed by: NURSE PRACTITIONER

## 2023-12-05 PROCEDURE — 1101F PT FALLS ASSESS-DOCD LE1/YR: CPT | Mod: CPTII,,, | Performed by: NURSE PRACTITIONER

## 2023-12-05 PROCEDURE — 99214 OFFICE O/P EST MOD 30 MIN: CPT | Mod: S$PBB,,, | Performed by: NURSE PRACTITIONER

## 2023-12-05 PROCEDURE — 1159F PR MEDICATION LIST DOCUMENTED IN MEDICAL RECORD: ICD-10-PCS | Mod: CPTII,,, | Performed by: NURSE PRACTITIONER

## 2023-12-05 PROCEDURE — 3288F FALL RISK ASSESSMENT DOCD: CPT | Mod: CPTII,,, | Performed by: NURSE PRACTITIONER

## 2023-12-05 PROCEDURE — 1160F RVW MEDS BY RX/DR IN RCRD: CPT | Mod: CPTII,,, | Performed by: NURSE PRACTITIONER

## 2023-12-05 PROCEDURE — 3044F PR MOST RECENT HEMOGLOBIN A1C LEVEL <7.0%: ICD-10-PCS | Mod: CPTII,,, | Performed by: NURSE PRACTITIONER

## 2023-12-05 PROCEDURE — 3077F SYST BP >= 140 MM HG: CPT | Mod: CPTII,,, | Performed by: NURSE PRACTITIONER

## 2023-12-05 PROCEDURE — 3066F NEPHROPATHY DOC TX: CPT | Mod: CPTII,,, | Performed by: NURSE PRACTITIONER

## 2023-12-05 PROCEDURE — 99214 PR OFFICE/OUTPT VISIT, EST, LEVL IV, 30-39 MIN: ICD-10-PCS | Mod: S$PBB,,, | Performed by: NURSE PRACTITIONER

## 2023-12-05 PROCEDURE — 3061F NEG MICROALBUMINURIA REV: CPT | Mod: CPTII,,, | Performed by: NURSE PRACTITIONER

## 2023-12-05 PROCEDURE — 3044F HG A1C LEVEL LT 7.0%: CPT | Mod: CPTII,,, | Performed by: NURSE PRACTITIONER

## 2023-12-05 NOTE — PROGRESS NOTES
Patient ID: [id number]     Chief Complaint: Establish Care        HPI:     ELVI Rehman Jr. is a 68 y.o. male here today to establish care. Pt previously followed by Damien Dodd NP. Pt requested to change PCP. Pt has hx   Family history of colonic polyps    High risk medication use    History of CVA (cerebrovascular accident)    Atrial fibrillation and flutter    Chronic pain syndrome    Degeneration of lumbar intervertebral disc    Controlled type 2 diabetes mellitus without complication, without long-term current use of insulin    Dysphonia    Erectile dysfunction    GERD (gastroesophageal reflux disease)    History of right cataract extraction    Mixed hyperlipidemia    Primary hypertension    Induratio penis plastica    Obesity    Parkinson's disease    Seropositive rheumatoid arthritis of multiple sites    Sixth nerve palsy    Spinal stenosis at L4-L5 level    Steatosis of liver    Wellness examination    Constipation    Chronic back pain greater than 3 months duration    Lumbar spondylosis    Decreased GFR    Microhematuria    Diastolic dysfunction    Encounter for comprehensive diabetic foot examination, type 2 diabetes mellitus    Dystrophic nail    Overgrown toenails    Type 2 diabetes mellitus with peripheral neuropathy    Polyneuropathy associated with underlying disease    Stage 3a chronic kidney disease    ILD (interstitial lung disease)    Foreign body in right foot    TIA (transient ischemic attack)    Macrocytic anemia     Pt followed in Cardio cl- last seen 11-16-23. Pt states he does not take his Lisinopril daily and only takes as needed, however he states he does take his metoprolol daily. Per cardio cl note pt informed to continue his cardiac medications as prescribed. Pt informed to f/u with cardiologist with next appt but encouraged to take all BP meds as directed.         Immunizations:   Immunization History   Administered Date(s) Administered    COVID-19, vector-nr, rS-Ad26,  PF (Danie) 03/08/2021, 10/25/2021    Influenza 10/28/2014    Influenza (FLUAD) - Quadrivalent - Adjuvanted - PF *Preferred* (65+) 10/21/2023    Influenza - Quadrivalent 09/30/2020    Influenza - Quadrivalent - PF *Preferred* (6 months and older) 12/04/2019, 09/30/2020    Influenza - Trivalent - PF (ADULT) 10/26/2018, 12/04/2019    Pneumococcal Conjugate - 13 Valent 05/03/2019    Pneumococcal Polysaccharide - 23 Valent 11/24/2020    Zoster Recombinant 11/12/2019, 02/13/2020        ----------------------------  Atrial fibrillation  Central stenosis of spinal canal  Chronic back pain greater than 3 months duration  Constipation  Degeneration of lumbar intervertebral disc  Diastolic dysfunction  Dysphonia  Dystrophic nail  Erectile dysfunction  Fatty liver  GERD (gastroesophageal reflux disease)  H/O cataract removal with insertion of prosthetic lens  History of CVA (cerebrovascular accident)  History of radial keratotomy  HTN (hypertension)  Hyperlipidemia  Induratio penis plastica  Microhematuria  Overgrown toenails  Parkinson's disease  Peyronie disease  Polyneuropathy associated with underlying disease  Rheumatoid arthritis  Rheumatoid arthritis, unspecified  Sixth nerve palsy  Spinal stenosis of lumbar region  Type 2 diabetes mellitus without complications     Past Surgical History:   Procedure Laterality Date    CATARACT EXTRACTION W/  INTRAOCULAR LENS IMPLANT      EPIDURAL STEROID INJECTION INTO LUMBAR SPINE      exc cyst Lt ear      EYE SURGERY      INJECTION OF ANESTHETIC AGENT AROUND MEDIAL BRANCH NERVES INNERVATING LUMBAR FACET JOINT Bilateral 11/16/2022    Procedure: Block-nerve-medial branch-lumbar;  Surgeon: Gertrude Blanco MD;  Location: [facility];  Service: Pain Management;  Laterality: Bilateral;  Bilateral L4-L5...Patient is requesting to be first case    INJECTION OF ANESTHETIC AGENT AROUND MEDIAL BRANCH NERVES INNERVATING LUMBAR FACET JOINT Bilateral 11/22/2023    Procedure: Block-nerve-medial  branch-lumbar;  Surgeon: Gertrude Blanco MD;  Location: [facility];  Service: Pain Management;  Laterality: Bilateral;  Bilateral MBB L3-5    KNEE ARTHROSCOPY Right     REFRACTIVE SURGERY      VASECTOMY  1998       Review of patient's allergies indicates:   Allergen Reactions    Sarilumab Other (See Comments)     Other reaction(s): fainting       Current Outpatient Medications   Medication Instructions    alogliptin-pioglitazone (OSENI) 25-30 mg Tab 1 tablet, Oral, Daily    ascorbic acid (vitamin C) (VITAMIN C) 1,000 mg, Oral, Nightly    b complex vitamins capsule 1 capsule, Oral, Nightly    blood sugar diagnostic (ACCU-CHEK GUIDE TEST STRIPS MISC) Accu-Chek Guide test strips    carbidopa-levodopa  mg (SINEMET)  mg per tablet 2 tablets, Oral, 2 times daily    cholecalciferol (vitamin D3) 1,000 Units, Oral, Nightly    coenzyme Q10 100 mg, Oral, Nightly    colchicine (COLCRYS) 0.6 mg, Oral, 2 times daily    folic acid/multivit-min/lutein (CENTRUM SILVER ORAL) 1 tablet, Oral, Nightly    gemfibroziL (LOPID) 600 mg, Oral, 2 times daily    lancets (ACCU-CHEK FASTCLIX LANCET DRUM MISC) Accu-Chek Fastclix Lancet Drum    lisinopriL-hydrochlorothiazide (PRINZIDE,ZESTORETIC) 20-25 mg Tab 1 tablet, Oral, As needed (PRN)    magnesium 250 mg, Oral, Nightly    metoprolol succinate (TOPROL-XL) 25 mg, Oral, Daily    NON FORMULARY MEDICATION 1 tablet, Oral, Nightly    pantoprazole (PROTONIX) 40 mg, Oral, Daily    potassium citrate 99 mg Cap 1 tablet, Oral, Nightly    rosuvastatin (CRESTOR) 20 mg, Oral, Daily    selegiline (ELDEPRYL) 5 mg, Oral, 2 times daily    tofacitinib (XELJANZ XR) 11 mg Tb24 1 tablet, Oral, Daily    vitamin A 8,000 Units, Oral, Daily    vitamin E 400 Units, Oral, Nightly    XARELTO 20 mg, Oral, Daily    zinc sulfate (ZINC-15 ORAL) 140 mg, Oral, Nightly       Social History     Socioeconomic History    Marital status:     Number of children: 1   Tobacco Use    Smoking status: Former     Current  packs/day: 0.00     Average packs/day: 1 pack/day for 15.0 years (15.0 ttl pk-yrs)     Types: Cigarettes     Start date: 1972     Quit date: 1987     Years since quittin.9     Passive exposure: Past    Smokeless tobacco: Never   Substance and Sexual Activity    Alcohol use: Not Currently    Drug use: Not Currently     Types: Marijuana    Sexual activity: Yes     Partners: Female     Social Determinants of Health     Financial Resource Strain: Medium Risk (10/21/2023)    Overall Financial Resource Strain (CARDIA)     Difficulty of Paying Living Expenses: Somewhat hard   Food Insecurity: No Food Insecurity (2022)    Hunger Vital Sign     Worried About Running Out of Food in the Last Year: Never true     Ran Out of Food in the Last Year: Never true   Transportation Needs: No Transportation Needs (2022)    PRAPARE - Transportation     Lack of Transportation (Medical): No     Lack of Transportation (Non-Medical): No   Physical Activity: Sufficiently Active (2022)    Exercise Vital Sign     Days of Exercise per Week: 7 days     Minutes of Exercise per Session: 30 min   Stress: No Stress Concern Present (2022)    [employer] - Occupational Stress Questionnaire     Feeling of Stress : Not at all   Social Connections: Moderately Isolated (10/21/2023)    Social Connection and Isolation Panel [NHANES]     Frequency of Communication with Friends and Family: More than three times a week     Frequency of Social Gatherings with Friends and Family: More than three times a week     Attends Zoroastrian Services: Never     Active Member of Clubs or Organizations: Yes     Attends Club or Organization Meetings: More than 4 times per year     Marital Status:    Housing Stability: Low Risk  (2022)    Housing Stability Vital Sign     Unable to Pay for Housing in the Last Year: No     Number of Places Lived in the Last Year: 1     Unstable Housing in the Last Year: No  "       Family History   Problem Relation Age of Onset    Heart failure Mother     Coronary artery disease Mother     Glaucoma Father     Alzheimer's disease Father     Emphysema Father     Autoimmune disease Sister     Pneumonia Sister     Dementia Sister         Patient Care Team:  Pau Gomes FNP as PCP - General (Family Medicine)  Valeria Donovan MD (Rheumatology)  Valeria Donovan MD (Rheumatology)  Valeria Donovan MD (Rheumatology)  Taisha Wood LPN as Care Coordinator     Subjective:     Review of Systems     See HPI for details    Constitutional: Denies Change in appetite. Denies Chills. Denies Fever. Denies Night sweats.  Eye: Denies Blurred vision. Denies Discharge. Denies Eye pain.  ENT: Denies Decreased hearing. Denies Sore throat. Denies Swollen glands.  Respiratory: Denies Cough. Denies Shortness of breath. Denies Shortness of breath with exertion. Denies Wheezing.  Cardiovascular: DeniesChest pain at rest. Denies Chest pain with exertion. Denies Irregular heartbeat. Denies Palpitations. Denies Edema.  Gastrointestinal: Denies Abdominal pain. DeniesDiarrhea. Denies Nausea. Denies Vomiting. Denies Hematemesis or Hematochezia.  Genitourinary: Denies Dysuria. Denies Urinary frequency. Denies Urinary urgency. Denies Blood in urine.  Endocrine: Denies Cold intolerance. Denies Excessive thirst. Denies Heat intolerance. Denies Weight loss. Denies Weight gain.  Musculoskeletal: Denies Painful joints. Denies Weakness.  Integumentary: Denies Rash. Denies Itching. Denies Dry skin.  Neurologic: Denies Dizziness. Denies Fainting. Denies Headache.  Psychiatric: Denies Depression. Denies Anxiety. Denies Suicidal/Homicidal ideations.    All Other ROS: Negative except as stated in HPI.       Objective:     Visit Vitals  BP (!) 158/80 (BP Location: Right arm, Patient Position: Sitting, BP Method: Large (Manual))   Pulse 73   Temp 97.9 °F (36.6 °C) (Oral)   Resp 18   Ht 5' 11" (1.803 m)   Wt 107 kg (236 " lb)   BMI 32.92 kg/m²       Physical Exam    General: Alert and oriented, No acute distress.  Head: Normocephalic, Atraumatic.  Eye: Pupils are equal, round and reactive to light, Extraocular movements are intact, Sclera non-icteric.  Ears/Nose/Throat: Normal, Mucosa moist,Clear.  Neck/Thyroid: Supple, Non-tender, No carotid bruit, No lymphadenopathy, No JVD, Full range of motion.  Respiratory: Clear to auscultation bilaterally; No wheezes, rales or rhonchi,Non-labored respirations, Symmetrical chest wall expansion.  Cardiovascular: Regular rate and rhythm, S1/S2 normal, No murmurs, rubs or gallops.  Gastrointestinal: Soft, Non-tender, Non-distended, Normal bowel sounds, No palpable organomegaly.  Musculoskeletal: Normal range of motion.  Integumentary: Warm, Dry, Intact, No suspicious lesions or rashes.  Extremities: No clubbing, cyanosis or edema  Neurologic: No focal deficits, Cranial Nerves II-XII are grossly intact, Motor strength normal upper and lower extremities, Sensory exam intact.  Psychiatric: Normal interaction, Coherent speech, Euthymic mood, Appropriate affect       Labs Reviewed:     Chemistry:  Lab Results   Component Value Date     10/27/2023    K 4.2 10/27/2023    CHLORIDE 108 (H) 10/27/2023    BUN 17.4 10/27/2023    CREATININE 1.09 10/27/2023    EGFRNORACEVR >60 10/27/2023    GLUCOSE 162 (H) 10/27/2023    CALCIUM 9.0 10/27/2023    ALKPHOS 54 10/27/2023    LABPROT 6.7 10/27/2023    ALBUMIN 3.9 10/27/2023    BILIDIR 0.2 01/27/2022    IBILI 0.40 01/27/2022    AST 19 10/27/2023    ALT <5 10/27/2023    MG 1.60 12/04/2020    PHOS 3.3 12/04/2020    TXEWJBEU73LP 43.5 05/25/2021        Lab Results   Component Value Date    HGBA1C 6.1 10/26/2023        Hematology:  Lab Results   Component Value Date    WBC 5.39 10/27/2023    HGB 13.7 (L) 10/27/2023    HCT 40.0 (L) 10/27/2023     10/27/2023       Lipid Panel:  Lab Results   Component Value Date    CHOL 167 10/09/2023    HDL 53 10/09/2023    LDL  79.00 10/09/2023    TRIG 174 (H) 10/09/2023    TOTALCHOLEST 3 10/09/2023        Urine:  Lab Results   Component Value Date    COLORUA Light-Yellow 11/10/2023    APPEARANCEUA Clear 11/10/2023    SGUA 1.022 11/10/2023    PHUA 5.5 11/10/2023    PROTEINUA Negative 11/10/2023    GLUCOSEUA Normal 11/10/2023    KETONESUA Negative 11/10/2023    BLOODUA 1+ (A) 11/10/2023    NITRITESUA Negative 11/10/2023    LEUKOCYTESUR Negative 11/10/2023    RBCUA 0-5 11/10/2023    WBCUA 0-5 11/10/2023    BACTERIA None Seen 11/10/2023    SQEPUA None Seen 11/10/2023    HYALINECASTS None Seen 11/10/2023    CREATRANDUR 162.6 11/10/2023    PROTEINURINE 18.0 05/15/2023    UPROTCREA 0.1 05/15/2023        Assessment:       ICD-10-CM ICD-9-CM   1. Type 2 diabetes mellitus with peripheral neuropathy  E11.42 250.60     357.2   2. Colon cancer screening  Z12.11 V76.51   3. Microhematuria  R31.29 599.72   4. Gastroesophageal reflux disease, unspecified whether esophagitis present  K21.9 530.81   5. TIA (transient ischemic attack)  G45.9 435.9   6. Mixed hyperlipidemia  E78.2 272.2   7. Spinal stenosis at L4-L5 level  M48.061 724.02   8. Macrocytic anemia  D53.9 281.9   9. Atrial fibrillation and flutter  I48.91 427.31    I48.92 427.32   10. Primary hypertension  I10 401.9   11. Well adult exam  Z00.00 V70.0        Plan:     1. Type 2 diabetes mellitus with peripheral neuropathy  A1c 6.1 10-26-23. ADA diet and exercise. Cont Alogliptin-Pioglitazone as prescribed. Urine microalbumin 11-8-23. DM foot 8-9-23. DM eye 3-24-23. A1c in 4 months.     2. Colon cancer screening  Keep colonoscopy as scheduled 6-6-24.     3. Microhematuria  Pt followed in Uro cl. Pt scheduled for Uro procedure 1-29-24. Keep appts.     4. Gastroesophageal reflux disease, unspecified whether esophagitis present  Avoid triggers. Pt had previous UGI showing stomach ulcers > 3 years ago. Cont Pantoprazole as prescribed.     5. TIA (transient ischemic attack)  Pt followed in Cardio cl.  Keep appts. Instructed on importance of controlling BP to prevent re-occurrence. Pt states TIA was in 2015.     6. Mixed hyperlipidemia  Trigs 174. Encouraged low fat diet and exercise. Cont Gemfibrozil, Crestor as prescribed.     7. Spinal stenosis at L4-L5 level  Pt followed by Dr Rees for pain mgmt. Keep appts.     8. Macrocytic anemia  Will get Vit B12 level and Folate level today.     9. Atrial fibrillation and flutter  Pt followed in Cardio cl. Cont Xeralto as prescribed per cardio cl.     10. Primary hypertension  BP elevated. Low fat low salt diet and exercise. Pt has not been taking his Lisinopril daily as prescribed. States he is taking his Metoprolol as prescribed daily. States his cardiologist informed him to only take the Lisinopril HCTZ on prn basis if BP elevated however Cardio cl note 11-16-23 states to take cardiac meds as prescribed. Informed to take all BP meds as prescribed.     11. Well adult exam  Labs today. UTD PSA. Keep colonoscopy appt 6-6-24.      12. Rheumatoid arthritis  Pt followed in Rheum cl. Keep appts.     Follow up in about 1 month (around 1/5/2024) for for lab results. . In addition to their scheduled follow up, the patient has also been instructed to follow up on as needed basis.     Future Appointments   Date Time Provider Department Center   12/8/2023  8:00 AM Gertrude Blanco MD [facility] SABRINA [place]   1/10/2024  8:00 AM Monica Chun FNP [facility] RADHA [place]   1/11/2024 11:00 AM Barbara Zelaya FNP [facility] RHEU [place]   1/29/2024 11:15 AM Jennifer Loomis DO [facility] [place]OL[place]   3/14/2024  9:30 AM [facility], [facility] [place]   4/9/2024  9:30 AM Valeria Donovan MD [facility] [place]   5/10/2024 12:30 PM Quincy Pearce, JOLENE [facility] [place]   5/16/2024  1:00 PM Quincy Conrad MD [facility] ASHELY [place], DEVP

## 2023-12-08 ENCOUNTER — OFFICE VISIT (OUTPATIENT)
Dept: PAIN MEDICINE | Facility: CLINIC | Age: 68
End: 2023-12-08
Payer: MEDICARE

## 2023-12-08 VITALS
HEART RATE: 82 BPM | SYSTOLIC BLOOD PRESSURE: 130 MMHG | HEIGHT: 71 IN | WEIGHT: 236 LBS | DIASTOLIC BLOOD PRESSURE: 80 MMHG | BODY MASS INDEX: 33.04 KG/M2

## 2023-12-08 DIAGNOSIS — M47.816 LUMBAR SPONDYLOSIS: ICD-10-CM

## 2023-12-08 DIAGNOSIS — G89.29 CHRONIC BACK PAIN GREATER THAN 3 MONTHS DURATION: ICD-10-CM

## 2023-12-08 DIAGNOSIS — M54.9 CHRONIC BACK PAIN GREATER THAN 3 MONTHS DURATION: ICD-10-CM

## 2023-12-08 DIAGNOSIS — M51.36 DEGENERATION OF LUMBAR INTERVERTEBRAL DISC: Primary | ICD-10-CM

## 2023-12-08 PROCEDURE — 3008F BODY MASS INDEX DOCD: CPT | Mod: CPTII,,, | Performed by: ANESTHESIOLOGY

## 2023-12-08 PROCEDURE — 99215 OFFICE O/P EST HI 40 MIN: CPT | Mod: ,,, | Performed by: ANESTHESIOLOGY

## 2023-12-08 PROCEDURE — 3061F NEG MICROALBUMINURIA REV: CPT | Mod: CPTII,,, | Performed by: ANESTHESIOLOGY

## 2023-12-08 PROCEDURE — 1125F AMNT PAIN NOTED PAIN PRSNT: CPT | Mod: CPTII,,, | Performed by: ANESTHESIOLOGY

## 2023-12-08 PROCEDURE — 3075F PR MOST RECENT SYSTOLIC BLOOD PRESS GE 130-139MM HG: ICD-10-PCS | Mod: CPTII,,, | Performed by: ANESTHESIOLOGY

## 2023-12-08 PROCEDURE — 1160F PR REVIEW ALL MEDS BY PRESCRIBER/CLIN PHARMACIST DOCUMENTED: ICD-10-PCS | Mod: CPTII,,, | Performed by: ANESTHESIOLOGY

## 2023-12-08 PROCEDURE — 3008F PR BODY MASS INDEX (BMI) DOCUMENTED: ICD-10-PCS | Mod: CPTII,,, | Performed by: ANESTHESIOLOGY

## 2023-12-08 PROCEDURE — 3066F PR DOCUMENTATION OF TREATMENT FOR NEPHROPATHY: ICD-10-PCS | Mod: CPTII,,, | Performed by: ANESTHESIOLOGY

## 2023-12-08 PROCEDURE — 3066F NEPHROPATHY DOC TX: CPT | Mod: CPTII,,, | Performed by: ANESTHESIOLOGY

## 2023-12-08 PROCEDURE — 3288F FALL RISK ASSESSMENT DOCD: CPT | Mod: CPTII,,, | Performed by: ANESTHESIOLOGY

## 2023-12-08 PROCEDURE — 3044F PR MOST RECENT HEMOGLOBIN A1C LEVEL <7.0%: ICD-10-PCS | Mod: CPTII,,, | Performed by: ANESTHESIOLOGY

## 2023-12-08 PROCEDURE — 3061F PR NEG MICROALBUMINURIA RESULT DOCUMENTED/REVIEW: ICD-10-PCS | Mod: CPTII,,, | Performed by: ANESTHESIOLOGY

## 2023-12-08 PROCEDURE — 1101F PT FALLS ASSESS-DOCD LE1/YR: CPT | Mod: CPTII,,, | Performed by: ANESTHESIOLOGY

## 2023-12-08 PROCEDURE — 1160F RVW MEDS BY RX/DR IN RCRD: CPT | Mod: CPTII,,, | Performed by: ANESTHESIOLOGY

## 2023-12-08 PROCEDURE — 3079F DIAST BP 80-89 MM HG: CPT | Mod: CPTII,,, | Performed by: ANESTHESIOLOGY

## 2023-12-08 PROCEDURE — 1125F PR PAIN SEVERITY QUANTIFIED, PAIN PRESENT: ICD-10-PCS | Mod: CPTII,,, | Performed by: ANESTHESIOLOGY

## 2023-12-08 PROCEDURE — 4010F PR ACE/ARB THEARPY RXD/TAKEN: ICD-10-PCS | Mod: CPTII,,, | Performed by: ANESTHESIOLOGY

## 2023-12-08 PROCEDURE — 1159F MED LIST DOCD IN RCRD: CPT | Mod: CPTII,,, | Performed by: ANESTHESIOLOGY

## 2023-12-08 PROCEDURE — 1101F PR PT FALLS ASSESS DOC 0-1 FALLS W/OUT INJ PAST YR: ICD-10-PCS | Mod: CPTII,,, | Performed by: ANESTHESIOLOGY

## 2023-12-08 PROCEDURE — 3075F SYST BP GE 130 - 139MM HG: CPT | Mod: CPTII,,, | Performed by: ANESTHESIOLOGY

## 2023-12-08 PROCEDURE — 3288F PR FALLS RISK ASSESSMENT DOCUMENTED: ICD-10-PCS | Mod: CPTII,,, | Performed by: ANESTHESIOLOGY

## 2023-12-08 PROCEDURE — 4010F ACE/ARB THERAPY RXD/TAKEN: CPT | Mod: CPTII,,, | Performed by: ANESTHESIOLOGY

## 2023-12-08 PROCEDURE — 1159F PR MEDICATION LIST DOCUMENTED IN MEDICAL RECORD: ICD-10-PCS | Mod: CPTII,,, | Performed by: ANESTHESIOLOGY

## 2023-12-08 PROCEDURE — 3044F HG A1C LEVEL LT 7.0%: CPT | Mod: CPTII,,, | Performed by: ANESTHESIOLOGY

## 2023-12-08 PROCEDURE — 3079F PR MOST RECENT DIASTOLIC BLOOD PRESSURE 80-89 MM HG: ICD-10-PCS | Mod: CPTII,,, | Performed by: ANESTHESIOLOGY

## 2023-12-08 PROCEDURE — 99215 PR OFFICE/OUTPT VISIT, EST, LEVL V, 40-54 MIN: ICD-10-PCS | Mod: ,,, | Performed by: ANESTHESIOLOGY

## 2023-12-08 NOTE — PROGRESS NOTES
Gertrude Blanco MD        PATIENT NAME: Melquiades Rehman Jr.  : 1955  DATE: 23  MRN: [id number]      Billing Provider: Gertrude Blanco MD  Level of Service:   Patient PCP Information       Provider PCP Type    DAVIDSON Mandujano [facility]            Reason for Visit / Chief Complaint: Post-op Evaluation (Post op MBB Burton L3-L5  23   Patient states injection helped a lot    Pain today is 2 )       Update PCP  Update Chief Complaint         History of Present Illness / Problem Focused Workflow     Melquiades Rehman Jr. presents to the clinic with Post-op Evaluation (Post op MBB Burton L3-L5  23   Patient states injection helped a lot    Pain today is 2 )       This is a 68-year-old male who returns to clinic today for follow up of his chronic low back pain.  He underwent bilateral L3-5 diagnostic medial branch blocks a couple weeks ago.  He states that he had 100% relief of his pain for 1 day after the procedure.  Then all of his pain returned, just like it was before.      Back Pain      Review of Systems     Review of Systems   Musculoskeletal:  Positive for back pain.   All other systems reviewed and are negative.     Medical / Social / Family History     Past Medical History:   Diagnosis Date    Atrial fibrillation     Central stenosis of spinal canal     Chronic back pain greater than 3 months duration 10/14/2022    Constipation 2022    Degeneration of lumbar intervertebral disc 2022    Diastolic dysfunction 2023    Dysphonia 2022    Dystrophic nail 2023    Erectile dysfunction 2022    Fatty liver     GERD (gastroesophageal reflux disease) 2022    H/O cataract removal with insertion of prosthetic lens     History of CVA (cerebrovascular accident) 2022    History of radial keratotomy 1998    HTN (hypertension)     Hyperlipidemia 2022    Induratio penis plastica 2022    Microhematuria 2023    Overgrown toenails 2023     Parkinson's disease     Peyronie disease     Polyneuropathy associated with underlying disease 06/19/2023    Rheumatoid arthritis 01/10/2022    Rheumatoid arthritis, unspecified     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    Spinal stenosis of lumbar region 09/30/2022    Type 2 diabetes mellitus without complications        Past Surgical History:   Procedure Laterality Date    CATARACT EXTRACTION W/  INTRAOCULAR LENS IMPLANT      EPIDURAL STEROID INJECTION INTO LUMBAR SPINE      exc cyst Lt ear      EYE SURGERY      INJECTION OF ANESTHETIC AGENT AROUND MEDIAL BRANCH NERVES INNERVATING LUMBAR FACET JOINT Bilateral 11/16/2022    Procedure: Block-nerve-medial branch-lumbar;  Surgeon: Gertrude Blanco MD;  Location: [facility] OR;  Service: Pain Management;  Laterality: Bilateral;  Bilateral L4-L5...Patient is requesting to be first case    INJECTION OF ANESTHETIC AGENT AROUND MEDIAL BRANCH NERVES INNERVATING LUMBAR FACET JOINT Bilateral 11/22/2023    Procedure: Block-nerve-medial branch-lumbar;  Surgeon: Gertrude Blanco MD;  Location: [facility] OR;  Service: Pain Management;  Laterality: Bilateral;  Bilateral MBB L3-5    KNEE ARTHROSCOPY Right     REFRACTIVE SURGERY      VASECTOMY  1998       Social History  Mr. Rehman  reports that he quit smoking about 36 years ago. His smoking use included cigarettes. He started smoking about 51 years ago. He has a 15.0 pack-year smoking history. He has been exposed to tobacco smoke. He has never used smokeless tobacco. He reports that he does not currently use alcohol. He reports that he does not currently use drugs after having used the following drugs: Marijuana.    Family History  Mr.'s Rehman family history includes Alzheimer's disease in his father; Autoimmune disease in his sister; Coronary artery disease in his mother; Dementia in his sister; Emphysema in his father; Glaucoma in his father; Heart failure in his mother; Pneumonia in his sister.    Medications and Allergies      Medications  No outpatient medications have been marked as taking for the 12/8/23 encounter (Office Visit) with Gertrude Blanco MD.       Allergies  Review of patient's allergies indicates:   Allergen Reactions    Sarilumab Other (See Comments)     Other reaction(s): fainting       Physical Examination     Vitals:    12/08/23 0826   BP: 130/80   Pulse: 82   Pain Disability Index (PDI): 49       Spine Musculoskeletal Exam    Gait    Gait is normal.    Inspection    Thoracolumbar    Thoracolumbar inspection is normal.    Range of Motion    Thoracolumbar        Thoracolumbar range of motion additional comments:   Restricted range of motion in the lower back due to pain    Strength    Thoracolumbar    Thoracolumbar motor exam is normal.       Sensory    Thoracolumbar    Thoracolumbar sensation is normal.    General      Constitutional: appears stated age, well-developed and well-nourished    Scleral icterus: no    Labored breathing: no    Psychiatric: normal mood and affect and no acute distress    Neurological: alert and oriented x3    Skin: intact    Lymphadenopathy: none   CV: extremities warm, well-perfused  Resp: nonlabored breathing    Assessment and Plan (including Health Maintenance)      Problem List  Smart Sets  Document Outside HM   :    Plan:   Degeneration of lumbar intervertebral disc    Lumbar spondylosis    Chronic back pain greater than 3 months duration      I am scheduling him for his 2nd set of bilateral L3-5 diagnostic medial branch blocks today.  He had 100% relief of his axial low back pain for 1 day after the 1st set of blocks.  I discussed that if he again has good but temporary relief, we would move forward to radiofrequency ablation.  He voices understanding and is in agreement with plan.    Problem List Items Addressed This Visit       Degeneration of lumbar intervertebral disc - Primary    Chronic back pain greater than 3 months duration    Lumbar spondylosis         Future  Appointments   Date Time Provider Department Center   1/10/2024  8:00 AM Monica Chun, [facility] OPWND [place]    1/11/2024 10:00 AM Pau Gomes, [facility] INTMED [place]    1/11/2024 11:00 AM Barbara Zelaya, P [facility] RHEU [place]    1/29/2024 11:15 AM Jennifer Loomis DO [facility] UROLO [place]   3/14/2024  9:30 AM PROVID[facility], [facility]NE[facility] OPHTH [place]    4/9/2024  9:30 AM Valeria Donovan MD [facility] RHEU [place]    5/10/2024 12:30 PM Quincy Pearce NP [facility] UROLO [place]    5/16/2024  1:00 PM Quincy Conrad MD [facility] CARD [place]        There are no Patient Instructions on file for this visit.  No follow-ups on file.     Signature:  Gertrude Blanco MD      Date of encounter: 12/8/23

## 2023-12-31 DIAGNOSIS — E78.2 MIXED HYPERLIPIDEMIA: ICD-10-CM

## 2024-01-04 RX ORDER — ROSUVASTATIN CALCIUM 20 MG/1
20 TABLET, COATED ORAL
Qty: 90 TABLET | Refills: 1 | OUTPATIENT
Start: 2024-01-04

## 2024-01-04 NOTE — TELEPHONE ENCOUNTER
Refill too soon.    Outpatient Medication Detail     Disp Refills Start End JANES   rosuvastatin (CRESTOR) 20 MG tablet 90 tablet 1 11/8/2023 11/7/2024 No   Sig - Route: Take 1 tablet (20 mg total) by mouth once daily. - Oral   Sent to pharmacy as: rosuvastatin (CRESTOR) 20 MG tablet   Class: Normal   Order: [id number]   Date/Time Signed: 11/8/2023 13:42       E-Prescribing Status: Receipt confirmed by pharmacy (11/8/2023  1:42 PM CST)

## 2024-01-09 NOTE — PROGRESS NOTES
Patient ID: [id number]     Chief Complaint: Seropositive rheumatoid arthritis of multiple site (Pt stated lower back pain)      HPI:     Melquiades Rehman Jr. is a 68 y.o. male here today for follow-up of Rheumatoid Arthritis.    Presents today for follow up of RA, CCP (>250) and RF. He recalls his symptoms began 1994 w/ mainly elbow problems. But over a year, his symptoms worsened and involved hands. He was referred to Dr. Beck who started Enbrel 2-3 yrs but developed secondary failure. He then changed to Humira, which he took for 15 yrs but lost efficacy a few yrs ago. He briefly tried Kevzara but had poor rxn. He was then changed to Xeljanz, which worked wonderfully. However, he lost his insurance and started seeing [facility], NP April. He reports that she didn't want to start Xeljanz over concerns of blood clots. Instead, she started Orencia, which didn't work at all, however he reports never taken. He had one bottle left of Xeljanz and restarted it. It has worked great so it was resumed. He did have +Hep C ab but saw GI (Dr. Toledo) - had US and repeat testing for Hep C was negative. Told he may have fatty liver dz.    Chronic back pain and he has DJD and spinal stenosis, no sciatica symptoms. He has seen Dr Montero once, Neurosurgery and continues to follow with Dr Simmons, pain management. He has done PT, traction has helped in the past but no longer doing any therapy. Nerve block and VALERIE did not help. Pain worse with bending and better with sitting and lying down.     RA has been in remission, denies red, warm and swollen joints. No flares. May be once in a while he takes Prednisone- has not had in quit some time, he tried to stop meds in August 2023 as he had been asymptomatic however he resumed after 1 month as he reports he had the start of a flare and has remained on since. He is taking Xeljanz 11 mg daily, doing well. He has A fib and on AC. He sees [facility] here at [facility]. CHF was on his problem list, but  "per patient he does not have HF and had tests done for that and the diagnosis was taken off the list.      January 2024: Here today for follow up. He continues Xeljanz 11 mg po qd and tolerating well. He deneis any red/warm/swollen joints. He continues to have back pain, has apt in 2 weeks for nerve block and possible repeat ablation with Dr. Blanco. He reports morning stiffness has been tolerable and not an issue at this time. Overall joints have been doing well with Xeljanz. He did report to the ER 10/26/2023 as he had some confusion with blurred vision- work up noted to be normal and reports was diagnosed with Anxiety. He did follow up with Neurology in [place] and denies any further diagnosis or concerns. He also continues to follow with Cardiology.     Denies any recent illness or hospitalizations since last visit.     Dr. Barrios- Neurology  Dr  -Cardiology   Denies any recent illness since last visit.     PMH: AFib on Xarelto, HTN, Inc lipids, Parkinsons, Peyronie's dz, T2DM, fatty liver.  He has Parkinson's, he sees Dr Barrios. Symptoms are better with Sinemet. He also takes medication for movement disorders.  H/o CVA several years ago- did follow with Neurology until dx with Parkinson and now continues to follow with Dr. Barrios. No residual weakness from stroke.    Denies history of fevers, rashes, photosensitivity, oral or nasal ulcers, h/o MI, seizures, h/o PE or DVT, Raynaud's phenomenon, uveitis, malignancies.   Family history of autoimmune disease: Sister but unsure what "autoimmune" condition she had- reports "attacked her lungs and killed her".  Smoking: Quit smoking 1987- smoked for roughly 15 years (1/2 ppd)  Social: 3-4 beers qow w/ playing music. No TBO but marijuana occ    Social History     Tobacco Use   Smoking Status Former    Current packs/day: 0.00    Average packs/day: 1 pack/day for 15.0 years (15.0 ttl pk-yrs)    Types: Cigarettes    Start date: 1/1/1972    Quit date: 1/1/1987    " Years since quittin.0    Passive exposure: Past   Smokeless Tobacco Never          ----------------------------  Atrial fibrillation  Central stenosis of spinal canal  Chronic back pain greater than 3 months duration  Constipation  Degeneration of lumbar intervertebral disc  Diastolic dysfunction  Dysphonia  Dystrophic nail  Erectile dysfunction  Fatty liver  GERD (gastroesophageal reflux disease)  H/O cataract removal with insertion of prosthetic lens  History of CVA (cerebrovascular accident)  History of radial keratotomy  HTN (hypertension)  Hyperlipidemia  Induratio penis plastica  Microhematuria  Overgrown toenails  Parkinson's disease  Peyronie disease  Polyneuropathy associated with underlying disease  Rheumatoid arthritis  Rheumatoid arthritis, unspecified  Sixth nerve palsy  Spinal stenosis of lumbar region  Type 2 diabetes mellitus without complications     Past Surgical History:   Procedure Laterality Date    CATARACT EXTRACTION W/  INTRAOCULAR LENS IMPLANT      EPIDURAL STEROID INJECTION INTO LUMBAR SPINE      exc cyst Lt ear      EYE SURGERY      INJECTION OF ANESTHETIC AGENT AROUND MEDIAL BRANCH NERVES INNERVATING LUMBAR FACET JOINT Bilateral 2022    Procedure: Block-nerve-medial branch-lumbar;  Surgeon: Gertrude Blanco MD;  Location: [facility] OR;  Service: Pain Management;  Laterality: Bilateral;  Bilateral L4-L5...Patient is requesting to be first case    INJECTION OF ANESTHETIC AGENT AROUND MEDIAL BRANCH NERVES INNERVATING LUMBAR FACET JOINT Bilateral 2023    Procedure: Block-nerve-medial branch-lumbar;  Surgeon: Gertrude Blanco MD;  Location: [facility] OR;  Service: Pain Management;  Laterality: Bilateral;  Bilateral MBB L3-5    KNEE ARTHROSCOPY Right     REFRACTIVE SURGERY      VASECTOMY         Review of patient's allergies indicates:   Allergen Reactions    Sarilumab Other (See Comments)     Other reaction(s): fainting       Outpatient Medications Marked as Taking for the 24  encounter (Office Visit) with Barbara Zelaya FNP   Medication Sig Dispense Refill    alogliptin-pioglitazone (OSENI) 25-30 mg Tab Take 1 tablet by mouth once daily. 90 tablet 1    blood sugar diagnostic (ACCU-CHEK GUIDE TEST STRIPS MISC) Accu-Chek Guide test strips      carbidopa-levodopa  mg (SINEMET)  mg per tablet Take 2 tablets by mouth 2 (two) times a day.      colchicine (COLCRYS) 0.6 mg tablet Take 1 tablet (0.6 mg total) by mouth 2 (two) times daily. 180 tablet 1    gemfibroziL (LOPID) 600 MG tablet Take 1 tablet (600 mg total) by mouth 2 (two) times daily. 180 tablet 1    lancets (ACCU-CHEK FASTCLIX LANCET DRUM MISC) Accu-Chek Fastclix Lancet Drum      lisinopriL-hydrochlorothiazide (PRINZIDE,ZESTORETIC) 20-25 mg Tab Take 1 tablet by mouth as needed.      metoprolol succinate (TOPROL-XL) 25 MG 24 hr tablet Take 1 tablet (25 mg total) by mouth once daily. 90 tablet 3    pantoprazole (PROTONIX) 40 MG tablet Take 1 tablet (40 mg total) by mouth once daily. 90 tablet 1    rosuvastatin (CRESTOR) 20 MG tablet Take 1 tablet (20 mg total) by mouth once daily. 90 tablet 1    tofacitinib (XELJANZ XR) 11 mg Tb24 Take 1 tablet by mouth once daily. 30 tablet 4    vit A/vit C/vit E/zinc/copper (PRESERVISION AREDS ORAL) Take 1 capsule by mouth once daily.      XARELTO 20 mg Tab Take 1 tablet (20 mg total) by mouth once daily. 30 tablet 6       Social History     Socioeconomic History    Marital status:     Number of children: 1   Tobacco Use    Smoking status: Former     Current packs/day: 0.00     Average packs/day: 1 pack/day for 15.0 years (15.0 ttl pk-yrs)     Types: Cigarettes     Start date: 1972     Quit date: 1987     Years since quittin.0     Passive exposure: Past    Smokeless tobacco: Never   Substance and Sexual Activity    Alcohol use: Not Currently    Drug use: Not Currently     Types: Marijuana    Sexual activity: Yes     Partners: Female     Social Determinants  of Health     Financial Resource Strain: Medium Risk (1/10/2024)    Overall Financial Resource Strain (CARDIA)     Difficulty of Paying Living Expenses: Somewhat hard   Food Insecurity: Food Insecurity Present (1/10/2024)    Hunger Vital Sign     Worried About Running Out of Food in the Last Year: Often true     Ran Out of Food in the Last Year: Often true   Transportation Needs: No Transportation Needs (1/10/2024)    PRAPARE - Transportation     Lack of Transportation (Medical): No     Lack of Transportation (Non-Medical): No   Physical Activity: Inactive (1/10/2024)    Exercise Vital Sign     Days of Exercise per Week: 0 days     Minutes of Exercise per Session: 20 min   Stress: No Stress Concern Present (1/10/2024)    [employer] - Occupational Stress Questionnaire     Feeling of Stress : Only a little   Social Connections: Socially Isolated (1/10/2024)    Social Connection and Isolation Panel [NHANES]     Frequency of Communication with Friends and Family: More than three times a week     Frequency of Social Gatherings with Friends and Family: Three times a week     Attends Tenriism Services: Never     Active Member of Clubs or Organizations: No     Attends Club or Organization Meetings: Never     Marital Status:    Housing Stability: Low Risk  (1/10/2024)    Housing Stability Vital Sign     Unable to Pay for Housing in the Last Year: No     Number of Places Lived in the Last Year: 1     Unstable Housing in the Last Year: No        Family History   Problem Relation Age of Onset    Heart failure Mother     Coronary artery disease Mother     Glaucoma Father     Alzheimer's disease Father     Emphysema Father     Autoimmune disease Sister     Pneumonia Sister     Dementia Sister         Immunization History   Administered Date(s) Administered    COVID-19, vector-nr, rS-Ad26, PF (Danie) 03/08/2021, 10/25/2021    Influenza 10/28/2014    Influenza (FLUAD) - Quadrivalent -  Adjuvanted - PF *Preferred* (65+) 10/21/2023    Influenza - Quadrivalent 09/30/2020    Influenza - Quadrivalent - PF *Preferred* (6 months and older) 12/04/2019, 09/30/2020    Influenza - Trivalent - PF (ADULT) 10/26/2018, 12/04/2019    Pneumococcal Conjugate - 13 Valent 05/03/2019    Pneumococcal Polysaccharide - 23 Valent 11/24/2020    Zoster Recombinant 11/12/2019, 02/13/2020       Patient Care Team:  Pau Gomes FNP as PCP - General (Family Medicine)  Valeria Donovan MD (Rheumatology)  Valeria Donovan MD (Rheumatology)  Valeria Donovan MD (Rheumatology)  Taisha Wood LPN as Care Coordinator  Homar Barrios MD (Neurology)     Subjective:     Constitutional:  Denies chills. Denies fever. Denies night sweats. Denies weight loss.   Ophthalmology: Denies blurred vision. Denies dry eyes. Denies eye pain. Denies Itching and redness.   ENT: Denies oral ulcers. Denies epistaxis. Denies dry mouth. Denies swollen glands.   Endocrine: Admits diabetes. Denies thyroid Problems.   Respiratory: Denies cough. Denies shortness of breath. Denies shortness of breath with exertion. Denies hemoptysis.   Cardiovascular: Denies chest pain at rest. Denies chest pain with exertion. Admits palpitations from A-fib  Gastrointestinal: Denies abdominal pain. Denies diarrhea. Denies nausea. Denies vomiting. Denies hematemesis or hematochezia. Denies heartburn. Admits constipation, uses OTC meds and helps  Genitourinary: Denies blood in urine.  Musculoskeletal: See HPI for details  Integumentary: Denies rash. Denies photosensitivity.   Peripheral Vascular: Denies Ulcers of hands and/or feet. Denies Cold extremities.   Neurologic: Denies dizziness. Denies headache.  Denies loss of strength. Denies numbness or tingling.   Psychiatric: Denies depression. Denies anxiety. Denies suicidal/homicidal ideations.      Objective:     /87 (BP Location: Right arm, Patient Position: Sitting, BP Method: Large (Automatic))    "Pulse 81   Temp 97.3 °F (36.3 °C) (Oral)   Resp 20   Ht 5' 11" (1.803 m)   Wt 107 kg (236 lb)   SpO2 99%   BMI 32.92 kg/m²     Physical Exam    General Appearance: alert, pleasant, in no acute distress.  Skin: Skin color, texture, turgor normal. No rashes or lesions.  Eyes:  extraocular movement intact (EOMI), pupils equal, round, reactive to light and accommodation, conjunctiva clear.  ENT: No oral or nasal ulcers.  Neck:  Neck supple. No adenopathy.   Lungs: CTA throughout without crackles, rhonchi, or wheezes.   Heart: RRR w/o murmurs.  No edema.   Neuro: Alert, oriented, CN II-XII GI, sensory and motor innervation intact.  Tremors present.  Rigidity of left upper extremity on exam today.  Musculoskeletal: No pain to bilateral MCPs, FROM noted to bilateral wrists, elbows, shoulders with no pain, no pain to bilateral MTPs.    Psych: Alert, oriented, normal eye contact.    Labs:   3/21/23:  Creatinine 1.52, GFR 51.  CBC okay.  ESR, CRP normal.  HIV, hepatitis B and C, quantiferon tb negative. 1+ protein and no blood. CKD and protenuria could be due to DM and HTN.    5/15/23:   milligram/gram.  Creatinine 1.27, GFR greater than 60.     7/6/2023 CMP Creat 1.23 (previously 1.33), CBC WBC 4.33 (previous 5.5), ANC 1.78 (previously 3.47), CRP <0.40 (<5), Sed Rate 3 (<15). - stopped Xeljanz    8/16/2023 CBC ANC now 2.23, otherwise ok.  ANC improved.     10/9/2023 Sed Rate 6 (<15), CBC WBC 4.07, RBC 4.50, ANC 2.04 (has been 1.78 in past)- stable. Trace protein and no blood in urine.  Creatinine 1.28, GFR greater than 60.  CRP normal.       Lab Results   Component Value Date    WBC 5.39 10/27/2023    HGB 13.7 (L) 10/27/2023    HCT 40.0 (L) 10/27/2023     10/27/2023    ALT <5 10/27/2023    AST 19 10/27/2023    BUN 17.4 10/27/2023    CREATININE 1.27 (H) 12/05/2023     10/27/2023    K 4.2 10/27/2023    CO2 27 10/27/2023    CRP <1.00 10/09/2023    SEDRATE 6 10/09/2023        Imaging:   Echo 1/15/21:  " Left ventricular systolic function normal.  EF 60%.  Grade 1 diastolic dysfunction.  PASP not given.  Right ventricular size and function normal.    3/21/23: CXR showed chronic bilateral basilar interstitial changes.     3/21/23:  Arthritis changes in bilateral feet, erosion of left 5th MTP.    X-ray of right foot showed moderate-to-severe arthritis.    DJD changes in bilateral hands.  No aggressive osseous lesions appreciated.    04/05/2023 ECHO:  LVEF 60%, LV hypertrophy noted, mild to moderate.  PASP 5 mmHg    4/05/2023 FVC 94.7%, FEV1 92.2 %, FEV1/FVC 97% TLC 94%, DLCO 79.4%, DLCO/.  FEV1/FVC ratio normal, FEV1, FVC and TLC  normal.  DLCO normal, impression normal PFT     5/4/2023: AAA Screening, No AAA noted. Some ectasia of proximal abd aorta with no evidence of aneurysmal dilatation.     10/02/2023: ECHO ejection fraction 60-65%, normal left ventricular systolic and diastolic function.  PASP not given.    10/3/2023: Carotid u/s Left and right Internal Cardotid arteries less than 50% stenosis.     10/2/2023 CT Head w/o Contrast:  Impression:  No acute intracranial findings identified.  Chronic microangiopathic ischemia    10/9/2023 CXRay: Impression: No acute cardiopulmonary process identified.    10/26/223 CT Head w/o Contrast: Impression: No acute intracranial findings or significant interval change compared to earlier this month.    10/26/2023 CT Head and Neck: Impression: Mild atherosclerotic changes seen as outlined above with no hemodynamically significant stenosis seen and no large plaque burden is seen.    10/26/2023 MRI Brain w/o Contrast: Impression: Chronic age related changes. Otherwise unremarkable    11/24/2023 CT Abd/Pelvis with and w/o Contrast: Impression: No nephrolithiasis, hydronephrosis, ureteral obstruction evident.  Possible mild renal cortical thinning. No acute process seen.  Assessment:       ICD-10-CM ICD-9-CM   1. Seropositive rheumatoid arthritis of multiple sites  M05.79  714.0   2. High risk medication use  Z79.899 V58.69   3. Spinal stenosis at L4-L5 level  M48.061 724.02   4. Atrial fibrillation and flutter  I48.91 427.31    I48.92 427.32   5. Parkinson's disease, unspecified whether dyskinesia present, unspecified whether manifestations fluctuate  G20.A1 332.0   6. Steatosis of liver  K76.0 571.8   7. Primary hypertension  I10 401.9   8. History of CVA (cerebrovascular accident)  Z86.73 V12.54   9. Controlled type 2 diabetes mellitus without complication, without long-term current use of insulin  E11.9 250.00   10. Hyperlipidemia, unspecified hyperlipidemia type  E78.5 272.4        Plan:       1. Strongly + CCP and RF rheumatoid arthritis dx 2014  - MTX was not used d/t hx of ETOH use. He had secondary failure of Enbrel after 2-3 yrs. Humira worked for 15 yrs but developed secondary failure. He briefly tried Kevzara but he did not tolerate it, states caused him to pass out after 1 dose. He was changed to Xeljanz, which worked well. Failed Orencia, did not help, however he does not recall taking Orencia in the past after reviewing medication. Restarted Xeljanz around October 2019 and he has been doing very well. 3/21/23:  Arthritis changes in bilateral feet, erosion of left 5th MTP.  Today on exam, no active synovitis noted.   -We had an at length discussion again today in regards to increase CV risk associated with JAIRO use (he is >53 y/o, history of CVA and CV risk factor of Afib) at his  visit in July 2023 he agreed to d/c  however resumed in September 2023 due to flare. We had another at length discussion in regards to increased risk however he wishes to continue medication as this has been very beneficial for him   -Repeat Cxray 10/9/2023 CXRay: Impression: No acute cardiopulmonary process identified.   -Repeat lab monitoring today- if creat remains elevated, will refer to nephrology for eval, possibly related to HTN/T2DM  -Infection w/u negative March 2023.     2. Severe  multifactorial spinal canal stenosis at L4-L5, moderate at L2-L3 and L3-L4, mild at L1-L2  - Follows Pain Management Dr. Simmons, was not able to participate in clinical trial study as previously discussed however has an upcoming nerve block scheduled in 2 weeks with possible ablation.   - He also smokes marijuana to help with pain    3. Afib on Xarelto  - Heart failure was ruled out, diagnosis was taken off his problem list per patient.   - Recent ECHO 10/02/2023: ejection fraction 60-65%, normal left ventricular systolic and diastolic function.  PASP not given  - Continue f/u with Cardio, stable at today's visit, discussion in regards to meds and CV history at length again today as mentioned above, wishes to continue- has been made aware of risks.     4. Parkinson's/History of CVA  -On carbidopa-levodopa.    -Follows with Neurologist Dr. Barrios.  -See above risk associated with Arsh- has been discussed several times in the past and again today however he wishes to continue medication.     5. Peyroniranulfo's dz  - Pt reports taking Colchicine.    6. Fatty liver  -One Hep C ab + but repeat w/ PCR negative. Possibly false +.   -Will follow.   -Infection work up May 2023 negative.     7. High risk med use  -Persons with rheumatoid arthritis, lupus, psoriatic arthritis and other autoimmune diseases are at increased risk of cardiovascular disease including heart attack and stroke. We recommend that all patients with these conditions have annual health maintenance exams including lipid measurements, blood pressure measurements, and smoking cessation counseling when applicable at their primary care provider's office.   -Due for colonoscopy, has apt 6/6/2024, PSA and screenings  with Urology  -Advised to stay up-to-date on age appropriate vaccinations and malignancy screening, including yearly skin exams.    -Continued lab monitoring.   -PPSV-23 11/20, Prevnar 5/19, Shingrix 6/2020. UTD w/ flu shot. UTD COVID vaccine.    8.  HLD/HTN/T2DM  -Followed by PCP, currently stable blood pressure at today's visit  - Last A1C noted at 6.4 October 2023    9. ILD? In the past, previous abnormal CXRay  -3/21/23: CXR showed chronic bilateral basilar interstitial changes.   -4/05/2023 FVC 94.7%, FEV1 92.2 %, FEV1/FVC 97% TLC 94%, DLCO 79.4%, DLCO/.  FEV1/FVC ratio normal, FEV1, FVC and TLC  normal.  DLCO normal, impression normal PFT   -May be chronic as he reports history of pneumonia x 2 and Covid, monitor with PFT's and CXR every 6 months- 1 year as he does not have any symptoms. Repeat CXRay 10/9/2023 CXRay: Impression: No acute cardiopulmonary process identified.     10. Microhematuria  - Was referred to Urology, has upcoming follow up for eval    No follow-ups on file. In addition to their scheduled follow up, the patient has also been instructed to follow up on as needed basis.      Total time spent with patient and documentation is  40 minutes. All questions were answered to patient's satisfaction and patient verbalized understanding.

## 2024-01-10 ENCOUNTER — OFFICE VISIT (OUTPATIENT)
Dept: PAIN MEDICINE | Facility: CLINIC | Age: 69
End: 2024-01-10
Payer: MEDICARE

## 2024-01-10 VITALS
TEMPERATURE: 98 F | WEIGHT: 236 LBS | DIASTOLIC BLOOD PRESSURE: 94 MMHG | SYSTOLIC BLOOD PRESSURE: 152 MMHG | HEIGHT: 71 IN | BODY MASS INDEX: 33.04 KG/M2 | HEART RATE: 89 BPM

## 2024-01-10 DIAGNOSIS — M47.816 LUMBAR SPONDYLOSIS: Primary | ICD-10-CM

## 2024-01-10 DIAGNOSIS — G89.4 CHRONIC PAIN SYNDROME: ICD-10-CM

## 2024-01-10 PROCEDURE — 3077F SYST BP >= 140 MM HG: CPT | Mod: CPTII,,, | Performed by: NURSE PRACTITIONER

## 2024-01-10 PROCEDURE — 1125F AMNT PAIN NOTED PAIN PRSNT: CPT | Mod: CPTII,,, | Performed by: NURSE PRACTITIONER

## 2024-01-10 PROCEDURE — 3288F FALL RISK ASSESSMENT DOCD: CPT | Mod: CPTII,,, | Performed by: NURSE PRACTITIONER

## 2024-01-10 PROCEDURE — 3008F BODY MASS INDEX DOCD: CPT | Mod: CPTII,,, | Performed by: NURSE PRACTITIONER

## 2024-01-10 PROCEDURE — 1101F PT FALLS ASSESS-DOCD LE1/YR: CPT | Mod: CPTII,,, | Performed by: NURSE PRACTITIONER

## 2024-01-10 PROCEDURE — 1159F MED LIST DOCD IN RCRD: CPT | Mod: CPTII,,, | Performed by: NURSE PRACTITIONER

## 2024-01-10 PROCEDURE — 99213 OFFICE O/P EST LOW 20 MIN: CPT | Mod: ,,, | Performed by: NURSE PRACTITIONER

## 2024-01-10 PROCEDURE — 3080F DIAST BP >= 90 MM HG: CPT | Mod: CPTII,,, | Performed by: NURSE PRACTITIONER

## 2024-01-10 NOTE — H&P (VIEW-ONLY)
ADMISSION HISTORY & PHYSICAL    SUBJECTIVE:    CHIEF COMPLAINT: Back Pain (Pre-op 2nd MBB Bilateral L3-5 1/24/24, RX and OTC  medication for relief, pain level 5/10/Pt requesting a list of medications told hold prior to procedure.//RFA tenativly 2/28/24)       History of Present Illness: 68 y.o. male presents today for preoperative evaluation for bilateral diagnostic medial branch block L3-5 on 01/24/2024. I reviewed the indications for procedure. The risks and benefits of the proposed and alternative treatments were discussed with the patient. Questions pertinent to the procedure were solicited and answered. No assurances were given. Informed consent was obtained. The patient expressed good understanding and wished to proceed with scheduling the procedure.     Review of Systems:   Constitutional: No fever, weakness, or fatigue.   Ear/Nose/Mouth/Throat: No nasal congestion or sore throat.   Respiratory: No shortness of breath or cough.   Cardiovascular: No chest pain, palpitations, or peripheral edema.   Gastrointestinal: No nausea, vomiting, or abdominal pain.   Genitourinary: No dysuria.  Musculoskeletal:  Primary pain is located to the axial lumbar region in his worse with standing too long, twisting at the waist and leaning back.  Pain reduced with leaning forward.    Past Surgical History:   Procedure Laterality Date    CATARACT EXTRACTION W/  INTRAOCULAR LENS IMPLANT      EPIDURAL STEROID INJECTION INTO LUMBAR SPINE      exc cyst Lt ear      EYE SURGERY      INJECTION OF ANESTHETIC AGENT AROUND MEDIAL BRANCH NERVES INNERVATING LUMBAR FACET JOINT Bilateral 11/16/2022    Procedure: Block-nerve-medial branch-lumbar;  Surgeon: Gertrude Blanco MD;  Location: [facility];  Service: Pain Management;  Laterality: Bilateral;  Bilateral L4-L5...Patient is requesting to be first case    INJECTION OF ANESTHETIC AGENT AROUND MEDIAL BRANCH NERVES INNERVATING LUMBAR FACET JOINT Bilateral 11/22/2023    Procedure:  Block-nerve-medial branch-lumbar;  Surgeon: Gertrude Blanco MD;  Location: [facility] OR;  Service: Pain Management;  Laterality: Bilateral;  Bilateral MBB L3-5    KNEE ARTHROSCOPY Right     REFRACTIVE SURGERY      VASECTOMY  1998        Past Medical History:   Diagnosis Date    Atrial fibrillation     Central stenosis of spinal canal     Chronic back pain greater than 3 months duration 10/14/2022    Constipation 09/30/2022    Degeneration of lumbar intervertebral disc 07/06/2022    Diastolic dysfunction 05/01/2023    Dysphonia 09/30/2022    Dystrophic nail 06/19/2023    Erectile dysfunction 09/30/2022    Fatty liver     GERD (gastroesophageal reflux disease) 09/30/2022    H/O cataract removal with insertion of prosthetic lens     History of CVA (cerebrovascular accident) 09/30/2022    History of radial keratotomy 01/01/1998    HTN (hypertension)     Hyperlipidemia 09/30/2022    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/30/2022    Microhematuria 03/30/2023    Overgrown toenails 06/19/2023    Parkinson's disease     Peyronie disease     Polyneuropathy associated with underlying disease 06/19/2023    Rheumatoid arthritis 01/10/2022    Rheumatoid arthritis, unspecified     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    Spinal stenosis of lumbar region 09/30/2022    Type 2 diabetes mellitus without complications         OBJECTIVE:    Vitals:    01/10/24 0841   BP: (!) 152/94   Pulse: 89   Temp: 98.3 °F (36.8 °C)      Pain Disability Index  Family/Home Responsibilities:: 4  Recreation:: 4  Social Activity:: 4  Occupation:: 4  Sexual Behavior:: 4  Self Care:: 4  Life-Support Activities:: 4  Pain Disability Index (PDI): 28      Physical Exam:   General: Well-developed, well-nourished.  Neuro: Alert and oriented x 3.  Psych: Normal mood and affect.  HEENT: Normocephalic. PERRLA EOM intact. Nose and throat clear.  Lungs: Clear to auscultation and percussion.  Heart: Regular rate and rhythm   Abdomen: Soft non-tender. Bowel sounds positive. No rebound  tenderness.  Skin: No rashes or open wounds  Musculoskeletal:  Antalgic gait.  Limited range of motion and pain with lumbar lateral rotation and extension.  No pain with lumbar flexion.  Independent ambulator.  Bilateral upper and lower muscle strength 5/5.  No sensory deficits bilateral upper or lower extremities.+ left hand tremor.    ASSESSMENT:  1. Lumbar spondylosis    2. Chronic pain syndrome       PLAN:  Plan for to proceed with  bilateral diagnostic medial branch block L3-5 on 01/24/2024.. The patient has been given preoperative instructions and prescriptions for post-operative medication. Post-operative appointment is scheduled for 2 weeks.  Patient takes daily Xarelto.  Cardiac clearance still current as of November patient to hold Xarelto for 2 days and resume postop.  Patient was also advised to hold Co Q10, multivitamins in any fish oil supplements 7 days prior to procedure.  He has not taking aspirin or nonsteroidal anti-inflammatory medications.  Postop office visit will be made today.

## 2024-01-10 NOTE — PROGRESS NOTES
ADMISSION HISTORY & PHYSICAL    SUBJECTIVE:    CHIEF COMPLAINT: Back Pain (Pre-op 2nd MBB Bilateral L3-5 1/24/24, RX and OTC  medication for relief, pain level 5/10/Pt requesting a list of medications told hold prior to procedure.//RFA tenativly 2/28/24)       History of Present Illness: 68 y.o. male presents today for preoperative evaluation for bilateral diagnostic medial branch block L3-5 on 01/24/2024. I reviewed the indications for procedure. The risks and benefits of the proposed and alternative treatments were discussed with the patient. Questions pertinent to the procedure were solicited and answered. No assurances were given. Informed consent was obtained. The patient expressed good understanding and wished to proceed with scheduling the procedure.     Review of Systems:   Constitutional: No fever, weakness, or fatigue.   Ear/Nose/Mouth/Throat: No nasal congestion or sore throat.   Respiratory: No shortness of breath or cough.   Cardiovascular: No chest pain, palpitations, or peripheral edema.   Gastrointestinal: No nausea, vomiting, or abdominal pain.   Genitourinary: No dysuria.  Musculoskeletal:  Primary pain is located to the axial lumbar region in his worse with standing too long, twisting at the waist and leaning back.  Pain reduced with leaning forward.    Past Surgical History:   Procedure Laterality Date    CATARACT EXTRACTION W/  INTRAOCULAR LENS IMPLANT      EPIDURAL STEROID INJECTION INTO LUMBAR SPINE      exc cyst Lt ear      EYE SURGERY      INJECTION OF ANESTHETIC AGENT AROUND MEDIAL BRANCH NERVES INNERVATING LUMBAR FACET JOINT Bilateral 11/16/2022    Procedure: Block-nerve-medial branch-lumbar;  Surgeon: Gerturde Blanco MD;  Location: [facility];  Service: Pain Management;  Laterality: Bilateral;  Bilateral L4-L5...Patient is requesting to be first case    INJECTION OF ANESTHETIC AGENT AROUND MEDIAL BRANCH NERVES INNERVATING LUMBAR FACET JOINT Bilateral 11/22/2023    Procedure:  Block-nerve-medial branch-lumbar;  Surgeon: Gertrude Blanco MD;  Location: [facility] OR;  Service: Pain Management;  Laterality: Bilateral;  Bilateral MBB L3-5    KNEE ARTHROSCOPY Right     REFRACTIVE SURGERY      VASECTOMY  1998        Past Medical History:   Diagnosis Date    Atrial fibrillation     Central stenosis of spinal canal     Chronic back pain greater than 3 months duration 10/14/2022    Constipation 09/30/2022    Degeneration of lumbar intervertebral disc 07/06/2022    Diastolic dysfunction 05/01/2023    Dysphonia 09/30/2022    Dystrophic nail 06/19/2023    Erectile dysfunction 09/30/2022    Fatty liver     GERD (gastroesophageal reflux disease) 09/30/2022    H/O cataract removal with insertion of prosthetic lens     History of CVA (cerebrovascular accident) 09/30/2022    History of radial keratotomy 01/01/1998    HTN (hypertension)     Hyperlipidemia 09/30/2022    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/30/2022    Microhematuria 03/30/2023    Overgrown toenails 06/19/2023    Parkinson's disease     Peyronie disease     Polyneuropathy associated with underlying disease 06/19/2023    Rheumatoid arthritis 01/10/2022    Rheumatoid arthritis, unspecified     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    Spinal stenosis of lumbar region 09/30/2022    Type 2 diabetes mellitus without complications         OBJECTIVE:    Vitals:    01/10/24 0841   BP: (!) 152/94   Pulse: 89   Temp: 98.3 °F (36.8 °C)      Pain Disability Index  Family/Home Responsibilities:: 4  Recreation:: 4  Social Activity:: 4  Occupation:: 4  Sexual Behavior:: 4  Self Care:: 4  Life-Support Activities:: 4  Pain Disability Index (PDI): 28      Physical Exam:   General: Well-developed, well-nourished.  Neuro: Alert and oriented x 3.  Psych: Normal mood and affect.  HEENT: Normocephalic. PERRLA EOM intact. Nose and throat clear.  Lungs: Clear to auscultation and percussion.  Heart: Regular rate and rhythm   Abdomen: Soft non-tender. Bowel sounds positive. No rebound  tenderness.  Skin: No rashes or open wounds  Musculoskeletal:  Antalgic gait.  Limited range of motion and pain with lumbar lateral rotation and extension.  No pain with lumbar flexion.  Independent ambulator.  Bilateral upper and lower muscle strength 5/5.  No sensory deficits bilateral upper or lower extremities.+ left hand tremor.    ASSESSMENT:  1. Lumbar spondylosis    2. Chronic pain syndrome       PLAN:  Plan for to proceed with  bilateral diagnostic medial branch block L3-5 on 01/24/2024.. The patient has been given preoperative instructions and prescriptions for post-operative medication. Post-operative appointment is scheduled for 2 weeks.  Patient takes daily Xarelto.  Cardiac clearance still current as of November patient to hold Xarelto for 2 days and resume postop.  Patient was also advised to hold Co Q10, multivitamins in any fish oil supplements 7 days prior to procedure.  He has not taking aspirin or nonsteroidal anti-inflammatory medications.  Postop office visit will be made today.

## 2024-01-11 ENCOUNTER — LAB VISIT (OUTPATIENT)
Dept: LAB | Facility: HOSPITAL | Age: 69
End: 2024-01-11
Attending: NURSE PRACTITIONER
Payer: MEDICARE

## 2024-01-11 ENCOUNTER — OFFICE VISIT (OUTPATIENT)
Dept: INTERNAL MEDICINE | Facility: CLINIC | Age: 69
End: 2024-01-11
Payer: MEDICARE

## 2024-01-11 ENCOUNTER — OFFICE VISIT (OUTPATIENT)
Dept: RHEUMATOLOGY | Facility: CLINIC | Age: 69
End: 2024-01-11
Payer: MEDICARE

## 2024-01-11 ENCOUNTER — TELEPHONE (OUTPATIENT)
Dept: RHEUMATOLOGY | Facility: CLINIC | Age: 69
End: 2024-01-11

## 2024-01-11 VITALS
OXYGEN SATURATION: 98 % | WEIGHT: 237 LBS | TEMPERATURE: 98 F | DIASTOLIC BLOOD PRESSURE: 80 MMHG | BODY MASS INDEX: 33.18 KG/M2 | RESPIRATION RATE: 18 BRPM | HEIGHT: 71 IN | SYSTOLIC BLOOD PRESSURE: 137 MMHG | HEART RATE: 74 BPM

## 2024-01-11 VITALS
DIASTOLIC BLOOD PRESSURE: 87 MMHG | HEART RATE: 81 BPM | TEMPERATURE: 97 F | HEIGHT: 71 IN | WEIGHT: 236 LBS | SYSTOLIC BLOOD PRESSURE: 138 MMHG | BODY MASS INDEX: 33.04 KG/M2 | OXYGEN SATURATION: 99 % | RESPIRATION RATE: 20 BRPM

## 2024-01-11 DIAGNOSIS — Z79.899 HIGH RISK MEDICATION USE: ICD-10-CM

## 2024-01-11 DIAGNOSIS — R31.29 MICROHEMATURIA: ICD-10-CM

## 2024-01-11 DIAGNOSIS — Z86.73 HISTORY OF CVA (CEREBROVASCULAR ACCIDENT): ICD-10-CM

## 2024-01-11 DIAGNOSIS — E11.9 CONTROLLED TYPE 2 DIABETES MELLITUS WITHOUT COMPLICATION, WITHOUT LONG-TERM CURRENT USE OF INSULIN: ICD-10-CM

## 2024-01-11 DIAGNOSIS — D53.9 MACROCYTIC ANEMIA: Primary | ICD-10-CM

## 2024-01-11 DIAGNOSIS — I10 PRIMARY HYPERTENSION: ICD-10-CM

## 2024-01-11 DIAGNOSIS — I48.92 ATRIAL FIBRILLATION AND FLUTTER: ICD-10-CM

## 2024-01-11 DIAGNOSIS — E78.5 HYPERLIPIDEMIA, UNSPECIFIED HYPERLIPIDEMIA TYPE: ICD-10-CM

## 2024-01-11 DIAGNOSIS — K76.0 STEATOSIS OF LIVER: ICD-10-CM

## 2024-01-11 DIAGNOSIS — G20.A1 PARKINSON'S DISEASE, UNSPECIFIED WHETHER DYSKINESIA PRESENT, UNSPECIFIED WHETHER MANIFESTATIONS FLUCTUATE: ICD-10-CM

## 2024-01-11 DIAGNOSIS — M48.061 SPINAL STENOSIS AT L4-L5 LEVEL: ICD-10-CM

## 2024-01-11 DIAGNOSIS — I48.91 ATRIAL FIBRILLATION AND FLUTTER: ICD-10-CM

## 2024-01-11 DIAGNOSIS — M05.79 SEROPOSITIVE RHEUMATOID ARTHRITIS OF MULTIPLE SITES: Primary | ICD-10-CM

## 2024-01-11 DIAGNOSIS — M05.79 SEROPOSITIVE RHEUMATOID ARTHRITIS OF MULTIPLE SITES: ICD-10-CM

## 2024-01-11 LAB
ALBUMIN SERPL-MCNC: 4.2 G/DL (ref 3.4–4.8)
ALBUMIN/GLOB SERPL: 1.2 RATIO (ref 1.1–2)
ALP SERPL-CCNC: 65 UNIT/L (ref 40–150)
ALT SERPL-CCNC: <5 UNIT/L (ref 0–55)
AST SERPL-CCNC: 31 UNIT/L (ref 5–34)
BASOPHILS # BLD AUTO: 0.01 X10(3)/MCL
BASOPHILS NFR BLD AUTO: 0.3 %
BILIRUB SERPL-MCNC: 0.4 MG/DL
BUN SERPL-MCNC: 19.8 MG/DL (ref 8.4–25.7)
CALCIUM SERPL-MCNC: 9.6 MG/DL (ref 8.8–10)
CHLORIDE SERPL-SCNC: 106 MMOL/L (ref 98–107)
CO2 SERPL-SCNC: 25 MMOL/L (ref 23–31)
CREAT SERPL-MCNC: 1.16 MG/DL (ref 0.73–1.18)
CRP SERPL-MCNC: 4.8 MG/L
EOSINOPHIL # BLD AUTO: 0.06 X10(3)/MCL (ref 0–0.9)
EOSINOPHIL NFR BLD AUTO: 1.6 %
ERYTHROCYTE [DISTWIDTH] IN BLOOD BY AUTOMATED COUNT: 12.9 % (ref 11.5–17)
ERYTHROCYTE [SEDIMENTATION RATE] IN BLOOD: 7 MM/HR (ref 0–15)
GFR SERPLBLD CREATININE-BSD FMLA CKD-EPI: >60 MLS/MIN/1.73/M2
GLOBULIN SER-MCNC: 3.6 GM/DL (ref 2.4–3.5)
GLUCOSE SERPL-MCNC: 104 MG/DL (ref 82–115)
HCT VFR BLD AUTO: 41.7 % (ref 42–52)
HGB BLD-MCNC: 14.1 G/DL (ref 14–18)
IMM GRANULOCYTES # BLD AUTO: 0.01 X10(3)/MCL (ref 0–0.04)
IMM GRANULOCYTES NFR BLD AUTO: 0.3 %
LYMPHOCYTES # BLD AUTO: 1.01 X10(3)/MCL (ref 0.6–4.6)
LYMPHOCYTES NFR BLD AUTO: 26.2 %
MCH RBC QN AUTO: 32.4 PG (ref 27–31)
MCHC RBC AUTO-ENTMCNC: 33.8 G/DL (ref 33–36)
MCV RBC AUTO: 95.9 FL (ref 80–94)
MONOCYTES # BLD AUTO: 0.64 X10(3)/MCL (ref 0.1–1.3)
MONOCYTES NFR BLD AUTO: 16.6 %
NEUTROPHILS # BLD AUTO: 2.12 X10(3)/MCL (ref 2.1–9.2)
NEUTROPHILS NFR BLD AUTO: 55 %
NRBC BLD AUTO-RTO: 0 %
PLATELET # BLD AUTO: 239 X10(3)/MCL (ref 130–400)
PMV BLD AUTO: 10.2 FL (ref 7.4–10.4)
POTASSIUM SERPL-SCNC: 4 MMOL/L (ref 3.5–5.1)
PROT SERPL-MCNC: 7.8 GM/DL (ref 5.8–7.6)
RBC # BLD AUTO: 4.35 X10(6)/MCL (ref 4.7–6.1)
SODIUM SERPL-SCNC: 141 MMOL/L (ref 136–145)
WBC # SPEC AUTO: 3.85 X10(3)/MCL (ref 4.5–11.5)

## 2024-01-11 PROCEDURE — 3075F SYST BP GE 130 - 139MM HG: CPT | Mod: CPTII,,, | Performed by: NURSE PRACTITIONER

## 2024-01-11 PROCEDURE — 86140 C-REACTIVE PROTEIN: CPT

## 2024-01-11 PROCEDURE — 99214 OFFICE O/P EST MOD 30 MIN: CPT | Mod: PBBFAC,27 | Performed by: NURSE PRACTITIONER

## 2024-01-11 PROCEDURE — 3008F BODY MASS INDEX DOCD: CPT | Mod: CPTII,,, | Performed by: NURSE PRACTITIONER

## 2024-01-11 PROCEDURE — 85025 COMPLETE CBC W/AUTO DIFF WBC: CPT

## 2024-01-11 PROCEDURE — 85652 RBC SED RATE AUTOMATED: CPT

## 2024-01-11 PROCEDURE — 3079F DIAST BP 80-89 MM HG: CPT | Mod: CPTII,,, | Performed by: NURSE PRACTITIONER

## 2024-01-11 PROCEDURE — 1159F MED LIST DOCD IN RCRD: CPT | Mod: CPTII,,, | Performed by: NURSE PRACTITIONER

## 2024-01-11 PROCEDURE — 3288F FALL RISK ASSESSMENT DOCD: CPT | Mod: CPTII,,, | Performed by: NURSE PRACTITIONER

## 2024-01-11 PROCEDURE — 80053 COMPREHEN METABOLIC PANEL: CPT

## 2024-01-11 PROCEDURE — 36415 COLL VENOUS BLD VENIPUNCTURE: CPT

## 2024-01-11 PROCEDURE — 1160F RVW MEDS BY RX/DR IN RCRD: CPT | Mod: CPTII,,, | Performed by: NURSE PRACTITIONER

## 2024-01-11 PROCEDURE — 1126F AMNT PAIN NOTED NONE PRSNT: CPT | Mod: CPTII,,, | Performed by: NURSE PRACTITIONER

## 2024-01-11 PROCEDURE — 1101F PT FALLS ASSESS-DOCD LE1/YR: CPT | Mod: CPTII,,, | Performed by: NURSE PRACTITIONER

## 2024-01-11 PROCEDURE — 99214 OFFICE O/P EST MOD 30 MIN: CPT | Mod: S$PBB,,, | Performed by: NURSE PRACTITIONER

## 2024-01-11 PROCEDURE — 1125F AMNT PAIN NOTED PAIN PRSNT: CPT | Mod: CPTII,,, | Performed by: NURSE PRACTITIONER

## 2024-01-11 PROCEDURE — 99215 OFFICE O/P EST HI 40 MIN: CPT | Mod: PBBFAC | Performed by: NURSE PRACTITIONER

## 2024-01-11 RX ORDER — TOFACITINIB 11 MG/1
1 TABLET, FILM COATED, EXTENDED RELEASE ORAL DAILY
Qty: 30 TABLET | Refills: 4 | Status: SHIPPED | OUTPATIENT
Start: 2024-01-11 | End: 2024-02-09 | Stop reason: SDUPTHER

## 2024-01-11 NOTE — TELEPHONE ENCOUNTER
Pt notified of message. Pt verbalized understanding        ----- Message from DAVIDSON Bond sent at 1/11/2024  1:52 PM CST -----  Please advise the patient that all lab are noted to be essentially WNL and clinically acceptable, we will continue to monitor at future lab draws

## 2024-01-11 NOTE — PROGRESS NOTES
Patient ID: [id number]     Chief Complaint: Labs Only (Pt is here for lab follow up)        HPI:     HPI      Melquiades Rehman Jr. is a 68 y.o. male here today for a follow up. Pt following Dr Barrios in [place] for parkinson's.           Immunizations:   Immunization History   Administered Date(s) Administered    COVID-19, vector-nr, rS-Ad26, PF (Danie) 03/08/2021, 10/25/2021    Influenza 10/28/2014    Influenza (FLUAD) - Quadrivalent - Adjuvanted - PF *Preferred* (65+) 10/21/2023    Influenza - Quadrivalent 09/30/2020    Influenza - Quadrivalent - PF *Preferred* (6 months and older) 12/04/2019, 09/30/2020    Influenza - Trivalent - PF (ADULT) 10/26/2018, 12/04/2019    Pneumococcal Conjugate - 13 Valent 05/03/2019    Pneumococcal Polysaccharide - 23 Valent 11/24/2020    Zoster Recombinant 11/12/2019, 02/13/2020        ----------------------------  Atrial fibrillation  Central stenosis of spinal canal  Chronic back pain greater than 3 months duration  Constipation  Degeneration of lumbar intervertebral disc  Diastolic dysfunction  Dysphonia  Dystrophic nail  Erectile dysfunction  Fatty liver  GERD (gastroesophageal reflux disease)  H/O cataract removal with insertion of prosthetic lens  History of CVA (cerebrovascular accident)  History of radial keratotomy  HTN (hypertension)  Hyperlipidemia  Induratio penis plastica  Microhematuria  Overgrown toenails  Parkinson's disease  Peyronie disease  Polyneuropathy associated with underlying disease  Rheumatoid arthritis  Rheumatoid arthritis, unspecified  Sixth nerve palsy  Spinal stenosis of lumbar region  Type 2 diabetes mellitus without complications     Past Surgical History:   Procedure Laterality Date    CATARACT EXTRACTION W/  INTRAOCULAR LENS IMPLANT      EPIDURAL STEROID INJECTION INTO LUMBAR SPINE      exc cyst Lt ear      EYE SURGERY      INJECTION OF ANESTHETIC AGENT AROUND MEDIAL BRANCH NERVES INNERVATING LUMBAR FACET JOINT Bilateral 11/16/2022     Procedure: Block-nerve-medial branch-lumbar;  Surgeon: Gertrude Blanco MD;  Location: [facility] OR;  Service: Pain Management;  Laterality: Bilateral;  Bilateral L4-L5...Patient is requesting to be first case    INJECTION OF ANESTHETIC AGENT AROUND MEDIAL BRANCH NERVES INNERVATING LUMBAR FACET JOINT Bilateral 11/22/2023    Procedure: Block-nerve-medial branch-lumbar;  Surgeon: Gertrude Blanco MD;  Location: [facility] OR;  Service: Pain Management;  Laterality: Bilateral;  Bilateral MBB L3-5    KNEE ARTHROSCOPY Right     REFRACTIVE SURGERY      VASECTOMY  1998       Review of patient's allergies indicates:   Allergen Reactions    Sarilumab Other (See Comments)     Other reaction(s): fainting       Current Outpatient Medications   Medication Instructions    alogliptin-pioglitazone (OSENI) 25-30 mg Tab 1 tablet, Oral, Daily    ascorbic acid (vitamin C) (VITAMIN C) 1,000 mg, Oral, Nightly    b complex vitamins capsule 1 capsule, Oral, Nightly    blood sugar diagnostic (ACCU-CHEK GUIDE TEST STRIPS [facility] Accu-Chek Guide test strips    carbidopa-levodopa  mg (SINEMET)  mg per tablet 2 tablets, Oral, 2 times daily    cholecalciferol (vitamin D3) 1,000 Units, Oral, Nightly    coenzyme Q10 100 mg, Oral, Nightly    colchicine (COLCRYS) 0.6 mg, Oral, 2 times daily    folic acid/multivit-min/lutein (CENTRUM SILVER ORAL) 1 tablet, Oral, Nightly    gemfibroziL (LOPID) 600 mg, Oral, 2 times daily    lancets (ACCU-CHEK FASTCLIX LANCET DRUM [facility]) Accu-Chek Fastclix Lancet Drum    lisinopriL-hydrochlorothiazide (PRINZIDE,ZESTORETIC) 20-25 mg Tab 1 tablet, Oral, As needed (PRN)    magnesium 250 mg, Oral, Nightly    metoprolol succinate (TOPROL-XL) 25 mg, Oral, Daily    NON FORMULARY MEDICATION 1 tablet, Oral, Nightly    pantoprazole (PROTONIX) 40 mg, Oral, Daily    potassium citrate 99 mg Cap 1 tablet, Oral, Nightly    rosuvastatin (CRESTOR) 20 mg, Oral, Daily    selegiline (ELDEPRYL) 5 mg, Oral, 2 times daily    tofacitinib  (XELJANZ XR) 11 mg Tb24 1 tablet, Oral, Daily    vitamin A 8,000 Units, Oral, Daily    vitamin E 400 Units, Oral, Nightly    XARELTO 20 mg, Oral, Daily    zinc sulfate (ZINC-15 ORAL) 140 mg, Oral, Nightly       Social History     Socioeconomic History    Marital status:     Number of children: 1   Tobacco Use    Smoking status: Former     Current packs/day: 0.00     Average packs/day: 1 pack/day for 15.0 years (15.0 ttl pk-yrs)     Types: Cigarettes     Start date: 1972     Quit date: 1987     Years since quittin.0     Passive exposure: Past    Smokeless tobacco: Never   Substance and Sexual Activity    Alcohol use: Not Currently    Drug use: Not Currently     Types: Marijuana    Sexual activity: Yes     Partners: Female     Social Determinants of Health     Financial Resource Strain: Medium Risk (1/10/2024)    Overall Financial Resource Strain (CARDIA)     Difficulty of Paying Living Expenses: Somewhat hard   Food Insecurity: Food Insecurity Present (1/10/2024)    Hunger Vital Sign     Worried About Running Out of Food in the Last Year: Often true     Ran Out of Food in the Last Year: Often true   Transportation Needs: No Transportation Needs (1/10/2024)    PRAPARE - Transportation     Lack of Transportation (Medical): No     Lack of Transportation (Non-Medical): No   Physical Activity: Inactive (1/10/2024)    Exercise Vital Sign     Days of Exercise per Week: 0 days     Minutes of Exercise per Session: 20 min   Stress: No Stress Concern Present (1/10/2024)    [employer] [place] [employer] - Occupational Stress Questionnaire     Feeling of Stress : Only a little   Social Connections: Socially Isolated (1/10/2024)    Social Connection and Isolation Panel [NHANES]     Frequency of Communication with Friends and Family: More than three times a week     Frequency of Social Gatherings with Friends and Family: Three times a week     Attends Restoration Services: Never     Active Member of  Clubs or Organizations: No     Attends Club or Organization Meetings: Never     Marital Status:    Housing Stability: Low Risk  (1/10/2024)    Housing Stability Vital Sign     Unable to Pay for Housing in the Last Year: No     Number of Places Lived in the Last Year: 1     Unstable Housing in the Last Year: No        Family History   Problem Relation Age of Onset    Heart failure Mother     Coronary artery disease Mother     Glaucoma Father     Alzheimer's disease Father     Emphysema Father     Autoimmune disease Sister     Pneumonia Sister     Dementia Sister         Patient Care Team:  Pau Gomes FNP as PCP - General (Family Medicine)  Valeria Donovan MD (Rheumatology)  Valeria Donovan MD (Rheumatology)  Valeria Donovan MD (Rheumatology)  Taisha Wood LPN as Care Coordinator     Subjective:     Review of Systems     See HPI for details    Constitutional: Denies Change in appetite. Denies Chills. Denies Fever. Denies Night sweats.  Eye: Denies Blurred vision. Denies Discharge. Denies Eye pain.  ENT: Denies Decreased hearing. Denies Sore throat. Denies Swollen glands.  Respiratory: Denies Cough. Denies Shortness of breath. Denies Shortness of breath with exertion. Denies Wheezing.  Cardiovascular: DeniesChest pain at rest. Denies Chest pain with exertion. Denies Irregular heartbeat. Denies Palpitations. Denies Edema.  Gastrointestinal: Denies Abdominal pain. DeniesDiarrhea. Denies Nausea. Denies Vomiting. Denies Hematemesis or Hematochezia.  Genitourinary: Denies Dysuria. Denies Urinary frequency. Denies Urinary urgency. Denies Blood in urine.  Endocrine: Denies Cold intolerance. Denies Excessive thirst. Denies Heat intolerance. Denies Weight loss. Denies Weight gain.  Musculoskeletal: Denies Painful joints. Denies Weakness.  Integumentary: Denies Rash. Denies Itching. Denies Dry skin.  Neurologic: Denies Dizziness. Denies Fainting. Denies Headache.  Psychiatric: Denies Depression.  "Denies Anxiety. Denies Suicidal/Homicidal ideations.    All Other ROS: Negative except as stated in HPI.       Objective:     Visit Vitals  /80 (BP Location: Left arm, Patient Position: Sitting, BP Method: Large (Automatic))   Pulse 74   Temp 97.9 °F (36.6 °C) (Oral)   Resp 18   Ht 5' 11" (1.803 m)   Wt 107.5 kg (237 lb)   SpO2 98%   BMI 33.05 kg/m²       Physical Exam    General: Alert and oriented, No acute distress.  Head: Normocephalic, Atraumatic.  Eye: Pupils are equal, round and reactive to light, Extraocular movements are intact, Sclera non-icteric.  Ears/Nose/Throat: Normal, Mucosa moist,Clear.  Neck/Thyroid: Supple, Non-tender, No carotid bruit, No lymphadenopathy, No JVD, Full range of motion.  Respiratory: Clear to auscultation bilaterally; No wheezes, rales or rhonchi,Non-labored respirations, Symmetrical chest wall expansion.  Cardiovascular: Regular rate and rhythm, S1/S2 normal, No murmurs, rubs or gallops.  Gastrointestinal: Soft, Non-tender, Non-distended, Normal bowel sounds, No palpable organomegaly.  Musculoskeletal: Normal range of motion.  Integumentary: Warm, Dry, Intact, No suspicious lesions or rashes.  Extremities: No clubbing, cyanosis or edema  Neurologic: No focal deficits, Cranial Nerves II-XII are grossly intact, Motor strength normal upper and lower extremities, Sensory exam intact.  Psychiatric: Normal interaction, Coherent speech, Euthymic mood, Appropriate affect       Labs Reviewed:     Chemistry:  Lab Results   Component Value Date     10/27/2023    K 4.2 10/27/2023    CHLORIDE 108 (H) 10/27/2023    BUN 17.4 10/27/2023    CREATININE 1.27 (H) 12/05/2023    EGFRNORACEVR >60 12/05/2023    GLUCOSE 162 (H) 10/27/2023    CALCIUM 9.0 10/27/2023    ALKPHOS 54 10/27/2023    LABPROT 6.7 10/27/2023    ALBUMIN 3.9 10/27/2023    BILIDIR 0.2 01/27/2022    IBILI 0.40 01/27/2022    AST 19 10/27/2023    ALT <5 10/27/2023    MG 1.60 12/04/2020    PHOS 3.3 12/04/2020    [id number] " 43.5 05/25/2021        Lab Results   Component Value Date    HGBA1C 6.1 10/26/2023        Hematology:  Lab Results   Component Value Date    WBC 5.39 10/27/2023    HGB 13.7 (L) 10/27/2023    HCT 40.0 (L) 10/27/2023     10/27/2023       Lipid Panel:  Lab Results   Component Value Date    CHOL 167 10/09/2023    HDL 53 10/09/2023    LDL 79.00 10/09/2023    TRIG 174 (H) 10/09/2023    TOTALCHOLEST 3 10/09/2023        Urine:  Lab Results   Component Value Date    COLORUA Light-Yellow 11/10/2023    APPEARANCEUA Clear 11/10/2023    SGUA 1.022 11/10/2023    PHUA 5.5 11/10/2023    PROTEINUA Negative 11/10/2023    GLUCOSEUA Normal 11/10/2023    KETONESUA Negative 11/10/2023    BLOODUA 1+ (A) 11/10/2023    NITRITESUA Negative 11/10/2023    LEUKOCYTESUR Negative 11/10/2023    RBCUA 0-5 11/10/2023    WBCUA 0-5 11/10/2023    BACTERIA None Seen 11/10/2023    SQEPUA None Seen 11/10/2023    HYALINECASTS None Seen 11/10/2023    CREATRANDUR 162.6 11/10/2023    PROTEINURINE 18.0 05/15/2023    UPROTCREA 0.1 05/15/2023        Assessment:       ICD-10-CM ICD-9-CM   1. Macrocytic anemia  D53.9 281.9   2. Primary hypertension  I10 401.9   3. Controlled type 2 diabetes mellitus without complication, without long-term current use of insulin  E11.9 250.00        Plan:     1. Macrocytic anemia  Folate 7.4, Vit B12 228. CBC in 3 months.     2. Primary hypertension  Low fat diet and exercise. Cont Metoprolol, Lisinopril as prescribed.     3. Controlled type 2 diabetes mellitus without complication, without long-term current use of insulin  A1c 6.1. ADA diet and exercise. Cont Alogliptin-pioglitazone as prescribed. A1c in 3 months. Urine microalbumin 11-10-23. DM eye exam 3-14-23. DM foot exam done 10-21-23.      4. Well adult  Labs in 3 months. UTD PSA. Keep colonoscopy appt 6-6-24.     Follow up in about 3 months (around 4/11/2024) for with labs 1 week prior to appt. . In addition to their scheduled follow up, the patient has also been  instructed to follow up on as needed basis.     Future Appointments   Date Time Provider Department Center   1/11/2024 11:00 AM Barbara Zelaya FNP [facility] RHEU [place]   1/11/2024  2:30 PM Monica Chun FNP [facility] RADHA [place]   1/29/2024 11:15 AM Jennifer Loomis DO [facility] UROLO [place]   2/7/2024  9:30 AM Felicita Dempsey NP [facility] SABRINA [place]   3/14/2024  9:30 AM [facility] [place]    4/9/2024  9:30 AM Valeria Donovan MD [facility] RHEU [place]   5/10/2024 12:30 PM Quincy Pearce NP [facility] UROLO [place]   5/16/2024  1:00 PM Quincy Conrad MD [facility] CARD [place]        Pau Gomes CHARI

## 2024-01-18 ENCOUNTER — ANESTHESIA EVENT (OUTPATIENT)
Dept: SURGERY | Facility: HOSPITAL | Age: 69
End: 2024-01-18
Payer: MEDICARE

## 2024-01-18 RX ORDER — SELEGILINE HYDROCHLORIDE 5 MG/1
5 TABLET ORAL 2 TIMES DAILY WITH MEALS
COMMUNITY

## 2024-01-22 ENCOUNTER — PATIENT MESSAGE (OUTPATIENT)
Dept: ADMINISTRATIVE | Facility: OTHER | Age: 69
End: 2024-01-22
Payer: MEDICARE

## 2024-01-23 ENCOUNTER — PATIENT MESSAGE (OUTPATIENT)
Dept: ADMINISTRATIVE | Facility: OTHER | Age: 69
End: 2024-01-23
Payer: MEDICARE

## 2024-01-24 ENCOUNTER — ANESTHESIA (OUTPATIENT)
Dept: SURGERY | Facility: HOSPITAL | Age: 69
End: 2024-01-24
Payer: MEDICARE

## 2024-01-24 ENCOUNTER — HOSPITAL ENCOUNTER (OUTPATIENT)
Facility: HOSPITAL | Age: 69
Discharge: HOME | End: 2024-01-24
Attending: ANESTHESIOLOGY | Admitting: ANESTHESIOLOGY
Payer: MEDICARE

## 2024-01-24 DIAGNOSIS — G89.29 CHRONIC BACK PAIN GREATER THAN 3 MONTHS DURATION: ICD-10-CM

## 2024-01-24 DIAGNOSIS — M54.9 CHRONIC BACK PAIN GREATER THAN 3 MONTHS DURATION: ICD-10-CM

## 2024-01-24 LAB
GLUCOSE SERPL-MCNC: 103 MG/DL (ref 70–110)
POCT GLUCOSE: 103 MG/DL (ref 70–110)

## 2024-01-24 PROCEDURE — 64494 INJ PARAVERT F JNT L/S 2 LEV: CPT | Mod: 50,,, | Performed by: ANESTHESIOLOGY

## 2024-01-24 PROCEDURE — 64493 INJ PARAVERT F JNT L/S 1 LEV: CPT | Mod: 50,,, | Performed by: ANESTHESIOLOGY

## 2024-01-24 PROCEDURE — 64493 INJ PARAVERT F JNT L/S 1 LEV: CPT | Mod: 50 | Performed by: ANESTHESIOLOGY

## 2024-01-24 PROCEDURE — D9220A PRA ANESTHESIA: Mod: 23,CRNA,, | Performed by: NURSE ANESTHETIST, CERTIFIED REGISTERED

## 2024-01-24 PROCEDURE — 25000003 PHARM REV CODE 250: Performed by: ANESTHESIOLOGY

## 2024-01-24 PROCEDURE — 63600175 PHARM REV CODE 636 W HCPCS: Mod: JZ,JG | Performed by: ANESTHESIOLOGY

## 2024-01-24 PROCEDURE — 63600175 PHARM REV CODE 636 W HCPCS: Performed by: NURSE ANESTHETIST, CERTIFIED REGISTERED

## 2024-01-24 PROCEDURE — D9220A PRA ANESTHESIA: Mod: 23,ANES,, | Performed by: ANESTHESIOLOGY

## 2024-01-24 PROCEDURE — 25000003 PHARM REV CODE 250: Performed by: NURSE ANESTHETIST, CERTIFIED REGISTERED

## 2024-01-24 PROCEDURE — 37000008 HC ANESTHESIA 1ST 15 MINUTES: Performed by: ANESTHESIOLOGY

## 2024-01-24 PROCEDURE — 82962 GLUCOSE BLOOD TEST: CPT | Performed by: ANESTHESIOLOGY

## 2024-01-24 PROCEDURE — 64494 INJ PARAVERT F JNT L/S 2 LEV: CPT | Mod: 50 | Performed by: ANESTHESIOLOGY

## 2024-01-24 RX ORDER — BUPIVACAINE HYDROCHLORIDE 2.5 MG/ML
INJECTION, SOLUTION EPIDURAL; INFILTRATION; INTRACAUDAL
Status: DISCONTINUED
Start: 2024-01-24 | End: 2024-01-24 | Stop reason: HOSPADM

## 2024-01-24 RX ORDER — BUPIVACAINE HYDROCHLORIDE 2.5 MG/ML
INJECTION, SOLUTION EPIDURAL; INFILTRATION; INTRACAUDAL
Status: DISCONTINUED | OUTPATIENT
Start: 2024-01-24 | End: 2024-01-24 | Stop reason: HOSPADM

## 2024-01-24 RX ORDER — SODIUM CHLORIDE, SODIUM LACTATE, POTASSIUM CHLORIDE, CALCIUM CHLORIDE 600; 310; 30; 20 MG/100ML; MG/100ML; MG/100ML; MG/100ML
INJECTION, SOLUTION INTRAVENOUS CONTINUOUS
Status: CANCELLED | OUTPATIENT
Start: 2024-01-24

## 2024-01-24 RX ORDER — SODIUM CHLORIDE, SODIUM GLUCONATE, SODIUM ACETATE, POTASSIUM CHLORIDE AND MAGNESIUM CHLORIDE 30; 37; 368; 526; 502 MG/100ML; MG/100ML; MG/100ML; MG/100ML; MG/100ML
INJECTION, SOLUTION INTRAVENOUS CONTINUOUS
Status: CANCELLED | OUTPATIENT
Start: 2024-01-24 | End: 2024-02-23

## 2024-01-24 RX ORDER — PROPOFOL 10 MG/ML
VIAL (ML) INTRAVENOUS
Status: DISCONTINUED | OUTPATIENT
Start: 2024-01-24 | End: 2024-01-24

## 2024-01-24 RX ORDER — ONDANSETRON HYDROCHLORIDE 2 MG/ML
4 INJECTION, SOLUTION INTRAVENOUS DAILY PRN
Status: CANCELLED | OUTPATIENT
Start: 2024-01-24

## 2024-01-24 RX ORDER — LIDOCAINE HYDROCHLORIDE 10 MG/ML
INJECTION, SOLUTION EPIDURAL; INFILTRATION; INTRACAUDAL; PERINEURAL
Status: DISCONTINUED | OUTPATIENT
Start: 2024-01-24 | End: 2024-01-24 | Stop reason: HOSPADM

## 2024-01-24 RX ORDER — LIDOCAINE HYDROCHLORIDE 10 MG/ML
INJECTION, SOLUTION EPIDURAL; INFILTRATION; INTRACAUDAL; PERINEURAL
Status: DISCONTINUED | OUTPATIENT
Start: 2024-01-24 | End: 2024-01-24

## 2024-01-24 RX ORDER — TRIAMCINOLONE ACETONIDE 40 MG/ML
INJECTION, SUSPENSION INTRA-ARTICULAR; INTRAMUSCULAR
Status: DISCONTINUED
Start: 2024-01-24 | End: 2024-01-24 | Stop reason: WASHOUT

## 2024-01-24 RX ORDER — LIDOCAINE HYDROCHLORIDE 10 MG/ML
INJECTION, SOLUTION EPIDURAL; INFILTRATION; INTRACAUDAL; PERINEURAL
Status: DISCONTINUED
Start: 2024-01-24 | End: 2024-01-24 | Stop reason: HOSPADM

## 2024-01-24 RX ADMIN — PROPOFOL 20 MG: 10 INJECTION, EMULSION INTRAVENOUS at 11:01

## 2024-01-24 RX ADMIN — LIDOCAINE HYDROCHLORIDE 50 MG: 10 INJECTION, SOLUTION EPIDURAL; INFILTRATION; INTRACAUDAL; PERINEURAL at 11:01

## 2024-01-24 RX ADMIN — PROPOFOL 10 MG: 10 INJECTION, EMULSION INTRAVENOUS at 11:01

## 2024-01-24 RX ADMIN — PROPOFOL 50 MG: 10 INJECTION, EMULSION INTRAVENOUS at 11:01

## 2024-01-24 NOTE — DISCHARGE SUMMARY
[facility] - Periop Services  Discharge Note  Short Stay    Procedure(s) (LRB):  Block-nerve-medial branch-lumbar (Bilateral)      OUTCOME: Patient tolerated treatment/procedure well without complication and is now ready for discharge.    DISPOSITION: Home or Self Care    FINAL DIAGNOSIS:  <principal problem not specified>    FOLLOWUP: In clinic    DISCHARGE INSTRUCTIONS:  No discharge procedures on file.     TIME SPENT ON DISCHARGE: 5 minutes

## 2024-01-24 NOTE — ANESTHESIA POSTPROCEDURE EVALUATION
Anesthesia Post Evaluation    Patient: Melquiades Rehman Jr.    Procedure(s) Performed: Procedure(s) (LRB):  Block-nerve-medial branch-lumbar (Bilateral)    Final Anesthesia Type: general      Patient location during evaluation: PACU  Patient participation: Yes- Able to Participate  Level of consciousness: awake and alert  Post-procedure vital signs: reviewed and stable  Pain management: adequate  Airway patency: patent    PONV status at discharge: No PONV  Anesthetic complications: no      Cardiovascular status: hemodynamically stable  Respiratory status: unassisted  Hydration status: euvolemic  Follow-up not needed.              Vitals Value Taken Time   /76 01/24/24 1202   Temp  01/24/24 1236   Pulse 76 01/24/24 1233   Resp 18 01/24/24 1200   SpO2 97 % 01/24/24 1233   Vitals shown include unvalidated device data.      No case tracking events are documented in the log.      Pain/Reese Score: Modified Reese Score: 20 (1/24/2024 11:51 AM)

## 2024-01-24 NOTE — ANESTHESIA PREPROCEDURE EVALUATION
01/24/2024  Melquiades Rehman Jr. is a 68 y.o., male with ----------------------------  Atrial fibrillation  Central stenosis of spinal canal  Chronic back pain greater than 3 months duration  Constipation  Degeneration of lumbar intervertebral disc  Diastolic dysfunction  Dysphonia  Dystrophic nail  Erectile dysfunction  Fatty liver  GERD (gastroesophageal reflux disease)  H/O cataract removal with insertion of prosthetic lens  History of CVA (cerebrovascular accident)  History of radial keratotomy  HTN (hypertension)  Hyperlipidemia  Induratio penis plastica  Microhematuria  Overgrown toenails  Parkinson's disease  Peyronie disease  Polyneuropathy associated with underlying disease  Rheumatoid arthritis  Rheumatoid arthritis, unspecified  Sixth nerve palsy  Spinal stenosis of lumbar region  Type 2 diabetes mellitus without complications    And ----------------------------  Cataract extraction w/  intraocular lens implant  Epidural steroid injection into lumbar spine  Exc cyst lt ear  Injection of anesthetic agent around medial branch nerves innervating   lumbar facet joint      Comment:  Procedure: Block-nerve-medial branch-lumbar;  Surgeon:                Gertrude Blanco MD;  Location: [facility] OR;  Service: Pain                Management;  Laterality: Bilateral;  Bilateral                L4-L5...Patient is requesting to be first case  Injection of anesthetic agent around medial branch nerves innervating   lumbar facet joint      Comment:  Procedure: Block-nerve-medial branch-lumbar;  Surgeon:                Gertrude Blanco MD;  Location: [facility] OR;  Service: Pain                Management;  Laterality: Bilateral;  Bilateral MBB L3-5  Knee arthroscopy  Vasectomy    Presents for bilateral lumbar medial branch block  Similar procedures twice in November 2023        Pre-op Assessment    I have reviewed the NPO  Status.      Review of Systems  Cardiovascular:     Hypertension                                  Hypertension     Atrial Fibrillation     Renal/:  Chronic Renal Disease        Kidney Function/Disease             Hepatic/GI:     GERD Liver Disease,     Gerd       Liver Disease        Musculoskeletal:  Arthritis        Arthritis          Neurological:  TIA,  Neuromuscular Disease,           Arthritis               TIA - Transient Ischemic Attack             Neuromuscular Disease   Endocrine:  Diabetes    Diabetes                          Physical Exam  General: Well nourished, Cooperative, Alert and Oriented    Airway:  Mallampati: II   Mouth Opening: Normal  TM Distance: Normal  Tongue: Normal  Neck ROM: Normal ROM    Dental:  Top teeth Mostly missing   Chest/Lungs:  Clear to auscultation, Normal Respiratory Rate    Heart:  Rate: Normal  Rhythm: Regular Rhythm        Anesthesia Plan  Type of Anesthesia, risks & benefits discussed:    Anesthesia Type: Gen Natural Airway  Intra-op Monitoring Plan: Standard ASA Monitors  Post Op Pain Control Plan: IV/PO Opioids PRN  Induction:  IV  Airway Plan: Direct  Informed Consent: Informed consent signed with the Patient and all parties understand the risks and agree with anesthesia plan.  All questions answered. Patient consented to blood products? No  ASA Score: 3  Day of Surgery Review of History & Physical: H&P Update referred to the surgeon/provider.  Anesthesia Plan Notes: Nasal cannula vs facemask supplemental oxygenation   For patients with CAITLYN/obesity, may consider SuperNoval Nasal CPAP      Ready For Surgery From Anesthesia Perspective.     .

## 2024-01-24 NOTE — TRANSFER OF CARE
"Anesthesia Transfer of Care Note    Patient: Melquiades Rehman Jr.    Procedure(s) Performed: Procedure(s) (LRB):  Block-nerve-medial branch-lumbar (Bilateral)    Patient location: OPS    Anesthesia Type: general    Transport from OR: Transported from OR on room air with adequate spontaneous ventilation    Post pain: adequate analgesia    Post assessment: no apparent anesthetic complications and tolerated procedure well    Post vital signs: stable    Level of consciousness: awake, alert and oriented    Nausea/Vomiting: no nausea/vomiting    Complications: none    Transfer of care protocol was followed      Last vitals: Visit Vitals  BP (!) 159/91   Pulse 82   Temp 36.9 °C (98.5 °F) (Tympanic)   Resp 20   Ht 5' 11" (1.803 m)   Wt 107.5 kg (236 lb 15.9 oz)   SpO2 97%   BMI 33.05 kg/m²     "

## 2024-01-24 NOTE — OP NOTE
Bilateral L3-5 diagnostic medial branch blocks    Pre-Procedure Diagnoses:  1. Chronic pain syndrome  2. Chronic Low back pain  3. Lumbar facet arthropathy    Post-Procedure Diagnoses:  1. Chronic pain syndrome  2. Chronic Low back pain  3. Lumbar facet arthropathy    Anesthesia:  Local and MAC    Estimated Blood Loss:  None    Complications:  None    Informed Consent:  The procedure, risks, benefits, and alternatives were discussed with the patient. There were no contraindications to the procedure. The patient expressed understanding and agreed to proceed. Fully informed written consent was obtained.     Description of the Procedure:  The patient was taken to the operating room. IV access was obtained prior to the start of the procedure. The patient was positioned prone on the fluoroscopy table. Continuous hemodynamic monitoring was initiated and continued throughout the duration of the procedure. The skin overlying the lumbosacral spine was prepped with Chloraprep and draped into a sterile field. Fluoroscopy was used to identify the locations of the left side L3, L4, and L5 medial branch nerves at the junctions of the superior articular processes and transverse processes of L4, L5, and the sacral ala, respectively. Skin anesthesia was achieved using 0.5 mL of 1% lidocaine over each injection site. A 22-gauge 5-inch Quinke spinal needle was slowly inserted and advanced under intermittent fluoroscopic guidance until it made bony contact at the target sites. Proper needle position was confirmed under AP, oblique, and lateral fluoroscopic views. Negative aspiration for blood or CSF was confirmed. Then 0.5 mL of 0.25% bupivacaine was easily injected at each level. There was no pain on injection. The needles were removed intact and bleeding was nil. The same procedure was repeated in identical fashion on the right side. Sterile bandages were applied. The patient was taken to the recovery room for further observation in  stable condition. The patient was then discharged home without any complications.

## 2024-01-25 VITALS
HEIGHT: 71 IN | OXYGEN SATURATION: 92 % | RESPIRATION RATE: 18 BRPM | WEIGHT: 237 LBS | DIASTOLIC BLOOD PRESSURE: 76 MMHG | BODY MASS INDEX: 33.18 KG/M2 | TEMPERATURE: 99 F | HEART RATE: 73 BPM | SYSTOLIC BLOOD PRESSURE: 129 MMHG

## 2024-01-29 PROBLEM — G45.9 TIA (TRANSIENT ISCHEMIC ATTACK): Status: RESOLVED | Noted: 2023-09-05 | Resolved: 2024-01-29

## 2024-01-31 ENCOUNTER — OFFICE VISIT (OUTPATIENT)
Dept: PAIN MEDICINE | Facility: CLINIC | Age: 69
End: 2024-01-31
Payer: MEDICARE

## 2024-01-31 VITALS
HEART RATE: 81 BPM | WEIGHT: 236 LBS | SYSTOLIC BLOOD PRESSURE: 110 MMHG | BODY MASS INDEX: 33.04 KG/M2 | HEIGHT: 71 IN | DIASTOLIC BLOOD PRESSURE: 75 MMHG

## 2024-01-31 DIAGNOSIS — G89.29 CHRONIC BACK PAIN GREATER THAN 3 MONTHS DURATION: ICD-10-CM

## 2024-01-31 DIAGNOSIS — M51.36 DEGENERATION OF LUMBAR INTERVERTEBRAL DISC: ICD-10-CM

## 2024-01-31 DIAGNOSIS — M54.9 CHRONIC BACK PAIN GREATER THAN 3 MONTHS DURATION: ICD-10-CM

## 2024-01-31 DIAGNOSIS — M48.061 SPINAL STENOSIS AT L4-L5 LEVEL: ICD-10-CM

## 2024-01-31 DIAGNOSIS — M47.816 LUMBAR SPONDYLOSIS: Primary | ICD-10-CM

## 2024-01-31 DIAGNOSIS — M51.36 DDD (DEGENERATIVE DISC DISEASE), LUMBAR: ICD-10-CM

## 2024-01-31 PROCEDURE — 1125F AMNT PAIN NOTED PAIN PRSNT: CPT | Mod: CPTII,,, | Performed by: NURSE PRACTITIONER

## 2024-01-31 PROCEDURE — 3074F SYST BP LT 130 MM HG: CPT | Mod: CPTII,,, | Performed by: NURSE PRACTITIONER

## 2024-01-31 PROCEDURE — 3008F BODY MASS INDEX DOCD: CPT | Mod: CPTII,,, | Performed by: NURSE PRACTITIONER

## 2024-01-31 PROCEDURE — 99214 OFFICE O/P EST MOD 30 MIN: CPT | Mod: ,,, | Performed by: NURSE PRACTITIONER

## 2024-01-31 PROCEDURE — 1160F RVW MEDS BY RX/DR IN RCRD: CPT | Mod: CPTII,,, | Performed by: NURSE PRACTITIONER

## 2024-01-31 PROCEDURE — 1159F MED LIST DOCD IN RCRD: CPT | Mod: CPTII,,, | Performed by: NURSE PRACTITIONER

## 2024-01-31 PROCEDURE — 3288F FALL RISK ASSESSMENT DOCD: CPT | Mod: CPTII,,, | Performed by: NURSE PRACTITIONER

## 2024-01-31 PROCEDURE — 3078F DIAST BP <80 MM HG: CPT | Mod: CPTII,,, | Performed by: NURSE PRACTITIONER

## 2024-01-31 PROCEDURE — 1101F PT FALLS ASSESS-DOCD LE1/YR: CPT | Mod: CPTII,,, | Performed by: NURSE PRACTITIONER

## 2024-01-31 NOTE — PROGRESS NOTES
"Subjective:      Patient ID: Melquiades Rehman Jr. is a 68 y.o. male.    Chief Complaint: Low-back Pain (Post op procedure 1/24/24 C/O pain level 4, states procedure helped , not taking pain meds,states bending over causes pain)    Referred by: No ref. provider found     HPI:  This is a pleasant 68-year-old male patient presenting as a follow-up for pain associated with lumbar spondylosis after receiving a 2nd diagnostic bilateral MBB L3-5 on 01/24/2024.He states that he received > 80 % relief of his pain for 1/2 day  during day of procedure.  Then all of his pain returned, just like it was before. He would like to proceed with Left RFA L3-L5.  His primary pain remains to bilateral axial spine.  Pain elevates with prolonged standing, walking, leaning forward and leaning back.  These activities elevate his pain score to a 10/10.  His pain score today with no activity is 4/10.            Vital signs:   Vitals:    01/31/24 1321 01/31/24 1323   BP: 110/75    Pulse: 81    Weight: 107 kg (236 lb)    Height: 5' 11" (1.803 m)    PainSc:    4     Body mass index is 32.92 kg/m².  Pain Disability Index (PDI): 20       Interventional Pain History  01/24/2024:  Diagnostic bilateral MBB L3-5  11/22/2023:  Diagnostic bilateral MBB L3-5    ROS    MRI lumbar spine 2022:   FINDINGS:  There are 5 non-rib-bearing lumbar type vertebral bodies.  Alignment is preserved without subluxation.  The vertebral body heights are maintained.  The bone marrow is normal in signal.  There is increased T2 signal in the L1-L2 disc space, increased from the prior exam, which may be degenerative.     The conus terminates at the level of L1.  It is normal in signal and contour.  There is no epidural fluid collection.     Disc spaces, spinal canal and neural foramina are as follows:     L1-L2: Disc bulge and facet hypertrophy with mild narrowing of the spinal canal, unchanged.  Mild bilateral neural foraminal stenosis.     L2-L3: Disc bulge and facet " hypertrophy with mild narrowing of the spinal canal, unchanged.  Mild bilateral neural foraminal stenosis.     L3-L4: Disc bulge and facet hypertrophy with moderate narrowing of the spinal canal, unchanged.  Mild bilateral foraminal stenosis.     L4-L5: Disc bulge and facet hypertrophy with moderate to severe narrowing of the spinal canal, not significantly changed.  Mild right and moderate left neural foraminal stenosis.     L5-S1: Disc bulge and facet hypertrophy.  No significant spinal canal stenosis.  Mild bilateral neural foraminal stenosis.     There are mild inflammatory changes around the right L4-5 facet joint.     Impression:     1. Moderate to severe degenerative narrowing of the spinal canal at L4-5, moderate at L3-L4, mild at L1-L3, not significantly changed.  2. Multilevel neural foraminal stenoses as described.  3. Multilevel facet arthropathy with inflammatory changes on the right at L4-5.        Electronically signed by: Linda Dorsey  Date:                                            12/22/2022  Time:                                           11:35          Objective:          Physical Exam  General: Well developed; overweight; A&O x 3; No anxiety/depression; NAD  Mental Status: Oriented to person, palce and time. Displays appropriate mood & affect.  Head: Norm cephalic and atraumatic  Neck:  No cervical paraspinal banding.  Full range of motion with lateral turning and cervical flexion +extension.  Eyes: normal conjunctiva, normal lids, normal pupils  ENT and mouth: normal external ear, nose, and no lesions noted on the lips.  Respiratory: Symmetrical, Unlabored. No dyspnea  CV: normal rhythm and rate. No peripheral edema.   Abdomen: Non-distended    Extremities:  Gen: No cyanosis or tenderness to palpation bilateral upper and lower extremities  Skin: Warm, pink, dry, no rashes, no lesions on the lumbar spine  Strength: 5/5 motor strength bilateral upper and lower extremities  ROM: Full ROM in  bilateral knees and ankles without pain or instability.    Neuro:  Gait: no altalgic lean, normal toe and heel raise. Independent ambulator.  DTR's: 2+ in bilateral patellar, and ankle  Sensory: Intact to light touch bilateral  upper and lower extremities    Spine: Normal lordosis. No scoliosis  L-spine ROM:  Limited and painful ROM with flexion, extension, bilateral rotation,   Straight Leg Raise:  Negative bilaterally  SI Joint: No tenderness to palpation bilaterally.            Assessment:       This is a pleasant 68-year-old male patient presenting as a follow-up for pain associated with lumbar spondylosis after receiving a 2nd diagnostic bilateral MBB L3-5 on 01/24/2024.He states that he received > 80 % relief of his pain for 1/2 day  during day of procedure.  Then all of his pain returned, just like it was before. He would like to proceed with Left RFA L3-L5.  His primary pain remains to bilateral axial spine.  Pain elevates with prolonged standing, walking, leaning forward and leaning back.  These activities elevate his pain score to a 10/10.  His pain score today with no activity is 4/10.    Left RFA L3-L5. Requested.   Pt to ice 20 min on and 20 min off to reduced anticipated post op swelling.     Xarelto to be held 2 days prior to procedure.  Patient was also started to hold all nonsteroidal anti-inflammatory medications, multivitamins, fish oil or any other blood thinners 7 days prior to the procedure.      Encounter Diagnosis   Name Primary?    Lumbar spondylosis Yes         Plan:       Melquiades was seen today for low-back pain.    Diagnoses and all orders for this visit:    Lumbar spondylosis               Past Medical History:   Diagnosis Date    Atrial fibrillation     Central stenosis of spinal canal     Chronic back pain greater than 3 months duration 10/14/2022    Constipation 09/30/2022    Degeneration of lumbar intervertebral disc 07/06/2022    Diastolic dysfunction 05/01/2023    Dysphonia  09/30/2022    Dystrophic nail 06/19/2023    Erectile dysfunction 09/30/2022    Fatty liver     GERD (gastroesophageal reflux disease) 09/30/2022    H/O cataract removal with insertion of prosthetic lens     History of CVA (cerebrovascular accident) 09/30/2022    History of radial keratotomy 01/01/1998    HTN (hypertension)     Hyperlipidemia 09/30/2022    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/30/2022    Microhematuria 03/30/2023    Overgrown toenails 06/19/2023    Parkinson's disease     Peyronie disease     Polyneuropathy associated with underlying disease 06/19/2023    Rheumatoid arthritis 01/10/2022    Rheumatoid arthritis, unspecified     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    Spinal stenosis of lumbar region 09/30/2022    Type 2 diabetes mellitus without complications        Past Surgical History:   Procedure Laterality Date    CATARACT EXTRACTION W/  INTRAOCULAR LENS IMPLANT Bilateral     EPIDURAL STEROID INJECTION INTO LUMBAR SPINE      exc cyst Lt ear      INJECTION OF ANESTHETIC AGENT AROUND MEDIAL BRANCH NERVES INNERVATING LUMBAR FACET JOINT Bilateral 11/16/2022    Procedure: Block-nerve-medial branch-lumbar;  Surgeon: Gertrude Blanco MD;  Location: [facility] OR;  Service: Pain Management;  Laterality: Bilateral;  Bilateral L4-L5...Patient is requesting to be first case    INJECTION OF ANESTHETIC AGENT AROUND MEDIAL BRANCH NERVES INNERVATING LUMBAR FACET JOINT Bilateral 11/22/2023    Procedure: Block-nerve-medial branch-lumbar;  Surgeon: Gertrude Blanco MD;  Location: [facility] OR;  Service: Pain Management;  Laterality: Bilateral;  Bilateral MBB L3-5    INJECTION OF ANESTHETIC AGENT AROUND MEDIAL BRANCH NERVES INNERVATING LUMBAR FACET JOINT Bilateral 1/24/2024    Procedure: Block-nerve-medial branch-lumbar;  Surgeon: Gertrude Blanco MD;  Location: [facility] OR;  Service: Pain Management;  Laterality: Bilateral;  Bilateral MBB L3-5    KNEE ARTHROSCOPY Right     VASECTOMY  1998       Family History   Problem Relation Age of Onset     Heart failure Mother     Coronary artery disease Mother     Glaucoma Father     Alzheimer's disease Father     Emphysema Father     Autoimmune disease Sister     Pneumonia Sister     Dementia Sister        Social History     Socioeconomic History    Marital status:     Number of children: 1   Tobacco Use    Smoking status: Former     Current packs/day: 0.00     Average packs/day: 1 pack/day for 15.0 years (15.0 ttl pk-yrs)     Types: Cigarettes     Start date: 1972     Quit date: 1987     Years since quittin.1     Passive exposure: Past    Smokeless tobacco: Never   Substance and Sexual Activity    Alcohol use: Not Currently    Drug use: Not Currently    Sexual activity: Yes     Partners: Female     Social Determinants of Health     Financial Resource Strain: Medium Risk (1/10/2024)    Overall Financial Resource Strain (CARDIA)     Difficulty of Paying Living Expenses: Somewhat hard   Food Insecurity: Food Insecurity Present (1/10/2024)    Hunger Vital Sign     Worried About Running Out of Food in the Last Year: Often true     Ran Out of Food in the Last Year: Often true   Transportation Needs: No Transportation Needs (1/10/2024)    PRAPARE - Transportation     Lack of Transportation (Medical): No     Lack of Transportation (Non-Medical): No   Physical Activity: Inactive (1/10/2024)    Exercise Vital Sign     Days of Exercise per Week: 0 days     Minutes of Exercise per Session: 20 min   Stress: No Stress Concern Present (1/10/2024)    [employer] - Occupational Stress Questionnaire     Feeling of Stress : Only a little   Social Connections: Socially Isolated (1/10/2024)    Social Connection and Isolation Panel [NHANES]     Frequency of Communication with Friends and Family: More than three times a week     Frequency of Social Gatherings with Friends and Family: Three times a week     Attends L[employer] day Services: Never     Active Member of Clubs or Organizations: No      Attends Club or Organization Meetings: Never     Marital Status:    Housing Stability: Low Risk  (1/10/2024)    Housing Stability Vital Sign     Unable to Pay for Housing in the Last Year: No     Number of Places Lived in the Last Year: 1     Unstable Housing in the Last Year: No       Current Outpatient Medications   Medication Sig Dispense Refill    alogliptin-pioglitazone (OSENI) 25-30 mg Tab Take 1 tablet by mouth once daily. 90 tablet 1    blood sugar diagnostic (ACCU-CHEK GUIDE TEST STRIPS MISC) Accu-Chek Guide test strips      carbidopa-levodopa  mg (SINEMET)  mg per tablet Take 2 tablets by mouth 2 (two) times a day.      gemfibroziL (LOPID) 600 MG tablet Take 1 tablet (600 mg total) by mouth 2 (two) times daily. 180 tablet 1    lancets (ACCU-CHEK FASTCLIX LANCET DRUM MISC) Accu-Chek Fastclix Lancet Drum      lisinopriL-hydrochlorothiazide (PRINZIDE,ZESTORETIC) 20-25 mg Tab Take 1 tablet by mouth as needed.      metoprolol succinate (TOPROL-XL) 25 MG 24 hr tablet Take 1 tablet (25 mg total) by mouth once daily. 90 tablet 3    pantoprazole (PROTONIX) 40 MG tablet Take 1 tablet (40 mg total) by mouth once daily. 90 tablet 1    rosuvastatin (CRESTOR) 20 MG tablet Take 1 tablet (20 mg total) by mouth once daily. 90 tablet 1    selegiline HCl (ELDEPRYL) 5 mg tablet Take 5 mg by mouth 2 (two) times daily with meals.      tofacitinib (XELJANZ XR) 11 mg Tb24 Take 1 tablet by mouth once daily. 30 tablet 4    vit A/vit C/vit E/zinc/copper (PRESERVISION AREDS ORAL) Take 1 capsule by mouth once daily.      XARELTO 20 mg Tab Take 1 tablet (20 mg total) by mouth once daily. 30 tablet 6    colchicine (COLCRYS) 0.6 mg tablet Take 1 tablet (0.6 mg total) by mouth 2 (two) times daily. 180 tablet 1     No current facility-administered medications for this visit.       Review of patient's allergies indicates:   Allergen Reactions    Sarilumab Other (See Comments)     Other reaction(s): evaning  Kevzara

## 2024-02-02 ENCOUNTER — HOSPITAL ENCOUNTER (OUTPATIENT)
Dept: WOUND CARE | Facility: HOSPITAL | Age: 69
Discharge: HOME | End: 2024-02-02
Attending: NURSE PRACTITIONER
Payer: MEDICARE

## 2024-02-02 VITALS
HEART RATE: 92 BPM | TEMPERATURE: 98 F | SYSTOLIC BLOOD PRESSURE: 126 MMHG | OXYGEN SATURATION: 98 % | DIASTOLIC BLOOD PRESSURE: 75 MMHG

## 2024-02-02 DIAGNOSIS — E11.9 ENCOUNTER FOR COMPREHENSIVE DIABETIC FOOT EXAMINATION, TYPE 2 DIABETES MELLITUS: Primary | ICD-10-CM

## 2024-02-02 DIAGNOSIS — L60.2 OVERGROWN TOENAILS: ICD-10-CM

## 2024-02-02 DIAGNOSIS — L85.3 XEROSIS OF SKIN: ICD-10-CM

## 2024-02-02 DIAGNOSIS — G63 POLYNEUROPATHY ASSOCIATED WITH UNDERLYING DISEASE: ICD-10-CM

## 2024-02-02 DIAGNOSIS — L60.3 DYSTROPHIC NAIL: ICD-10-CM

## 2024-02-02 DIAGNOSIS — E11.42 TYPE 2 DIABETES MELLITUS WITH PERIPHERAL NEUROPATHY: ICD-10-CM

## 2024-02-02 PROCEDURE — 99211 OFF/OP EST MAY X REQ PHY/QHP: CPT | Mod: 25,,, | Performed by: NURSE PRACTITIONER

## 2024-02-02 PROCEDURE — 27000999 HC MEDICAL RECORD PHOTO DOCUMENTATION

## 2024-02-02 PROCEDURE — 99211 OFF/OP EST MAY X REQ PHY/QHP: CPT

## 2024-02-02 PROCEDURE — 11721 DEBRIDE NAIL 6 OR MORE: CPT

## 2024-02-02 PROCEDURE — 11719 TRIM NAIL(S) ANY NUMBER: CPT | Mod: ,,, | Performed by: NURSE PRACTITIONER

## 2024-02-02 PROCEDURE — 11719 TRIM NAIL(S) ANY NUMBER: CPT

## 2024-02-02 RX ORDER — AMMONIUM LACTATE 12 G/100G
1 CREAM TOPICAL 2 TIMES DAILY
Qty: 385 G | Refills: 11 | Status: SHIPPED | OUTPATIENT
Start: 2024-02-02 | End: 2024-03-03

## 2024-02-02 NOTE — PROGRESS NOTES
TODAY'S VISIT NOTE WAS IMPORTED FROM LAST WOUND CLINIC VISIT OF 9/21/23.  I ATTEST THAT I REVIEWED THE HPI, ROS, LABS, WOUND-RELATED IMAGING, PHYSICAL EXAMINATION, EVALUATION AND PLAN SECTIONS OF IMPORTED NOTE AND REVISED TODAY'S VISIT NOTE TO REFLECT TODAY'S NEW ASSESSMENT FINDINGS AND TODAY'S UPDATES TO WOUND TREATMENT PLAN.          CHIEF COMPLAINT:    Routine diabetic foot care - Medicare      HISTORY OF  PRESENT ILLNESS:  68 y.o. White male being seen today for routine diabetic foot care.    Followed by DAVIDSON Balderas for PCP.   Last visit with Ms. Recio was on 1/11/24.   No hx of vascular work-ups or interventions.  Never smoker.  .  Disabled .  Quit smoking in 1987; was a light smoker.  History includes:        Past Medical History:   Diagnosis Date    Atrial fibrillation     Central stenosis of spinal canal     Chronic back pain greater than 3 months duration 10/14/2022    Constipation 09/30/2022    Degeneration of lumbar intervertebral disc 07/06/2022    Diastolic dysfunction 05/01/2023    Dysphonia 09/30/2022    Dystrophic nail 06/19/2023    Erectile dysfunction 09/30/2022    Fatty liver     GERD (gastroesophageal reflux disease) 09/30/2022    H/O cataract removal with insertion of prosthetic lens     History of CVA (cerebrovascular accident) 09/30/2022    History of radial keratotomy 01/01/1998    HTN (hypertension)     Hyperlipidemia 09/30/2022    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/30/2022    Microhematuria 03/30/2023    Overgrown toenails 06/19/2023    Parkinson's disease     Peyronie disease     Polyneuropathy associated with underlying disease 06/19/2023    Rheumatoid arthritis 01/10/2022    Rheumatoid arthritis, unspecified     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    Spinal stenosis of lumbar region 09/30/2022    Type 2 diabetes mellitus without complications       Past Surgical History:   Procedure Laterality Date    CATARACT EXTRACTION W/  INTRAOCULAR LENS IMPLANT Bilateral     EPIDURAL  STEROID INJECTION INTO LUMBAR SPINE      exc cyst Lt ear      INJECTION OF ANESTHETIC AGENT AROUND MEDIAL BRANCH NERVES INNERVATING LUMBAR FACET JOINT Bilateral 2022    Procedure: Block-nerve-medial branch-lumbar;  Surgeon: Gertrude Blanco MD;  Location: [facility] OR;  Service: Pain Management;  Laterality: Bilateral;  Bilateral L4-L5...Patient is requesting to be first case    INJECTION OF ANESTHETIC AGENT AROUND MEDIAL BRANCH NERVES INNERVATING LUMBAR FACET JOINT Bilateral 2023    Procedure: Block-nerve-medial branch-lumbar;  Surgeon: Gertrude Blanco MD;  Location: [facility] OR;  Service: Pain Management;  Laterality: Bilateral;  Bilateral MBB L3-5    INJECTION OF ANESTHETIC AGENT AROUND MEDIAL BRANCH NERVES INNERVATING LUMBAR FACET JOINT Bilateral 2024    Procedure: Block-nerve-medial branch-lumbar;  Surgeon: Gertrude Blanco MD;  Location: [facility] OR;  Service: Pain Management;  Laterality: Bilateral;  Bilateral MBB L3-5    KNEE ARTHROSCOPY Right     VASECTOMY        Social History     Socioeconomic History    Marital status:     Number of children: 1   Tobacco Use    Smoking status: Former     Current packs/day: 0.00     Average packs/day: 1 pack/day for 15.0 years (15.0 ttl pk-yrs)     Types: Cigarettes     Start date: 1972     Quit date: 1987     Years since quittin.1     Passive exposure: Past    Smokeless tobacco: Never   Substance and Sexual Activity    Alcohol use: Not Currently    Drug use: Not Currently    Sexual activity: Yes     Partners: Female     Social Determinants of Health     Financial Resource Strain: Medium Risk (1/10/2024)    Overall Financial Resource Strain (CARDIA)     Difficulty of Paying Living Expenses: Somewhat hard   Food Insecurity: Food Insecurity Present (1/10/2024)    Hunger Vital Sign     Worried About Running Out of Food in the Last Year: Often true     Ran Out of Food in the Last Year: Often true   Transportation Needs: No Transportation Needs  (1/10/2024)    PRAPARE - Transportation     Lack of Transportation (Medical): No     Lack of Transportation (Non-Medical): No   Physical Activity: Inactive (1/10/2024)    Exercise Vital Sign     Days of Exercise per Week: 0 days     Minutes of Exercise per Session: 20 min   Stress: No Stress Concern Present (1/10/2024)    [employer] - Occupational Stress Questionnaire     Feeling of Stress : Only a little   Social Connections: Socially Isolated (1/10/2024)    Social Connection and Isolation Panel [NHANES]     Frequency of Communication with Friends and Family: More than three times a week     Frequency of Social Gatherings with Friends and Family: Three times a week     Attends Hoahaoism Services: Never     Active Member of Clubs or Organizations: No     Attends Club or Organization Meetings: Never     Marital Status:    Housing Stability: Low Risk  (1/10/2024)    Housing Stability Vital Sign     Unable to Pay for Housing in the Last Year: No     Number of Places Lived in the Last Year: 1     Unstable Housing in the Last Year: No       Review of Most Recent Wound Care-Related Labs:  Lab Results   Component Value Date/Time    WBC 3.85 (L) 01/11/2024 11:45 AM    RBC 4.35 (L) 01/11/2024 11:45 AM    HGB 14.1 01/11/2024 11:45 AM    HCT 41.7 (L) 01/11/2024 11:45 AM    IRON 128 02/08/2022 09:14 AM    FERRITIN 816.80 (H) 12/09/2020 06:35 AM    MCV 95.9 (H) 01/11/2024 11:45 AM    MCH 32.4 (H) 01/11/2024 11:45 AM    CREATININE 1.16 01/11/2024 11:45 AM    HGBA1C 6.1 10/26/2023 09:52 AM    CRP 4.80 01/11/2024 11:45 AM        Today 2/2/24:  Had a back injection since last visit and pain is much better.  Plans on having nerves on one side of spine burned at some point in near future.  Continues having numbness, pain, and tingling in feet on a daily basis.  Ankles continue to swell; however, no worse than usual baseline.  Wearing closed heel and toe shoes now with socks.  Checking feet more  often these days.   breakdown.     9/21/23:  Reports numbness and burning in all toes on daily basis.  Does have pain in calves with walking distances, as well as the left thigh now.  Not aware of his feet feeling cold.  Has occasional swelling in bilateral outer and inner ankles.  Continues going barefoot; however, doing that half as much as he used to.  Checking his feet and toes more often than before; however, not daily, as instructed.   He no longer wears CROCs; however, is wearing shoes without socks.        REVIEW OF SYSTEMS:  Except as stated in HPI, all other 10 body systems normal.       PHYSICAL EXAMINATION AND VITAL SIGNS:    Vitals:    02/02/24 0834   BP: 126/75   Pulse: 92   Temp: 98 °F (36.7 °C)       There is no height or weight on file to calculate BMI.      General:   VSS; afebrile.  Overweight, in no acute distress.   Respiratory:   Breathing even, quiet, and unlabored at rest.  No coughing.  Cardiovascular:   Mild non-pitting edema in bilateral ankles.  Bilateral DP and PT pulses palpated.  No hair distribution over BLE and toes.  Generalized pallor and in feet and toes.  Mild erythema in distal toes.   Generalized xerosis over bilateral plantar feet.  Distal toes cool to touch.       Musculoskeletal:    Full range of motion of all extremities.  Ambulates without assistive device.   Normal dorsiflexion and flexion in feet and hallux toes.  Normal intrinsic muscle ROM bilaterally.    Neurologic:  A&O X 3.  Cranial nerves grossly intact.  Normal monofilament testing.  No LOPS in either foot.    Psychiatric:  Calm, cooperative.  Mood and effect normal.  Responses appropriate.   Integumentary:        10 overgrown and dystrophic toenails.      Generalized xerosis over bilateral plantar feet.                                            ASSESSMENT AND PLAN:    Encounter for diabetic foot care, non-insulin dependent:  Last visit with PCP, Pau Recio, APRN:   1/11/24  Diabetes well-controlled per last  HgbA1C.     Diabetic foot care teaching reinforced with focused teaching towards proper foot and socks wear, and to not go barefoot while out of bed, with rationale.  Wound clinic and [facility] [place] reinforced.      Class B Findings:  3 of the following are present:  Abnormal hair growth  Thickened nails  Xerosis of bilateral plantar feet  Skin color (pallor in feet, erythema in distal toes)  Q8 - modifier    Class C Findings:  3 of the following are present:  Cold Feet  Edema  Burning in Feet  Paresthesias (abnormal spontaneous sensations in feet  Q9 - modifier:  One Class B and two Class C findings                                      PROCEDURE NOTE    Procedure Today:  cutting and filing of 10 toenails.  Rationale:  Overgrown toenails, as patient presented today, can lead to diabetic foot/toe ulcers, osteomyelitis, and lower limb amputations.   Informed verbal consent obtained.  Using standard podiatry clippers and Morris boards, I cut and filed 10 toenails.  No bleeding or discomfort during procedure.  Patient tolerated procedure without complications.       Xerosis of bilateral feet:  Teaching provided on underlying cause of condition, s/s of condition, complications, and treatment options of condition.    RX:  Lac-Hydrin 12%, followed by thin layer of Vaseline twice/day.    Teaching provided on new medication, expected outcomes of medication, how to administer medication, and possible s/e of medications.    Instructed not to put creams/lotions between toes, with rationale.                          Return to clinic in 3 months.  Teaching reinforced on s/s to call wound clinic for promptly.

## 2024-02-08 DIAGNOSIS — I48.92 ATRIAL FIBRILLATION AND FLUTTER: ICD-10-CM

## 2024-02-08 DIAGNOSIS — I48.91 ATRIAL FIBRILLATION AND FLUTTER: ICD-10-CM

## 2024-02-08 DIAGNOSIS — E78.2 MIXED HYPERLIPIDEMIA: ICD-10-CM

## 2024-02-08 RX ORDER — LISINOPRIL AND HYDROCHLOROTHIAZIDE 20; 25 MG/1; MG/1
1 TABLET ORAL DAILY
Qty: 90 TABLET | Refills: 3 | Status: SHIPPED | OUTPATIENT
Start: 2024-02-08 | End: 2024-05-16 | Stop reason: ALTCHOICE

## 2024-02-08 RX ORDER — GEMFIBROZIL 600 MG/1
600 TABLET, FILM COATED ORAL 2 TIMES DAILY
Qty: 180 TABLET | Refills: 3 | Status: SHIPPED | OUTPATIENT
Start: 2024-02-08 | End: 2025-02-02

## 2024-02-08 RX ORDER — ROSUVASTATIN CALCIUM 20 MG/1
20 TABLET, COATED ORAL DAILY
Qty: 90 TABLET | Refills: 3 | Status: SHIPPED | OUTPATIENT
Start: 2024-02-08 | End: 2025-02-07

## 2024-02-08 RX ORDER — METOPROLOL SUCCINATE 25 MG/1
25 TABLET, EXTENDED RELEASE ORAL DAILY
Qty: 90 TABLET | Refills: 3 | Status: SHIPPED | OUTPATIENT
Start: 2024-02-08 | End: 2025-02-07

## 2024-02-08 RX ORDER — RIVAROXABAN 20 MG/1
20 TABLET, FILM COATED ORAL DAILY
Qty: 90 TABLET | Refills: 3 | Status: SHIPPED | OUTPATIENT
Start: 2024-02-08

## 2024-02-08 NOTE — TELEPHONE ENCOUNTER
LCV 10/06/23  NCV 05/16/24    ----- Message from Barbara Serra sent at 2/8/2024  8:53 AM CST -----  Regarding: Medication Refills  Patient came in this morning wanting his medications sent to [facility] for mail delivery.  He would like a prescription for Metoprolol Succinate, Xarelto, Lisinopril, and Rosuvastatin.    Thank you,

## 2024-02-09 DIAGNOSIS — I10 PRIMARY HYPERTENSION: ICD-10-CM

## 2024-02-09 DIAGNOSIS — E11.9 TYPE 2 DIABETES MELLITUS WITHOUT COMPLICATION, WITHOUT LONG-TERM CURRENT USE OF INSULIN: ICD-10-CM

## 2024-02-09 DIAGNOSIS — M51.36 DEGENERATION OF LUMBAR INTERVERTEBRAL DISC: ICD-10-CM

## 2024-02-09 DIAGNOSIS — Z79.899 HIGH RISK MEDICATION USE: ICD-10-CM

## 2024-02-09 DIAGNOSIS — M05.79 SEROPOSITIVE RHEUMATOID ARTHRITIS OF MULTIPLE SITES: ICD-10-CM

## 2024-02-09 DIAGNOSIS — G89.4 CHRONIC PAIN SYNDROME: ICD-10-CM

## 2024-02-09 DIAGNOSIS — K21.9 GASTROESOPHAGEAL REFLUX DISEASE, UNSPECIFIED WHETHER ESOPHAGITIS PRESENT: ICD-10-CM

## 2024-02-09 DIAGNOSIS — M05.79 SEROPOSITIVE RHEUMATOID ARTHRITIS OF MULTIPLE SITES: Primary | ICD-10-CM

## 2024-02-09 DIAGNOSIS — D53.9 MACROCYTIC ANEMIA: Primary | ICD-10-CM

## 2024-02-09 RX ORDER — TOFACITINIB 11 MG/1
1 TABLET, FILM COATED, EXTENDED RELEASE ORAL DAILY
Qty: 30 TABLET | Refills: 2 | Status: SHIPPED | OUTPATIENT
Start: 2024-02-09 | End: 2024-04-09 | Stop reason: SDUPTHER

## 2024-02-09 NOTE — TELEPHONE ENCOUNTER
Pt called requesting urgent refills on Oseni, Protonix and Colchicine to [facility].    LOV 1/11/24  NOV 4/11/24

## 2024-02-11 DIAGNOSIS — E11.9 TYPE 2 DIABETES MELLITUS WITHOUT COMPLICATION, WITHOUT LONG-TERM CURRENT USE OF INSULIN: ICD-10-CM

## 2024-02-11 RX ORDER — ALOGLIPTIN BENZOATE AND PIOGLITAZONE HYDROCHLORIDE 25; 30 MG/1; MG/1
1 TABLET, FILM COATED ORAL DAILY
Qty: 90 TABLET | Refills: 0 | Status: SHIPPED | OUTPATIENT
Start: 2024-02-11 | End: 2024-04-09 | Stop reason: SDUPTHER

## 2024-02-11 RX ORDER — COLCHICINE 0.6 MG/1
0.6 TABLET ORAL 2 TIMES DAILY
Qty: 180 TABLET | Refills: 0 | Status: SHIPPED | OUTPATIENT
Start: 2024-02-11 | End: 2024-05-03 | Stop reason: SDUPTHER

## 2024-02-11 RX ORDER — ALOGLIPTIN BENZOATE AND PIOGLITAZONE HYDROCHLORIDE 25; 15 MG/1; MG/1
1 TABLET, FILM COATED ORAL
Qty: 90 TABLET | Refills: 3 | OUTPATIENT
Start: 2024-02-11

## 2024-02-11 RX ORDER — PANTOPRAZOLE SODIUM 40 MG/1
40 TABLET, DELAYED RELEASE ORAL DAILY
Qty: 90 TABLET | Refills: 0 | Status: SHIPPED | OUTPATIENT
Start: 2024-02-11 | End: 2024-04-09 | Stop reason: SDUPTHER

## 2024-02-12 PROBLEM — Z00.00 WELLNESS EXAMINATION: Status: RESOLVED | Noted: 2022-09-30 | Resolved: 2024-02-12

## 2024-02-15 ENCOUNTER — TELEPHONE (OUTPATIENT)
Dept: RHEUMATOLOGY | Facility: CLINIC | Age: 69
End: 2024-02-15
Payer: MEDICARE

## 2024-02-15 NOTE — TELEPHONE ENCOUNTER
Received PA request via [employer] with cover my meds key:[id number] for Xeljanz XR 11mg, attempted to submit to plan. Cannot find matching patient with Name and Date of Birth provided. For additional information, please contact the phone number on the back of the member prescription ID card.      Medication was previously approved [id number] from 1/1/2024 to 12/31/2024. Scanned in chart. Called [facility] home delivery at [phone] and spoke with Christina with PA department to ask medication status update and she said it is cancelled because it was previously approved. Asked her to do a test claim and went through. She stated last filled 2/6/2024 next 2/27/24.

## 2024-02-19 DIAGNOSIS — K21.9 GASTROESOPHAGEAL REFLUX DISEASE, UNSPECIFIED WHETHER ESOPHAGITIS PRESENT: ICD-10-CM

## 2024-02-19 DIAGNOSIS — E11.9 TYPE 2 DIABETES MELLITUS WITHOUT COMPLICATION, WITHOUT LONG-TERM CURRENT USE OF INSULIN: ICD-10-CM

## 2024-02-19 RX ORDER — PREDNISONE 5 MG/1
5 TABLET ORAL DAILY
Qty: 5 TABLET | Refills: 0 | Status: SHIPPED | OUTPATIENT
Start: 2024-02-19 | End: 2024-05-02 | Stop reason: SDUPTHER

## 2024-02-19 RX ORDER — PREDNISONE 5 MG/1
5 TABLET ORAL DAILY
COMMUNITY
End: 2024-02-19 | Stop reason: SDUPTHER

## 2024-02-19 NOTE — TELEPHONE ENCOUNTER
----- Message from Duran Vega sent at 2/19/2024  9:23 AM CST -----  Type:  RX Refill Request    Who Called: pt  Refill or New Rx:refill    RX Name and Strength:(OSENI) 25-30 mg Tab   How is the patient currently taking it? (ex. 1XDay):1x  Is this a 30 day or 90 day RX:90  Preferred Pharmacy with phone number:[email or web address] - [address]  Local or Mail Order:mail  Ordering Provider:Pau Gomes FNP    Best Call Back Number: [phone]    Additional Information: pt stated OSENI needs to be filled not  (generic) alogliptin-pioglitazone25-30 mg Tab, pt stated he can not take generic version of this medication , please call pt to discuss

## 2024-02-19 NOTE — TELEPHONE ENCOUNTER
----- Message from Naye Flores sent at 2/19/2024 10:18 AM CST -----  Pt presented at the window requesting predisone 5mg for flare up if possible [facility] [place] [facility] ph # [phone]      Pt can be reach @  [phone]

## 2024-03-07 RX ORDER — ALOGLIPTIN BENZOATE AND PIOGLITAZONE HYDROCHLORIDE 25; 30 MG/1; MG/1
1 TABLET, FILM COATED ORAL DAILY
Qty: 90 TABLET | Refills: 0 | OUTPATIENT
Start: 2024-03-07 | End: 2024-09-03

## 2024-03-11 PROBLEM — Z00.00 WELL ADULT EXAM: Status: RESOLVED | Noted: 2023-12-05 | Resolved: 2024-03-11

## 2024-03-14 ENCOUNTER — OFFICE VISIT (OUTPATIENT)
Dept: PAIN MEDICINE | Facility: CLINIC | Age: 69
End: 2024-03-14
Payer: MEDICARE

## 2024-03-14 VITALS
HEIGHT: 71 IN | DIASTOLIC BLOOD PRESSURE: 96 MMHG | BODY MASS INDEX: 32.2 KG/M2 | SYSTOLIC BLOOD PRESSURE: 158 MMHG | HEART RATE: 92 BPM | TEMPERATURE: 98 F | WEIGHT: 230 LBS

## 2024-03-14 DIAGNOSIS — G89.29 CHRONIC BACK PAIN GREATER THAN 3 MONTHS DURATION: ICD-10-CM

## 2024-03-14 DIAGNOSIS — M47.816 LUMBAR SPONDYLOSIS: ICD-10-CM

## 2024-03-14 DIAGNOSIS — M54.9 CHRONIC BACK PAIN GREATER THAN 3 MONTHS DURATION: ICD-10-CM

## 2024-03-14 DIAGNOSIS — M51.36 DEGENERATION OF LUMBAR INTERVERTEBRAL DISC: Primary | ICD-10-CM

## 2024-03-14 PROCEDURE — 1159F MED LIST DOCD IN RCRD: CPT | Mod: CPTII,,, | Performed by: ANESTHESIOLOGY

## 2024-03-14 PROCEDURE — 1160F RVW MEDS BY RX/DR IN RCRD: CPT | Mod: CPTII,,, | Performed by: ANESTHESIOLOGY

## 2024-03-14 PROCEDURE — 3288F FALL RISK ASSESSMENT DOCD: CPT | Mod: CPTII,,, | Performed by: ANESTHESIOLOGY

## 2024-03-14 PROCEDURE — 99213 OFFICE O/P EST LOW 20 MIN: CPT | Mod: ,,, | Performed by: ANESTHESIOLOGY

## 2024-03-14 PROCEDURE — 4010F ACE/ARB THERAPY RXD/TAKEN: CPT | Mod: CPTII,,, | Performed by: ANESTHESIOLOGY

## 2024-03-14 PROCEDURE — 3008F BODY MASS INDEX DOCD: CPT | Mod: CPTII,,, | Performed by: ANESTHESIOLOGY

## 2024-03-14 PROCEDURE — 3080F DIAST BP >= 90 MM HG: CPT | Mod: CPTII,,, | Performed by: ANESTHESIOLOGY

## 2024-03-14 PROCEDURE — 1125F AMNT PAIN NOTED PAIN PRSNT: CPT | Mod: CPTII,,, | Performed by: ANESTHESIOLOGY

## 2024-03-14 PROCEDURE — 3077F SYST BP >= 140 MM HG: CPT | Mod: CPTII,,, | Performed by: ANESTHESIOLOGY

## 2024-03-14 PROCEDURE — 1101F PT FALLS ASSESS-DOCD LE1/YR: CPT | Mod: CPTII,,, | Performed by: ANESTHESIOLOGY

## 2024-03-14 RX ORDER — AMMONIUM LACTATE 12 G/100G
1 CREAM TOPICAL 2 TIMES DAILY
COMMUNITY
Start: 2024-03-09

## 2024-03-14 NOTE — PROGRESS NOTES
Gertrude Blanco MD        PATIENT NAME: Melquiades Rehman Jr.  : 1955  DATE: 3/14/24  MRN: [id number]      Billing Provider: Gertrude Blanco MD  Level of Service:   Patient PCP Information       Provider PCP Type    [facility]            Reason for Visit / Chief Complaint: Low-back Pain (Pre op procedure 3/27/24 C/O pain level 8, not taking pain meds.)       Update PCP  Update Chief Complaint         History of Present Illness / Problem Focused Workflow     Melquiades Rehman Jr. presents to the clinic with Low-back Pain (Pre op procedure 3/27/24 C/O pain level 8, not taking pain meds.)       This is a 68-year-old male who returns to clinic today for follow up of his chronic low back pain.  He is scheduled for left L3-5 medial branch radiofrequency ablations in a couple of weeks.  He would also like to schedule the right side, since his insurance will run out on May 1st.  He did some work on his car and he has had increased back pain since then.      Back Pain    Low-back Pain      Review of Systems     Review of Systems   Musculoskeletal:  Positive for back pain.   All other systems reviewed and are negative.       Medical / Social / Family History     Past Medical History:   Diagnosis Date    Atrial fibrillation     Central stenosis of spinal canal     Chronic back pain greater than 3 months duration 10/14/2022    Constipation 2022    Degeneration of lumbar intervertebral disc 2022    Diastolic dysfunction 2023    Dysphonia 2022    Dystrophic nail 2023    Erectile dysfunction 2022    Fatty liver     GERD (gastroesophageal reflux disease) 2022    H/O cataract removal with insertion of prosthetic lens     History of CVA (cerebrovascular accident) 2022    History of radial keratotomy 1998    HTN (hypertension)     Hyperlipidemia 2022    Induratio penis plastica 2022    Microhematuria 2023    Overgrown toenails 2023     Parkinson's disease     Peyronie disease     Polyneuropathy associated with underlying disease 06/19/2023    Rheumatoid arthritis 01/10/2022    Rheumatoid arthritis, unspecified     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    Spinal stenosis of lumbar region 09/30/2022    Type 2 diabetes mellitus without complications     Unspecified macular degeneration        Past Surgical History:   Procedure Laterality Date    CATARACT EXTRACTION W/  INTRAOCULAR LENS IMPLANT Bilateral     EPIDURAL STEROID INJECTION INTO LUMBAR SPINE      exc cyst Lt ear      INJECTION OF ANESTHETIC AGENT AROUND MEDIAL BRANCH NERVES INNERVATING LUMBAR FACET JOINT Bilateral 11/16/2022    Procedure: Block-nerve-medial branch-lumbar;  Surgeon: Gertrude Blanco MD;  Location: [facility] OR;  Service: Pain Management;  Laterality: Bilateral;  Bilateral L4-L5...Patient is requesting to be first case    INJECTION OF ANESTHETIC AGENT AROUND MEDIAL BRANCH NERVES INNERVATING LUMBAR FACET JOINT Bilateral 11/22/2023    Procedure: Block-nerve-medial branch-lumbar;  Surgeon: eGrtrude Blanco MD;  Location: [facility] OR;  Service: Pain Management;  Laterality: Bilateral;  Bilateral MBB L3-5    INJECTION OF ANESTHETIC AGENT AROUND MEDIAL BRANCH NERVES INNERVATING LUMBAR FACET JOINT Bilateral 1/24/2024    Procedure: Block-nerve-medial branch-lumbar;  Surgeon: Gertrude Blanco MD;  Location: [facility] OR;  Service: Pain Management;  Laterality: Bilateral;  Bilateral MBB L3-5    KNEE ARTHROSCOPY Right     VASECTOMY  1998       Social History  Mr. Rehman  reports that he quit smoking about 37 years ago. His smoking use included cigarettes. He started smoking about 52 years ago. He has a 15.0 pack-year smoking history. He has been exposed to tobacco smoke. He has never used smokeless tobacco. He reports that he does not currently use alcohol. He reports that he does not currently use drugs.    Family History  Mr.'s Rehman family history includes Alzheimer's disease in his father;  Autoimmune disease in his sister; Coronary artery disease in his mother; Dementia in his sister; Emphysema in his father; Glaucoma in his father; Heart failure in his mother; Pneumonia in his sister.    Medications and Allergies     Medications  Outpatient Medications Marked as Taking for the 3/14/24 encounter (Office Visit) with Gertrude Blanco MD   Medication Sig Dispense Refill    alogliptin-pioglitazone (OSENI) 25-30 mg Tab Take 1 tablet by mouth once daily. 90 tablet 0    ammonium lactate 12 % Crea Apply 1 application  topically 2 (two) times daily.      blood sugar diagnostic (ACCU-CHEK GUIDE TEST STRIPS MISC) Accu-Chek Guide test strips      carbidopa-levodopa  mg (SINEMET)  mg per tablet Take 2 tablets by mouth 2 (two) times a day.      colchicine (COLCRYS) 0.6 mg tablet Take 1 tablet (0.6 mg total) by mouth 2 (two) times daily. 180 tablet 0    gemfibroziL (LOPID) 600 MG tablet Take 1 tablet (600 mg total) by mouth 2 (two) times daily. 180 tablet 3    lancets (ACCU-CHEK FASTCLIX LANCET DRUM MISC) Accu-Chek Fastclix Lancet Drum      lisinopriL-hydrochlorothiazide (PRINZIDE,ZESTORETIC) 20-25 mg Tab Take 1 tablet by mouth once daily. (Patient taking differently: Take 1 tablet by mouth as needed.) 90 tablet 3    metoprolol succinate (TOPROL-XL) 25 MG 24 hr tablet Take 1 tablet (25 mg total) by mouth once daily. 90 tablet 3    pantoprazole (PROTONIX) 40 MG tablet Take 1 tablet (40 mg total) by mouth once daily. 90 tablet 0    predniSONE (DELTASONE) 5 MG tablet Take 1 tablet (5 mg total) by mouth once daily. (Patient taking differently: Take 5 mg by mouth daily as needed.) 5 tablet 0    rosuvastatin (CRESTOR) 20 MG tablet Take 1 tablet (20 mg total) by mouth once daily. 90 tablet 3    selegiline HCl (ELDEPRYL) 5 mg tablet Take 5 mg by mouth 2 (two) times daily with meals.      tofacitinib (XELJANZ XR) 11 mg Tb24 Take 1 tablet by mouth once daily. 30 tablet 2    vit A/vit C/vit E/zinc/copper  (PRESERVISION AREDS ORAL) Take 1 capsule by mouth once daily.      XARELTO 20 mg Tab Take 1 tablet (20 mg total) by mouth once daily. 90 tablet 3       Allergies  Review of patient's allergies indicates:   Allergen Reactions    Sarilumab Other (See Comments)     Other reaction(s): fainting  Kevzara       Physical Examination     Vitals:    03/14/24 0801   BP: (!) 158/96   Pulse: 92   Temp: 98 °F (36.7 °C)   Pain Disability Index (PDI): 30       Spine Musculoskeletal Exam    Gait    Gait is normal.    Inspection    Thoracolumbar    Thoracolumbar inspection is normal.    Range of Motion    Thoracolumbar        Thoracolumbar range of motion additional comments:   Restricted range of motion in the lower back due to pain    Strength    Thoracolumbar    Thoracolumbar motor exam is normal.       Sensory    Thoracolumbar    Thoracolumbar sensation is normal.    General      Constitutional: appears stated age, well-developed and well-nourished    Scleral icterus: no    Labored breathing: no    Psychiatric: normal mood and affect and no acute distress    Neurological: alert and oriented x3    Skin: intact    Lymphadenopathy: none     CV: extremities warm, well-perfused  Resp: nonlabored breathing    Assessment and Plan (including Health Maintenance)      Problem List  Smart Sets  Document Outside HM   :    Plan:   Degeneration of lumbar intervertebral disc    Lumbar spondylosis    Chronic back pain greater than 3 months duration      He is scheduled for left L3-5 medial branch radiofrequency ablations in a couple of weeks.  We will also schedule him for the right side.  The plan was discussed with the patient and he wishes to proceed.  I will also obtain some x-rays of the lumbar spine today for further evaluation of his increased pain.    Problem List Items Addressed This Visit       Degeneration of lumbar intervertebral disc - Primary    Chronic back pain greater than 3 months duration    Lumbar spondylosis         Future  Appointments   Date Time Provider Department Center   3/25/2024 11:15 AM Jennifer Loomis DO [facility] UROLO [place]    4/9/2024  9:30 AM Valeria Donovan MD [facility] RHEU [place]    4/11/2024  9:40 AM Pau Goems, [facility] INTM[facility] [place]    5/10/2024  8:00 AM Monica Chun, [facility] OPWND [place]    5/10/2024 12:30 PM Quincy Pearce NP [facility]OL[facility] [place]    5/16/2024  1:00 PM Quincy Conrad MD [facility] CARD [place]    5/29/2024  2:00 PM PROVIDERS, [facility] [place]    7/11/2024  1:00 PM Barbara Zelaya, [facility] RHEU [place]        There are no Patient Instructions on file for this visit.  No follow-ups on file.     Signature:  Gertrude Blanco MD      Date of encounter: 3/14/24

## 2024-03-14 NOTE — H&P (VIEW-ONLY)
Gertrude Blanco MD        PATIENT NAME: Melquiades Rehman Jr.  : 1955  DATE: 3/14/24  MRN: [id number]      Billing Provider: Gertrude Blanco MD  Level of Service:   Patient PCP Information       Provider PCP Type    [facility]            Reason for Visit / Chief Complaint: Low-back Pain (Pre op procedure 3/27/24 C/O pain level 8, not taking pain meds.)       Update PCP  Update Chief Complaint         History of Present Illness / Problem Focused Workflow     Melquiades Rehman Jr. presents to the clinic with Low-back Pain (Pre op procedure 3/27/24 C/O pain level 8, not taking pain meds.)       This is a 68-year-old male who returns to clinic today for follow up of his chronic low back pain.  He is scheduled for left L3-5 medial branch radiofrequency ablations in a couple of weeks.  He would also like to schedule the right side, since his insurance will run out on May 1st.  He did some work on his car and he has had increased back pain since then.      Back Pain    Low-back Pain      Review of Systems     Review of Systems   Musculoskeletal:  Positive for back pain.   All other systems reviewed and are negative.       Medical / Social / Family History     Past Medical History:   Diagnosis Date    Atrial fibrillation     Central stenosis of spinal canal     Chronic back pain greater than 3 months duration 10/14/2022    Constipation 2022    Degeneration of lumbar intervertebral disc 2022    Diastolic dysfunction 2023    Dysphonia 2022    Dystrophic nail 2023    Erectile dysfunction 2022    Fatty liver     GERD (gastroesophageal reflux disease) 2022    H/O cataract removal with insertion of prosthetic lens     History of CVA (cerebrovascular accident) 2022    History of radial keratotomy 1998    HTN (hypertension)     Hyperlipidemia 2022    Induratio penis plastica 2022    Microhematuria 2023    Overgrown toenails 2023     Parkinson's disease     Peyronie disease     Polyneuropathy associated with underlying disease 06/19/2023    Rheumatoid arthritis 01/10/2022    Rheumatoid arthritis, unspecified     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    Spinal stenosis of lumbar region 09/30/2022    Type 2 diabetes mellitus without complications     Unspecified macular degeneration        Past Surgical History:   Procedure Laterality Date    CATARACT EXTRACTION W/  INTRAOCULAR LENS IMPLANT Bilateral     EPIDURAL STEROID INJECTION INTO LUMBAR SPINE      exc cyst Lt ear      INJECTION OF ANESTHETIC AGENT AROUND MEDIAL BRANCH NERVES INNERVATING LUMBAR FACET JOINT Bilateral 11/16/2022    Procedure: Block-nerve-medial branch-lumbar;  Surgeon: Gertrude Blanoc MD;  Location: [facility] OR;  Service: Pain Management;  Laterality: Bilateral;  Bilateral L4-L5...Patient is requesting to be first case    INJECTION OF ANESTHETIC AGENT AROUND MEDIAL BRANCH NERVES INNERVATING LUMBAR FACET JOINT Bilateral 11/22/2023    Procedure: Block-nerve-medial branch-lumbar;  Surgeon: Gertrude Blanco MD;  Location: [facility] OR;  Service: Pain Management;  Laterality: Bilateral;  Bilateral MBB L3-5    INJECTION OF ANESTHETIC AGENT AROUND MEDIAL BRANCH NERVES INNERVATING LUMBAR FACET JOINT Bilateral 1/24/2024    Procedure: Block-nerve-medial branch-lumbar;  Surgeon: Gertrude Blanco MD;  Location: [facility] OR;  Service: Pain Management;  Laterality: Bilateral;  Bilateral MBB L3-5    KNEE ARTHROSCOPY Right     VASECTOMY  1998       Social History  Mr. Rehman  reports that he quit smoking about 37 years ago. His smoking use included cigarettes. He started smoking about 52 years ago. He has a 15.0 pack-year smoking history. He has been exposed to tobacco smoke. He has never used smokeless tobacco. He reports that he does not currently use alcohol. He reports that he does not currently use drugs.    Family History  Mr.'s Rehman family history includes Alzheimer's disease in his father;  Autoimmune disease in his sister; Coronary artery disease in his mother; Dementia in his sister; Emphysema in his father; Glaucoma in his father; Heart failure in his mother; Pneumonia in his sister.    Medications and Allergies     Medications  Outpatient Medications Marked as Taking for the 3/14/24 encounter (Office Visit) with Gertrude Blanco MD   Medication Sig Dispense Refill    alogliptin-pioglitazone (OSENI) 25-30 mg Tab Take 1 tablet by mouth once daily. 90 tablet 0    ammonium lactate 12 % Crea Apply 1 application  topically 2 (two) times daily.      blood sugar diagnostic (ACCU-CHEK GUIDE TEST STRIPS MISC) Accu-Chek Guide test strips      carbidopa-levodopa  mg (SINEMET)  mg per tablet Take 2 tablets by mouth 2 (two) times a day.      colchicine (COLCRYS) 0.6 mg tablet Take 1 tablet (0.6 mg total) by mouth 2 (two) times daily. 180 tablet 0    gemfibroziL (LOPID) 600 MG tablet Take 1 tablet (600 mg total) by mouth 2 (two) times daily. 180 tablet 3    lancets (ACCU-CHEK FASTCLIX LANCET DRUM MISC) Accu-Chek Fastclix Lancet Drum      lisinopriL-hydrochlorothiazide (PRINZIDE,ZESTORETIC) 20-25 mg Tab Take 1 tablet by mouth once daily. (Patient taking differently: Take 1 tablet by mouth as needed.) 90 tablet 3    metoprolol succinate (TOPROL-XL) 25 MG 24 hr tablet Take 1 tablet (25 mg total) by mouth once daily. 90 tablet 3    pantoprazole (PROTONIX) 40 MG tablet Take 1 tablet (40 mg total) by mouth once daily. 90 tablet 0    predniSONE (DELTASONE) 5 MG tablet Take 1 tablet (5 mg total) by mouth once daily. (Patient taking differently: Take 5 mg by mouth daily as needed.) 5 tablet 0    rosuvastatin (CRESTOR) 20 MG tablet Take 1 tablet (20 mg total) by mouth once daily. 90 tablet 3    selegiline HCl (ELDEPRYL) 5 mg tablet Take 5 mg by mouth 2 (two) times daily with meals.      tofacitinib (XELJANZ XR) 11 mg Tb24 Take 1 tablet by mouth once daily. 30 tablet 2    vit A/vit C/vit E/zinc/copper  (PRESERVISION AREDS ORAL) Take 1 capsule by mouth once daily.      XARELTO 20 mg Tab Take 1 tablet (20 mg total) by mouth once daily. 90 tablet 3       Allergies  Review of patient's allergies indicates:   Allergen Reactions    Sarilumab Other (See Comments)     Other reaction(s): fainting  Kevzara       Physical Examination     Vitals:    03/14/24 0801   BP: (!) 158/96   Pulse: 92   Temp: 98 °F (36.7 °C)   Pain Disability Index (PDI): 30       Spine Musculoskeletal Exam    Gait    Gait is normal.    Inspection    Thoracolumbar    Thoracolumbar inspection is normal.    Range of Motion    Thoracolumbar        Thoracolumbar range of motion additional comments:   Restricted range of motion in the lower back due to pain    Strength    Thoracolumbar    Thoracolumbar motor exam is normal.       Sensory    Thoracolumbar    Thoracolumbar sensation is normal.    General      Constitutional: appears stated age, well-developed and well-nourished    Scleral icterus: no    Labored breathing: no    Psychiatric: normal mood and affect and no acute distress    Neurological: alert and oriented x3    Skin: intact    Lymphadenopathy: none     CV: extremities warm, well-perfused  Resp: nonlabored breathing    Assessment and Plan (including Health Maintenance)      Problem List  Smart Sets  Document Outside HM   :    Plan:   Degeneration of lumbar intervertebral disc    Lumbar spondylosis    Chronic back pain greater than 3 months duration      He is scheduled for left L3-5 medial branch radiofrequency ablations in a couple of weeks.  We will also schedule him for the right side.  The plan was discussed with the patient and he wishes to proceed.  I will also obtain some x-rays of the lumbar spine today for further evaluation of his increased pain.    Problem List Items Addressed This Visit       Degeneration of lumbar intervertebral disc - Primary    Chronic back pain greater than 3 months duration    Lumbar spondylosis         Future  Appointments   Date Time Provider Department Center   3/25/2024 11:15 AM Jennifer Loomis DO [facility] UROLO [place]    4/9/2024  9:30 AM Valeria Donovan MD [facility] RHEU [place]    4/11/2024  9:40 AM Pau Gomes, [facility] INTMED [place]    5/10/2024  8:00 AM Monica Chun, [facility] OPWND [place]    5/10/2024 12:30 PM Quincy Pearce NP [facility] UROLO [place]    5/16/2024  1:00 PM Quincy Conrad MD [facility] CARD [place]    5/29/2024  2:00 PM PROVIDERS, [facility] [place]    7/11/2024  1:00 PM Barbara Zelaya, [facility] RHEU [place]        There are no Patient Instructions on file for this visit.  No follow-ups on file.     Signature:  Gertrude Blanco MD      Date of encounter: 3/14/24

## 2024-03-15 ENCOUNTER — ANESTHESIA EVENT (OUTPATIENT)
Dept: SURGERY | Facility: HOSPITAL | Age: 69
End: 2024-03-15
Payer: MEDICARE

## 2024-03-15 ENCOUNTER — HOSPITAL ENCOUNTER (OUTPATIENT)
Dept: RADIOLOGY | Facility: HOSPITAL | Age: 69
Discharge: HOME | End: 2024-03-15
Attending: ANESTHESIOLOGY
Payer: MEDICARE

## 2024-03-15 DIAGNOSIS — M47.816 LUMBAR SPONDYLOSIS: ICD-10-CM

## 2024-03-15 DIAGNOSIS — G89.29 CHRONIC BACK PAIN GREATER THAN 3 MONTHS DURATION: ICD-10-CM

## 2024-03-15 DIAGNOSIS — M54.9 CHRONIC BACK PAIN GREATER THAN 3 MONTHS DURATION: ICD-10-CM

## 2024-03-15 DIAGNOSIS — M51.36 DEGENERATION OF LUMBAR INTERVERTEBRAL DISC: ICD-10-CM

## 2024-03-15 PROCEDURE — 72114 X-RAY EXAM L-S SPINE BENDING: CPT | Mod: TC

## 2024-03-25 ENCOUNTER — PROCEDURE VISIT (OUTPATIENT)
Dept: UROLOGY | Facility: CLINIC | Age: 69
End: 2024-03-25
Payer: MEDICARE

## 2024-03-25 VITALS
WEIGHT: 236 LBS | BODY MASS INDEX: 33.04 KG/M2 | OXYGEN SATURATION: 98 % | HEIGHT: 71 IN | DIASTOLIC BLOOD PRESSURE: 90 MMHG | RESPIRATION RATE: 18 BRPM | HEART RATE: 83 BPM | SYSTOLIC BLOOD PRESSURE: 141 MMHG

## 2024-03-25 DIAGNOSIS — N32.9 LESION OF BLADDER: ICD-10-CM

## 2024-03-25 DIAGNOSIS — R31.29 MICROHEMATURIA: Primary | ICD-10-CM

## 2024-03-25 LAB
BILIRUB SERPL-MCNC: NORMAL MG/DL
BLOOD URINE, POC: NORMAL
COLOR, POC UA: YELLOW
GLUCOSE UR QL STRIP: NORMAL
KETONES UR QL STRIP: NORMAL
LEUKOCYTE ESTERASE URINE, POC: NORMAL
NITRITE, POC UA: NORMAL
PH, POC UA: 5.5
PROTEIN, POC: NORMAL
SPECIFIC GRAVITY, POC UA: >=1.03
UROBILINOGEN, POC UA: 1

## 2024-03-25 PROCEDURE — 99213 OFFICE O/P EST LOW 20 MIN: CPT | Mod: 25,S$PBB,, | Performed by: UROLOGY

## 2024-03-25 PROCEDURE — 52000 CYSTOURETHROSCOPY: CPT | Mod: PBBFAC | Performed by: UROLOGY

## 2024-03-25 PROCEDURE — 52000 CYSTOURETHROSCOPY: CPT | Mod: S$PBB,,, | Performed by: UROLOGY

## 2024-03-25 PROCEDURE — 81001 URINALYSIS AUTO W/SCOPE: CPT | Mod: PBBFAC | Performed by: UROLOGY

## 2024-03-25 RX ORDER — CIPROFLOXACIN 500 MG/1
500 TABLET ORAL
Status: COMPLETED | OUTPATIENT
Start: 2024-03-25 | End: 2024-03-25

## 2024-03-25 RX ORDER — LIDOCAINE HYDROCHLORIDE 20 MG/ML
JELLY TOPICAL
Status: COMPLETED | OUTPATIENT
Start: 2024-03-25 | End: 2024-03-25

## 2024-03-25 RX ADMIN — CIPROFLOXACIN 500 MG: 500 TABLET ORAL at 11:03

## 2024-03-25 RX ADMIN — LIDOCAINE HYDROCHLORIDE: 20 JELLY TOPICAL at 11:03

## 2024-03-25 NOTE — PROCEDURES
CC:  Cystoscopy    HPI:  Melquiades Rehman Jr. is a 68 y.o. male here for a cystoscopy for hematuria.  He has a history of microscopic hematuria.  A cytology returned showing atypical urothelial cells.  He does have a smoking history but quit 30 years ago after smoking for a total of 30 years.    Urinalysis:  Results for orders placed or performed in visit on 03/25/24   POCT URINE DIPSTICK WITH MICROSCOPE, AUTOMATED   Result Value Ref Range    Color, UA Yellow     Spec Grav UA >=1.030     pH, UA 5.5     WBC, UA neg     Nitrite, UA neg     Protein, POC trace     Glucose, UA neg     Ketones, UA neg     Urobilinogen, UA 1.0     Bilirubin, POC neg     Blood, UA neg       Microscopic Urinalysis:  WBC:   None per HPF    RBC:    None per HPF    Bacteria:    None per HPF    Squamous epithelial cells:    None per HPF      Crystals:   None    Imaging:  CT - 24 November 2023:  Prostate is mildly enlarged     The remainder of the urinary tract is normal.    ROS:  All systems reviewed and are negative except as documented in HPI and/or Assessment and Plan.     Patient Active Problem List:     Patient Active Problem List   Diagnosis    Family history of colonic polyps    High risk medication use    History of CVA (cerebrovascular accident)    Atrial fibrillation and flutter    Chronic pain syndrome    Degeneration of lumbar intervertebral disc    Controlled type 2 diabetes mellitus without complication, without long-term current use of insulin    Dysphonia    Erectile dysfunction    GERD (gastroesophageal reflux disease)    History of right cataract extraction    Hyperlipidemia    Hypertension    Induratio penis plastica    Obesity    Parkinson's disease    Seropositive rheumatoid arthritis of multiple sites    Sixth nerve palsy    Spinal stenosis at L4-L5 level    Steatosis of liver    Constipation    Chronic back pain greater than 3 months duration    Lumbar spondylosis    Decreased GFR    Microhematuria    Diastolic dysfunction     Encounter for comprehensive diabetic foot examination, type 2 diabetes mellitus    Dystrophic nail    Overgrown toenails    Type 2 diabetes mellitus with peripheral neuropathy    Polyneuropathy associated with underlying disease    Stage 3a chronic kidney disease    ILD (interstitial lung disease)    Foreign body in right foot    Macrocytic anemia    Colon cancer screening        Past Medical History:  Past Medical History:   Diagnosis Date    Atrial fibrillation     Central stenosis of spinal canal     Chronic back pain greater than 3 months duration 10/14/2022    Constipation 09/30/2022    Degeneration of lumbar intervertebral disc 07/06/2022    Diastolic dysfunction 05/01/2023    Dysphonia 09/30/2022    Dystrophic nail 06/19/2023    Erectile dysfunction 09/30/2022    Fatty liver     GERD (gastroesophageal reflux disease) 09/30/2022    H/O cataract removal with insertion of prosthetic lens     History of CVA (cerebrovascular accident) 09/30/2022    History of radial keratotomy 01/01/1998    HTN (hypertension)     Hyperlipidemia 09/30/2022    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/30/2022    Microhematuria 03/30/2023    Overgrown toenails 06/19/2023    Parkinson's disease     Peyronie disease     Polyneuropathy associated with underlying disease 06/19/2023    Rheumatoid arthritis 01/10/2022    Rheumatoid arthritis, unspecified     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    Spinal stenosis of lumbar region 09/30/2022    Type 2 diabetes mellitus without complications     Unspecified macular degeneration         Past Surgical History:  Past Surgical History:   Procedure Laterality Date    CATARACT EXTRACTION W/  INTRAOCULAR LENS IMPLANT Bilateral     EPIDURAL STEROID INJECTION INTO LUMBAR SPINE      exc cyst Lt ear      INJECTION OF ANESTHETIC AGENT AROUND MEDIAL BRANCH NERVES INNERVATING LUMBAR FACET JOINT Bilateral 11/16/2022    Procedure: Block-nerve-medial branch-lumbar;  Surgeon: Gertrude Blanco MD;  Location: [facility];  Service:  Pain Management;  Laterality: Bilateral;  Bilateral L4-L5...Patient is requesting to be first case    INJECTION OF ANESTHETIC AGENT AROUND MEDIAL BRANCH NERVES INNERVATING LUMBAR FACET JOINT Bilateral 2023    Procedure: Block-nerve-medial branch-lumbar;  Surgeon: Gertrude Blanco MD;  Location: [facility] OR;  Service: Pain Management;  Laterality: Bilateral;  Bilateral MBB L3-5    INJECTION OF ANESTHETIC AGENT AROUND MEDIAL BRANCH NERVES INNERVATING LUMBAR FACET JOINT Bilateral 2024    Procedure: Block-nerve-medial branch-lumbar;  Surgeon: Gertrude Blanco MD;  Location: [facility] OR;  Service: Pain Management;  Laterality: Bilateral;  Bilateral MBB L3-5    KNEE ARTHROSCOPY Right     VASECTOMY          Family History:  Family History   Problem Relation Age of Onset    Heart failure Mother     Coronary artery disease Mother     Glaucoma Father     Alzheimer's disease Father     Emphysema Father     Autoimmune disease Sister     Pneumonia Sister     Dementia Sister         Social History:  Social History     Socioeconomic History    Marital status:     Number of children: 1   Tobacco Use    Smoking status: Former     Current packs/day: 0.00     Average packs/day: 1 pack/day for 15.0 years (15.0 ttl pk-yrs)     Types: Cigarettes     Start date: 1972     Quit date: 1987     Years since quittin.2     Passive exposure: Past    Smokeless tobacco: Never   Substance and Sexual Activity    Alcohol use: Not Currently    Drug use: Not Currently    Sexual activity: Yes     Partners: Female     Social Determinants of Health     Financial Resource Strain: Medium Risk (1/10/2024)    Overall Financial Resource Strain (CARDIA)     Difficulty of Paying Living Expenses: Somewhat hard   Food Insecurity: Food Insecurity Present (1/10/2024)    Hunger Vital Sign     Worried About Running Out of Food in the Last Year: Often true     Ran Out of Food in the Last Year: Often true   Transportation Needs: No  Transportation Needs (1/10/2024)    PRAPARE - Transportation     Lack of Transportation (Medical): No     Lack of Transportation (Non-Medical): No   Physical Activity: Inactive (1/10/2024)    Exercise Vital Sign     Days of Exercise per Week: 0 days     Minutes of Exercise per Session: 20 min   Stress: No Stress Concern Present (1/10/2024)    [employer] - Occupational Stress Questionnaire     Feeling of Stress : Only a little   Social Connections: Socially Isolated (1/10/2024)    Social Connection and Isolation Panel [NHANES]     Frequency of Communication with Friends and Family: More than three times a week     Frequency of Social Gatherings with Friends and Family: Three times a week     Attends Orthodox Services: Never     Active Member of Clubs or Organizations: No     Attends Club or Organization Meetings: Never     Marital Status:    Housing Stability: Low Risk  (1/10/2024)    Housing Stability Vital Sign     Unable to Pay for Housing in the Last Year: No     Number of Places Lived in the Last Year: 1     Unstable Housing in the Last Year: No        Allergies:  Review of patient's allergies indicates:   Allergen Reactions    Sarilumab Other (See Comments)     Other reaction(s): fainting  Kevzara        Objective:  Vitals:    03/25/24 1122   BP: (!) 141/90   Pulse:    Resp:      General:  Well developed, well nourished adult male in no acute distress  Abdomen: Soft, nontender, no masses  Extremities:  No clubbing, cyanosis, or edema  Neurologic:  Grossly intact  Musculoskeletal:  Normal tone    Cystoscopy:        - Penis:  Uncircumcised, no lesions         - Urethral meatus:  No strictures        - Urethra:  Normal without strictures or lesions           - Prostate:  Moderately enlarged lateral lobes        - Bladder neck:  Normal        - Bladder:  There is a hypervascular area on the left anterior bladder wall.  This does not appear to be papillary.        - Ureteral  orifices:  On the trigone with clear efflux bilaterally    The patient tolerated the procedure well without complications.  He was given Cipro 500mg, one tablet in the clinic.   The urethra was anesthetized with 2% Lidocaine Jelly, Urojet.      Assessment:  1. Microhematuria  - POCT URINE DIPSTICK WITH MICROSCOPE, AUTOMATED    2. Lesion of bladder     Plan:  1 and 2.  The hypervascular lesion appears to be benign but we will repeat the cystoscopy in three months to make sure that there has been no change.    Follow-up:  Three months for cystoscopy.

## 2024-03-25 NOTE — PROGRESS NOTES
Patient presented to clinic for cystoscopy. Urine collect. Procedure explained, patient verbalized understanding and signed consent. Prep applied using sterile technique, Urojet and Cirpo administered. Patient tolerated well. Dr. Jennifer Loomis performed cystoscopy without any complications. Patient to return to clinic in 3 mths.

## 2024-03-27 ENCOUNTER — ANESTHESIA (OUTPATIENT)
Dept: SURGERY | Facility: HOSPITAL | Age: 69
End: 2024-03-27
Payer: MEDICARE

## 2024-03-27 ENCOUNTER — HOSPITAL ENCOUNTER (OUTPATIENT)
Facility: HOSPITAL | Age: 69
Discharge: HOME | End: 2024-03-27
Attending: ANESTHESIOLOGY | Admitting: ANESTHESIOLOGY
Payer: MEDICARE

## 2024-03-27 DIAGNOSIS — E11.9 TYPE 2 DIABETES MELLITUS WITHOUT COMPLICATION, WITHOUT LONG-TERM CURRENT USE OF INSULIN: ICD-10-CM

## 2024-03-27 DIAGNOSIS — M54.9 CHRONIC BACK PAIN GREATER THAN 3 MONTHS DURATION: Primary | ICD-10-CM

## 2024-03-27 DIAGNOSIS — G89.29 CHRONIC BACK PAIN GREATER THAN 3 MONTHS DURATION: Primary | ICD-10-CM

## 2024-03-27 LAB
GLUCOSE SERPL-MCNC: 107 MG/DL (ref 70–110)
POCT GLUCOSE: 107 MG/DL (ref 70–110)

## 2024-03-27 PROCEDURE — 63600175 PHARM REV CODE 636 W HCPCS: Performed by: ANESTHESIOLOGY

## 2024-03-27 PROCEDURE — 64636 DESTROY L/S FACET JNT ADDL: CPT | Mod: RT,,, | Performed by: ANESTHESIOLOGY

## 2024-03-27 PROCEDURE — 37000008 HC ANESTHESIA 1ST 15 MINUTES: Performed by: ANESTHESIOLOGY

## 2024-03-27 PROCEDURE — 25000003 PHARM REV CODE 250: Performed by: ANESTHESIOLOGY

## 2024-03-27 PROCEDURE — 64636 DESTROY L/S FACET JNT ADDL: CPT | Mod: RT | Performed by: ANESTHESIOLOGY

## 2024-03-27 PROCEDURE — 64635 DESTROY LUMB/SAC FACET JNT: CPT | Mod: RT | Performed by: ANESTHESIOLOGY

## 2024-03-27 PROCEDURE — 25000003 PHARM REV CODE 250

## 2024-03-27 PROCEDURE — D9220A PRA ANESTHESIA: Mod: CRNA,,,

## 2024-03-27 PROCEDURE — 64635 DESTROY LUMB/SAC FACET JNT: CPT | Mod: RT,,, | Performed by: ANESTHESIOLOGY

## 2024-03-27 PROCEDURE — D9220A PRA ANESTHESIA: Mod: ANES,,, | Performed by: ANESTHESIOLOGY

## 2024-03-27 PROCEDURE — 82962 GLUCOSE BLOOD TEST: CPT | Performed by: ANESTHESIOLOGY

## 2024-03-27 PROCEDURE — 63600175 PHARM REV CODE 636 W HCPCS

## 2024-03-27 PROCEDURE — 37000009 HC ANESTHESIA EA ADD 15 MINS: Performed by: ANESTHESIOLOGY

## 2024-03-27 RX ORDER — LIDOCAINE HYDROCHLORIDE 10 MG/ML
INJECTION, SOLUTION EPIDURAL; INFILTRATION; INTRACAUDAL; PERINEURAL
Status: DISCONTINUED | OUTPATIENT
Start: 2024-03-27 | End: 2024-03-27

## 2024-03-27 RX ORDER — DEXAMETHASONE SODIUM PHOSPHATE 10 MG/ML
INJECTION INTRAMUSCULAR; INTRAVENOUS
Status: DISCONTINUED
Start: 2024-03-27 | End: 2024-03-27 | Stop reason: WASHOUT

## 2024-03-27 RX ORDER — KETAMINE HYDROCHLORIDE 100 MG/ML
INJECTION, SOLUTION INTRAMUSCULAR; INTRAVENOUS
Status: DISCONTINUED | OUTPATIENT
Start: 2024-03-27 | End: 2024-03-27

## 2024-03-27 RX ORDER — LIDOCAINE HYDROCHLORIDE 10 MG/ML
1 INJECTION, SOLUTION EPIDURAL; INFILTRATION; INTRACAUDAL; PERINEURAL ONCE
Status: DISCONTINUED | OUTPATIENT
Start: 2024-03-27 | End: 2024-03-27 | Stop reason: HOSPADM

## 2024-03-27 RX ORDER — DEXMEDETOMIDINE HYDROCHLORIDE 100 UG/ML
INJECTION, SOLUTION INTRAVENOUS
Status: DISCONTINUED | OUTPATIENT
Start: 2024-03-27 | End: 2024-03-27

## 2024-03-27 RX ORDER — TRIAMCINOLONE ACETONIDE 40 MG/ML
INJECTION, SUSPENSION INTRA-ARTICULAR; INTRAMUSCULAR
Status: DISCONTINUED | OUTPATIENT
Start: 2024-03-27 | End: 2024-03-27 | Stop reason: HOSPADM

## 2024-03-27 RX ORDER — TRIAMCINOLONE ACETONIDE 40 MG/ML
INJECTION, SUSPENSION INTRA-ARTICULAR; INTRAMUSCULAR
Status: DISCONTINUED
Start: 2024-03-27 | End: 2024-03-27 | Stop reason: HOSPADM

## 2024-03-27 RX ORDER — LIDOCAINE HYDROCHLORIDE 10 MG/ML
INJECTION, SOLUTION EPIDURAL; INFILTRATION; INTRACAUDAL; PERINEURAL
Status: DISCONTINUED | OUTPATIENT
Start: 2024-03-27 | End: 2024-03-27 | Stop reason: HOSPADM

## 2024-03-27 RX ORDER — PROPOFOL 10 MG/ML
VIAL (ML) INTRAVENOUS
Status: DISCONTINUED | OUTPATIENT
Start: 2024-03-27 | End: 2024-03-27

## 2024-03-27 RX ORDER — SODIUM CHLORIDE, SODIUM GLUCONATE, SODIUM ACETATE, POTASSIUM CHLORIDE AND MAGNESIUM CHLORIDE 30; 37; 368; 526; 502 MG/100ML; MG/100ML; MG/100ML; MG/100ML; MG/100ML
INJECTION, SOLUTION INTRAVENOUS CONTINUOUS
Status: CANCELLED | OUTPATIENT
Start: 2024-03-27 | End: 2024-04-26

## 2024-03-27 RX ORDER — LIDOCAINE HYDROCHLORIDE 10 MG/ML
INJECTION, SOLUTION EPIDURAL; INFILTRATION; INTRACAUDAL; PERINEURAL
Status: DISCONTINUED
Start: 2024-03-27 | End: 2024-03-27 | Stop reason: HOSPADM

## 2024-03-27 RX ORDER — BUPIVACAINE HYDROCHLORIDE 2.5 MG/ML
INJECTION, SOLUTION EPIDURAL; INFILTRATION; INTRACAUDAL
Status: DISCONTINUED | OUTPATIENT
Start: 2024-03-27 | End: 2024-03-27 | Stop reason: HOSPADM

## 2024-03-27 RX ORDER — BUPIVACAINE HYDROCHLORIDE 2.5 MG/ML
INJECTION, SOLUTION EPIDURAL; INFILTRATION; INTRACAUDAL
Status: DISCONTINUED
Start: 2024-03-27 | End: 2024-03-27 | Stop reason: HOSPADM

## 2024-03-27 RX ADMIN — DEXMEDETOMIDINE HYDROCHLORIDE 6 MCG: 100 INJECTION, SOLUTION INTRAVENOUS at 08:03

## 2024-03-27 RX ADMIN — PROPOFOL 40 MG: 10 INJECTION, EMULSION INTRAVENOUS at 08:03

## 2024-03-27 RX ADMIN — PROPOFOL 50 MG: 10 INJECTION, EMULSION INTRAVENOUS at 08:03

## 2024-03-27 RX ADMIN — KETAMINE HYDROCHLORIDE 20 MG: 100 INJECTION, SOLUTION INTRAMUSCULAR; INTRAVENOUS at 08:03

## 2024-03-27 RX ADMIN — LIDOCAINE HYDROCHLORIDE 30 MG: 10 INJECTION, SOLUTION EPIDURAL; INFILTRATION; INTRACAUDAL; PERINEURAL at 08:03

## 2024-03-27 NOTE — DISCHARGE INSTRUCTIONS
These instructions give you information on caring for yourself after your procedure. Your doctor may also give you specific instructions. Call your doctor if you have any problems or questions after your procedure.     HOME CARE  Do not drive or use any machinery for the next 24 hours.  Do not do any hard physical activity for the next 24 hours  No heavy lifting for one week  Apply ice pack for comfort  Do not use a heating pad in area of injection  You may go back to eating as usual  Remove bandaids tomorrow and then you may shower  No tub soaking for 3-5 days    SIDE EFFECTS THAT COULD HAPPEN UP TO 4 HOURS AFTER THE INJECTION  If you have leg weakness or numbness, have someone help you walk. If the weakness or numbness does not go away, or if it gets worse go to the emergency department.  If you have dizziness, lie down right away. This usually helps. Sit up slowly and then when you have been sitting for a few minutes then stand up.  If you have a mild headache, drink fluids (especially drinks with caffeine) Call your doctor if the headache gets worse or persists.  When the numbing medicine wears off you may feel some discomfort where you had the shot. It usually only lasts for a few days. You may put ice over the injection site. Leave ice on for 20 minutes at a time and protect your skin during use.   You may feel minor back pain and stiffness at the site of the shot. Call your doctor if this pain gets worse or does not improve. You may feel nauseous or vomit several hours after your procedure. If this happens, try drinking small amounts of clear liquids until you feel better. If you continue to feel nauseated or continue vomiting, get help right away.            GET HELP RIGHT AWAY IF:  You have very bad pain, headache or neck pain or stiffness  There is a change in your vision (double or blurry vision)  You have a fever over 101 or chills  You have swelling or redness at the injection site  You get  weaker  You are not able to control your bladder or bowels  You are not able to urinate

## 2024-03-27 NOTE — ANESTHESIA POSTPROCEDURE EVALUATION
Anesthesia Post Evaluation    Patient: Melquiades Rehman Jr.    Procedure(s) Performed: Procedure(s) (LRB):  Radiofrequency Ablation (Left)    Final Anesthesia Type: MAC      Patient location during evaluation: OPS  Patient participation: Yes- Able to Participate  Level of consciousness: awake and alert and oriented  Post-procedure vital signs: reviewed and stable  Pain management: adequate  Airway patency: patent    PONV status at discharge: No PONV, vomiting (controlled) and nausea (controlled)  Anesthetic complications: no      Cardiovascular status: stable and blood pressure returned to baseline  Respiratory status: unassisted  Hydration status: euvolemic                Vitals Value Taken Time   /98 03/27/24 0902   Temp 36.6 °C (97.9 °F) 03/27/24 0830   Pulse 74 03/27/24 0908   Resp 20 03/27/24 0908   SpO2 95 % 03/27/24 0908   Vitals shown include unvalidated device data.      No case tracking events are documented in the log.      Pain/Reese Score: Modified Reese Score: 19 (3/27/2024  8:26 AM)

## 2024-03-27 NOTE — TRANSFER OF CARE
"Anesthesia Transfer of Care Note    Patient: Melquiades Rehman Jr.    Procedure(s) Performed: Procedure(s) (LRB):  Radiofrequency Ablation (Left)    Patient location: [facility]    Anesthesia Type: MAC    Transport from OR: Transported from OR on room air with adequate spontaneous ventilation    Post pain: adequate analgesia    Post assessment: no apparent anesthetic complications and tolerated procedure well    Post vital signs: stable    Level of consciousness: awake and alert    Nausea/Vomiting: no nausea/vomiting    Complications: none    Transfer of care protocol was followed      Last vitals: Visit Vitals  BP (!) 175/102   Pulse 76   Temp 36.3 °C (97.4 °F) (Tympanic)   Resp 20   Ht 5' 11" (1.803 m)   Wt 108.3 kg (238 lb 12.1 oz)   SpO2 97%   BMI 33.30 kg/m²     "

## 2024-03-27 NOTE — OP NOTE
Left L3-5 medial branch Radiofrequency Ablation    Pre-Procedure Diagnoses:  1. Chronic pain syndrome  2. Chronic Low back pain  3. Lumbar facet arthropathy    Post-Procedure Diagnoses:  1. Chronic pain syndrome  2. Chronic Low back pain  3. Lumbar facet arthropathy    Anesthesia:  Local and MAC    Estimated Blood Loss:  None    Complications:  None    Informed Consent:  The procedure, risks, benefits, and alternatives were discussed with the patient. There were no contraindications to the procedure. The patient expressed understanding and agreed to proceed. Fully informed written consent was obtained.     Description of the Procedure:  The patient was taken to the operating room. IV access was obtained prior to the start of the procedure. The patient was positioned prone on the fluoroscopy table. Continuous hemodynamic monitoring was initiated and continued throughout the duration of the procedure. The skin overlying the lumbosacral spine was prepped with Chloraprep and draped into a sterile field. Fluoroscopy was used to identify the locations of the left L3, L4, and L5 medial branch nerves at the junctions of the superior articular processes and transverse processes of L4, L5, and the sacral ala, respectively. Skin anesthesia was achieved using 1 mL of 1% lidocaine over each injection site. An 18-gauge 100 mm RF needle was slowly inserted and advanced under intermittent fluoroscopic guidance until it made bony contact at the target sites. Proper needle position was confirmed under AP, oblique, and lateral fluoroscopic views. Negative aspiration for blood or CSF was confirmed. An RF probe was placed through each needle. Impedence values were low. Motor testing was performed, which confirmed no stimulation of the lower extremity. Radiofrequency lesioning was then carried out at 80 degrees Celsius for 90 seconds at each level. The probes were withdrawn and each needle was injected with a combination of 0.5 ml of  0.25% bupivacaine and 10 mg of Kenalog. There was no pain on injection. The needles were removed intact and bleeding was nil. Sterile bandages were applied. The patient was taken to the recovery room for further observation in stable condition. The patient was then discharged home without any complications.

## 2024-03-27 NOTE — DISCHARGE SUMMARY
[facility] - Periop Services  Discharge Note  Short Stay    Procedure(s) (LRB):  Radiofrequency Ablation (Left)      OUTCOME: Patient tolerated treatment/procedure well without complication and is now ready for discharge.    DISPOSITION: Home or Self Care    FINAL DIAGNOSIS:  <principal problem not specified>    FOLLOWUP: In clinic    DISCHARGE INSTRUCTIONS:  No discharge procedures on file.     TIME SPENT ON DISCHARGE: 5 minutes

## 2024-03-27 NOTE — ANESTHESIA PREPROCEDURE EVALUATION
03/27/2024  Melquiades Rehman Jr. is a 68 y.o., male.  Presents with chronic back pain.      The pt. Comes to [facility] for the noted pain management procedure under IV Sedation / TIVA w/ Local.  PMHx:  Chronic back pain greater than 3 months duration    Other Medical History   Rheumatoid arthritis, unspecified Central stenosis of spinal canal   HTN (hypertension) Parkinson's disease   Peyronie disease Type 2 diabetes mellitus without complications   Fatty liver History of radial keratotomy   H/O cataract removal with insertion of prosthetic lens Constipation   Degeneration of lumbar intervertebral disc Diastolic dysfunction   Dysphonia Dystrophic nail   Erectile dysfunction GERD (gastroesophageal reflux disease)   History of CVA (cerebrovascular accident) Hyperlipidemia   Induratio penis plastica Microhematuria   Overgrown toenails Polyneuropathy associated with underlying disease   Rheumatoid arthritis Sixth nerve palsy   Spinal stenosis of lumbar region Atrial fibrillation   Unspecified macular degeneration      Surgical History    CATARACT EXTRACTION W/  INTRAOCULAR LENS IMPLANT EPIDURAL STEROID INJECTION INTO LUMBAR SPINE   VASECTOMY KNEE ARTHROSCOPY   exc cyst Lt ear INJECTION OF ANESTHETIC AGENT AROUND MEDIAL BRANCH NERVES INNERVATING LUMBAR FACET JOINT   INJECTION OF ANESTHETIC AGENT AROUND MEDIAL BRANCH NERVES INNERVATING LUMBAR FACET JOINT INJECTION OF ANESTHETIC AGENT AROUND MEDIAL BRANCH NERVES INNERVATING LUMBAR FACET JOINT         Vital signs:        Lab Data:  N/A    Echo:  EF: 60-65%      EKG:          Pre-op Assessment    I have reviewed the Patient Summary Reports.     I have reviewed the Nursing Notes. I have reviewed the NPO Status.   I have reviewed the Medications.     Review of Systems  Anesthesia Hx:  No problems with previous Anesthesia                Social:  Non-Smoker        Hematology/Oncology:  Hematology Normal   Oncology Normal                                   EENT/Dental:  EENT/Dental Normal           Cardiovascular:  Exercise tolerance: good   Hypertension                Functional Capacity good / => 4 METS                         Pulmonary:  Pulmonary Normal                       Renal/:  Chronic Renal Disease                Hepatic/GI:     GERD Liver Disease,            Musculoskeletal:  Arthritis               Neurological:    Neuromuscular Disease,                                   Endocrine:  Diabetes           Dermatological:  Skin Normal    Psych:  Psychiatric Normal                    Physical Exam  General: Alert, Oriented, Well nourished and Cooperative    Airway:  Mallampati: II   Mouth Opening: Normal  TM Distance: Normal  Tongue: Normal  Neck ROM: Normal ROM    Dental:  Intact    Chest/Lungs:  Clear to auscultation, Normal Respiratory Rate    Heart:  Rate: Normal  Rhythm: Regular Rhythm        Anesthesia Plan  Type of Anesthesia, risks & benefits discussed:    Anesthesia Type: Gen Natural Airway  Intra-op Monitoring Plan: Standard ASA Monitors  Post Op Pain Control Plan: IV/PO Opioids PRN  Induction:  IV  Informed Consent: Informed consent signed with the Patient and all parties understand the risks and agree with anesthesia plan.  All questions answered.   ASA Score: 3  Day of Surgery Review of History & Physical: H&P Update referred to the surgeon/provider.    Ready For Surgery From Anesthesia Perspective.     .

## 2024-03-28 VITALS
HEART RATE: 64 BPM | OXYGEN SATURATION: 91 % | SYSTOLIC BLOOD PRESSURE: 142 MMHG | TEMPERATURE: 98 F | DIASTOLIC BLOOD PRESSURE: 88 MMHG | BODY MASS INDEX: 33.43 KG/M2 | WEIGHT: 238.75 LBS | RESPIRATION RATE: 18 BRPM | HEIGHT: 71 IN

## 2024-03-28 NOTE — TELEPHONE ENCOUNTER
Pt requesting refill for blood sugar diagnostic (ACCU-CHEK GUIDE TEST STRIPS MISC) and alogliptin-pioglitazone (OSENI) 25-30 mg Tab   LOV: 1/11/24    NOV:4/9/24  Medication  blood sugar diagnostic (ACCU-CHEK GUIDE TEST STRIPS MISC) [[id number]]  Outpatient Medication Detail     Disp Refills Start End JANES   blood sugar diagnostic (ACCU-CHEK GUIDE TEST STRIPS MISC) -- --  -- --   Sig: Accu-Chek Guide test strips   Class: Historical Med   Order: [id number]   Date/Time Signed: 9/30/2022 07:32       Outpatient Medication Detail     Disp Refills Start End JANES   alogliptin-pioglitazone (OSENI) 25-30 mg Tab 90 tablet 0 2/11/2024 8/9/2024 No   Sig - Route: Take 1 tablet by mouth once daily. - Oral   Sent to pharmacy as: alogliptin-pioglitazone (OSENI) 25-30 mg Tab   Class: Normal   Order: [id number]   Date/Time Signed: 2/11/2024 08:07       E-Prescribing Status: Receipt confirmed by pharmacy (2/11/2024  8:07 AM CST)

## 2024-03-28 NOTE — TELEPHONE ENCOUNTER
----- Message from Shaniqua Kofi sent at 3/27/2024  3:56 PM CDT -----  Regarding: refill  .Type:  RX Refill Request    Who Called: Pt    Refill or New Rx:Refill    RX Name and Strength:blood sugar diagnostic (ACCU-CHEK GUIDE TEST STRIPS MISC) - -  - --  Sig: Accu-Chek Guide test strips      How is the patient currently taking it? (ex. 1XDay):    Is this a 30 day or 90 day RX:    Preferred Pharmacy with phone number:[facility] - [address]   Phone: [phone]  Fax: [phone]        Local or Mail Order:mail order    Ordering Provider:Pau Gomes    Would the patient rather a call back or a response via [facility]?     Best Call Back Number:[phone]    Additional Information: Refill request. Please advise. Thanks.

## 2024-03-31 RX ORDER — ALOGLIPTIN BENZOATE AND PIOGLITAZONE HYDROCHLORIDE 25; 30 MG/1; MG/1
1 TABLET, FILM COATED ORAL DAILY
Qty: 90 TABLET | Refills: 3 | OUTPATIENT
Start: 2024-03-31 | End: 2024-09-27

## 2024-04-03 ENCOUNTER — DOCUMENTATION ONLY (OUTPATIENT)
Dept: RHEUMATOLOGY | Facility: CLINIC | Age: 69
End: 2024-04-03
Payer: MEDICARE

## 2024-04-03 NOTE — PROGRESS NOTES
Certified letter [phone] mailed to patient in regard to Rheumatology provider  from organization/ medication refills. Letter returned due to Melquiades Rehman Jr. incorrect address, etc.

## 2024-04-08 NOTE — PROGRESS NOTES
Patient ID: [id number]     Chief Complaint: Seropositive rheumatoid arthritis of multiple sites (Patient states he is doing well. )      HPI:     Melquiades Rehman Jr. is a 68 y.o. male here today for follow-up of Rheumatoid Arthritis.    Presents today for follow up of RA, CCP (>250) and RF. He recalls his symptoms began 1994 w/ mainly elbow problems. But over a year, his symptoms worsened and involved hands. He was referred to Dr. Beck who started Enbrel 2-3 yrs but developed secondary failure. He then changed to Humira, which he took for 15 yrs but lost efficacy a few yrs ago. He briefly tried Kevzara but had poor rxn. He was then changed to Xeljanz, which worked wonderfully. However, he lost his insurance and started seeing [facility] [place] [place] [facility], NP April. He reports that she didn't want to start Xeljanz over concerns of blood clots. Instead, she started Orencia, which didn't work at all, however he reports never taken. He had one bottle left of Xeljanz and restarted it. It has worked great so it was resumed. He did have +Hep C ab but saw GI (Dr. Toledo) - had US and repeat testing for Hep C was negative. Told he may have fatty liver dz.    Chronic back pain and he has DJD and spinal stenosis, no sciatica symptoms. He has seen Dr Montero once, Neurosurgery and continues to follow with Dr Simmons, pain management. He has done PT, traction has helped in the past but no longer doing any therapy. Nerve block and VALERIE did not help. Pain worse with bending and better with sitting and lying down.     RA has been in remission, denies red, warm and swollen joints. No flares. May be once in a while he takes Prednisone- has not had in quit some time, he tried to stop meds in August 2023 as he had been asymptomatic however he resumed after 1 month as he reports he had the start of a flare and has remained on since. He is taking Xeljanz 11 mg daily, doing well. He has A fib and on AC. He sees [facility] here at [facility]. CHF was on his problem  "list, but per patient he does not have HF and had tests done for that and the diagnosis was taken off the list.      January 2024: Here today for follow up. He continues Xeljanz 11 mg po qd and tolerating well. He deneis any red/warm/swollen joints. He continues to have back pain, has apt in 2 weeks for nerve block and possible repeat ablation with Dr. Blanco. He reports morning stiffness has been tolerable and not an issue at this time. Overall joints have been doing well with Xeljanz. He did report to the ER 10/26/2023 as he had some confusion with blurred vision- work up noted to be normal and reports was diagnosed with Anxiety. He did follow up with Neurology in [place] and denies any further diagnosis or concerns. He also continues to follow with Cardiology.     4/9/24:  He is here for follow-up of rheumatoid arthritis.  Currently taking Xeljanz 11 mg daily.  Tolerating medications well without any issues.  He had arthritis flare once in the last few months, took prednisone 10 mg daily once.  He stopped taking narcotics.  Overall he feels like he has been doing very well with Xeljanz.  Denies cough or shortness of breath.    Denies any recent illness or hospitalizations since last visit.     Dr. Barrios- Neurology  Dr  -Cardiology   Denies any recent illness since last visit.     PMH: AFib on Xarelto, HTN, Inc lipids, Parkinsons, Peyronie's dz, T2DM, fatty liver.  He has Parkinson's, he sees Dr Barrios. Symptoms are better with Sinemet. He also takes medication for movement disorders.  H/o CVA several years ago- did follow with Neurology until dx with Parkinson and now continues to follow with Dr. Barrios. No residual weakness from stroke.    Denies history of fevers, rashes, photosensitivity, oral or nasal ulcers, h/o MI, seizures, h/o PE or DVT, Raynaud's phenomenon, uveitis, malignancies.   Family history of autoimmune disease: Sister but unsure what "autoimmune" condition she had- reports "attacked her " "lungs and killed her".  Smoking: Quit smoking - smoked for roughly 15 years (1/2 ppd)  Social: 3-4 beers qow w/ playing music. No TBO but marijuana occ    Social History     Tobacco Use   Smoking Status Former    Current packs/day: 0.00    Average packs/day: 1 pack/day for 15.0 years (15.0 ttl pk-yrs)    Types: Cigarettes    Start date: 1972    Quit date: 1987    Years since quittin.2    Passive exposure: Past   Smokeless Tobacco Never          ----------------------------  Atrial fibrillation  Central stenosis of spinal canal  Chronic back pain greater than 3 months duration  Constipation  Degeneration of lumbar intervertebral disc  Diastolic dysfunction  Dysphonia  Dystrophic nail  Erectile dysfunction  Fatty liver  GERD (gastroesophageal reflux disease)  H/O cataract removal with insertion of prosthetic lens  History of CVA (cerebrovascular accident)  History of radial keratotomy  HTN (hypertension)  Hyperlipidemia  Induratio penis plastica  Microhematuria  Overgrown toenails  Parkinson's disease  Peyronie disease  Polyneuropathy associated with underlying disease  Rheumatoid arthritis  Rheumatoid arthritis, unspecified  Sixth nerve palsy  Spinal stenosis of lumbar region  Type 2 diabetes mellitus without complications  Unspecified macular degeneration  Unspecified macular degeneration     Past Surgical History:   Procedure Laterality Date    BACK SURGERY Left 2024    CATARACT EXTRACTION W/  INTRAOCULAR LENS IMPLANT Bilateral     EPIDURAL STEROID INJECTION INTO LUMBAR SPINE      exc cyst Lt ear      INJECTION OF ANESTHETIC AGENT AROUND MEDIAL BRANCH NERVES INNERVATING LUMBAR FACET JOINT Bilateral 2022    Procedure: Block-nerve-medial branch-lumbar;  Surgeon: Gertrude Blanco MD;  Location: [facility];  Service: Pain Management;  Laterality: Bilateral;  Bilateral L4-L5...Patient is requesting to be first case    INJECTION OF ANESTHETIC AGENT AROUND MEDIAL BRANCH NERVES INNERVATING LUMBAR " FACET JOINT Bilateral 11/22/2023    Procedure: Block-nerve-medial branch-lumbar;  Surgeon: Gertrude Blanco MD;  Location: [facility] OR;  Service: Pain Management;  Laterality: Bilateral;  Bilateral MBB L3-5    INJECTION OF ANESTHETIC AGENT AROUND MEDIAL BRANCH NERVES INNERVATING LUMBAR FACET JOINT Bilateral 01/24/2024    Procedure: Block-nerve-medial branch-lumbar;  Surgeon: Gertrude Blanco MD;  Location: [facility] OR;  Service: Pain Management;  Laterality: Bilateral;  Bilateral MBB L3-5    KNEE ARTHROSCOPY Right     RADIOFREQUENCY ABLATION Left 03/27/2024    Procedure: Radiofrequency Ablation;  Surgeon: Gertrude Blanco MD;  Location: [facility] OR;  Service: Pain Management;  Laterality: Left;  RFA Lt L3-L5    VASECTOMY  1998       Review of patient's allergies indicates:   Allergen Reactions    Sarilumab Other (See Comments)     Other reaction(s): fainting  Kevzara       Outpatient Medications Marked as Taking for the 4/9/24 encounter (Office Visit) with Valeria Donovan MD   Medication Sig Dispense Refill    alogliptin-pioglitazone (OSENI) 25-30 mg Tab Take 1 tablet by mouth once daily. 90 tablet 0    ammonium lactate 12 % Crea Apply 1 application  topically 2 (two) times daily.      blood sugar diagnostic (ACCU-CHEK GUIDE TEST STRIPS) Strp 1 strip by skin prick route Daily. 100 strip 11    carbidopa-levodopa  mg (SINEMET)  mg per tablet Take 2 tablets by mouth 2 (two) times a day.      colchicine (COLCRYS) 0.6 mg tablet Take 1 tablet (0.6 mg total) by mouth 2 (two) times daily. 180 tablet 0    gemfibroziL (LOPID) 600 MG tablet Take 1 tablet (600 mg total) by mouth 2 (two) times daily. 180 tablet 3    lancets (ACCU-CHEK FASTCLIX LANCET DRUM MISC) Accu-Chek Fastclix Lancet Drum      lisinopriL-hydrochlorothiazide (PRINZIDE,ZESTORETIC) 20-25 mg Tab Take 1 tablet by mouth once daily. (Patient taking differently: Take 1 tablet by mouth as needed.) 90 tablet 3    metoprolol succinate (TOPROL-XL) 25 MG 24 hr tablet  Take 1 tablet (25 mg total) by mouth once daily. 90 tablet 3    pantoprazole (PROTONIX) 40 MG tablet Take 1 tablet (40 mg total) by mouth once daily. 90 tablet 0    predniSONE (DELTASONE) 5 MG tablet Take 1 tablet (5 mg total) by mouth once daily. 5 tablet 0    rosuvastatin (CRESTOR) 20 MG tablet Take 1 tablet (20 mg total) by mouth once daily. 90 tablet 3    selegiline HCl (ELDEPRYL) 5 mg tablet Take 5 mg by mouth 2 (two) times daily with meals.      vit A/vit C/vit E/zinc/copper (PRESERVISION AREDS ORAL) Take 1 capsule by mouth once daily.      XARELTO 20 mg Tab Take 1 tablet (20 mg total) by mouth once daily. 90 tablet 3    [DISCONTINUED] tofacitinib (XELJANZ XR) 11 mg Tb24 Take 1 tablet by mouth once daily. 30 tablet 2       Social History     Socioeconomic History    Marital status:     Number of children: 1   Tobacco Use    Smoking status: Former     Current packs/day: 0.00     Average packs/day: 1 pack/day for 15.0 years (15.0 ttl pk-yrs)     Types: Cigarettes     Start date: 1972     Quit date: 1987     Years since quittin.2     Passive exposure: Past    Smokeless tobacco: Never   Substance and Sexual Activity    Alcohol use: Not Currently    Drug use: Not Currently    Sexual activity: Yes     Partners: Female     Social Determinants of Health     Financial Resource Strain: Medium Risk (1/10/2024)    Overall Financial Resource Strain (CARDIA)     Difficulty of Paying Living Expenses: Somewhat hard   Food Insecurity: Food Insecurity Present (1/10/2024)    Hunger Vital Sign     Worried About Running Out of Food in the Last Year: Often true     Ran Out of Food in the Last Year: Often true   Transportation Needs: No Transportation Needs (1/10/2024)    PRAPARE - Transportation     Lack of Transportation (Medical): No     Lack of Transportation (Non-Medical): No   Physical Activity: Inactive (1/10/2024)    Exercise Vital Sign     Days of Exercise per Week: 0 days     Minutes of Exercise per  Session: 20 min   Stress: No Stress Concern Present (1/10/2024)    [employer] - Occupational Stress Questionnaire     Feeling of Stress : Only a little   Social Connections: Socially Isolated (1/10/2024)    Social Connection and Isolation Panel [NHANES]     Frequency of Communication with Friends and Family: More than three times a week     Frequency of Social Gatherings with Friends and Family: Three times a week     Attends Jain Services: Never     Active Member of Clubs or Organizations: No     Attends Club or Organization Meetings: Never     Marital Status:    Housing Stability: Low Risk  (1/10/2024)    Housing Stability Vital Sign     Unable to Pay for Housing in the Last Year: No     Number of Places Lived in the Last Year: 1     Unstable Housing in the Last Year: No        Family History   Problem Relation Age of Onset    Heart failure Mother     Coronary artery disease Mother     Glaucoma Father     Alzheimer's disease Father     Emphysema Father     Autoimmune disease Sister     Pneumonia Sister     Dementia Sister         Immunization History   Administered Date(s) Administered    COVID-19, vector-nr, rS-Ad26, PF (Danie) 03/08/2021, 10/25/2021    Influenza 10/28/2014    Influenza (FLUAD) - Quadrivalent - Adjuvanted - PF *Preferred* (65+) 10/21/2023    Influenza - Quadrivalent 09/30/2020    Influenza - Quadrivalent - PF *Preferred* (6 months and older) 12/04/2019, 09/30/2020    Influenza - Trivalent - PF (ADULT) 10/26/2018, 12/04/2019    Pneumococcal Conjugate - 13 Valent 05/03/2019    Pneumococcal Polysaccharide - 23 Valent 11/24/2020    Zoster Recombinant 11/12/2019, 02/13/2020       Patient Care Team:  Pau Gomes FNP as PCP - General (Family Medicine)  Taisha Wood LPN as Care Coordinator  Homar Barrios MD (Neurology)     Subjective:     Constitutional:  Denies chills. Denies fever. Denies night sweats. Denies weight loss.  "  Ophthalmology: Denies blurred vision. Denies dry eyes. Denies eye pain. Denies Itching and redness.   ENT: Denies oral ulcers. Denies epistaxis. Denies dry mouth. Denies swollen glands.   Endocrine: Admits diabetes. Denies thyroid Problems.   Respiratory: Denies cough. Denies shortness of breath. Denies shortness of breath with exertion. Denies hemoptysis.   Cardiovascular: Denies chest pain at rest. Denies chest pain with exertion. Admits palpitations from A-fib  Gastrointestinal: Denies abdominal pain. Denies diarrhea. Denies nausea. Denies vomiting. Denies hematemesis or hematochezia. Denies heartburn. Admits constipation, uses OTC meds and helps  Genitourinary: Denies blood in urine.  Musculoskeletal: See HPI for details  Integumentary: Denies rash. Denies photosensitivity.   Peripheral Vascular: Denies Ulcers of hands and/or feet. Denies Cold extremities.   Neurologic: Denies dizziness. Denies headache.  Denies loss of strength. Denies numbness or tingling.   Psychiatric: Denies depression. Denies anxiety. Denies suicidal/homicidal ideations.      Objective:     /81 (BP Location: Left arm, Patient Position: Sitting, BP Method: Medium (Automatic))   Pulse 79   Temp 97.8 °F (36.6 °C) (Oral)   Resp 18   Ht 5' 11" (1.803 m)   Wt 107.5 kg (236 lb 15.9 oz)   SpO2 95%   BMI 33.05 kg/m²     Physical Exam    General Appearance: alert, pleasant, in no acute distress.  Skin: Skin color, texture, turgor normal. No rashes or lesions.  Eyes:  extraocular movement intact (EOMI), pupils equal, round, reactive to light and accommodation, conjunctiva clear.  ENT: No oral or nasal ulcers.  Neck:  Neck supple. No adenopathy.   Lungs: CTA throughout without crackles, rhonchi, or wheezes.   Heart: RRR w/o murmurs.  No edema.   Neuro: Alert, oriented, CN II-XII GI, sensory and motor innervation intact.  Tremors present.  Rigidity of left upper extremity on exam, mild.  Musculoskeletal: No pain to bilateral MCPs, FROM " noted to bilateral wrists, elbows, shoulders with no pain, no pain to bilateral MTPs.    Psych: Alert, oriented, normal eye contact.    Labs:   3/21/23:  Creatinine 1.52, GFR 51.  CBC okay.  ESR, CRP normal.  HIV, hepatitis B and C, quantiferon tb negative. 1+ protein and no blood. CKD and protenuria could be due to DM and HTN.    5/15/23:   milligram/gram.  Creatinine 1.27, GFR greater than 60.     7/6/2023 CMP Creat 1.23 (previously 1.33), CBC WBC 4.33 (previous 5.5), ANC 1.78 (previously 3.47), CRP <0.40 (<5), Sed Rate 3 (<15). - stopped Xeljanz    8/16/2023 CBC ANC now 2.23, otherwise ok.  ANC improved.     10/9/2023 Sed Rate 6 (<15), CBC WBC 4.07, RBC 4.50, ANC 2.04 (has been 1.78 in past)- stable. Trace protein and no blood in urine.  Creatinine 1.28, GFR greater than 60.  CRP normal.    1/11/2024 CBC WBC 3.85, ANC ok, RBC 4.35, hct 41.7, otherwise ok. Sed Rate 7 (<15), CMP ok. CRP 4.80 (<5)      4/9/24: Cr 1.26, GFR >60. CBC ok.     Imaging:   Echo 1/15/21:  Left ventricular systolic function normal.  EF 60%.  Grade 1 diastolic dysfunction.  PASP not given.  Right ventricular size and function normal.    3/21/23: CXR showed chronic bilateral basilar interstitial changes.     3/21/23:  Arthritis changes in bilateral feet, erosion of left 5th MTP.    X-ray of right foot showed moderate-to-severe arthritis.    DJD changes in bilateral hands.  No aggressive osseous lesions appreciated.    04/05/2023 ECHO:  LVEF 60%, LV hypertrophy noted, mild to moderate.  PASP 5 mmHg    4/05/2023 FVC 94.7%, FEV1 92.2 %, FEV1/FVC 97% TLC 94%, DLCO 79.4%, DLCO/.  FEV1/FVC ratio normal, FEV1, FVC and TLC  normal.  DLCO normal, impression normal PFT     5/4/2023: AAA Screening, No AAA noted. Some ectasia of proximal abd aorta with no evidence of aneurysmal dilatation.     10/02/2023: ECHO ejection fraction 60-65%, normal left ventricular systolic and diastolic function.  PASP not given.    10/3/2023: Carotid u/s Left  and right Internal Cardotid arteries less than 50% stenosis.     10/2/2023 CT Head w/o Contrast:  Impression:  No acute intracranial findings identified.  Chronic microangiopathic ischemia    10/9/2023 CXRay: Impression: No acute cardiopulmonary process identified.    10/26/223 CT Head w/o Contrast: Impression: No acute intracranial findings or significant interval change compared to earlier this month.    10/26/2023 CT Head and Neck: Impression: Mild atherosclerotic changes seen as outlined above with no hemodynamically significant stenosis seen and no large plaque burden is seen.    10/26/2023 MRI Brain w/o Contrast: Impression: Chronic age related changes. Otherwise unremarkable    11/24/2023 CT Abd/Pelvis with and w/o Contrast: Impression: No nephrolithiasis, hydronephrosis, ureteral obstruction evident.  Possible mild renal cortical thinning. No acute process seen.    Assessment:       ICD-10-CM ICD-9-CM   1. Seropositive rheumatoid arthritis of multiple sites  M05.79 714.0   2. Drug-induced immunodeficiency  D84.821 279.3    Z79.899 E947.9   3. Spinal stenosis at L4-L5 level  M48.061 724.02   4. Atrial fibrillation and flutter  I48.91 427.31    I48.92 427.32   5. Parkinson's disease, unspecified whether dyskinesia present, unspecified whether manifestations fluctuate  G20.A1 332.0   6. Primary hypertension  I10 401.9   7. Steatosis of liver  K76.0 571.8   8. Controlled type 2 diabetes mellitus without complication, without long-term current use of insulin  E11.9 250.00   9. Hyperlipidemia, unspecified hyperlipidemia type  E78.5 272.4   10. Abnormal CXR  R93.89 793.2          Plan:       1. Strongly + CCP and RF rheumatoid arthritis dx 2014  - MTX was not used d/t hx of ETOH use. He had secondary failure of Enbrel after 2-3 yrs. Humira worked for 15 yrs but developed secondary failure. He briefly tried Kevzara but he did not tolerate it, states caused him to pass out after 1 dose. He was changed to Xeljanz,  which worked well. Failed Orencia, did not help, however he does not recall taking Orencia in the past after reviewing medication. Restarted Xeljanz around October 2019 and he has been doing very well. 3/21/23:  Arthritis changes in bilateral feet, erosion of left 5th MTP.  Today on exam, no active synovitis noted.   -At length discussion in the past in regards to increase CV risk associated with JAIRO use (he is >53 y/o, history of CVA and CV risk factor of Afib), he wishes to continue medication as this has been very beneficial for him   -Repeat Cxray 10/9/2023 CXRay: Impression: No acute cardiopulmonary process identified.   -Infection w/u negative March 2023.   - CBC and BMP ok. Will see if lab can add LFT's.   - C/w Xeljanz 11 mg daily.     2. Severe multifactorial spinal canal stenosis at L4-L5, moderate at L2-L3 and L3-L4, mild at L1-L2  - Follows Pain Management Dr. Simmons, he had nerve block with possible ablation.   - He also smokes marijuana to help with pain    3. Afib on Xarelto  - Heart failure was ruled out, diagnosis was taken off his problem list per patient.   - Recent ECHO 10/02/2023: ejection fraction 60-65%, normal left ventricular systolic and diastolic function.  PASP not given  - Continue f/u with Cardio.     4. Parkinson's/History of CVA  -On carbidopa-levodopa.    -Follows with Neurologist Dr. Barrios.    5. Peyronie's dz  - Pt reports taking Colchicine.    6. Fatty liver  -One Hep C ab + but repeat w/ PCR negative. Possibly false +.   -Will follow.   -Infection work up May 2023 negative.     7. High risk med use/drug-induced immunodeficiency   -Persons with rheumatoid arthritis, lupus, psoriatic arthritis and other autoimmune diseases are at increased risk of cardiovascular disease including heart attack and stroke. We recommend that all patients with these conditions have annual health maintenance exams including lipid measurements, blood pressure measurements, and smoking cessation  counseling when applicable at their primary care provider's office.   -Due for colonoscopy, has apt 6/6/2024, PSA and screenings  with Urology  -Advised to stay up-to-date on age appropriate vaccinations and malignancy screening, including yearly skin exams.    -Continued lab monitoring.   -PPSV-23 11/20, Prevnar 5/19, Shingrix 6/2020. UTD w/ flu shot. UTD COVID vaccine.    8. HLD/HTN/T2DM  -Followed by PCP, currently stable blood pressure at today's visit    9. Previous abnormal CXRay  -3/21/23: CXR showed chronic bilateral basilar interstitial changes.   -4/05/2023 FVC 94.7%, FEV1 92.2 %, FEV1/FVC 97% TLC 94%, DLCO 79.4%, DLCO/.  FEV1/FVC ratio normal, FEV1, FVC and TLC  normal.  DLCO normal, impression normal PFT   -May be chronic as he reports history of pneumonia x 2 and Covid, which could have been the reason for previous abnormal CXR. Repeat CXRay 10/9/2023 CXRay: No acute cardiopulmonary process identified. He has no symptoms, no cough or SOB.  - repeat CXR today.       Follow up for as scheduled with NP. In addition to their scheduled follow up, the patient has also been instructed to follow up on as needed basis.      Total time spent with patient and documentation is 30 minutes. All questions were answered to patient's satisfaction and patient verbalized understanding.

## 2024-04-09 ENCOUNTER — HOSPITAL ENCOUNTER (OUTPATIENT)
Dept: RADIOLOGY | Facility: HOSPITAL | Age: 69
Discharge: HOME | End: 2024-04-09
Attending: INTERNAL MEDICINE
Payer: MEDICARE

## 2024-04-09 ENCOUNTER — OFFICE VISIT (OUTPATIENT)
Dept: RHEUMATOLOGY | Facility: CLINIC | Age: 69
End: 2024-04-09
Payer: MEDICARE

## 2024-04-09 ENCOUNTER — OFFICE VISIT (OUTPATIENT)
Dept: INTERNAL MEDICINE | Facility: CLINIC | Age: 69
End: 2024-04-09
Payer: MEDICARE

## 2024-04-09 VITALS
BODY MASS INDEX: 33.02 KG/M2 | TEMPERATURE: 98 F | DIASTOLIC BLOOD PRESSURE: 74 MMHG | HEART RATE: 80 BPM | SYSTOLIC BLOOD PRESSURE: 120 MMHG | RESPIRATION RATE: 18 BRPM | HEIGHT: 71 IN | WEIGHT: 235.88 LBS

## 2024-04-09 VITALS
WEIGHT: 237 LBS | RESPIRATION RATE: 18 BRPM | DIASTOLIC BLOOD PRESSURE: 81 MMHG | TEMPERATURE: 98 F | HEIGHT: 71 IN | SYSTOLIC BLOOD PRESSURE: 135 MMHG | BODY MASS INDEX: 33.18 KG/M2 | OXYGEN SATURATION: 95 % | HEART RATE: 79 BPM

## 2024-04-09 DIAGNOSIS — G20.A1 PARKINSON'S DISEASE, UNSPECIFIED WHETHER DYSKINESIA PRESENT, UNSPECIFIED WHETHER MANIFESTATIONS FLUCTUATE: ICD-10-CM

## 2024-04-09 DIAGNOSIS — Z79.899 DRUG-INDUCED IMMUNODEFICIENCY: ICD-10-CM

## 2024-04-09 DIAGNOSIS — E11.9 TYPE 2 DIABETES MELLITUS WITHOUT COMPLICATION, WITHOUT LONG-TERM CURRENT USE OF INSULIN: ICD-10-CM

## 2024-04-09 DIAGNOSIS — K21.9 GASTROESOPHAGEAL REFLUX DISEASE, UNSPECIFIED WHETHER ESOPHAGITIS PRESENT: ICD-10-CM

## 2024-04-09 DIAGNOSIS — D84.821 DRUG-INDUCED IMMUNODEFICIENCY: ICD-10-CM

## 2024-04-09 DIAGNOSIS — M05.79 SEROPOSITIVE RHEUMATOID ARTHRITIS OF MULTIPLE SITES: Primary | ICD-10-CM

## 2024-04-09 DIAGNOSIS — E11.9 CONTROLLED TYPE 2 DIABETES MELLITUS WITHOUT COMPLICATION, WITHOUT LONG-TERM CURRENT USE OF INSULIN: ICD-10-CM

## 2024-04-09 DIAGNOSIS — M48.061 SPINAL STENOSIS AT L4-L5 LEVEL: ICD-10-CM

## 2024-04-09 DIAGNOSIS — M05.79 SEROPOSITIVE RHEUMATOID ARTHRITIS OF MULTIPLE SITES: ICD-10-CM

## 2024-04-09 DIAGNOSIS — I48.92 ATRIAL FIBRILLATION AND FLUTTER: ICD-10-CM

## 2024-04-09 DIAGNOSIS — I10 PRIMARY HYPERTENSION: ICD-10-CM

## 2024-04-09 DIAGNOSIS — R93.89 ABNORMAL CXR: ICD-10-CM

## 2024-04-09 DIAGNOSIS — D53.9 MACROCYTIC ANEMIA: Primary | ICD-10-CM

## 2024-04-09 DIAGNOSIS — E78.5 HYPERLIPIDEMIA, UNSPECIFIED HYPERLIPIDEMIA TYPE: ICD-10-CM

## 2024-04-09 DIAGNOSIS — I48.91 ATRIAL FIBRILLATION AND FLUTTER: ICD-10-CM

## 2024-04-09 DIAGNOSIS — K76.0 STEATOSIS OF LIVER: ICD-10-CM

## 2024-04-09 PROCEDURE — 71046 X-RAY EXAM CHEST 2 VIEWS: CPT | Mod: TC

## 2024-04-09 PROCEDURE — 3008F BODY MASS INDEX DOCD: CPT | Mod: CPTII,,, | Performed by: INTERNAL MEDICINE

## 2024-04-09 PROCEDURE — 3075F SYST BP GE 130 - 139MM HG: CPT | Mod: CPTII,,, | Performed by: INTERNAL MEDICINE

## 2024-04-09 PROCEDURE — 99213 OFFICE O/P EST LOW 20 MIN: CPT | Mod: PBBFAC,25,27 | Performed by: NURSE PRACTITIONER

## 2024-04-09 PROCEDURE — 4010F ACE/ARB THERAPY RXD/TAKEN: CPT | Mod: CPTII,,, | Performed by: NURSE PRACTITIONER

## 2024-04-09 PROCEDURE — 3008F BODY MASS INDEX DOCD: CPT | Mod: CPTII,,, | Performed by: NURSE PRACTITIONER

## 2024-04-09 PROCEDURE — 3074F SYST BP LT 130 MM HG: CPT | Mod: CPTII,,, | Performed by: NURSE PRACTITIONER

## 2024-04-09 PROCEDURE — 4010F ACE/ARB THERAPY RXD/TAKEN: CPT | Mod: CPTII,,, | Performed by: INTERNAL MEDICINE

## 2024-04-09 PROCEDURE — 3288F FALL RISK ASSESSMENT DOCD: CPT | Mod: CPTII,,, | Performed by: INTERNAL MEDICINE

## 2024-04-09 PROCEDURE — 3079F DIAST BP 80-89 MM HG: CPT | Mod: CPTII,,, | Performed by: INTERNAL MEDICINE

## 2024-04-09 PROCEDURE — 1160F RVW MEDS BY RX/DR IN RCRD: CPT | Mod: CPTII,,, | Performed by: NURSE PRACTITIONER

## 2024-04-09 PROCEDURE — 99214 OFFICE O/P EST MOD 30 MIN: CPT | Mod: S$PBB,,, | Performed by: INTERNAL MEDICINE

## 2024-04-09 PROCEDURE — 99214 OFFICE O/P EST MOD 30 MIN: CPT | Mod: S$PBB,,, | Performed by: NURSE PRACTITIONER

## 2024-04-09 PROCEDURE — 3288F FALL RISK ASSESSMENT DOCD: CPT | Mod: CPTII,,, | Performed by: NURSE PRACTITIONER

## 2024-04-09 PROCEDURE — 3044F HG A1C LEVEL LT 7.0%: CPT | Mod: CPTII,,, | Performed by: NURSE PRACTITIONER

## 2024-04-09 PROCEDURE — 3078F DIAST BP <80 MM HG: CPT | Mod: CPTII,,, | Performed by: NURSE PRACTITIONER

## 2024-04-09 PROCEDURE — 99214 OFFICE O/P EST MOD 30 MIN: CPT | Mod: PBBFAC,25 | Performed by: INTERNAL MEDICINE

## 2024-04-09 PROCEDURE — 3044F HG A1C LEVEL LT 7.0%: CPT | Mod: CPTII,,, | Performed by: INTERNAL MEDICINE

## 2024-04-09 PROCEDURE — 1100F PTFALLS ASSESS-DOCD GE2>/YR: CPT | Mod: CPTII,,, | Performed by: INTERNAL MEDICINE

## 2024-04-09 PROCEDURE — 1125F AMNT PAIN NOTED PAIN PRSNT: CPT | Mod: CPTII,,, | Performed by: INTERNAL MEDICINE

## 2024-04-09 PROCEDURE — 1101F PT FALLS ASSESS-DOCD LE1/YR: CPT | Mod: CPTII,,, | Performed by: NURSE PRACTITIONER

## 2024-04-09 PROCEDURE — 1159F MED LIST DOCD IN RCRD: CPT | Mod: CPTII,,, | Performed by: NURSE PRACTITIONER

## 2024-04-09 PROCEDURE — 1126F AMNT PAIN NOTED NONE PRSNT: CPT | Mod: CPTII,,, | Performed by: NURSE PRACTITIONER

## 2024-04-09 RX ORDER — TOFACITINIB 11 MG/1
1 TABLET, FILM COATED, EXTENDED RELEASE ORAL DAILY
Qty: 30 TABLET | Refills: 3 | Status: SHIPPED | OUTPATIENT
Start: 2024-04-09

## 2024-04-09 RX ORDER — PANTOPRAZOLE SODIUM 40 MG/1
40 TABLET, DELAYED RELEASE ORAL DAILY
Qty: 90 TABLET | Refills: 1 | Status: SHIPPED | OUTPATIENT
Start: 2024-04-09

## 2024-04-09 RX ORDER — ALOGLIPTIN BENZOATE AND PIOGLITAZONE HYDROCHLORIDE 25; 30 MG/1; MG/1
1 TABLET, FILM COATED ORAL DAILY
Qty: 90 TABLET | Refills: 1 | Status: SHIPPED | OUTPATIENT
Start: 2024-04-09 | End: 2024-10-06

## 2024-04-09 NOTE — PROGRESS NOTES
Patient ID: [id number]     Chief Complaint: lab results        HPI:     HPI      Melquiades Rehman Jr. is a 68 y.o. male here today for a follow up.         Immunizations:   Immunization History   Administered Date(s) Administered    COVID-19, vector-nr, rS-Ad26, PF (Danie) 03/08/2021, 10/25/2021    Influenza 10/28/2014    Influenza (FLUAD) - Quadrivalent - Adjuvanted - PF *Preferred* (65+) 10/21/2023    Influenza - Quadrivalent 09/30/2020    Influenza - Quadrivalent - PF *Preferred* (6 months and older) 12/04/2019, 09/30/2020    Influenza - Trivalent - PF (ADULT) 10/26/2018, 12/04/2019    Pneumococcal Conjugate - 13 Valent 05/03/2019    Pneumococcal Polysaccharide - 23 Valent 11/24/2020    Zoster Recombinant 11/12/2019, 02/13/2020        ----------------------------  Atrial fibrillation  Central stenosis of spinal canal  Chronic back pain greater than 3 months duration  Constipation  Degeneration of lumbar intervertebral disc  Diastolic dysfunction  Dysphonia  Dystrophic nail  Erectile dysfunction  Fatty liver  GERD (gastroesophageal reflux disease)  H/O cataract removal with insertion of prosthetic lens  History of CVA (cerebrovascular accident)  History of radial keratotomy  HTN (hypertension)  Hyperlipidemia  Induratio penis plastica  Microhematuria  Overgrown toenails  Parkinson's disease  Peyronie disease  Polyneuropathy associated with underlying disease  Rheumatoid arthritis  Rheumatoid arthritis, unspecified  Sixth nerve palsy  Spinal stenosis of lumbar region  Type 2 diabetes mellitus without complications  Unspecified macular degeneration  Unspecified macular degeneration     Past Surgical History:   Procedure Laterality Date    BACK SURGERY Left 04/2024    CATARACT EXTRACTION W/  INTRAOCULAR LENS IMPLANT Bilateral     EPIDURAL STEROID INJECTION INTO LUMBAR SPINE      exc cyst Lt ear      INJECTION OF ANESTHETIC AGENT AROUND MEDIAL BRANCH NERVES INNERVATING LUMBAR FACET JOINT Bilateral 11/16/2022     Procedure: Block-nerve-medial branch-lumbar;  Surgeon: Gertrude Blanco MD;  Location: [facility] OR;  Service: Pain Management;  Laterality: Bilateral;  Bilateral L4-L5...Patient is requesting to be first case    INJECTION OF ANESTHETIC AGENT AROUND MEDIAL BRANCH NERVES INNERVATING LUMBAR FACET JOINT Bilateral 11/22/2023    Procedure: Block-nerve-medial branch-lumbar;  Surgeon: Gertrude Blanco MD;  Location: [facility] OR;  Service: Pain Management;  Laterality: Bilateral;  Bilateral MBB L3-5    INJECTION OF ANESTHETIC AGENT AROUND MEDIAL BRANCH NERVES INNERVATING LUMBAR FACET JOINT Bilateral 01/24/2024    Procedure: Block-nerve-medial branch-lumbar;  Surgeon: Gertrude Blanco MD;  Location: [facility] OR;  Service: Pain Management;  Laterality: Bilateral;  Bilateral MBB L3-5    KNEE ARTHROSCOPY Right     RADIOFREQUENCY ABLATION Left 03/27/2024    Procedure: Radiofrequency Ablation;  Surgeon: Gertrude Blanco MD;  Location: [facility] OR;  Service: Pain Management;  Laterality: Left;  RFA Lt L3-L5    VASECTOMY  1998       Review of patient's allergies indicates:   Allergen Reactions    Sarilumab Other (See Comments)     Other reaction(s): fainting  Johnzara       Current Outpatient Medications   Medication Instructions    alogliptin-pioglitazone (OSENI) 25-30 mg Tab 1 tablet, Oral, Daily    ammonium lactate 12 % Crea 1 application , Topical (Top), 2 times daily    blood sugar diagnostic (ACCU-CHEK GUIDE TEST STRIPS) Strp 1 strip, skin prick, Daily    carbidopa-levodopa  mg (SINEMET)  mg per tablet 2 tablets, Oral, 2 times daily    colchicine (COLCRYS) 0.6 mg, Oral, 2 times daily    gemfibroziL (LOPID) 600 mg, Oral, 2 times daily    lancets (ACCU-CHEK FASTCLIX LANCET DRUM MISC) Accu-Chek Fastclix Lancet Drum    lisinopriL-hydrochlorothiazide (PRINZIDE,ZESTORETIC) 20-25 mg Tab 1 tablet, Oral, Daily    metoprolol succinate (TOPROL-XL) 25 mg, Oral, Daily    pantoprazole (PROTONIX) 40 mg, Oral, Daily    predniSONE (DELTASONE) 5  mg, Oral, Daily    rosuvastatin (CRESTOR) 20 mg, Oral, Daily    selegiline HCl (ELDEPRYL) 5 mg, Oral, 2 times daily with meals    tofacitinib (XELJANZ XR) 11 mg Tb24 1 tablet, Oral, Daily    vit A/vit C/vit E/zinc/copper (PRESERVISION AREDS ORAL) 1 capsule, Oral, Daily    XARELTO 20 mg, Oral, Daily       Social History     Socioeconomic History    Marital status:     Number of children: 1   Tobacco Use    Smoking status: Former     Current packs/day: 0.00     Average packs/day: 1 pack/day for 15.0 years (15.0 ttl pk-yrs)     Types: Cigarettes     Start date: 1972     Quit date: 1987     Years since quittin.2     Passive exposure: Past    Smokeless tobacco: Never   Substance and Sexual Activity    Alcohol use: Not Currently    Drug use: Not Currently    Sexual activity: Yes     Partners: Female     Social Determinants of Health     Financial Resource Strain: Medium Risk (1/10/2024)    Overall Financial Resource Strain (CARDIA)     Difficulty of Paying Living Expenses: Somewhat hard   Food Insecurity: Food Insecurity Present (1/10/2024)    Hunger Vital Sign     Worried About Running Out of Food in the Last Year: Often true     Ran Out of Food in the Last Year: Often true   Transportation Needs: No Transportation Needs (1/10/2024)    PRAPARE - Transportation     Lack of Transportation (Medical): No     Lack of Transportation (Non-Medical): No   Physical Activity: Inactive (1/10/2024)    Exercise Vital Sign     Days of Exercise per Week: 0 days     Minutes of Exercise per Session: 20 min   Stress: No Stress Concern Present (1/10/2024)    Citizen of [place] of Occupational Health - Occupational Stress Questionnaire     Feeling of Stress : Only a little   Social Connections: Socially Isolated (1/10/2024)    Social Connection and Isolation Panel [NHANES]     Frequency of Communication with Friends and Family: More than three times a week     Frequency of Social Gatherings with Friends and Family: Three  times a week     Attends Rastafarian Services: Never     Active Member of Clubs or Organizations: No     Attends Club or Organization Meetings: Never     Marital Status:    Housing Stability: Low Risk  (1/10/2024)    Housing Stability Vital Sign     Unable to Pay for Housing in the Last Year: No     Number of Places Lived in the Last Year: 1     Unstable Housing in the Last Year: No        Family History   Problem Relation Age of Onset    Heart failure Mother     Coronary artery disease Mother     Glaucoma Father     Alzheimer's disease Father     Emphysema Father     Autoimmune disease Sister     Pneumonia Sister     Dementia Sister         Patient Care Team:  Pau Gomes FNP as PCP - General (Family Medicine)  Taisha Wood LPN as Care Coordinator  Homar Barrios MD (Neurology)     Subjective:     Review of Systems     See HPI for details    Constitutional: Denies Change in appetite. Denies Chills. Denies Fever. Denies Night sweats.  Eye: Denies Blurred vision. Denies Discharge. Denies Eye pain.  ENT: Denies Decreased hearing. Denies Sore throat. Denies Swollen glands.  Respiratory: Denies Cough. Denies Shortness of breath. Denies Shortness of breath with exertion. Denies Wheezing.  Cardiovascular: DeniesChest pain at rest. Denies Chest pain with exertion. Denies Irregular heartbeat. Denies Palpitations. Denies Edema.  Gastrointestinal: Denies Abdominal pain. DeniesDiarrhea. Denies Nausea. Denies Vomiting. Denies Hematemesis or Hematochezia.  Genitourinary: Denies Dysuria. Denies Urinary frequency. Denies Urinary urgency. Denies Blood in urine.  Endocrine: Denies Cold intolerance. Denies Excessive thirst. Denies Heat intolerance. Denies Weight loss. Denies Weight gain.  Musculoskeletal: Denies Painful joints. Denies Weakness.  Integumentary: Denies Rash. Denies Itching. Denies Dry skin.  Neurologic: Denies Dizziness. Denies Fainting. Denies Headache.  Psychiatric: Denies Depression.  "Denies Anxiety. Denies Suicidal/Homicidal ideations.    All Other ROS: Negative except as stated in HPI.       Objective:     Visit Vitals  /74 (BP Location: Left arm, Patient Position: Sitting, BP Method: Large (Automatic))   Pulse 80   Temp 97.8 °F (36.6 °C) (Oral)   Resp 18   Ht 5' 11" (1.803 m)   Wt 107 kg (235 lb 14.3 oz)   BMI 32.90 kg/m²       Physical Exam    General: Alert and oriented, No acute distress.  Head: Normocephalic, Atraumatic.  Eye: Pupils are equal, round and reactive to light, Extraocular movements are intact, Sclera non-icteric.  Ears/Nose/Throat: Normal, Mucosa moist,Clear.  Neck/Thyroid: Supple, Non-tender, No carotid bruit, No lymphadenopathy, No JVD, Full range of motion.  Respiratory: Clear to auscultation bilaterally; No wheezes, rales or rhonchi,Non-labored respirations, Symmetrical chest wall expansion.  Cardiovascular: Regular rate and rhythm, S1/S2 normal, No murmurs, rubs or gallops.  Gastrointestinal: Soft, Non-tender, Non-distended, Normal bowel sounds, No palpable organomegaly.  Musculoskeletal: Normal range of motion.  Integumentary: Warm, Dry, Intact, No suspicious lesions or rashes.  Extremities: No clubbing, cyanosis or edema  Neurologic: No focal deficits, Cranial Nerves II-XII are grossly intact, Motor strength normal upper and lower extremities, Sensory exam intact.  Psychiatric: Normal interaction, Coherent speech, Euthymic mood, Appropriate affect       Labs Reviewed:     Chemistry:  Lab Results   Component Value Date     04/09/2024    K 4.0 04/09/2024    CHLORIDE 107 04/09/2024    BUN 21.8 04/09/2024    CREATININE 1.26 (H) 04/09/2024    EGFRNORACEVR >60 04/09/2024    GLUCOSE 115 04/09/2024    CALCIUM 9.7 04/09/2024    ALKPHOS 60 04/09/2024    LABPROT 7.7 (H) 04/09/2024    ALBUMIN 4.3 04/09/2024    BILIDIR 0.2 04/09/2024    IBILI 0.20 04/09/2024    AST 21 04/09/2024    ALT 24 04/09/2024    MG 1.60 12/04/2020    PHOS 3.3 12/04/2020    MXRNSZBP07LO 43.5 " 05/25/2021        Lab Results   Component Value Date    HGBA1C 6.3 04/09/2024        Hematology:  Lab Results   Component Value Date    WBC 5.41 04/09/2024    HGB 14.7 04/09/2024    HCT 43.3 04/09/2024     04/09/2024       Lipid Panel:  Lab Results   Component Value Date    CHOL 186 04/09/2024    HDL 52 04/09/2024    .00 04/09/2024    TRIG 112 04/09/2024    TOTALCHOLEST 4 04/09/2024        Urine:  Lab Results   Component Value Date    COLORUA Light-Yellow 11/10/2023    APPEARANCEUA Clear 11/10/2023    SGUA 1.022 11/10/2023    PHUA 5.5 11/10/2023    PROTEINUA Negative 11/10/2023    GLUCOSEUA Normal 11/10/2023    KETONESUA Negative 11/10/2023    BLOODUA 1+ (A) 11/10/2023    NITRITESUA Negative 11/10/2023    LEUKOCYTESUR Negative 11/10/2023    RBCUA 0-5 11/10/2023    WBCUA 0-5 11/10/2023    BACTERIA None Seen 11/10/2023    SQEPUA None Seen 11/10/2023    HYALINECASTS None Seen 11/10/2023    CREATRANDUR 162.6 11/10/2023    PROTEINURINE 18.0 05/15/2023    UPROTCREA 0.1 05/15/2023        Assessment:       ICD-10-CM ICD-9-CM   1. Macrocytic anemia  D53.9 281.9   2. Primary hypertension  I10 401.9   3. Controlled type 2 diabetes mellitus without complication, without long-term current use of insulin  E11.9 250.00   4. Type 2 diabetes mellitus without complication, without long-term current use of insulin  E11.9 250.00   5. Gastroesophageal reflux disease, unspecified whether esophagitis present  K21.9 530.81        Plan:     1. Macrocytic anemia  CBC stable. CBC in 3 months.     2. Primary hypertension  BP controlled. Low fat low salt diet and exercise. Cont Lisinopril HCTZ, Metoprolol as prescribed.     3. Controlled type 2 diabetes mellitus without complication, without long-term current use of insulin  A1c 6.3. ADA diet and exercise. Cont Oseni as prescribed. Urine microalbumin 11-10-23. DM foot 10-21-23. Dm eye done 3-14-23- pt [facility]. Keep appt.     4. Type 2 diabetes mellitus  without complication, without long-term current use of insulin  A1c 6.3. ADA diet and exercise. Cont Oseni as prescribed. Urine microalbumin 11-10-23. DM foot 10-21-23. Dm eye done 3-14-23- pt has appt at [facility]. Keep appt.     5. Gastroesophageal reflux disease, unspecified whether esophagitis present  Avoid triggers. Cont Pantoprazole as prescribed.          Follow up in about 3 months (around 7/9/2024) for with labs 1 week prior to appt. . In addition to their scheduled follow up, the patient has also been instructed to follow up on as needed basis.     Future Appointments   Date Time Provider Department Center   4/10/2024  8:00 AM Felicita Dempsey NP [facility] S[place]   5/10/2024  8:00 AM Monica Chun FNP [facility]HA [place]    5/10/2024 12:30 PM Quincy Pearce NP [facility] UROLO [place]    5/16/2024  1:00 PM Quincy Conrad MD [facility] ASHELY [place]   5/29/2024  2:00 PM [facility] [place]    7/11/2024  1:00 PM Barbara Zelaya FNP [facility] [place]

## 2024-04-10 ENCOUNTER — OFFICE VISIT (OUTPATIENT)
Dept: PAIN MEDICINE | Facility: CLINIC | Age: 69
End: 2024-04-10
Payer: MEDICARE

## 2024-04-10 VITALS
DIASTOLIC BLOOD PRESSURE: 86 MMHG | SYSTOLIC BLOOD PRESSURE: 129 MMHG | WEIGHT: 235.88 LBS | HEART RATE: 76 BPM | BODY MASS INDEX: 33.02 KG/M2 | TEMPERATURE: 98 F | HEIGHT: 71 IN

## 2024-04-10 DIAGNOSIS — M51.36 DDD (DEGENERATIVE DISC DISEASE), LUMBAR: ICD-10-CM

## 2024-04-10 DIAGNOSIS — M54.16 LUMBAR RADICULITIS: ICD-10-CM

## 2024-04-10 DIAGNOSIS — M47.816 LUMBAR SPONDYLOSIS: Primary | ICD-10-CM

## 2024-04-10 PROCEDURE — 1101F PT FALLS ASSESS-DOCD LE1/YR: CPT | Mod: CPTII,,, | Performed by: NURSE PRACTITIONER

## 2024-04-10 PROCEDURE — 99213 OFFICE O/P EST LOW 20 MIN: CPT | Mod: ,,, | Performed by: NURSE PRACTITIONER

## 2024-04-10 PROCEDURE — 4010F ACE/ARB THERAPY RXD/TAKEN: CPT | Mod: CPTII,,, | Performed by: NURSE PRACTITIONER

## 2024-04-10 PROCEDURE — 1125F AMNT PAIN NOTED PAIN PRSNT: CPT | Mod: CPTII,,, | Performed by: NURSE PRACTITIONER

## 2024-04-10 PROCEDURE — 3079F DIAST BP 80-89 MM HG: CPT | Mod: CPTII,,, | Performed by: NURSE PRACTITIONER

## 2024-04-10 PROCEDURE — 3288F FALL RISK ASSESSMENT DOCD: CPT | Mod: CPTII,,, | Performed by: NURSE PRACTITIONER

## 2024-04-10 PROCEDURE — 1159F MED LIST DOCD IN RCRD: CPT | Mod: CPTII,,, | Performed by: NURSE PRACTITIONER

## 2024-04-10 PROCEDURE — 3008F BODY MASS INDEX DOCD: CPT | Mod: CPTII,,, | Performed by: NURSE PRACTITIONER

## 2024-04-10 PROCEDURE — 3044F HG A1C LEVEL LT 7.0%: CPT | Mod: CPTII,,, | Performed by: NURSE PRACTITIONER

## 2024-04-10 PROCEDURE — 3074F SYST BP LT 130 MM HG: CPT | Mod: CPTII,,, | Performed by: NURSE PRACTITIONER

## 2024-04-10 NOTE — H&P (VIEW-ONLY)
"Subjective:      Patient ID: Melquiades Rehman Jr. is a 68 y.o. male.    Chief Complaint: Back Pain (Post-op left RFA L3-L5 3/27/24, pt states he states he received great relief, pt also here today to sign consent for 2nd Right RFA L3-L5  scheduled for 4/25/24, pain level right side 8/10)    Referred by: No ref. provider found     HPI:  This is a pleasant 68-year-old male patient presenting as a follow-up for pain associated with lumbar spondylosis and radiculitis after receiving a left radiofrequency ablation L3-L5 on 03/27/2024.  Patient has received 50% relief on the left lumbar axial spine region after receiving the RFA.  He has not been icing which may account for ongoing swelling on the left side.  Overall he is very satisfied with pain control on the left as he has better pain relief with dishwashing, standing longer periods and sleeping better however he is still stiff in the morning when he wakes up.  He would like to proceed with the right radiofrequency ablation to the same levels in anticipated of longer pain control, as his primary pain is located to the right lumbar axial spine in his worse with the aforementioned movements above.  Today his pain score is 8/10 on the right .    Vital signs:   Vitals:    04/10/24 0753 04/10/24 0756   BP: 129/86    Pulse: 76    Temp: 98.1 °F (36.7 °C)    TempSrc: Oral    Weight: 107 kg (235 lb 14.3 oz)    Height: 5' 11" (1.803 m)    PainSc:    8     Body mass index is 32.9 kg/m².  Pain Disability Index (PDI): 35       Interventional Pain History  07/2024 radiofrequency ablation L3-L5  01/24/2024:  Diagnostic bilateral MBB L3-5  11/22/2023:  Diagnostic bilateral MBB L3-5    ROS: Low back pain     MRI lumbar spine 2022:   FINDINGS:  There are 5 non-rib-bearing lumbar type vertebral bodies.  Alignment is preserved without subluxation.  The vertebral body heights are maintained.  The bone marrow is normal in signal.  There is increased T2 signal in the L1-L2 disc space, " increased from the prior exam, which may be degenerative.     The conus terminates at the level of L1.  It is normal in signal and contour.  There is no epidural fluid collection.     Disc spaces, spinal canal and neural foramina are as follows:     L1-L2: Disc bulge and facet hypertrophy with mild narrowing of the spinal canal, unchanged.  Mild bilateral neural foraminal stenosis.     L2-L3: Disc bulge and facet hypertrophy with mild narrowing of the spinal canal, unchanged.  Mild bilateral neural foraminal stenosis.     L3-L4: Disc bulge and facet hypertrophy with moderate narrowing of the spinal canal, unchanged.  Mild bilateral foraminal stenosis.     L4-L5: Disc bulge and facet hypertrophy with moderate to severe narrowing of the spinal canal, not significantly changed.  Mild right and moderate left neural foraminal stenosis.     L5-S1: Disc bulge and facet hypertrophy.  No significant spinal canal stenosis.  Mild bilateral neural foraminal stenosis.     There are mild inflammatory changes around the right L4-5 facet joint.     Impression:     1. Moderate to severe degenerative narrowing of the spinal canal at L4-5, moderate at L3-L4, mild at L1-L3, not significantly changed.  2. Multilevel neural foraminal stenoses as described.  3. Multilevel facet arthropathy with inflammatory changes on the right at L4-5.        Electronically signed by: Linda Dorsey  Date:                                            12/22/2022  Time:                                           11:35           Objective:          Physical Exam    Physical Exam  General: Well developed; overweight; A&O x 3; No anxiety/depression; NAD  Mental Status: Oriented to person, palce and time. Displays appropriate mood & affect.  Head: Norm cephalic and atraumatic  Neck:  No cervical paraspinal banding.  Full range of motion with lateral turning and cervical flexion +extension.  Eyes: normal conjunctiva, normal lids, normal pupils  ENT and mouth:  normal external ear, nose, and no lesions noted on the lips.  Respiratory: Symmetrical, Unlabored. No dyspnea  CV: normal rhythm and rate. No peripheral edema.   Abdomen: Non-distended     Extremities:  Gen: No cyanosis or tenderness to palpation bilateral upper and lower extremities  Skin: Warm, pink, dry, no rashes, no lesions on the lumbar spine  Strength: 5/5 motor strength bilateral upper and lower extremities  ROM: Full ROM in bilateral knees and ankles without pain or instability.     Neuro:  Gait: no altalgic lean, normal toe and heel raise. Independent ambulator.  DTR's: 2+ in bilateral patellar, and ankle  Sensory: Intact to light touch bilateral  upper and lower extremities     Spine: Normal lordosis. No scoliosis  L-spine ROM:  Limited and painful on right with flexion, extension on bilateral rotation,   Straight Leg Raise:  Negative bilaterally  SI Joint: No tenderness to palpation bilaterally.        Assessment:     This is a pleasant 68-year-old male patient presenting as a follow-up for pain associated with lumbar spondylosis and radiculitis after receiving a left radiofrequency ablation L3-L5 on 03/27/2024.  Patient has received 50% relief on the left lumbar axial spine region after receiving the RFA.  He has not been icing which may account for ongoing swelling on the left side.  Overall he is very satisfied with pain control on the left as he has better pain relief with dishwashing, standing longer periods and sleeping better however he is still stiff in the morning when he wakes up.  He would like to proceed with the right radiofrequency ablation to the same levels in anticipated of longer pain control, as his primary pain is located to the right lumbar axial spine in his worse with the aforementioned movements above.  Today his pain score is 8/10 on the right .      Plan of care:   Request to proceed with right L3-L5 on right  Clearance to hold Xarelto for 2 days (patient verified  understanding)  Pt to ice post op 20 min on and alternate 20 min off  Follow-up postop      Encounter Diagnoses   Name Primary?    Lumbar spondylosis Yes    DDD (degenerative disc disease), lumbar     Lumbar radiculitis          Plan:       Melquiades was seen today for back pain.    Diagnoses and all orders for this visit:    Lumbar spondylosis    DDD (degenerative disc disease), lumbar    Lumbar radiculitis               Past Medical History:   Diagnosis Date    Atrial fibrillation     Central stenosis of spinal canal     Chronic back pain greater than 3 months duration 10/14/2022    Constipation 09/30/2022    Degeneration of lumbar intervertebral disc 07/06/2022    Diastolic dysfunction 05/01/2023    Dysphonia 09/30/2022    Dystrophic nail 06/19/2023    Erectile dysfunction 09/30/2022    Fatty liver     GERD (gastroesophageal reflux disease) 09/30/2022    H/O cataract removal with insertion of prosthetic lens     History of CVA (cerebrovascular accident) 09/30/2022    History of radial keratotomy 01/01/1998    HTN (hypertension)     Hyperlipidemia 09/30/2022    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/30/2022    Microhematuria 03/30/2023    Overgrown toenails 06/19/2023    Parkinson's disease     Peyronie disease     Polyneuropathy associated with underlying disease 06/19/2023    Rheumatoid arthritis 01/10/2022    Rheumatoid arthritis, unspecified     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    Spinal stenosis of lumbar region 09/30/2022    Type 2 diabetes mellitus without complications     Unspecified macular degeneration     Unspecified macular degeneration        Past Surgical History:   Procedure Laterality Date    BACK SURGERY Left 04/2024    CATARACT EXTRACTION W/  INTRAOCULAR LENS IMPLANT Bilateral     EPIDURAL STEROID INJECTION INTO LUMBAR SPINE      exc cyst Lt ear      INJECTION OF ANESTHETIC AGENT AROUND MEDIAL BRANCH NERVES INNERVATING LUMBAR FACET JOINT Bilateral 11/16/2022    Procedure: Block-nerve-medial branch-lumbar;   Surgeon: Gertrude Blanco MD;  Location: [facility] OR;  Service: Pain Management;  Laterality: Bilateral;  Bilateral L4-L5...Patient is requesting to be first case    INJECTION OF ANESTHETIC AGENT AROUND MEDIAL BRANCH NERVES INNERVATING LUMBAR FACET JOINT Bilateral 2023    Procedure: Block-nerve-medial branch-lumbar;  Surgeon: Gertrude Blanco MD;  Location: [facility] OR;  Service: Pain Management;  Laterality: Bilateral;  Bilateral MBB L3-5    INJECTION OF ANESTHETIC AGENT AROUND MEDIAL BRANCH NERVES INNERVATING LUMBAR FACET JOINT Bilateral 2024    Procedure: Block-nerve-medial branch-lumbar;  Surgeon: Gertrude Blanco MD;  Location: [facility] OR;  Service: Pain Management;  Laterality: Bilateral;  Bilateral MBB L3-5    KNEE ARTHROSCOPY Right     RADIOFREQUENCY ABLATION Left 2024    Procedure: Radiofrequency Ablation;  Surgeon: Gertrude Blanco MD;  Location: [facility] OR;  Service: Pain Management;  Laterality: Left;  RFA Lt L3-L5    VASECTOMY         Family History   Problem Relation Age of Onset    Heart failure Mother     Coronary artery disease Mother     Glaucoma Father     Alzheimer's disease Father     Emphysema Father     Autoimmune disease Sister     Pneumonia Sister     Dementia Sister        Social History     Socioeconomic History    Marital status:     Number of children: 1   Tobacco Use    Smoking status: Former     Current packs/day: 0.00     Average packs/day: 1 pack/day for 15.0 years (15.0 ttl pk-yrs)     Types: Cigarettes     Start date: 1972     Quit date: 1987     Years since quittin.2     Passive exposure: Past    Smokeless tobacco: Never   Substance and Sexual Activity    Alcohol use: Not Currently    Drug use: Not Currently    Sexual activity: Yes     Partners: Female     Social Determinants of Health     Financial Resource Strain: Medium Risk (1/10/2024)    Overall Financial Resource Strain (CARDIA)     Difficulty of Paying Living Expenses: Somewhat hard   Food  Insecurity: Food Insecurity Present (1/10/2024)    Hunger Vital Sign     Worried About Running Out of Food in the Last Year: Often true     Ran Out of Food in the Last Year: Often true   Transportation Needs: No Transportation Needs (1/10/2024)    PRAPARE - Transportation     Lack of Transportation (Medical): No     Lack of Transportation (Non-Medical): No   Physical Activity: Inactive (1/10/2024)    Exercise Vital Sign     Days of Exercise per Week: 0 days     Minutes of Exercise per Session: 20 min   Stress: No Stress Concern Present (1/10/2024)    [employer] - Occupational Stress Questionnaire     Feeling of Stress : Only a little   Social Connections: Socially Isolated (1/10/2024)    Social Connection and Isolation Panel [NHANES]     Frequency of Communication with Friends and Family: More than three times a week     Frequency of Social Gatherings with Friends and Family: Three times a week     Attends Druze Services: Never     Active Member of Clubs or Organizations: No     Attends Club or Organization Meetings: Never     Marital Status:    Housing Stability: Low Risk  (1/10/2024)    Housing Stability Vital Sign     Unable to Pay for Housing in the Last Year: No     Number of Places Lived in the Last Year: 1     Unstable Housing in the Last Year: No       Current Outpatient Medications   Medication Sig Dispense Refill    alogliptin-pioglitazone (OSENI) 25-30 mg Tab Take 1 tablet by mouth once daily. 90 tablet 1    ammonium lactate 12 % Crea Apply 1 application  topically 2 (two) times daily.      blood sugar diagnostic (ACCU-CHEK GUIDE TEST STRIPS) Strp 1 strip by skin prick route Daily. 100 strip 11    carbidopa-levodopa  mg (SINEMET)  mg per tablet Take 2 tablets by mouth 2 (two) times a day.      colchicine (COLCRYS) 0.6 mg tablet Take 1 tablet (0.6 mg total) by mouth 2 (two) times daily. 180 tablet 0    gemfibroziL (LOPID) 600 MG tablet Take 1 tablet  (600 mg total) by mouth 2 (two) times daily. 180 tablet 3    lancets (ACCU-CHEK FASTCLIX LANCET DRUM MISC) Accu-Chek Fastclix Lancet Drum      lisinopriL-hydrochlorothiazide (PRINZIDE,ZESTORETIC) 20-25 mg Tab Take 1 tablet by mouth once daily. (Patient taking differently: Take 1 tablet by mouth as needed.) 90 tablet 3    metoprolol succinate (TOPROL-XL) 25 MG 24 hr tablet Take 1 tablet (25 mg total) by mouth once daily. 90 tablet 3    pantoprazole (PROTONIX) 40 MG tablet Take 1 tablet (40 mg total) by mouth once daily. 90 tablet 1    predniSONE (DELTASONE) 5 MG tablet Take 1 tablet (5 mg total) by mouth once daily. 5 tablet 0    rosuvastatin (CRESTOR) 20 MG tablet Take 1 tablet (20 mg total) by mouth once daily. 90 tablet 3    selegiline HCl (ELDEPRYL) 5 mg tablet Take 5 mg by mouth 2 (two) times daily with meals.      tofacitinib (XELJANZ XR) 11 mg Tb24 Take 1 tablet by mouth once daily. 30 tablet 3    vit A/vit C/vit E/zinc/copper (PRESERVISION AREDS ORAL) Take 1 capsule by mouth once daily.      XARELTO 20 mg Tab Take 1 tablet (20 mg total) by mouth once daily. 90 tablet 3     No current facility-administered medications for this visit.       Review of patient's allergies indicates:   Allergen Reactions    Sarilumab Other (See Comments)     Other reaction(s): evaning  Kevzara

## 2024-04-10 NOTE — PROGRESS NOTES
"Subjective:      Patient ID: Melquiades Rehman Jr. is a 68 y.o. male.    Chief Complaint: Back Pain (Post-op left RFA L3-L5 3/27/24, pt states he states he received great relief, pt also here today to sign consent for 2nd Right RFA L3-L5  scheduled for 4/25/24, pain level right side 8/10)    Referred by: No ref. provider found     HPI:  This is a pleasant 68-year-old male patient presenting as a follow-up for pain associated with lumbar spondylosis and radiculitis after receiving a left radiofrequency ablation L3-L5 on 03/27/2024.  Patient has received 50% relief on the left lumbar axial spine region after receiving the RFA.  He has not been icing which may account for ongoing swelling on the left side.  Overall he is very satisfied with pain control on the left as he has better pain relief with dishwashing, standing longer periods and sleeping better however he is still stiff in the morning when he wakes up.  He would like to proceed with the right radiofrequency ablation to the same levels in anticipated of longer pain control, as his primary pain is located to the right lumbar axial spine in his worse with the aforementioned movements above.  Today his pain score is 8/10 on the right .    Vital signs:   Vitals:    04/10/24 0753 04/10/24 0756   BP: 129/86    Pulse: 76    Temp: 98.1 °F (36.7 °C)    TempSrc: Oral    Weight: 107 kg (235 lb 14.3 oz)    Height: 5' 11" (1.803 m)    PainSc:    8     Body mass index is 32.9 kg/m².  Pain Disability Index (PDI): 35       Interventional Pain History  07/2024 radiofrequency ablation L3-L5  01/24/2024:  Diagnostic bilateral MBB L3-5  11/22/2023:  Diagnostic bilateral MBB L3-5    ROS: Low back pain     MRI lumbar spine 2022:   FINDINGS:  There are 5 non-rib-bearing lumbar type vertebral bodies.  Alignment is preserved without subluxation.  The vertebral body heights are maintained.  The bone marrow is normal in signal.  There is increased T2 signal in the L1-L2 disc space, " increased from the prior exam, which may be degenerative.     The conus terminates at the level of L1.  It is normal in signal and contour.  There is no epidural fluid collection.     Disc spaces, spinal canal and neural foramina are as follows:     L1-L2: Disc bulge and facet hypertrophy with mild narrowing of the spinal canal, unchanged.  Mild bilateral neural foraminal stenosis.     L2-L3: Disc bulge and facet hypertrophy with mild narrowing of the spinal canal, unchanged.  Mild bilateral neural foraminal stenosis.     L3-L4: Disc bulge and facet hypertrophy with moderate narrowing of the spinal canal, unchanged.  Mild bilateral foraminal stenosis.     L4-L5: Disc bulge and facet hypertrophy with moderate to severe narrowing of the spinal canal, not significantly changed.  Mild right and moderate left neural foraminal stenosis.     L5-S1: Disc bulge and facet hypertrophy.  No significant spinal canal stenosis.  Mild bilateral neural foraminal stenosis.     There are mild inflammatory changes around the right L4-5 facet joint.     Impression:     1. Moderate to severe degenerative narrowing of the spinal canal at L4-5, moderate at L3-L4, mild at L1-L3, not significantly changed.  2. Multilevel neural foraminal stenoses as described.  3. Multilevel facet arthropathy with inflammatory changes on the right at L4-5.        Electronically signed by: Linda Dorsey  Date:                                            12/22/2022  Time:                                           11:35           Objective:          Physical Exam    Physical Exam  General: Well developed; overweight; A&O x 3; No anxiety/depression; NAD  Mental Status: Oriented to person, palce and time. Displays appropriate mood & affect.  Head: Norm cephalic and atraumatic  Neck:  No cervical paraspinal banding.  Full range of motion with lateral turning and cervical flexion +extension.  Eyes: normal conjunctiva, normal lids, normal pupils  ENT and mouth:  normal external ear, nose, and no lesions noted on the lips.  Respiratory: Symmetrical, Unlabored. No dyspnea  CV: normal rhythm and rate. No peripheral edema.   Abdomen: Non-distended     Extremities:  Gen: No cyanosis or tenderness to palpation bilateral upper and lower extremities  Skin: Warm, pink, dry, no rashes, no lesions on the lumbar spine  Strength: 5/5 motor strength bilateral upper and lower extremities  ROM: Full ROM in bilateral knees and ankles without pain or instability.     Neuro:  Gait: no altalgic lean, normal toe and heel raise. Independent ambulator.  DTR's: 2+ in bilateral patellar, and ankle  Sensory: Intact to light touch bilateral  upper and lower extremities     Spine: Normal lordosis. No scoliosis  L-spine ROM:  Limited and painful on right with flexion, extension on bilateral rotation,   Straight Leg Raise:  Negative bilaterally  SI Joint: No tenderness to palpation bilaterally.        Assessment:     This is a pleasant 68-year-old male patient presenting as a follow-up for pain associated with lumbar spondylosis and radiculitis after receiving a left radiofrequency ablation L3-L5 on 03/27/2024.  Patient has received 50% relief on the left lumbar axial spine region after receiving the RFA.  He has not been icing which may account for ongoing swelling on the left side.  Overall he is very satisfied with pain control on the left as he has better pain relief with dishwashing, standing longer periods and sleeping better however he is still stiff in the morning when he wakes up.  He would like to proceed with the right radiofrequency ablation to the same levels in anticipated of longer pain control, as his primary pain is located to the right lumbar axial spine in his worse with the aforementioned movements above.  Today his pain score is 8/10 on the right .      Plan of care:   Request to proceed with right L3-L5 on right  Clearance to hold Xarelto for 2 days (patient verified  understanding)  Pt to ice post op 20 min on and alternate 20 min off  Follow-up postop      Encounter Diagnoses   Name Primary?    Lumbar spondylosis Yes    DDD (degenerative disc disease), lumbar     Lumbar radiculitis          Plan:       Melquiades was seen today for back pain.    Diagnoses and all orders for this visit:    Lumbar spondylosis    DDD (degenerative disc disease), lumbar    Lumbar radiculitis               Past Medical History:   Diagnosis Date    Atrial fibrillation     Central stenosis of spinal canal     Chronic back pain greater than 3 months duration 10/14/2022    Constipation 09/30/2022    Degeneration of lumbar intervertebral disc 07/06/2022    Diastolic dysfunction 05/01/2023    Dysphonia 09/30/2022    Dystrophic nail 06/19/2023    Erectile dysfunction 09/30/2022    Fatty liver     GERD (gastroesophageal reflux disease) 09/30/2022    H/O cataract removal with insertion of prosthetic lens     History of CVA (cerebrovascular accident) 09/30/2022    History of radial keratotomy 01/01/1998    HTN (hypertension)     Hyperlipidemia 09/30/2022    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/30/2022    Microhematuria 03/30/2023    Overgrown toenails 06/19/2023    Parkinson's disease     Peyronie disease     Polyneuropathy associated with underlying disease 06/19/2023    Rheumatoid arthritis 01/10/2022    Rheumatoid arthritis, unspecified     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    Spinal stenosis of lumbar region 09/30/2022    Type 2 diabetes mellitus without complications     Unspecified macular degeneration     Unspecified macular degeneration        Past Surgical History:   Procedure Laterality Date    BACK SURGERY Left 04/2024    CATARACT EXTRACTION W/  INTRAOCULAR LENS IMPLANT Bilateral     EPIDURAL STEROID INJECTION INTO LUMBAR SPINE      exc cyst Lt ear      INJECTION OF ANESTHETIC AGENT AROUND MEDIAL BRANCH NERVES INNERVATING LUMBAR FACET JOINT Bilateral 11/16/2022    Procedure: Block-nerve-medial branch-lumbar;   Surgeon: Gertrude Blanco MD;  Location: [facility] OR;  Service: Pain Management;  Laterality: Bilateral;  Bilateral L4-L5...Patient is requesting to be first case    INJECTION OF ANESTHETIC AGENT AROUND MEDIAL BRANCH NERVES INNERVATING LUMBAR FACET JOINT Bilateral 2023    Procedure: Block-nerve-medial branch-lumbar;  Surgeon: Gertrude Blanco MD;  Location: [facility] OR;  Service: Pain Management;  Laterality: Bilateral;  Bilateral MBB L3-5    INJECTION OF ANESTHETIC AGENT AROUND MEDIAL BRANCH NERVES INNERVATING LUMBAR FACET JOINT Bilateral 2024    Procedure: Block-nerve-medial branch-lumbar;  Surgeon: Gertrude Blanco MD;  Location: [facility] OR;  Service: Pain Management;  Laterality: Bilateral;  Bilateral MBB L3-5    KNEE ARTHROSCOPY Right     RADIOFREQUENCY ABLATION Left 2024    Procedure: Radiofrequency Ablation;  Surgeon: Gertrude Blanco MD;  Location: [facility] OR;  Service: Pain Management;  Laterality: Left;  RFA Lt L3-L5    VASECTOMY         Family History   Problem Relation Age of Onset    Heart failure Mother     Coronary artery disease Mother     Glaucoma Father     Alzheimer's disease Father     Emphysema Father     Autoimmune disease Sister     Pneumonia Sister     Dementia Sister        Social History     Socioeconomic History    Marital status:     Number of children: 1   Tobacco Use    Smoking status: Former     Current packs/day: 0.00     Average packs/day: 1 pack/day for 15.0 years (15.0 ttl pk-yrs)     Types: Cigarettes     Start date: 1972     Quit date: 1987     Years since quittin.2     Passive exposure: Past    Smokeless tobacco: Never   Substance and Sexual Activity    Alcohol use: Not Currently    Drug use: Not Currently    Sexual activity: Yes     Partners: Female     Social Determinants of Health     Financial Resource Strain: Medium Risk (1/10/2024)    Overall Financial Resource Strain (CARDIA)     Difficulty of Paying Living Expenses: Somewhat hard   Food  Insecurity: Food Insecurity Present (1/10/2024)    Hunger Vital Sign     Worried About Running Out of Food in the Last Year: Often true     Ran Out of Food in the Last Year: Often true   Transportation Needs: No Transportation Needs (1/10/2024)    PRAPARE - Transportation     Lack of Transportation (Medical): No     Lack of Transportation (Non-Medical): No   Physical Activity: Inactive (1/10/2024)    Exercise Vital Sign     Days of Exercise per Week: 0 days     Minutes of Exercise per Session: 20 min   Stress: No Stress Concern Present (1/10/2024)    [employer] [place] [employer] - Occupational Stress Questionnaire     Feeling of Stress : Only a little   Social Connections: Socially Isolated (1/10/2024)    Social Connection and Isolation Panel [NHANES]     Frequency of Communication with Friends and Family: More than three times a week     Frequency of Social Gatherings with Friends and Family: Three times a week     Attends Congregation Services: Never     Active Member of Clubs or Organizations: No     Attends Club or Organization Meetings: Never     Marital Status:    Housing Stability: Low Risk  (1/10/2024)    Housing Stability Vital Sign     Unable to Pay for Housing in the Last Year: No     Number of Places Lived in the Last Year: 1     Unstable Housing in the Last Year: No       Current Outpatient Medications   Medication Sig Dispense Refill    alogliptin-pioglitazone (OSENI) 25-30 mg Tab Take 1 tablet by mouth once daily. 90 tablet 1    ammonium lactate 12 % Crea Apply 1 application  topically 2 (two) times daily.      blood sugar diagnostic (ACCU-CHEK GUIDE TEST STRIPS) Strp 1 strip by skin prick route Daily. 100 strip 11    carbidopa-levodopa  mg (SINEMET)  mg per tablet Take 2 tablets by mouth 2 (two) times a day.      colchicine (COLCRYS) 0.6 mg tablet Take 1 tablet (0.6 mg total) by mouth 2 (two) times daily. 180 tablet 0    gemfibroziL (LOPID) 600 MG tablet Take 1 tablet  (600 mg total) by mouth 2 (two) times daily. 180 tablet 3    lancets (ACCU-CHEK FASTCLIX LANCET DRUM MISC) Accu-Chek Fastclix Lancet Drum      lisinopriL-hydrochlorothiazide (PRINZIDE,ZESTORETIC) 20-25 mg Tab Take 1 tablet by mouth once daily. (Patient taking differently: Take 1 tablet by mouth as needed.) 90 tablet 3    metoprolol succinate (TOPROL-XL) 25 MG 24 hr tablet Take 1 tablet (25 mg total) by mouth once daily. 90 tablet 3    pantoprazole (PROTONIX) 40 MG tablet Take 1 tablet (40 mg total) by mouth once daily. 90 tablet 1    predniSONE (DELTASONE) 5 MG tablet Take 1 tablet (5 mg total) by mouth once daily. 5 tablet 0    rosuvastatin (CRESTOR) 20 MG tablet Take 1 tablet (20 mg total) by mouth once daily. 90 tablet 3    selegiline HCl (ELDEPRYL) 5 mg tablet Take 5 mg by mouth 2 (two) times daily with meals.      tofacitinib (XELJANZ XR) 11 mg Tb24 Take 1 tablet by mouth once daily. 30 tablet 3    vit A/vit C/vit E/zinc/copper (PRESERVISION AREDS ORAL) Take 1 capsule by mouth once daily.      XARELTO 20 mg Tab Take 1 tablet (20 mg total) by mouth once daily. 90 tablet 3     No current facility-administered medications for this visit.       Review of patient's allergies indicates:   Allergen Reactions    Sarilumab Other (See Comments)     Other reaction(s): evaning  Kevzara

## 2024-04-11 ENCOUNTER — TELEPHONE (OUTPATIENT)
Dept: INTERNAL MEDICINE | Facility: CLINIC | Age: 69
End: 2024-04-11
Payer: MEDICARE

## 2024-04-22 ENCOUNTER — PATIENT MESSAGE (OUTPATIENT)
Dept: ADMINISTRATIVE | Facility: OTHER | Age: 69
End: 2024-04-22
Payer: MEDICARE

## 2024-04-24 NOTE — DISCHARGE INSTRUCTIONS
Radiofrequency Ablation After Care    Wash your hands before and after touching your puncture site.  You may take the bandage off and shower tomorrow. Check for signs and symptoms of infection daily: redness, warmth, pus or drainage, increase swelling, and foul odor.  No heavy lifting for 1 week.  No driving today.  You may resume your regular diet today.  You may resume your regular activities tomorrow.  No heating pad on the puncture site for 24 hours. You may use an ice pack for pain or swelling for no longer than 15 minutes at a time for 3-4 times a day. Do not place ice directly on your skin.    Contact your doctor for:  Increase pain not controlled with medication.  Any new numbness or tingling.  Fever greater than 102 degree Fahrenheit that does not break with medication.  Any signs or symptoms of infection: redness, warmth, pus or drainage, increase swelling, and foul odor at the puncture site.

## 2024-04-25 ENCOUNTER — ANESTHESIA (OUTPATIENT)
Dept: SURGERY | Facility: HOSPITAL | Age: 69
End: 2024-04-25
Payer: MEDICARE

## 2024-04-25 ENCOUNTER — HOSPITAL ENCOUNTER (OUTPATIENT)
Facility: HOSPITAL | Age: 69
Discharge: HOME | End: 2024-04-25
Attending: ANESTHESIOLOGY | Admitting: ANESTHESIOLOGY
Payer: MEDICARE

## 2024-04-25 ENCOUNTER — ANESTHESIA EVENT (OUTPATIENT)
Dept: SURGERY | Facility: HOSPITAL | Age: 69
End: 2024-04-25
Payer: MEDICARE

## 2024-04-25 DIAGNOSIS — G89.29 CHRONIC BACK PAIN GREATER THAN 3 MONTHS DURATION: ICD-10-CM

## 2024-04-25 DIAGNOSIS — M54.9 CHRONIC BACK PAIN GREATER THAN 3 MONTHS DURATION: ICD-10-CM

## 2024-04-25 LAB — POCT GLUCOSE: 111 MG/DL (ref 70–110)

## 2024-04-25 PROCEDURE — 64635 DESTROY LUMB/SAC FACET JNT: CPT | Mod: RT | Performed by: ANESTHESIOLOGY

## 2024-04-25 PROCEDURE — D9220A PRA ANESTHESIA: Mod: ,,, | Performed by: NURSE ANESTHETIST, CERTIFIED REGISTERED

## 2024-04-25 PROCEDURE — 63600175 PHARM REV CODE 636 W HCPCS: Performed by: NURSE ANESTHETIST, CERTIFIED REGISTERED

## 2024-04-25 PROCEDURE — 63600175 PHARM REV CODE 636 W HCPCS: Performed by: ANESTHESIOLOGY

## 2024-04-25 PROCEDURE — 64635 DESTROY LUMB/SAC FACET JNT: CPT | Mod: RT,,, | Performed by: ANESTHESIOLOGY

## 2024-04-25 PROCEDURE — 25000003 PHARM REV CODE 250: Performed by: NURSE ANESTHETIST, CERTIFIED REGISTERED

## 2024-04-25 PROCEDURE — 64636 DESTROY L/S FACET JNT ADDL: CPT | Mod: RT,,, | Performed by: ANESTHESIOLOGY

## 2024-04-25 PROCEDURE — 64636 DESTROY L/S FACET JNT ADDL: CPT | Mod: RT | Performed by: ANESTHESIOLOGY

## 2024-04-25 PROCEDURE — 25000003 PHARM REV CODE 250: Performed by: ANESTHESIOLOGY

## 2024-04-25 PROCEDURE — 37000008 HC ANESTHESIA 1ST 15 MINUTES: Performed by: ANESTHESIOLOGY

## 2024-04-25 PROCEDURE — 82962 GLUCOSE BLOOD TEST: CPT | Performed by: ANESTHESIOLOGY

## 2024-04-25 RX ORDER — TRIAMCINOLONE ACETONIDE 40 MG/ML
INJECTION, SUSPENSION INTRA-ARTICULAR; INTRAMUSCULAR
Status: DISCONTINUED
Start: 2024-04-25 | End: 2024-04-25 | Stop reason: HOSPADM

## 2024-04-25 RX ORDER — SODIUM CHLORIDE, SODIUM GLUCONATE, SODIUM ACETATE, POTASSIUM CHLORIDE AND MAGNESIUM CHLORIDE 30; 37; 368; 526; 502 MG/100ML; MG/100ML; MG/100ML; MG/100ML; MG/100ML
INJECTION, SOLUTION INTRAVENOUS CONTINUOUS
Status: DISCONTINUED | OUTPATIENT
Start: 2024-04-25 | End: 2024-04-25 | Stop reason: HOSPADM

## 2024-04-25 RX ORDER — PROPOFOL 10 MG/ML
VIAL (ML) INTRAVENOUS
Status: DISCONTINUED | OUTPATIENT
Start: 2024-04-25 | End: 2024-04-25

## 2024-04-25 RX ORDER — BUPIVACAINE HYDROCHLORIDE 2.5 MG/ML
INJECTION, SOLUTION EPIDURAL; INFILTRATION; INTRACAUDAL
Status: DISCONTINUED | OUTPATIENT
Start: 2024-04-25 | End: 2024-04-25 | Stop reason: HOSPADM

## 2024-04-25 RX ORDER — TRIAMCINOLONE ACETONIDE 40 MG/ML
INJECTION, SUSPENSION INTRA-ARTICULAR; INTRAMUSCULAR
Status: DISCONTINUED | OUTPATIENT
Start: 2024-04-25 | End: 2024-04-25 | Stop reason: HOSPADM

## 2024-04-25 RX ORDER — ONDANSETRON 4 MG/1
8 TABLET, ORALLY DISINTEGRATING ORAL EVERY 6 HOURS PRN
Status: DISCONTINUED | OUTPATIENT
Start: 2024-04-25 | End: 2024-04-25 | Stop reason: HOSPADM

## 2024-04-25 RX ORDER — ONDANSETRON HYDROCHLORIDE 2 MG/ML
4 INJECTION, SOLUTION INTRAVENOUS DAILY PRN
Status: CANCELLED | OUTPATIENT
Start: 2024-04-25

## 2024-04-25 RX ORDER — LIDOCAINE HYDROCHLORIDE 10 MG/ML
INJECTION INFILTRATION; PERINEURAL
Status: DISCONTINUED | OUTPATIENT
Start: 2024-04-25 | End: 2024-04-25 | Stop reason: HOSPADM

## 2024-04-25 RX ORDER — LIDOCAINE HYDROCHLORIDE 10 MG/ML
INJECTION, SOLUTION EPIDURAL; INFILTRATION; INTRACAUDAL; PERINEURAL
Status: DISCONTINUED | OUTPATIENT
Start: 2024-04-25 | End: 2024-04-25

## 2024-04-25 RX ORDER — FENTANYL CITRATE 50 UG/ML
INJECTION, SOLUTION INTRAMUSCULAR; INTRAVENOUS
Status: DISCONTINUED | OUTPATIENT
Start: 2024-04-25 | End: 2024-04-25

## 2024-04-25 RX ORDER — DEXMEDETOMIDINE HYDROCHLORIDE 100 UG/ML
INJECTION, SOLUTION INTRAVENOUS
Status: DISCONTINUED | OUTPATIENT
Start: 2024-04-25 | End: 2024-04-25

## 2024-04-25 RX ORDER — LIDOCAINE HYDROCHLORIDE 10 MG/ML
1 INJECTION, SOLUTION EPIDURAL; INFILTRATION; INTRACAUDAL; PERINEURAL ONCE
Status: DISCONTINUED | OUTPATIENT
Start: 2024-04-25 | End: 2024-04-25 | Stop reason: HOSPADM

## 2024-04-25 RX ORDER — MEPERIDINE HYDROCHLORIDE 25 MG/ML
12.5 INJECTION INTRAMUSCULAR; INTRAVENOUS; SUBCUTANEOUS EVERY 10 MIN PRN
Status: CANCELLED | OUTPATIENT
Start: 2024-04-25 | End: 2024-04-26

## 2024-04-25 RX ORDER — LIDOCAINE HYDROCHLORIDE 10 MG/ML
INJECTION, SOLUTION EPIDURAL; INFILTRATION; INTRACAUDAL; PERINEURAL
Status: DISCONTINUED
Start: 2024-04-25 | End: 2024-04-25 | Stop reason: HOSPADM

## 2024-04-25 RX ORDER — IPRATROPIUM BROMIDE AND ALBUTEROL SULFATE 2.5; .5 MG/3ML; MG/3ML
3 SOLUTION RESPIRATORY (INHALATION)
Status: CANCELLED | OUTPATIENT
Start: 2024-04-25

## 2024-04-25 RX ADMIN — Medication 100 MG: at 08:04

## 2024-04-25 RX ADMIN — FENTANYL CITRATE 25 MCG: 50 INJECTION, SOLUTION INTRAMUSCULAR; INTRAVENOUS at 08:04

## 2024-04-25 RX ADMIN — SODIUM CHLORIDE, POTASSIUM CHLORIDE, SODIUM LACTATE AND CALCIUM CHLORIDE: 600; 310; 30; 20 INJECTION, SOLUTION INTRAVENOUS at 08:04

## 2024-04-25 RX ADMIN — DEXMEDETOMIDINE 8 MCG: 200 INJECTION, SOLUTION INTRAVENOUS at 08:04

## 2024-04-25 RX ADMIN — Medication 50 MG: at 08:04

## 2024-04-25 RX ADMIN — LIDOCAINE HYDROCHLORIDE 20 MG: 10 INJECTION, SOLUTION EPIDURAL; INFILTRATION; INTRACAUDAL; PERINEURAL at 08:04

## 2024-04-25 NOTE — DISCHARGE SUMMARY
[facility] Surgical - Periop Services  Discharge Note  Short Stay    Procedure(s) (LRB):  RADIOFREQUENCY ABLATION / Right L3 L5 (Right)      OUTCOME: Patient tolerated treatment/procedure well without complication and is now ready for discharge.    DISPOSITION: Home or Self Care    FINAL DIAGNOSIS:  <principal problem not specified>    FOLLOWUP: In clinic    DISCHARGE INSTRUCTIONS:  No discharge procedures on file.     TIME SPENT ON DISCHARGE: 5 minutes

## 2024-04-25 NOTE — ANESTHESIA PREPROCEDURE EVALUATION
"                                                                                                             04/25/2024  Melquiades Rehman Jr. is a 68 y.o., male with Parkinson's disease, paroxysmal AFib prior CVA presents as an outpatient for right L3-5 RFA.  Patient IV sedation for similar procedure recently (see below).  Recheck diastolic pressure in 80s    Last 3 sets of Vitals        4/17/2024    10:05 AM 4/25/2024     6:53 AM 4/25/2024     7:03 AM   Vitals - 1 value per visit   SYSTOLIC  163    DIASTOLIC  100    Pulse  82    Temp  37.1 °C (98.7 °F)    Resp   20   SPO2  95 %    Weight (lb) 230     Weight (kg) 104.327     Height 5' 11" (1.803 m)     BMI (Calculated) 32.1           Lab Results   Component Value Date    WBC 5.41 04/09/2024    HGB 14.7 04/09/2024    HCT 43.3 04/09/2024    MCV 96.4 (H) 04/09/2024     04/09/2024          BMP  Lab Results   Component Value Date     04/09/2024    K 4.0 04/09/2024    CO2 26 04/09/2024    BUN 21.8 04/09/2024    CREATININE 1.26 (H) 04/09/2024    CALCIUM 9.7 04/09/2024    EGFRNONAA 62 03/29/2022      Transthoracic echo October 2023   EF 60% with normal diastolic function   Mild valvular heart disease  Negative bubble study  Pre-op Assessment    I have reviewed the Patient Summary Reports.    I have reviewed the NPO Status.   I have reviewed the Medications.     Review of Systems  Anesthesia Hx:               Denies Personal Hx of Anesthesia complications.                    Social:  Non-Smoker       Cardiovascular:     Hypertension                Functional Capacity 2 METS                         Renal/:  Chronic Renal Disease, CKD                Hepatic/GI:     GERD, well controlled             Neurological:   CVA                                 Movement Disorder Dx, Parkinson's Disease   Endocrine:  Diabetes, type 2         Obesity / BMI > 30      Physical Exam  General: Well nourished, Cooperative, Alert and Oriented    Airway:  Mallampati: II   Mouth " Opening: Normal  TM Distance: Normal  Tongue: Normal  Neck ROM: Normal ROM    Dental:  Intact    Chest/Lungs:  Clear to auscultation, Normal Respiratory Rate    Heart:  Rate: Normal  Rhythm: Regular Rhythm        Anesthesia Plan  Type of Anesthesia, risks & benefits discussed:    Anesthesia Type: Gen Natural Airway  Intra-op Monitoring Plan: Standard ASA Monitors  Induction:  IV  Informed Consent: Informed consent signed with the Patient and all parties understand the risks and agree with anesthesia plan.  All questions answered.   ASA Score: 3  Day of Surgery Review of History & Physical: H&P Update referred to the surgeon/provider.    Ready For Surgery From Anesthesia Perspective.     .

## 2024-04-25 NOTE — OP NOTE
Right L3-5 medial branch Radiofrequency Ablation    Pre-Procedure Diagnoses:  1. Chronic pain syndrome  2. Chronic Low back pain  3. Lumbar facet arthropathy    Post-Procedure Diagnoses:  1. Chronic pain syndrome  2. Chronic Low back pain  3. Lumbar facet arthropathy    Anesthesia:  Local and MAC    Estimated Blood Loss:  None    Complications:  None    Informed Consent:  The procedure, risks, benefits, and alternatives were discussed with the patient. There were no contraindications to the procedure. The patient expressed understanding and agreed to proceed. Fully informed written consent was obtained.     Description of the Procedure:  The patient was taken to the operating room. IV access was obtained prior to the start of the procedure. The patient was positioned prone on the fluoroscopy table. Continuous hemodynamic monitoring was initiated and continued throughout the duration of the procedure. The skin overlying the lumbosacral spine was prepped with Chloraprep and draped into a sterile field. Fluoroscopy was used to identify the locations of the right L3, L4, and L5 medial branch nerves at the junctions of the superior articular processes and transverse processes of L4, L5, and the sacral ala, respectively. Skin anesthesia was achieved using 1 mL of 1% lidocaine over each injection site. An 18-gauge 150 mm RF needle was slowly inserted and advanced under intermittent fluoroscopic guidance until it made bony contact at the target sites. Proper needle position was confirmed under AP, oblique, and lateral fluoroscopic views. Negative aspiration for blood or CSF was confirmed. An RF probe was placed through each needle. Impedence values were low. Motor testing was performed, which confirmed no stimulation of the lower extremity. Radiofrequency lesioning was then carried out at 80 degrees Celsius for 90 seconds at each level. The probes were withdrawn and each needle was injected with a combination of 0.5 ml of  0.25% bupivacaine and 10 mg of Kenalog. There was no pain on injection. The needles were removed intact and bleeding was nil. Sterile bandages were applied. The patient was taken to the recovery room for further observation in stable condition. The patient was then discharged home without any complications.

## 2024-04-25 NOTE — ANESTHESIA POSTPROCEDURE EVALUATION
Anesthesia Post Evaluation    Patient: Melquiades Rehman Jr.    Procedure(s) Performed: Procedure(s) (LRB):  RADIOFREQUENCY ABLATION / Right L3 L5 (Right)    Final Anesthesia Type: MAC      Patient location during evaluation: OPS  Patient participation: Yes- Able to Participate  Level of consciousness: awake and alert  Post-procedure vital signs: reviewed and stable  Pain management: adequate  Airway patency: patent  CAITLYN mitigation strategies: Preoperative initiation of continuous positive airway pressure (CPAP) or non-invasive positive pressure ventilation (NIPPV)  PONV status at discharge: No PONV  Anesthetic complications: no      Cardiovascular status: blood pressure returned to baseline  Respiratory status: unassisted and spontaneous ventilation  Hydration status: euvolemic  Follow-up not needed.

## 2024-04-25 NOTE — TRANSFER OF CARE
"Anesthesia Transfer of Care Note    Patient: Melquiades Rehman Jr.    Procedure(s) Performed: Procedure(s) (LRB):  RADIOFREQUENCY ABLATION / Right L3 L5 (Right)    Patient location: OPS    Anesthesia Type: MAC and general    Transport from OR: Transported from OR on room air with adequate spontaneous ventilation    Post pain: adequate analgesia    Post assessment: no apparent anesthetic complications    Post vital signs: stable    Level of consciousness: awake and alert    Nausea/Vomiting: no nausea/vomiting    Complications: none    Transfer of care protocol was followed      Last vitals: Visit Vitals  BP (!) 154/86   Pulse 72   Temp 37.1 °C (98.7 °F) (Oral)   Resp 20   Ht 5' 11" (1.803 m)   Wt 106.8 kg (235 lb 7.2 oz)   SpO2 95%   BMI 32.84 kg/m²     "

## 2024-04-26 VITALS
OXYGEN SATURATION: 95 % | RESPIRATION RATE: 18 BRPM | DIASTOLIC BLOOD PRESSURE: 72 MMHG | HEART RATE: 73 BPM | WEIGHT: 235.44 LBS | TEMPERATURE: 99 F | BODY MASS INDEX: 32.96 KG/M2 | SYSTOLIC BLOOD PRESSURE: 121 MMHG | HEIGHT: 71 IN

## 2024-05-02 RX ORDER — PREDNISONE 5 MG/1
5 TABLET ORAL DAILY
Qty: 10 TABLET | Refills: 0 | Status: SHIPPED | OUTPATIENT
Start: 2024-05-02

## 2024-05-03 DIAGNOSIS — M51.36 DEGENERATION OF LUMBAR INTERVERTEBRAL DISC: ICD-10-CM

## 2024-05-03 DIAGNOSIS — G89.4 CHRONIC PAIN SYNDROME: ICD-10-CM

## 2024-05-05 RX ORDER — COLCHICINE 0.6 MG/1
0.6 TABLET ORAL 2 TIMES DAILY
Qty: 180 TABLET | Refills: 0 | Status: SHIPPED | OUTPATIENT
Start: 2024-05-05 | End: 2024-11-01

## 2024-05-06 DIAGNOSIS — Z86.010 HX OF COLONIC POLYPS: Primary | ICD-10-CM

## 2024-05-06 RX ORDER — POLYETHYLENE GLYCOL 3350, SODIUM SULFATE, SODIUM CHLORIDE, POTASSIUM CHLORIDE, SODIUM ASCORBATE, AND ASCORBIC ACID 7.5-2.691G
2000 KIT ORAL SEE ADMIN INSTRUCTIONS
Qty: 1 KIT | Refills: 0 | Status: SHIPPED | OUTPATIENT
Start: 2024-05-06

## 2024-05-06 NOTE — TELEPHONE ENCOUNTER
----- Message from Minda Jim sent at 12/8/2023  8:15 AM CST -----  Regarding: prep masha 6/6/24  [facility]

## 2024-05-09 ENCOUNTER — OFFICE VISIT (OUTPATIENT)
Dept: PAIN MEDICINE | Facility: CLINIC | Age: 69
End: 2024-05-09
Payer: MEDICARE

## 2024-05-09 VITALS
BODY MASS INDEX: 32.9 KG/M2 | DIASTOLIC BLOOD PRESSURE: 79 MMHG | HEART RATE: 89 BPM | WEIGHT: 235 LBS | HEIGHT: 71 IN | SYSTOLIC BLOOD PRESSURE: 128 MMHG

## 2024-05-09 DIAGNOSIS — M47.816 LUMBAR SPONDYLOSIS: Primary | ICD-10-CM

## 2024-05-09 DIAGNOSIS — M54.16 LUMBAR RADICULOPATHY: ICD-10-CM

## 2024-05-09 DIAGNOSIS — M51.36 DDD (DEGENERATIVE DISC DISEASE), LUMBAR: ICD-10-CM

## 2024-05-09 PROCEDURE — 3288F FALL RISK ASSESSMENT DOCD: CPT | Mod: CPTII,,, | Performed by: NURSE PRACTITIONER

## 2024-05-09 PROCEDURE — 3074F SYST BP LT 130 MM HG: CPT | Mod: CPTII,,, | Performed by: NURSE PRACTITIONER

## 2024-05-09 PROCEDURE — 1159F MED LIST DOCD IN RCRD: CPT | Mod: CPTII,,, | Performed by: NURSE PRACTITIONER

## 2024-05-09 PROCEDURE — 1160F RVW MEDS BY RX/DR IN RCRD: CPT | Mod: CPTII,,, | Performed by: NURSE PRACTITIONER

## 2024-05-09 PROCEDURE — 1101F PT FALLS ASSESS-DOCD LE1/YR: CPT | Mod: CPTII,,, | Performed by: NURSE PRACTITIONER

## 2024-05-09 PROCEDURE — 1125F AMNT PAIN NOTED PAIN PRSNT: CPT | Mod: CPTII,,, | Performed by: NURSE PRACTITIONER

## 2024-05-09 PROCEDURE — 99214 OFFICE O/P EST MOD 30 MIN: CPT | Mod: ,,, | Performed by: NURSE PRACTITIONER

## 2024-05-09 PROCEDURE — 3078F DIAST BP <80 MM HG: CPT | Mod: CPTII,,, | Performed by: NURSE PRACTITIONER

## 2024-05-09 PROCEDURE — 3008F BODY MASS INDEX DOCD: CPT | Mod: CPTII,,, | Performed by: NURSE PRACTITIONER

## 2024-05-09 PROCEDURE — 3044F HG A1C LEVEL LT 7.0%: CPT | Mod: CPTII,,, | Performed by: NURSE PRACTITIONER

## 2024-05-09 PROCEDURE — 4010F ACE/ARB THERAPY RXD/TAKEN: CPT | Mod: CPTII,,, | Performed by: NURSE PRACTITIONER

## 2024-05-09 NOTE — PROGRESS NOTES
"Subjective:      Patient ID: Melquiades Rehman Jr. is a 68 y.o. male.    Chief Complaint: Follow-up (Post op procedure 4/25/24 C/O pain level 4, states procedure helped for a week now pain has returned. Not taking pain meds.)    Referred by: No ref. provider found     HPI:  This is a pleasant 68-year-old male patient presenting as a post operative visit to evaluate effectiveness his right radiofrequency ablation L3-L5 on 04/25/2024.  Patient stated that this helped for about a week and now his pain has returned.  Further investigation revealed patient has not been icing his lumbar axial spine since his right and left radiofrequency ablation.  He has not interested in a Medrol Dosepak as he has diabetes and his sugars are running high.  Patient's pain is constant with activities of standing too long twisting at the waist mowing the yd, etc..  These will elevate his pain score to 7/10.     Patient is also worried about his finances and bills he can not pain right now which include fixing his lawn more so he can work again.  He has a call placed to [email or web address] for financial assistance.  Additionally, patient stated he has not followed up with his PCP about this and has not ever obtained a  consult see if they can provide resources in the community.      Vital signs:   Vitals:    05/09/24 0757 05/09/24 0800   BP: 128/79    Pulse: 89    Weight: 106.6 kg (235 lb)    Height: 5' 11" (1.803 m)    PainSc:    4     Body mass index is 32.78 kg/m².          Interventional Pain History  04/25/2024:Right RFA L3-L5  03/27/2024 Left RFA L3-L5  01/24/2024:  Diagnostic bilateral MBB L3-5  11/22/2023:  Diagnostic bilateral MBB L3-5    ROS:  Back pain       MRI lumbar spine 2022:   FINDINGS:  There are 5 non-rib-bearing lumbar type vertebral bodies.  Alignment is preserved without subluxation.  The vertebral body heights are maintained.  The bone marrow is normal in signal.  There is increased T2 signal in the L1-L2 " disc space, increased from the prior exam, which may be degenerative.     The conus terminates at the level of L1.  It is normal in signal and contour.  There is no epidural fluid collection.     Disc spaces, spinal canal and neural foramina are as follows:     L1-L2: Disc bulge and facet hypertrophy with mild narrowing of the spinal canal, unchanged.  Mild bilateral neural foraminal stenosis.     L2-L3: Disc bulge and facet hypertrophy with mild narrowing of the spinal canal, unchanged.  Mild bilateral neural foraminal stenosis.     L3-L4: Disc bulge and facet hypertrophy with moderate narrowing of the spinal canal, unchanged.  Mild bilateral foraminal stenosis.     L4-L5: Disc bulge and facet hypertrophy with moderate to severe narrowing of the spinal canal, not significantly changed.  Mild right and moderate left neural foraminal stenosis.     L5-S1: Disc bulge and facet hypertrophy.  No significant spinal canal stenosis.  Mild bilateral neural foraminal stenosis.     There are mild inflammatory changes around the right L4-5 facet joint.     Impression:     1. Moderate to severe degenerative narrowing of the spinal canal at L4-5, moderate at L3-L4, mild at L1-L3, not significantly changed.  2. Multilevel neural foraminal stenoses as described.  3. Multilevel facet arthropathy with inflammatory changes on the right at L4-5.        Electronically signed by: Linda Dorsey  Date:                                            12/22/2022  Time:                                           11:35            Objective:          Physical Exam    General: Well developed; overweight; A&O x 3; No anxiety/depression; NAD  Mental Status: Oriented to person, palce and time. Displays appropriate mood & affect.  Head: Norm cephalic and atraumatic  Neck:  No cervical paraspinal banding.  Full range of motion with lateral turning and cervical flexion +extension.  Eyes: normal conjunctiva, normal lids, normal pupils  ENT and mouth:  normal external ear, nose, and no lesions noted on the lips.  Respiratory: Symmetrical, Unlabored. No dyspnea  CV: normal rhythm and rate. No peripheral edema.   Abdomen: Non-distended     Extremities:  Gen: No cyanosis or tenderness to palpation bilateral upper and lower extremities  Skin: Warm, pink, dry, no rashes, no lesions on the lumbar spine  Strength: 5/5 motor strength bilateral upper and lower extremities  ROM: Full ROM in bilateral knees and ankles without pain or instability.     Neuro:  Gait: no altalgic lean, normal toe and heel raise. Independent ambulator.  DTR's: 2+ in bilateral patellar, and ankle  Sensory: Intact to light touch bilateral  upper and lower extremities     Spine: Normal lordosis. No scoliosis  L-spine ROM:  Limited and painful on right with flexion, extension on bilateral rotation,   Straight Leg Raise:  Negative bilaterally  SI Joint: No tenderness to palpation bilaterally.     Assessment:     This is a pleasant 68-year-old male patient presenting as a post operative visit to evaluate effectiveness his right radiofrequency ablation L3-L5 on 04/25/2024.  Patient stated that this helped for about a week and now his pain has returned.  Further investigation revealed patient has not been icing his lumbar axial spine since his right and left radiofrequency ablation.  He has not interested in a Medrol Dosepak as he has diabetes and his sugars are running high.  Patient's pain is constant with activities of standing too long twisting at the waist mowing the yd, etc..  These will elevate his pain score to 7/10.     Patient is also worried about his finances and bills he can not pain right now which include fixing his lawn more so he can work again.  He has a call placed to [employer] for financial assistance.  Additionally, patient stated he has not followed up with his PCP about this and has not ever obtained a  consult see if they can provide resources in the  community.      Plan of care:  Patient was instructed to ice 20 minutes on and 20 minutes off multiple times throughout the day to his bilateral lumbar axial spine with a goal to reduce his anticipated postoperative swelling after his radiofrequency ablation.  Up until this point he has not iced at all.  I am avoiding a Medrol Dosepak as his blood sugars are running high.  Patient also has financial in security in his very stressed about this.  He has no thoughts of homicide or suicide.  I am sending message to his PCP to see if she could send a  consult to assist with resources in the community to help the patient.  He will follow-up as needed as his finances are limited.      Encounter Diagnoses   Name Primary?    Lumbar spondylosis Yes    DDD (degenerative disc disease), lumbar     Lumbar radiculopathy          Plan:       Melquiades was seen today for follow-up.    Diagnoses and all orders for this visit:    Lumbar spondylosis    DDD (degenerative disc disease), lumbar    Lumbar radiculopathy               Past Medical History:   Diagnosis Date    Atrial fibrillation     Central stenosis of spinal canal     Chronic back pain greater than 3 months duration 10/14/2022    Constipation 09/30/2022    Degeneration of lumbar intervertebral disc 07/06/2022    Diastolic dysfunction 05/01/2023    Dysphonia 09/30/2022    Dystrophic nail 06/19/2023    Erectile dysfunction 09/30/2022    Fatty liver     GERD (gastroesophageal reflux disease) 09/30/2022    H/O cataract removal with insertion of prosthetic lens     History of CVA (cerebrovascular accident) 09/30/2022    TIA    History of radial keratotomy 01/01/1998    HTN (hypertension)     Hyperlipidemia 09/30/2022    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/30/2022    Microhematuria 03/30/2023    Overgrown toenails 06/19/2023    Parkinson's disease     Peyronie disease     Polyneuropathy associated with underlying disease 06/19/2023    Rheumatoid arthritis 01/10/2022     Rheumatoid arthritis, unspecified     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    Spinal stenosis of lumbar region 09/30/2022    Type 2 diabetes mellitus without complications     Unspecified macular degeneration     Unspecified macular degeneration        Past Surgical History:   Procedure Laterality Date    BACK SURGERY Left 04/2024    CATARACT EXTRACTION W/  INTRAOCULAR LENS IMPLANT Bilateral     EPIDURAL STEROID INJECTION INTO LUMBAR SPINE      exc cyst Lt ear      INJECTION OF ANESTHETIC AGENT AROUND MEDIAL BRANCH NERVES INNERVATING LUMBAR FACET JOINT Bilateral 11/16/2022    Procedure: Block-nerve-medial branch-lumbar;  Surgeon: Gertrude Blanco MD;  Location: [facility] OR;  Service: Pain Management;  Laterality: Bilateral;  Bilateral L4-L5...Patient is requesting to be first case    INJECTION OF ANESTHETIC AGENT AROUND MEDIAL BRANCH NERVES INNERVATING LUMBAR FACET JOINT Bilateral 11/22/2023    Procedure: Block-nerve-medial branch-lumbar;  Surgeon: Gertrude Blanco MD;  Location: [facility] OR;  Service: Pain Management;  Laterality: Bilateral;  Bilateral MBB L3-5    INJECTION OF ANESTHETIC AGENT AROUND MEDIAL BRANCH NERVES INNERVATING LUMBAR FACET JOINT Bilateral 01/24/2024    Procedure: Block-nerve-medial branch-lumbar;  Surgeon: Gertrude Blanco MD;  Location: [facility] OR;  Service: Pain Management;  Laterality: Bilateral;  Bilateral MBB L3-5    KNEE ARTHROSCOPY Right     RADIOFREQUENCY ABLATION Left 03/27/2024    Procedure: Radiofrequency Ablation;  Surgeon: Gertrude Blanco MD;  Location: [facility] OR;  Service: Pain Management;  Laterality: Left;  RFA Lt L3-L5    RADIOFREQUENCY ABLATION Right 4/25/2024    Procedure: RADIOFREQUENCY ABLATION / Right L3 L5;  Surgeon: Gertrude Blanco MD;  Location: [facility] OR;  Service: Pain Management;  Laterality: Right;  Right RFA L3-L5    VASECTOMY  1998       Family History   Problem Relation Name Age of Onset    Heart failure Mother      Coronary artery disease Mother      Glaucoma Father       Alzheimer's disease Father      Emphysema Father      Autoimmune disease Sister      Pneumonia Sister Jenifer     Dementia Sister Jenifer        Social History     Socioeconomic History    Marital status:     Number of children: 1   Tobacco Use    Smoking status: Former     Current packs/day: 0.00     Average packs/day: 1 pack/day for 15.0 years (15.0 ttl pk-yrs)     Types: Cigarettes     Start date: 1972     Quit date: 1987     Years since quittin.3     Passive exposure: Past    Smokeless tobacco: Never   Substance and Sexual Activity    Alcohol use: Not Currently    Drug use: Not Currently    Sexual activity: Yes     Partners: Female     Social Determinants of Health     Financial Resource Strain: Medium Risk (1/10/2024)    Overall Financial Resource Strain (CARDIA)     Difficulty of Paying Living Expenses: Somewhat hard   Food Insecurity: Food Insecurity Present (1/10/2024)    Hunger Vital Sign     Worried About Running Out of Food in the Last Year: Often true     Ran Out of Food in the Last Year: Often true   Transportation Needs: No Transportation Needs (1/10/2024)    PRAPARE - Transportation     Lack of Transportation (Medical): No     Lack of Transportation (Non-Medical): No   Physical Activity: Inactive (1/10/2024)    Exercise Vital Sign     Days of Exercise per Week: 0 days     Minutes of Exercise per Session: 20 min   Stress: No Stress Concern Present (1/10/2024)    [employer] - Occupational Stress Questionnaire     Feeling of Stress : Only a little   Housing Stability: Low Risk  (1/10/2024)    Housing Stability Vital Sign     Unable to Pay for Housing in the Last Year: No     Number of Places Lived in the Last Year: 1     Unstable Housing in the Last Year: No       Current Outpatient Medications   Medication Sig Dispense Refill    alogliptin-pioglitazone (OSENI) 25-30 mg Tab Take 1 tablet by mouth once daily. 90 tablet 1    ammonium lactate 12 % Crea  Apply 1 application  topically 2 (two) times daily.      blood sugar diagnostic (ACCU-CHEK GUIDE TEST STRIPS) Strp 1 strip by skin prick route Daily. 100 strip 11    carbidopa-levodopa  mg (SINEMET)  mg per tablet Take 2 tablets by mouth 2 (two) times a day.      colchicine (COLCRYS) 0.6 mg tablet Take 1 tablet (0.6 mg total) by mouth 2 (two) times daily. 180 tablet 0    gemfibroziL (LOPID) 600 MG tablet Take 1 tablet (600 mg total) by mouth 2 (two) times daily. 180 tablet 3    lancets (ACCU-CHEK FASTCLIX LANCET DRUM MISC) Accu-Chek Fastclix Lancet Drum      lisinopriL-hydrochlorothiazide (PRINZIDE,ZESTORETIC) 20-25 mg Tab Take 1 tablet by mouth once daily. (Patient taking differently: Take 1 tablet by mouth as needed.) 90 tablet 3    metoprolol succinate (TOPROL-XL) 25 MG 24 hr tablet Take 1 tablet (25 mg total) by mouth once daily. 90 tablet 3    pantoprazole (PROTONIX) 40 MG tablet Take 1 tablet (40 mg total) by mouth once daily. 90 tablet 1    polyethylene glycol (MOVIPREP) 100-7.5-2.691 gram solution Take 2,000 mLs by mouth As instructed (take as directed per instructions provided by GI clinic). 1 kit 0    predniSONE (DELTASONE) 5 MG tablet Take 1 tablet (5 mg total) by mouth once daily. Take 1 tab po qd prn RA flare. 10 tablet 0    rosuvastatin (CRESTOR) 20 MG tablet Take 1 tablet (20 mg total) by mouth once daily. 90 tablet 3    selegiline HCl (ELDEPRYL) 5 mg tablet Take 5 mg by mouth 2 (two) times daily with meals.      tofacitinib (XELJANZ XR) 11 mg Tb24 Take 1 tablet by mouth once daily. 30 tablet 3    vit A/vit C/vit E/zinc/copper (PRESERVISION AREDS ORAL) Take by mouth.      XARELTO 20 mg Tab Take 1 tablet (20 mg total) by mouth once daily. 90 tablet 3     No current facility-administered medications for this visit.       Review of patient's allergies indicates:   Allergen Reactions    Sarilumab Other (See Comments)     Other reaction(s): fainting  Kevzara

## 2024-05-10 ENCOUNTER — HOSPITAL ENCOUNTER (OUTPATIENT)
Dept: WOUND CARE | Facility: HOSPITAL | Age: 69
Discharge: HOME | End: 2024-05-10
Attending: NURSE PRACTITIONER
Payer: MEDICARE

## 2024-05-10 ENCOUNTER — CLINICAL SUPPORT (OUTPATIENT)
Dept: AUDIOLOGY | Facility: HOSPITAL | Age: 69
End: 2024-05-10
Payer: MEDICARE

## 2024-05-10 ENCOUNTER — OFFICE VISIT (OUTPATIENT)
Dept: UROLOGY | Facility: CLINIC | Age: 69
End: 2024-05-10
Payer: MEDICARE

## 2024-05-10 VITALS
HEART RATE: 89 BPM | RESPIRATION RATE: 18 BRPM | TEMPERATURE: 98 F | OXYGEN SATURATION: 95 % | WEIGHT: 234 LBS | DIASTOLIC BLOOD PRESSURE: 86 MMHG | HEIGHT: 71 IN | BODY MASS INDEX: 32.76 KG/M2 | SYSTOLIC BLOOD PRESSURE: 147 MMHG

## 2024-05-10 VITALS
RESPIRATION RATE: 18 BRPM | HEART RATE: 79 BPM | OXYGEN SATURATION: 95 % | DIASTOLIC BLOOD PRESSURE: 85 MMHG | TEMPERATURE: 98 F | SYSTOLIC BLOOD PRESSURE: 156 MMHG

## 2024-05-10 DIAGNOSIS — H93.13 TINNITUS AURIUM, BILATERAL: ICD-10-CM

## 2024-05-10 DIAGNOSIS — E11.9 ENCOUNTER FOR COMPREHENSIVE DIABETIC FOOT EXAMINATION, TYPE 2 DIABETES MELLITUS: Primary | ICD-10-CM

## 2024-05-10 DIAGNOSIS — L60.2 OVERGROWN TOENAILS: ICD-10-CM

## 2024-05-10 DIAGNOSIS — R31.29 MICROHEMATURIA: Primary | ICD-10-CM

## 2024-05-10 DIAGNOSIS — H90.3 SENSORINEURAL HEARING LOSS (SNHL) OF BOTH EARS: ICD-10-CM

## 2024-05-10 DIAGNOSIS — E11.42 TYPE 2 DIABETES MELLITUS WITH PERIPHERAL NEUROPATHY: ICD-10-CM

## 2024-05-10 DIAGNOSIS — L85.3 XEROSIS OF SKIN: ICD-10-CM

## 2024-05-10 DIAGNOSIS — H90.3 ASYMMETRIC SNHL (SENSORINEURAL HEARING LOSS): ICD-10-CM

## 2024-05-10 DIAGNOSIS — H90.3 SENSORINEURAL HEARING LOSS, BILATERAL: Primary | ICD-10-CM

## 2024-05-10 DIAGNOSIS — L60.3 DYSTROPHIC NAIL: ICD-10-CM

## 2024-05-10 DIAGNOSIS — G63 POLYNEUROPATHY ASSOCIATED WITH UNDERLYING DISEASE: ICD-10-CM

## 2024-05-10 PROCEDURE — 92567 TYMPANOMETRY: CPT | Performed by: AUDIOLOGIST

## 2024-05-10 PROCEDURE — 3008F BODY MASS INDEX DOCD: CPT | Mod: CPTII,,, | Performed by: NURSE PRACTITIONER

## 2024-05-10 PROCEDURE — 3288F FALL RISK ASSESSMENT DOCD: CPT | Mod: CPTII,,, | Performed by: NURSE PRACTITIONER

## 2024-05-10 PROCEDURE — 1159F MED LIST DOCD IN RCRD: CPT | Mod: CPTII,,, | Performed by: NURSE PRACTITIONER

## 2024-05-10 PROCEDURE — 3044F HG A1C LEVEL LT 7.0%: CPT | Mod: CPTII,,, | Performed by: NURSE PRACTITIONER

## 2024-05-10 PROCEDURE — 1160F RVW MEDS BY RX/DR IN RCRD: CPT | Mod: CPTII,,, | Performed by: NURSE PRACTITIONER

## 2024-05-10 PROCEDURE — 99213 OFFICE O/P EST LOW 20 MIN: CPT | Mod: S$PBB,,, | Performed by: NURSE PRACTITIONER

## 2024-05-10 PROCEDURE — 1126F AMNT PAIN NOTED NONE PRSNT: CPT | Mod: CPTII,,, | Performed by: NURSE PRACTITIONER

## 2024-05-10 PROCEDURE — 99211 OFF/OP EST MAY X REQ PHY/QHP: CPT | Mod: 25

## 2024-05-10 PROCEDURE — 99215 OFFICE O/P EST HI 40 MIN: CPT | Mod: PBBFAC,25,27 | Performed by: NURSE PRACTITIONER

## 2024-05-10 PROCEDURE — 3079F DIAST BP 80-89 MM HG: CPT | Mod: CPTII,,, | Performed by: NURSE PRACTITIONER

## 2024-05-10 PROCEDURE — 11719 TRIM NAIL(S) ANY NUMBER: CPT | Mod: Q9,,, | Performed by: NURSE PRACTITIONER

## 2024-05-10 PROCEDURE — 99499 UNLISTED E&M SERVICE: CPT | Mod: ,,, | Performed by: NURSE PRACTITIONER

## 2024-05-10 PROCEDURE — 27000999 HC MEDICAL RECORD PHOTO DOCUMENTATION

## 2024-05-10 PROCEDURE — 92557 COMPREHENSIVE HEARING TEST: CPT | Performed by: AUDIOLOGIST

## 2024-05-10 PROCEDURE — 11719 TRIM NAIL(S) ANY NUMBER: CPT

## 2024-05-10 PROCEDURE — 4010F ACE/ARB THERAPY RXD/TAKEN: CPT | Mod: CPTII,,, | Performed by: NURSE PRACTITIONER

## 2024-05-10 PROCEDURE — 3077F SYST BP >= 140 MM HG: CPT | Mod: CPTII,,, | Performed by: NURSE PRACTITIONER

## 2024-05-10 PROCEDURE — 1101F PT FALLS ASSESS-DOCD LE1/YR: CPT | Mod: CPTII,,, | Performed by: NURSE PRACTITIONER

## 2024-05-10 NOTE — PROGRESS NOTES
Hearing Evaluation        Patient History: Mr. Rehman has a long-standing hearing loss reporting no changes in hearing since previous evaluation. Tinnitus, vertigo and middle ear issues are denied. All additional history is unremarkable.        Test Results:                    Pure Tone Testing:      Right ear:       Mild to moderately severe sensorineural hearing loss from 250-8kHz. Speech reception threshold is in agreement with puretone findings.  Discrimination score of 80% is considered good.        Left ear:          Mild to profound sensorineural hearing loss from 250-8kHz. Speech reception threshold is in agreement with puretone findings.  Discrimination score of 76% is considered good.                                                                            Tympanometry:                                           Right ear:       Type 'A' tymp, normal middle ear pressure/function     Left ear:          Type 'As' tymp, minimal (.18mL) compliance                                Interpretations:      Behavioral test findings indicate a slight decrease in hearing since previous evaluation. Speech reception thresholds obtained at 20dB, AU, and are in agreement with puretone findings. Speech discrimination scores of 80%, AD and 76%, AS, are considered good.  Immittance measures indicate normal middle ear pressure/function, right ear and minimal TM mobility, left ear.            Recommendations:   1. Annual hearing evaluations  2. Binaural amplification (Information on [place] given)

## 2024-05-10 NOTE — PROGRESS NOTES
Chief Complaint:   Chief Complaint   Patient presents with    Follow up hematuria        HPI:  Patient is a 68-year-old male here for six-month follow-up hematuria.  Patient states PCP noted a urinalysis with trace hematuria.  Patient seen by Dr. Morocho for a cystoscope on 03/25/2024 with moderate enlarged lobes and hypervascular area on the left anterior bladder wall, patient to repeat cystoscope in 3 months which is scheduled in June.  Current PSA 0.76 on 04/09/2024.  Mild decreased from previous PSA 0.87.  Today patient presents without any complaints of dysuria, urinary frequency, urinary hesitancy, urinary incontinence, urinary retention, gross hematuria, nocturia.  Instructed patient to follow-up with Dr. Morocho on the June appointment as scheduled for a repeat cystoscope and will RTC after his next appointment.      Allergies:  Review of patient's allergies indicates:   Allergen Reactions    Sarilumab Other (See Comments)     Other reaction(s): fainting  Kevzara       Medications:  Current Outpatient Medications   Medication Sig Dispense Refill    alogliptin-pioglitazone (OSENI) 25-30 mg Tab Take 1 tablet by mouth once daily. 90 tablet 1    ammonium lactate 12 % Crea Apply 1 application  topically 2 (two) times daily.      blood sugar diagnostic (ACCU-CHEK GUIDE TEST STRIPS) Strp 1 strip by skin prick route Daily. 100 strip 11    carbidopa-levodopa  mg (SINEMET)  mg per tablet Take 2 tablets by mouth 2 (two) times a day.      colchicine (COLCRYS) 0.6 mg tablet Take 1 tablet (0.6 mg total) by mouth 2 (two) times daily. 180 tablet 0    gemfibroziL (LOPID) 600 MG tablet Take 1 tablet (600 mg total) by mouth 2 (two) times daily. 180 tablet 3    lancets (ACCU-CHEK FASTCLIX LANCET DRUM MISC) Accu-Chek Fastclix Lancet Drum      lisinopriL-hydrochlorothiazide (PRINZIDE,ZESTORETIC) 20-25 mg Tab Take 1 tablet by mouth once daily. (Patient taking differently: Take 1 tablet by mouth as needed.) 90 tablet 3     metoprolol succinate (TOPROL-XL) 25 MG 24 hr tablet Take 1 tablet (25 mg total) by mouth once daily. 90 tablet 3    pantoprazole (PROTONIX) 40 MG tablet Take 1 tablet (40 mg total) by mouth once daily. 90 tablet 1    polyethylene glycol (MOVIPREP) 100-7.5-2.691 gram solution Take 2,000 mLs by mouth As instructed (take as directed per instructions provided by GI clinic). 1 kit 0    predniSONE (DELTASONE) 5 MG tablet Take 1 tablet (5 mg total) by mouth once daily. Take 1 tab po qd prn RA flare. 10 tablet 0    rosuvastatin (CRESTOR) 20 MG tablet Take 1 tablet (20 mg total) by mouth once daily. 90 tablet 3    selegiline HCl (ELDEPRYL) 5 mg tablet Take 5 mg by mouth 2 (two) times daily with meals.      tofacitinib (XELJANZ XR) 11 mg Tb24 Take 1 tablet by mouth once daily. 30 tablet 3    vit A/vit C/vit E/zinc/copper (PRESERVISION AREDS ORAL) Take by mouth.      XARELTO 20 mg Tab Take 1 tablet (20 mg total) by mouth once daily. 90 tablet 3     No current facility-administered medications for this visit.       Review of Systems:  General: No fever, chills, fatigability, or weight loss.  Skin: No rashes, itching, or changes in color or texture of skin.  Chest: Denies BRAGG, cyanosis, wheezing, cough, and sputum production.  Abdomen: Appetite fine. No weight loss. Denies diarrhea, abdominal pain, hematemesis, or blood in stool.  Musculoskeletal: No joint stiffness or swelling. Denies back pain.  : As above.  All other review of systems negative.    PMH:  Past Medical History:   Diagnosis Date    Atrial fibrillation     Central stenosis of spinal canal     Chronic back pain greater than 3 months duration 10/14/2022    Constipation 09/30/2022    Degeneration of lumbar intervertebral disc 07/06/2022    Diastolic dysfunction 05/01/2023    Dysphonia 09/30/2022    Dystrophic nail 06/19/2023    Erectile dysfunction 09/30/2022    Fatty liver     GERD (gastroesophageal reflux disease) 09/30/2022    H/O cataract removal with  insertion of prosthetic lens     History of CVA (cerebrovascular accident) 09/30/2022    TIA    History of radial keratotomy 01/01/1998    HTN (hypertension)     Hyperlipidemia 09/30/2022    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/30/2022    Microhematuria 03/30/2023    Overgrown toenails 06/19/2023    Parkinson's disease     Peyronie disease     Polyneuropathy associated with underlying disease 06/19/2023    Rheumatoid arthritis 01/10/2022    Rheumatoid arthritis, unspecified     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    Spinal stenosis of lumbar region 09/30/2022    Type 2 diabetes mellitus without complications     Unspecified macular degeneration     Unspecified macular degeneration        PSH:  Past Surgical History:   Procedure Laterality Date    BACK SURGERY Left 04/2024    CATARACT EXTRACTION W/  INTRAOCULAR LENS IMPLANT Bilateral     EPIDURAL STEROID INJECTION INTO LUMBAR SPINE      exc cyst Lt ear      INJECTION OF ANESTHETIC AGENT AROUND MEDIAL BRANCH NERVES INNERVATING LUMBAR FACET JOINT Bilateral 11/16/2022    Procedure: Block-nerve-medial branch-lumbar;  Surgeon: Gertrude Blanco MD;  Location: [facility] OR;  Service: Pain Management;  Laterality: Bilateral;  Bilateral L4-L5...Patient is requesting to be first case    INJECTION OF ANESTHETIC AGENT AROUND MEDIAL BRANCH NERVES INNERVATING LUMBAR FACET JOINT Bilateral 11/22/2023    Procedure: Block-nerve-medial branch-lumbar;  Surgeon: Gertrude Blanco MD;  Location: [facility] OR;  Service: Pain Management;  Laterality: Bilateral;  Bilateral MBB L3-5    INJECTION OF ANESTHETIC AGENT AROUND MEDIAL BRANCH NERVES INNERVATING LUMBAR FACET JOINT Bilateral 01/24/2024    Procedure: Block-nerve-medial branch-lumbar;  Surgeon: Gertrude Blanco MD;  Location: [facility] OR;  Service: Pain Management;  Laterality: Bilateral;  Bilateral MBB L3-5    KNEE ARTHROSCOPY Right     RADIOFREQUENCY ABLATION Left 03/27/2024    Procedure: Radiofrequency Ablation;  Surgeon: Gertrude Blanco MD;  Location: [facility]  OR;  Service: Pain Management;  Laterality: Left;  RFA Lt L3-L5    RADIOFREQUENCY ABLATION Right 2024    Procedure: RADIOFREQUENCY ABLATION / Right L3 L5;  Surgeon: Gertrude Blanco MD;  Location: [facility] OR;  Service: Pain Management;  Laterality: Right;  Right RFA L3-L5    VASECTOMY         FamHx:  Family History   Problem Relation Name Age of Onset    Heart failure Mother      Coronary artery disease Mother      Glaucoma Father      Alzheimer's disease Father      Emphysema Father      Autoimmune disease Sister      Pneumonia Sister Jenifer     Dementia Sister Jenifer        SocHx:  Social History     Socioeconomic History    Marital status:     Number of children: 1   Tobacco Use    Smoking status: Former     Current packs/day: 0.00     Average packs/day: 1 pack/day for 15.0 years (15.0 ttl pk-yrs)     Types: Cigarettes     Start date: 1972     Quit date: 1987     Years since quittin.3     Passive exposure: Past    Smokeless tobacco: Never   Substance and Sexual Activity    Alcohol use: Not Currently    Drug use: Not Currently    Sexual activity: Yes     Partners: Female     Social Determinants of Health     Financial Resource Strain: Medium Risk (1/10/2024)    Overall Financial Resource Strain (CARDIA)     Difficulty of Paying Living Expenses: Somewhat hard   Food Insecurity: Food Insecurity Present (1/10/2024)    Hunger Vital Sign     Worried About Running Out of Food in the Last Year: Often true     Ran Out of Food in the Last Year: Often true   Transportation Needs: No Transportation Needs (1/10/2024)    PRAPARE - Transportation     Lack of Transportation (Medical): No     Lack of Transportation (Non-Medical): No   Physical Activity: Inactive (1/10/2024)    Exercise Vital Sign     Days of Exercise per Week: 0 days     Minutes of Exercise per Session: 20 min   Stress: No Stress Concern Present (1/10/2024)    [employer] [place] [employer] - Occupational Stress  Questionnaire     Feeling of Stress : Only a little   Housing Stability: Low Risk  (1/10/2024)    Housing Stability Vital Sign     Unable to Pay for Housing in the Last Year: No     Number of Places Lived in the Last Year: 1     Unstable Housing in the Last Year: No       Physical Exam:  Vitals:    05/10/24 1210   BP: (!) 147/86   Pulse: 89   Resp: 18   Temp: 97.9 °F (36.6 °C)     General: A&Ox3, no apparent distress, no deformities  Neck: No masses, normal thyroid  Lungs: CTA estelita, no use of accessory muscles  Heart: RRR, no arrhythmias  Abdomen: Soft, NT, ND, no masses, no hernias, no hepatosplenomegaly  Lymphatic: Neck and groin nodes negative  Skin: The skin is warm and dry. No jaundice.  Ext: No c/c/e.    [place]: Deferred rectal exam     Labs:    Latest Reference Range & Units 09/06/18 09:26 09/18/19 09:39 07/22/20 07:04 09/25/20 08:32 05/25/21 08:33 09/26/22 06:38 11/10/23 13:56 04/09/24 06:51   Prostate Specific Antigen <=4.00 ng/mL 0.87 1.06 1.1 1.3 2.26 0.91 0.87 0.76         Imaging:       Impression:  BPH  Hematuria    Plan:  Instructed patient to keep appointment Dr. Morocho for cystoscope in June.  Instructed patient develops any abnormal urologic symptoms notify clinic to be re-evaluate treated RTC after cystoscope.

## 2024-05-10 NOTE — PROGRESS NOTES
TODAY'S VISIT NOTE WAS IMPORTED FROM LAST WOUND CLINIC VISIT OF 2/2/24.  I ATTEST THAT I REVIEWED THE HPI, ROS, LABS, WOUND-RELATED IMAGING, PHYSICAL EXAMINATION, EVALUATION AND PLAN SECTIONS OF IMPORTED NOTE AND REVISED TODAY'S VISIT NOTE TO REFLECT TODAY'S NEW ASSESSMENT FINDINGS AND TODAY'S UPDATES TO WOUND TREATMENT PLAN.          CHIEF COMPLAINT:    Routine diabetic foot care - Medicare      HISTORY OF  PRESENT ILLNESS:  68 y.o. White male being seen today for routine diabetic foot care.    Followed by DAVIDSON Balderas for PCP.   Last visit with Ms. Recio was on 4/9/24.   No hx of vascular work-ups or interventions.  Never smoker.  .  Disabled .  Quit smoking in 1987; was a light smoker.  History includes:        Past Medical History:   Diagnosis Date    Atrial fibrillation     Central stenosis of spinal canal     Chronic back pain greater than 3 months duration 10/14/2022    Constipation 09/30/2022    Degeneration of lumbar intervertebral disc 07/06/2022    Diastolic dysfunction 05/01/2023    Dysphonia 09/30/2022    Dystrophic nail 06/19/2023    Erectile dysfunction 09/30/2022    Fatty liver     GERD (gastroesophageal reflux disease) 09/30/2022    H/O cataract removal with insertion of prosthetic lens     History of CVA (cerebrovascular accident) 09/30/2022    TIA    History of radial keratotomy 01/01/1998    HTN (hypertension)     Hyperlipidemia 09/30/2022    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/30/2022    Microhematuria 03/30/2023    Overgrown toenails 06/19/2023    Parkinson's disease     Peyronie disease     Polyneuropathy associated with underlying disease 06/19/2023    Rheumatoid arthritis 01/10/2022    Rheumatoid arthritis, unspecified     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    Spinal stenosis of lumbar region 09/30/2022    Type 2 diabetes mellitus without complications     Unspecified macular degeneration     Unspecified macular degeneration       Past Surgical History:   Procedure Laterality  Date    BACK SURGERY Left 2024    CATARACT EXTRACTION W/  INTRAOCULAR LENS IMPLANT Bilateral     EPIDURAL STEROID INJECTION INTO LUMBAR SPINE      exc cyst Lt ear      INJECTION OF ANESTHETIC AGENT AROUND MEDIAL BRANCH NERVES INNERVATING LUMBAR FACET JOINT Bilateral 2022    Procedure: Block-nerve-medial branch-lumbar;  Surgeon: Gertrude Blanco MD;  Location: [facility] OR;  Service: Pain Management;  Laterality: Bilateral;  Bilateral L4-L5...Patient is requesting to be first case    INJECTION OF ANESTHETIC AGENT AROUND MEDIAL BRANCH NERVES INNERVATING LUMBAR FACET JOINT Bilateral 2023    Procedure: Block-nerve-medial branch-lumbar;  Surgeon: Gertrude Blanco MD;  Location: [facility] OR;  Service: Pain Management;  Laterality: Bilateral;  Bilateral MBB L3-5    INJECTION OF ANESTHETIC AGENT AROUND MEDIAL BRANCH NERVES INNERVATING LUMBAR FACET JOINT Bilateral 2024    Procedure: Block-nerve-medial branch-lumbar;  Surgeon: Gertrude Blanco MD;  Location: [facility] OR;  Service: Pain Management;  Laterality: Bilateral;  Bilateral MBB L3-5    KNEE ARTHROSCOPY Right     RADIOFREQUENCY ABLATION Left 2024    Procedure: Radiofrequency Ablation;  Surgeon: Gertrude Blanco MD;  Location: [facility] OR;  Service: Pain Management;  Laterality: Left;  RFA Lt L3-L5    RADIOFREQUENCY ABLATION Right 2024    Procedure: RADIOFREQUENCY ABLATION / Right L3 L5;  Surgeon: Gertrude Blanco MD;  Location: [facility] OR;  Service: Pain Management;  Laterality: Right;  Right RFA L3-L5    VASECTOMY        Social History     Socioeconomic History    Marital status:     Number of children: 1   Tobacco Use    Smoking status: Former     Current packs/day: 0.00     Average packs/day: 1 pack/day for 15.0 years (15.0 ttl pk-yrs)     Types: Cigarettes     Start date: 1972     Quit date: 1987     Years since quittin.3     Passive exposure: Past    Smokeless tobacco: Never   Substance and Sexual Activity    Alcohol use:  Not Currently    Drug use: Not Currently    Sexual activity: Yes     Partners: Female     Social Determinants of Health     Financial Resource Strain: Medium Risk (1/10/2024)    Overall Financial Resource Strain (CARDIA)     Difficulty of Paying Living Expenses: Somewhat hard   Food Insecurity: Food Insecurity Present (1/10/2024)    Hunger Vital Sign     Worried About Running Out of Food in the Last Year: Often true     Ran Out of Food in the Last Year: Often true   Transportation Needs: No Transportation Needs (1/10/2024)    PRAPARE - Transportation     Lack of Transportation (Medical): No     Lack of Transportation (Non-Medical): No   Physical Activity: Inactive (1/10/2024)    Exercise Vital Sign     Days of Exercise per Week: 0 days     Minutes of Exercise per Session: 20 min   Stress: No Stress Concern Present (1/10/2024)    [employer] [place] [employer] - Occupational Stress Questionnaire     Feeling of Stress : Only a little   Housing Stability: Low Risk  (1/10/2024)    Housing Stability Vital Sign     Unable to Pay for Housing in the Last Year: No     Number of Places Lived in the Last Year: 1     Unstable Housing in the Last Year: No       Review of Most Recent Wound Care-Related Labs:  Lab Results   Component Value Date/Time    WBC 5.41 04/09/2024 06:51 AM    RBC 4.49 (L) 04/09/2024 06:51 AM    HGB 14.7 04/09/2024 06:51 AM    HCT 43.3 04/09/2024 06:51 AM    IRON 128 02/08/2022 09:14 AM    FERRITIN 816.80 (H) 12/09/2020 06:35 AM    MCV 96.4 (H) 04/09/2024 06:51 AM    MCH 32.7 (H) 04/09/2024 06:51 AM    CREATININE 1.26 (H) 04/09/2024 06:51 AM    HGBA1C 6.3 04/09/2024 06:51 AM    CRP 4.80 01/11/2024 11:45 AM        Today 5/10/24:  Had a back procedure done by pain management provider since last visit; procedure ineffective; still having daily and constant pain in back.  Has pain in both thighs with certain positional changes.  Feet always feet cold.  Having ongoing pain, numbness, and tingling in  feet and toes.  Ankles get puffy by end of the day, most days.  Toenails are too thick for him to cut on his own.  Has no one available who can cut them for him.   Diabetes well-controlled, per last labs.   No new rashes, lesions, or skin breakdown.   Applying Lac-Hydrin and Vaseline to feet twice/day, as prescribed.  Dry skin much better with that tx plan.  Checking feet daily.  Wearing closed toe, heel shoes while out of bed. Wearing socks with shoes now.   Having increased financial stressors, with  and air conditioning having broken down.      2/2/24:  Had a back injection since last visit and pain is much better.  Plans on having nerves on one side of spine burned at some point in near future.  Continues having numbness, pain, and tingling in feet on a daily basis.  Ankles continue to swell; however, no worse than usual baseline.  Wearing closed heel and toe shoes now with socks.  Checking feet more often these days.   .     9/21/23:  Reports numbness and burning in all toes on daily basis.  Does have pain in calves with walking distances, as well as the left thigh now.  Not aware of his feet feeling cold.  Has occasional swelling in bilateral outer and inner ankles.  Continues going barefoot; however, doing that half as much as he used to.  Checking his feet and toes more often than before; however, not daily, as instructed.   He no longer wears CROCs; however, is wearing shoes without socks.        REVIEW OF SYSTEMS:  Except as stated in HPI, all other 10 body systems normal.       PHYSICAL EXAMINATION AND VITAL SIGNS:    Vitals:    05/10/24 0755   BP: (!) 156/85   Pulse: 79   Resp: 18   Temp: 98.1 °F (36.7 °C)       There is no height or weight on file to calculate BMI.      General:   Hypertensive, asymptomatic with; afebrile.  Overweight, in no acute distress.   Respiratory:   Breathing even, quiet, and unlabored at rest.  No coughing.  Cardiovascular:   Mild non-pitting edema in bilateral  ankles.  Bilateral DP and PT pulses palpated.  No hair distribution over BLE and toes.  Generalized pallor and in feet and toes.  Mild erythema in distal toes.   Generalized mild xerosis over bilateral plantar feet.  Distal toes cool to touch.       Musculoskeletal:    Full range of motion of all extremities.  Ambulates without assistive device.   Normal dorsiflexion and flexion in feet and hallux toes.  Normal intrinsic muscle ROM bilaterally.    Neurologic:  A&O X 3.  Cranial nerves grossly intact.  Normal monofilament testing.  No LOPS in either foot.    Psychiatric:  Calm, cooperative.  Mood and effect normal.  Responses appropriate.   Integumentary:        10 overgrown and dystrophic toenails.      Generalized mild xerosis over bilateral plantar feet.                              ASSESSMENT AND PLAN:    Encounter for diabetic foot care, non-insulin dependent:  Last visit with PCP, Pau Recio, APRN:   4/9/24  Diabetes well-controlled per last HgbA1C.     Diabetic foot care teaching reinforced with focused teaching towards proper foot and socks wear, and to not go barefoot while out of bed, with rationale.  Wound clinic and [facility] precautions reinforced.      Class B Findings:  3 of the following are present:  Abnormal hair growth  Thickened nails  Xerosis of bilateral plantar feet  Skin color (pallor in feet, erythema in distal toes)  Q8 - modifier    Class C Findings:  3 of the following are present:  Cold Feet  Edema  Burning in Feet  Paresthesias (abnormal spontaneous sensations in feet  Q9 - modifier:  One Class B and two Class C findings                                      PROCEDURE NOTE    Procedure Today:  cutting and filing of 10 toenails.  Rationale:  Overgrown toenails, as patient presented today, can lead to diabetic foot/toe ulcers, osteomyelitis, and lower limb amputations.   Informed verbal consent obtained.  Using standard podiatry clippers and [place] boards, I cut and filed 10 toenails.  No  bleeding or discomfort during procedure.  Patient tolerated procedure without complications.       Xerosis of bilateral feet:  Improved with current tx plan; CPM  Teaching reinforced on underlying cause of condition, s/s of condition, complications, and treatment options of condition.    Prescribed Lac-Hydrin 12%, followed by thin layer of Vaseline twice/day.    Instructions reinforced to not put creams/lotions between toes, with rationale.                          Return to clinic in 3 months.  Teaching reinforced on s/s to call wound clinic for promptly.

## 2024-05-16 ENCOUNTER — OFFICE VISIT (OUTPATIENT)
Dept: CARDIOLOGY | Facility: CLINIC | Age: 69
End: 2024-05-16
Payer: MEDICARE

## 2024-05-16 VITALS
OXYGEN SATURATION: 99 % | WEIGHT: 234 LBS | HEART RATE: 85 BPM | DIASTOLIC BLOOD PRESSURE: 76 MMHG | TEMPERATURE: 98 F | BODY MASS INDEX: 32.76 KG/M2 | HEIGHT: 71 IN | RESPIRATION RATE: 16 BRPM | SYSTOLIC BLOOD PRESSURE: 137 MMHG

## 2024-05-16 DIAGNOSIS — E78.5 HYPERLIPIDEMIA, UNSPECIFIED HYPERLIPIDEMIA TYPE: ICD-10-CM

## 2024-05-16 DIAGNOSIS — I51.89 DIASTOLIC DYSFUNCTION: ICD-10-CM

## 2024-05-16 DIAGNOSIS — I48.92 ATRIAL FIBRILLATION AND FLUTTER: Primary | ICD-10-CM

## 2024-05-16 DIAGNOSIS — I48.91 ATRIAL FIBRILLATION AND FLUTTER: Primary | ICD-10-CM

## 2024-05-16 DIAGNOSIS — I10 PRIMARY HYPERTENSION: ICD-10-CM

## 2024-05-16 PROCEDURE — 99215 OFFICE O/P EST HI 40 MIN: CPT | Mod: PBBFAC | Performed by: INTERNAL MEDICINE

## 2024-05-16 RX ORDER — LISINOPRIL 10 MG/1
10 TABLET ORAL DAILY
Qty: 90 TABLET | Refills: 3 | Status: SHIPPED | OUTPATIENT
Start: 2024-05-16 | End: 2025-05-16

## 2024-05-16 NOTE — PROGRESS NOTES
Mr. Melquiades Rehman Jr. is a 68 y.o. male with:   Chief Complaint   Patient presents with    6 months follow up          HPI  Mr. Melquiades Rehman Jr. was seen in Cardiology Clinic.  The patient has Afib and HTN  Today the patient is seen for a routine appointment. Denies acute complaints today. Denies chest pain, palpitations, SOB on exertion, and LE edema. Reports that he is currently taking Lisinopril-HCTZ on PRN basis. States that BP runs from 110s/70s-150s/80's at home.     ROS:  Please see HPI    PMH:    Patient Active Problem List   Diagnosis    Family history of colonic polyps    High risk medication use    History of CVA (cerebrovascular accident)    Atrial fibrillation and flutter    Chronic pain syndrome    Degeneration of lumbar intervertebral disc    Controlled type 2 diabetes mellitus without complication, without long-term current use of insulin    Dysphonia    Erectile dysfunction    GERD (gastroesophageal reflux disease)    History of right cataract extraction    Hyperlipidemia    Hypertension    Induratio penis plastica    Obesity    Parkinson's disease    Seropositive rheumatoid arthritis of multiple sites    Sixth nerve palsy    Spinal stenosis at L4-L5 level    Steatosis of liver    Constipation    Chronic back pain greater than 3 months duration    Lumbar spondylosis    Decreased GFR    Other microscopic hematuria    Diastolic dysfunction    Encounter for comprehensive diabetic foot examination, type 2 diabetes mellitus    Dystrophic nail    Overgrown toenails    Type 2 diabetes mellitus with peripheral neuropathy    Polyneuropathy associated with underlying disease    Stage 3a chronic kidney disease    ILD (interstitial lung disease)    Foreign body in right foot    Macrocytic anemia    Colon cancer screening     Surgical Hx:    Past Surgical History:   Procedure Laterality Date    BACK SURGERY Left 04/2024    CATARACT EXTRACTION W/  INTRAOCULAR LENS IMPLANT Bilateral     EPIDURAL STEROID  INJECTION INTO LUMBAR SPINE      exc cyst Lt ear      INJECTION OF ANESTHETIC AGENT AROUND MEDIAL BRANCH NERVES INNERVATING LUMBAR FACET JOINT Bilateral 2022    Procedure: Block-nerve-medial branch-lumbar;  Surgeon: Gertrude Blanco MD;  Location: [facility] OR;  Service: Pain Management;  Laterality: Bilateral;  Bilateral L4-L5...Patient is requesting to be first case    INJECTION OF ANESTHETIC AGENT AROUND MEDIAL BRANCH NERVES INNERVATING LUMBAR FACET JOINT Bilateral 2023    Procedure: Block-nerve-medial branch-lumbar;  Surgeon: Gertrude Blanco MD;  Location: [facility] OR;  Service: Pain Management;  Laterality: Bilateral;  Bilateral MBB L3-5    INJECTION OF ANESTHETIC AGENT AROUND MEDIAL BRANCH NERVES INNERVATING LUMBAR FACET JOINT Bilateral 2024    Procedure: Block-nerve-medial branch-lumbar;  Surgeon: Gertrude Blanco MD;  Location: [facility] OR;  Service: Pain Management;  Laterality: Bilateral;  Bilateral MBB L3-5    KNEE ARTHROSCOPY Right     RADIOFREQUENCY ABLATION Left 2024    Procedure: Radiofrequency Ablation;  Surgeon: Gertrude Blanco MD;  Location: [facility] OR;  Service: Pain Management;  Laterality: Left;  RFA Lt L3-L5    RADIOFREQUENCY ABLATION Right 2024    Procedure: RADIOFREQUENCY ABLATION / Right L3 L5;  Surgeon: Gertrude Blanco MD;  Location: [facility] OR;  Service: Pain Management;  Laterality: Right;  Right RFA L3-L5    VASECTOMY         Social History     Socioeconomic History    Marital status:     Number of children: 1   Tobacco Use    Smoking status: Former     Current packs/day: 0.00     Average packs/day: 1 pack/day for 15.0 years (15.0 ttl pk-yrs)     Types: Cigarettes     Start date: 1972     Quit date: 1987     Years since quittin.3     Passive exposure: Past    Smokeless tobacco: Never   Substance and Sexual Activity    Alcohol use: Not Currently     Comment: occasional    Drug use: Yes     Types: Marijuana    Sexual activity: Yes     Partners:  Female     Social Determinants of Health     Financial Resource Strain: Medium Risk (1/10/2024)    Overall Financial Resource Strain (CARDIA)     Difficulty of Paying Living Expenses: Somewhat hard   Food Insecurity: Food Insecurity Present (1/10/2024)    Hunger Vital Sign     Worried About Running Out of Food in the Last Year: Often true     Ran Out of Food in the Last Year: Often true   Transportation Needs: No Transportation Needs (1/10/2024)    PRAPARE - Transportation     Lack of Transportation (Medical): No     Lack of Transportation (Non-Medical): No   Physical Activity: Inactive (1/10/2024)    Exercise Vital Sign     Days of Exercise per Week: 0 days     Minutes of Exercise per Session: 20 min   Stress: No Stress Concern Present (1/10/2024)    [employer] [place] [employer] - Occupational Stress Questionnaire     Feeling of Stress : Only a little   Housing Stability: Low Risk  (1/10/2024)    Housing Stability Vital Sign     Unable to Pay for Housing in the Last Year: No     Number of Places Lived in the Last Year: 1     Unstable Housing in the Last Year: No       Family History   Problem Relation Name Age of Onset    Heart failure Mother      Coronary artery disease Mother      Glaucoma Father      Alzheimer's disease Father      Emphysema Father      Autoimmune disease Sister      Pneumonia Sister Jenifer     Dementia Sister Jenifer        Review of patient's allergies indicates:   Allergen Reactions    Sarilumab Other (See Comments)     Other reaction(s): fainting  Kevzara       Current Medications:    Current Outpatient Medications:     alogliptin-pioglitazone (OSENI) 25-30 mg Tab, Take 1 tablet by mouth once daily., Disp: 90 tablet, Rfl: 1    ammonium lactate 12 % Crea, Apply 1 application  topically 2 (two) times daily., Disp: , Rfl:     blood sugar diagnostic (ACCU-CHEK GUIDE TEST STRIPS) Strp, 1 strip by skin prick route Daily., Disp: 100 strip, Rfl: 11    carbidopa-levodopa  mg  (SINEMET)  mg per tablet, Take 2 tablets by mouth 2 (two) times a day., Disp: , Rfl:     colchicine (COLCRYS) 0.6 mg tablet, Take 1 tablet (0.6 mg total) by mouth 2 (two) times daily., Disp: 180 tablet, Rfl: 0    gemfibroziL (LOPID) 600 MG tablet, Take 1 tablet (600 mg total) by mouth 2 (two) times daily., Disp: 180 tablet, Rfl: 3    lancets (ACCU-CHEK FASTCLIX LANCET DRUM MISC), Accu-Chek Fastclix Lancet Drum, Disp: , Rfl:     lisinopriL-hydrochlorothiazide (PRINZIDE,ZESTORETIC) 20-25 mg Tab, Take 1 tablet by mouth once daily. (Patient taking differently: Take 1 tablet by mouth as needed.), Disp: 90 tablet, Rfl: 3    metoprolol succinate (TOPROL-XL) 25 MG 24 hr tablet, Take 1 tablet (25 mg total) by mouth once daily., Disp: 90 tablet, Rfl: 3    pantoprazole (PROTONIX) 40 MG tablet, Take 1 tablet (40 mg total) by mouth once daily., Disp: 90 tablet, Rfl: 1    predniSONE (DELTASONE) 5 MG tablet, Take 1 tablet (5 mg total) by mouth once daily. Take 1 tab po qd prn RA flare., Disp: 10 tablet, Rfl: 0    rosuvastatin (CRESTOR) 20 MG tablet, Take 1 tablet (20 mg total) by mouth once daily., Disp: 90 tablet, Rfl: 3    selegiline HCl (ELDEPRYL) 5 mg tablet, Take 5 mg by mouth 2 (two) times daily with meals., Disp: , Rfl:     tofacitinib (XELJANZ XR) 11 mg Tb24, Take 1 tablet by mouth once daily., Disp: 30 tablet, Rfl: 3    vit A/vit C/vit E/zinc/copper (PRESERVISION AREDS ORAL), Take by mouth., Disp: , Rfl:     XARELTO 20 mg Tab, Take 1 tablet (20 mg total) by mouth once daily., Disp: 90 tablet, Rfl: 3    polyethylene glycol (MOVIPREP) 100-7.5-2.691 gram solution, Take 2,000 mLs by mouth As instructed (take as directed per instructions provided by GI clinic). (Patient not taking: Reported on 5/16/2024), Disp: 1 kit, Rfl: 0  Current Outpatient Medications   Medication Instructions    alogliptin-pioglitazone (OSENI) 25-30 mg Tab 1 tablet, Oral, Daily    ammonium lactate 12 % Crea 1 application , Topical (Top), 2 times  "daily    blood sugar diagnostic (ACCU-CHEK GUIDE TEST STRIPS) Strp 1 strip, skin prick, Daily    carbidopa-levodopa  mg (SINEMET)  mg per tablet 2 tablets, Oral, 2 times daily    colchicine (COLCRYS) 0.6 mg, Oral, 2 times daily    gemfibroziL (LOPID) 600 mg, Oral, 2 times daily    lancets (ACCU-CHEK FASTCLIX LANCET DRUM MISC) Accu-Chek Fastclix Lancet Drum    lisinopriL-hydrochlorothiazide (PRINZIDE,ZESTORETIC) 20-25 mg Tab 1 tablet, Oral, Daily    metoprolol succinate (TOPROL-XL) 25 mg, Oral, Daily    pantoprazole (PROTONIX) 40 mg, Oral, Daily    polyethylene glycol (MOVIPREP) 100-7.5-2.691 gram solution 2,000 mLs, Oral, See admin instructions    predniSONE (DELTASONE) 5 mg, Oral, Daily, Take 1 tab po qd prn RA flare.    rosuvastatin (CRESTOR) 20 mg, Oral, Daily    selegiline HCl (ELDEPRYL) 5 mg, Oral, 2 times daily with meals    tofacitinib (XELJANZ XR) 11 mg Tb24 1 tablet, Oral, Daily    vit A/vit C/vit E/zinc/copper (PRESERVISION AREDS ORAL) Oral    XARELTO 20 mg, Oral, Daily       ROS:  Please see HPI.    BP Readings from Last 3 Encounters:   05/16/24 137/76   05/10/24 (!) 156/85   05/10/24 (!) 147/86        Pulse Readings from Last 3 Encounters:   05/16/24 85   05/10/24 79   05/10/24 89        Temp Readings from Last 3 Encounters:   05/16/24 98.1 °F (36.7 °C) (Oral)   05/10/24 98.1 °F (36.7 °C)   05/10/24 97.9 °F (36.6 °C)     Wt Readings from Last 3 Encounters:   05/16/24 106.1 kg (234 lb)   05/10/24 106.1 kg (234 lb)   05/09/24 106.6 kg (235 lb)     BMI:  32.64 kg/m^2    PE  Blood pressure 137/76, pulse 85, temperature 98.1 °F (36.7 °C), temperature source Oral, resp. rate 16, height 5' 11" (1.803 m), weight 106.1 kg (234 lb), SpO2 99%.  CONSTITUTIONAL:  No acute distress.  Not ill appearing.  GENERAL:  Pleasant  NECK:  supple  RESPIRATIONS:  no apparent distress  ABDOMEN:   obese  SKIN:  dry     CARDIAC TESTS  ECHO  Results for orders placed during the hospital encounter of " "10/02/23    Echo    Interpretation Summary    Left Ventricle: There is normal systolic function with a visually estimated ejection fraction of 60 - 65%. There is normal diastolic function.    Right Ventricle: Normal right ventricular cavity size. Systolic function is normal.    Less than mild valvular stenosis/regurgitation.    Agitated saline study of the atrial septum is negative after vasalva maneuver, suggesting absence of intracardiac shunt at the atrial level.    STRESS TEST  No results found for this or any previous visit.    [facility]  No results found for this or any previous visit.      LABS  Last BMP  Lab Results   Component Value Date     04/09/2024    K 4.0 04/09/2024    CO2 26 04/09/2024    BUN 21.8 04/09/2024    CREATININE 1.26 (H) 04/09/2024    CALCIUM 9.7 04/09/2024    EGFRNORACEVR >60 04/09/2024       Lab Results   Component Value Date    CREATININE 1.26 (H) 04/09/2024    CREATININE 1.16 01/11/2024    CREATININE 1.27 (H) 12/05/2023     No results found for: "BNP"  Lab Results   Component Value Date    TROPONINI <0.010 12/08/2020    TROPONINI <0.010 12/07/2020    TROPONINI <0.010 12/07/2020     Last CBC     Lab Results   Component Value Date    WBC 5.41 04/09/2024    HGB 14.7 04/09/2024    HCT 43.3 04/09/2024    MCV 96.4 (H) 04/09/2024     04/09/2024           Last lipids    Lab Results   Component Value Date    CHOL 186 04/09/2024    CHOL 167 10/09/2023    CHOL 190 03/21/2023    HDL 52 04/09/2024    HDL 53 10/09/2023    HDL 52 03/21/2023    .00 04/09/2024    LDL 79.00 10/09/2023    .00 03/21/2023    TRIG 112 04/09/2024    TRIG 174 (H) 10/09/2023    TRIG 124 03/21/2023    TOTALCHOLEST 4 04/09/2024    TOTALCHOLEST 3 10/09/2023    TOTALCHOLEST 4 03/21/2023       LFT   No components found for: "LFT"    ASSESSMENT  1. Atrial fibrillation and flutter    2. Hyperlipidemia, unspecified hyperlipidemia type    3. Primary hypertension    4. Diastolic dysfunction      10 Year " Cardiovascular Risk:  The 10-year ASCVD risk score (Aidan WHITESIDE, et al., 2019) is: 29.6%    Values used to calculate the score:      Age: 68 years      Sex: Male      Is Non- : No      Diabetic: Yes      Tobacco smoker: No      Systolic Blood Pressure: 137 mmHg      Is BP treated: Yes      HDL Cholesterol: 52 mg/dL      Total Cholesterol: 186 mg/dL  BMI:  Body mass index is 32.64 kg/m².  Patient is obese (BMI 30-34.9)    Last PCP visit:  4/9/2024    PLAN  Medications:  Discontinue Lisinopril-HCTZ combo with patient using it PRN. SBP up to 150's at home. Start Lisinopril 10mg daily. Continue Xarelto. CV4, HB 2.   Plan: BMP in 2 weeks  Follow up:   4 months    Matt Hale DO  [facility] PGY-1

## 2024-05-16 NOTE — PATIENT INSTRUCTIONS
Labs in 2 weeks     Follow up in 4 months     Stop hctz/lisinopril combo as needed    Start lisinopril as ordered

## 2024-05-24 DIAGNOSIS — I51.89 DIASTOLIC DYSFUNCTION: Primary | ICD-10-CM

## 2024-05-24 DIAGNOSIS — Z01.810 PREOP CARDIOVASCULAR EXAM: Primary | ICD-10-CM

## 2024-05-24 DIAGNOSIS — R93.1 ABNORMAL FINDINGS ON DIAGNOSTIC IMAGING OF HEART AND CORONARY CIRCULATION: ICD-10-CM

## 2024-05-24 DIAGNOSIS — I48.20 CHRONIC A-FIB: ICD-10-CM

## 2024-05-28 ENCOUNTER — OFFICE VISIT (OUTPATIENT)
Dept: URGENT CARE | Facility: CLINIC | Age: 69
End: 2024-05-28
Payer: MEDICARE

## 2024-05-28 ENCOUNTER — HOSPITAL ENCOUNTER (OUTPATIENT)
Dept: RADIOLOGY | Facility: HOSPITAL | Age: 69
Discharge: HOME | End: 2024-05-28
Attending: FAMILY MEDICINE
Payer: MEDICARE

## 2024-05-28 VITALS
BODY MASS INDEX: 33.36 KG/M2 | HEART RATE: 64 BPM | TEMPERATURE: 98 F | OXYGEN SATURATION: 96 % | DIASTOLIC BLOOD PRESSURE: 80 MMHG | WEIGHT: 233 LBS | SYSTOLIC BLOOD PRESSURE: 119 MMHG | RESPIRATION RATE: 18 BRPM | HEIGHT: 70 IN

## 2024-05-28 DIAGNOSIS — S90.859A: Primary | ICD-10-CM

## 2024-05-28 PROCEDURE — 25000003 PHARM REV CODE 250

## 2024-05-28 PROCEDURE — 99214 OFFICE O/P EST MOD 30 MIN: CPT | Mod: 25,S$PBB,, | Performed by: FAMILY MEDICINE

## 2024-05-28 PROCEDURE — 24065 BIOPSY ARM/ELBOW SOFT TISSUE: CPT | Mod: PBBFAC | Performed by: FAMILY MEDICINE

## 2024-05-28 PROCEDURE — 73630 X-RAY EXAM OF FOOT: CPT | Mod: TC,LT

## 2024-05-28 PROCEDURE — 99214 OFFICE O/P EST MOD 30 MIN: CPT | Mod: PBBFAC,25 | Performed by: FAMILY MEDICINE

## 2024-05-28 PROCEDURE — 24065 BIOPSY ARM/ELBOW SOFT TISSUE: CPT | Mod: S$PBB,LT,, | Performed by: FAMILY MEDICINE

## 2024-05-28 RX ORDER — MUPIROCIN 20 MG/G
OINTMENT TOPICAL 3 TIMES DAILY
Qty: 22 G | Refills: 0 | Status: SHIPPED | OUTPATIENT
Start: 2024-05-28

## 2024-05-28 RX ORDER — LIDOCAINE HYDROCHLORIDE 10 MG/ML
5 INJECTION INFILTRATION; PERINEURAL
Status: COMPLETED | OUTPATIENT
Start: 2024-05-28 | End: 2024-05-28

## 2024-05-28 RX ADMIN — LIDOCAINE HYDROCHLORIDE 5 ML: 10 INJECTION, SOLUTION INFILTRATION; PERINEURAL at 05:05

## 2024-05-28 NOTE — PROGRESS NOTES
"Subjective:       Patient ID: Melquiades Rehman Jr. is a 68 y.o. male.    Chief Complaint: Foot Pain (Patient reports L foot pain and the feeling of something in foot )      Foot Pain      68-year-old male with foot pain on left heel.  Patient feels like he stepped on something and there is a foreign body present.  He could not reach it to try to remove it.  Review of Systems   Integumentary:         As above         Objective:       Vital Signs  Temp: 97.9 °F (36.6 °C)  Temp Source: Oral  Pulse: 64  Resp: 18  SpO2: 96 %  BP: 119/80  Height and Weight  Height: 5' 10" (177.8 cm)  Weight: 105.7 kg (233 lb)  BSA (Calculated - sq m): 2.28 sq meters  BMI (Calculated): 33.4  Weight in (lb) to have BMI = 25: 173.9]  Physical Exam  Vitals reviewed.   Constitutional:       Appearance: Normal appearance.   HENT:      Head: Normocephalic and atraumatic.   Eyes:      Extraocular Movements: Extraocular movements intact.      Conjunctiva/sclera: Conjunctivae normal.   Skin:     Comments: Left heel-punctum of probable foreign body lesion and foot.  Purulent exudate expressed.   Neurological:      General: No focal deficit present.      Mental Status: He is alert.   Psychiatric:         Mood and Affect: Mood normal.         Behavior: Behavior normal.         Procedure: Foreign body removal  Diagnosis: Foreign body of left heel  Permit: Signed informed consent   Details:  Prepped with ChloraPrep, anesthesia with lidocaine 1%.  Ten blade used to Pare down superficial skin.  No foreign body appreciated.  Dressed with gauze and tape.  EBL < 3 ml  Complications: None  Assessment:       Problem List Items Addressed This Visit    None  Visit Diagnoses       Foreign body in skin of foot    -  Primary    Relevant Medications    LIDOcaine HCL 10 mg/ml (1%) injection 5 mL (Completed)    mupirocin (BACTROBAN) 2 % ointment    Other Relevant Orders    XR FOOT COMPLETE 3 VIEW LEFT            Plan:   Keep area clean and covered  Dress with " mupirocin and bandage  Routine postprocedure instructions given  Encouraged ibuprofen/acetaminophen as needed  ER precautions  Follow-up with PCP

## 2024-06-17 DIAGNOSIS — E11.9 CONTROLLED TYPE 2 DIABETES MELLITUS WITHOUT COMPLICATION, WITHOUT LONG-TERM CURRENT USE OF INSULIN: Primary | ICD-10-CM

## 2024-06-17 RX ORDER — INSULIN PUMP SYRINGE, 3 ML
EACH MISCELLANEOUS
Qty: 1 EACH | Refills: 0 | Status: SHIPPED | OUTPATIENT
Start: 2024-06-17

## 2024-06-17 NOTE — TELEPHONE ENCOUNTER
----- Message from Irma Stein sent at 6/17/2024  7:31 AM CDT -----  Regarding: Rx request  Who Called: Melquiades Rehman Jr.    Refill or New Rx:Refill    RX Name and Strength:Accu check guide glucose meter    How is the patient currently taking it? (ex. 1XDay):    Is this a 30 day or 90 day RX:    Local or Mail Order:local     List of preferred pharmacies on file (remove unneeded):   If different Pharmacy is requested, enter Pharmacy information here including location and phone number:    [facility] - [address]   Phone: [phone]  Fax: [phone]    Ordering Provider:    Preferred Method of Contact: Phone Call    Patient's Preferred Phone Number on File: [phone]     Best Call Back Number, if different:    Additional Information: Melquiades stated he requested the meter since Wednesday of last week. He is requesting a call back. He stated he have the strips just not the meter

## 2024-06-18 ENCOUNTER — TELEPHONE (OUTPATIENT)
Dept: PAIN MEDICINE | Facility: CLINIC | Age: 69
End: 2024-06-18
Payer: MEDICARE

## 2024-06-18 NOTE — TELEPHONE ENCOUNTER
----- Message from Carolina Wood sent at 6/18/2024  8:51 AM CDT -----  Regarding: Next Step  Mr. Rehman phoned the office today because he wants to know what's the next step in his treatment plan?

## 2024-06-21 ENCOUNTER — TELEPHONE (OUTPATIENT)
Dept: INTERNAL MEDICINE | Facility: CLINIC | Age: 69
End: 2024-06-21
Payer: MEDICARE

## 2024-06-21 NOTE — TELEPHONE ENCOUNTER
----- Message from Bonnie Goyal sent at 6/21/2024  8:37 AM CDT -----  Who Called: Melquiades Rehman Jr.    Refill or New Rx:New Rx  RX Name and Strength: alogliptin-pioglitazone (OSENI) 25-30 mg Tab   How is the patient currently taking it? (ex. 1XDay): one day  Is this a 30 day or 90 day RX:90 tablet    RX Name and Strength: ACCU-CHEK: softclix lancets (per pt request, brand not listed in chart)  How is the patient currently taking it? (ex. 1XDay): one day  Is this a 30 day or 90 day RX:100    Local or Mail Order: mail  List of preferred pharmacies on file (remove unneeded): [unfilled]  If different Pharmacy is requested, enter Pharmacy information here including location and phone number: [facility] [address]   Phone: [phone]  Fax: [phone]    Ordering Provider:zeenat    Preferred Method of Contact: Phone Call  Patient's Preferred Phone Number on File: [phone]   Best Call Back Number, if different:  Additional Information:  refill request, pt called and stated he is requesting only the common name of medication (OSENI) not generic due to common name works better for him, please advise, thanks

## 2024-07-08 ENCOUNTER — LAB VISIT (OUTPATIENT)
Dept: LAB | Facility: HOSPITAL | Age: 69
End: 2024-07-08
Attending: NURSE PRACTITIONER
Payer: MEDICARE

## 2024-07-08 DIAGNOSIS — I10 PRIMARY HYPERTENSION: ICD-10-CM

## 2024-07-08 DIAGNOSIS — M05.79 SEROPOSITIVE RHEUMATOID ARTHRITIS OF MULTIPLE SITES: Primary | ICD-10-CM

## 2024-07-08 LAB
ANION GAP SERPL CALC-SCNC: 10 MEQ/L
BASOPHILS # BLD AUTO: 0.02 X10(3)/MCL
BASOPHILS NFR BLD AUTO: 0.4 %
BUN SERPL-MCNC: 25.3 MG/DL (ref 8.4–25.7)
CALCIUM SERPL-MCNC: 10.1 MG/DL (ref 8.8–10)
CHLORIDE SERPL-SCNC: 108 MMOL/L (ref 98–107)
CO2 SERPL-SCNC: 23 MMOL/L (ref 23–31)
CREAT SERPL-MCNC: 1.3 MG/DL (ref 0.73–1.18)
CREAT/UREA NIT SERPL: 19
EOSINOPHIL # BLD AUTO: 0.1 X10(3)/MCL (ref 0–0.9)
EOSINOPHIL NFR BLD AUTO: 2.1 %
ERYTHROCYTE [DISTWIDTH] IN BLOOD BY AUTOMATED COUNT: 13.5 % (ref 11.5–17)
GFR SERPLBLD CREATININE-BSD FMLA CKD-EPI: >60 ML/MIN/1.73/M2
GLUCOSE SERPL-MCNC: 129 MG/DL (ref 82–115)
HCT VFR BLD AUTO: 41.2 % (ref 42–52)
HGB BLD-MCNC: 14.6 G/DL (ref 14–18)
IMM GRANULOCYTES # BLD AUTO: 0.03 X10(3)/MCL (ref 0–0.04)
IMM GRANULOCYTES NFR BLD AUTO: 0.6 %
LYMPHOCYTES # BLD AUTO: 1.18 X10(3)/MCL (ref 0.6–4.6)
LYMPHOCYTES NFR BLD AUTO: 24.2 %
MCH RBC QN AUTO: 33.9 PG (ref 27–31)
MCHC RBC AUTO-ENTMCNC: 35.4 G/DL (ref 33–36)
MCV RBC AUTO: 95.6 FL (ref 80–94)
MONOCYTES # BLD AUTO: 0.71 X10(3)/MCL (ref 0.1–1.3)
MONOCYTES NFR BLD AUTO: 14.6 %
NEUTROPHILS # BLD AUTO: 2.83 X10(3)/MCL (ref 2.1–9.2)
NEUTROPHILS NFR BLD AUTO: 58.1 %
NRBC BLD AUTO-RTO: 0 %
PLATELET # BLD AUTO: 246 X10(3)/MCL (ref 130–400)
PMV BLD AUTO: 10.1 FL (ref 7.4–10.4)
POTASSIUM SERPL-SCNC: 4 MMOL/L (ref 3.5–5.1)
RBC # BLD AUTO: 4.31 X10(6)/MCL (ref 4.7–6.1)
SODIUM SERPL-SCNC: 141 MMOL/L (ref 136–145)
WBC # BLD AUTO: 4.87 X10(3)/MCL (ref 4.5–11.5)

## 2024-07-08 PROCEDURE — 80048 BASIC METABOLIC PNL TOTAL CA: CPT

## 2024-07-08 PROCEDURE — 36415 COLL VENOUS BLD VENIPUNCTURE: CPT

## 2024-07-08 PROCEDURE — 85025 COMPLETE CBC W/AUTO DIFF WBC: CPT

## 2024-07-08 RX ORDER — PREDNISONE 5 MG/1
5 TABLET ORAL DAILY
Qty: 10 TABLET | Refills: 0 | Status: SHIPPED | OUTPATIENT
Start: 2024-07-08

## 2024-07-09 ENCOUNTER — OFFICE VISIT (OUTPATIENT)
Dept: INTERNAL MEDICINE | Facility: CLINIC | Age: 69
End: 2024-07-09
Payer: MEDICARE

## 2024-07-09 VITALS
HEIGHT: 70 IN | DIASTOLIC BLOOD PRESSURE: 66 MMHG | BODY MASS INDEX: 33.46 KG/M2 | HEART RATE: 89 BPM | SYSTOLIC BLOOD PRESSURE: 102 MMHG | TEMPERATURE: 98 F | RESPIRATION RATE: 18 BRPM | WEIGHT: 233.69 LBS

## 2024-07-09 DIAGNOSIS — G20.A1 PARKINSON'S DISEASE, UNSPECIFIED WHETHER DYSKINESIA PRESENT, UNSPECIFIED WHETHER MANIFESTATIONS FLUCTUATE: ICD-10-CM

## 2024-07-09 DIAGNOSIS — I10 PRIMARY HYPERTENSION: ICD-10-CM

## 2024-07-09 DIAGNOSIS — E11.9 TYPE 2 DIABETES MELLITUS WITHOUT COMPLICATION, WITHOUT LONG-TERM CURRENT USE OF INSULIN: ICD-10-CM

## 2024-07-09 DIAGNOSIS — K21.9 GASTROESOPHAGEAL REFLUX DISEASE, UNSPECIFIED WHETHER ESOPHAGITIS PRESENT: ICD-10-CM

## 2024-07-09 DIAGNOSIS — E11.9 CONTROLLED TYPE 2 DIABETES MELLITUS WITHOUT COMPLICATION, WITHOUT LONG-TERM CURRENT USE OF INSULIN: ICD-10-CM

## 2024-07-09 DIAGNOSIS — Z00.00 WELL ADULT EXAM: ICD-10-CM

## 2024-07-09 DIAGNOSIS — D53.9 MACROCYTIC ANEMIA: Primary | ICD-10-CM

## 2024-07-09 DIAGNOSIS — Z83.719 FAMILY HISTORY OF COLONIC POLYPS: ICD-10-CM

## 2024-07-09 PROCEDURE — 3288F FALL RISK ASSESSMENT DOCD: CPT | Mod: CPTII,,, | Performed by: NURSE PRACTITIONER

## 2024-07-09 PROCEDURE — 3044F HG A1C LEVEL LT 7.0%: CPT | Mod: CPTII,,, | Performed by: NURSE PRACTITIONER

## 2024-07-09 PROCEDURE — 3008F BODY MASS INDEX DOCD: CPT | Mod: CPTII,,, | Performed by: NURSE PRACTITIONER

## 2024-07-09 PROCEDURE — 99214 OFFICE O/P EST MOD 30 MIN: CPT | Mod: S$PBB,,, | Performed by: NURSE PRACTITIONER

## 2024-07-09 PROCEDURE — 1160F RVW MEDS BY RX/DR IN RCRD: CPT | Mod: CPTII,,, | Performed by: NURSE PRACTITIONER

## 2024-07-09 PROCEDURE — 99215 OFFICE O/P EST HI 40 MIN: CPT | Mod: PBBFAC | Performed by: NURSE PRACTITIONER

## 2024-07-09 PROCEDURE — 1126F AMNT PAIN NOTED NONE PRSNT: CPT | Mod: CPTII,,, | Performed by: NURSE PRACTITIONER

## 2024-07-09 PROCEDURE — 1159F MED LIST DOCD IN RCRD: CPT | Mod: CPTII,,, | Performed by: NURSE PRACTITIONER

## 2024-07-09 PROCEDURE — 3074F SYST BP LT 130 MM HG: CPT | Mod: CPTII,,, | Performed by: NURSE PRACTITIONER

## 2024-07-09 PROCEDURE — 4010F ACE/ARB THERAPY RXD/TAKEN: CPT | Mod: CPTII,,, | Performed by: NURSE PRACTITIONER

## 2024-07-09 PROCEDURE — 1101F PT FALLS ASSESS-DOCD LE1/YR: CPT | Mod: CPTII,,, | Performed by: NURSE PRACTITIONER

## 2024-07-09 PROCEDURE — 3078F DIAST BP <80 MM HG: CPT | Mod: CPTII,,, | Performed by: NURSE PRACTITIONER

## 2024-07-09 RX ORDER — LANCETS
1 EACH MISCELLANEOUS DAILY
Qty: 100 EACH | Refills: 11 | Status: SHIPPED | OUTPATIENT
Start: 2024-07-09

## 2024-07-09 RX ORDER — BLOOD-GLUCOSE METER
EACH MISCELLANEOUS
COMMUNITY
Start: 2024-06-18

## 2024-07-09 RX ORDER — ISOPROPYL ALCOHOL 70 ML/100ML
1 SWAB TOPICAL DAILY
Qty: 100 EACH | Refills: 11 | Status: SHIPPED | OUTPATIENT
Start: 2024-07-09

## 2024-07-09 RX ORDER — PANTOPRAZOLE SODIUM 40 MG/1
40 TABLET, DELAYED RELEASE ORAL DAILY
Qty: 90 TABLET | Refills: 1 | Status: SHIPPED | OUTPATIENT
Start: 2024-07-09

## 2024-07-09 RX ORDER — ALOGLIPTIN BENZOATE AND PIOGLITAZONE HYDROCHLORIDE 25; 30 MG/1; MG/1
1 TABLET, FILM COATED ORAL DAILY
Qty: 90 TABLET | Refills: 1 | Status: SHIPPED | OUTPATIENT
Start: 2024-07-09 | End: 2025-01-05

## 2024-07-09 NOTE — PROGRESS NOTES
Patient ID: [id number]     Chief Complaint: LAB RESULTS        HPI:     HPI      Melquiades Rehman Jr. is a 68 y.o. male here today for a follow up. Pt states he is participating in a research study in [facility] with [employer] for treatment of Parkinson's with stem cell therapy.  States study will last 1 year and his 1st infusion will be August 1st 2024. States he will get 6 infusions.         Immunizations:   Immunization History   Administered Date(s) Administered    COVID-19, vector-nr, rS-Ad26, PF (Danie) 03/08/2021, 10/25/2021    Influenza 10/28/2014    Influenza (FLUAD) - Quadrivalent - Adjuvanted - PF *Preferred* (65+) 10/21/2023    Influenza - Quadrivalent 09/30/2020    Influenza - Quadrivalent - PF *Preferred* (6 months and older) 12/04/2019, 09/30/2020    Influenza - Trivalent - PF (ADULT) 10/26/2018, 12/04/2019    Pneumococcal Conjugate - 13 Valent 05/03/2019    Pneumococcal Polysaccharide - 23 Valent 11/24/2020    Zoster Recombinant 11/12/2019, 02/13/2020        -------------------------------------    Atrial fibrillation    Central stenosis of spinal canal    Chronic back pain greater than 3 months duration    Constipation    Degeneration of lumbar intervertebral disc    Diastolic dysfunction    Dysphonia    Dystrophic nail    Erectile dysfunction    Fatty liver    GERD (gastroesophageal reflux disease)    H/O cataract removal with insertion of prosthetic lens    History of CVA (cerebrovascular accident)    TIA    History of radial keratotomy    HTN (hypertension)    Hyperlipidemia    Induratio penis plastica    Microhematuria    Overgrown toenails    Parkinson's disease    Peyronie disease    Polyneuropathy associated with underlying disease    Rheumatoid arthritis    Rheumatoid arthritis, unspecified    Sixth nerve palsy    Spinal stenosis of lumbar region    Type 2 diabetes mellitus without complications    Unspecified macular degeneration    Unspecified macular  degeneration        Past Surgical History:   Procedure Laterality Date    BACK SURGERY Left 04/2024    CATARACT EXTRACTION W/  INTRAOCULAR LENS IMPLANT Bilateral     EPIDURAL STEROID INJECTION INTO LUMBAR SPINE      exc cyst Lt ear      INJECTION OF ANESTHETIC AGENT AROUND MEDIAL BRANCH NERVES INNERVATING LUMBAR FACET JOINT Bilateral 11/16/2022    Procedure: Block-nerve-medial branch-lumbar;  Surgeon: Gertrude Blanco MD;  Location: [facility] OR;  Service: Pain Management;  Laterality: Bilateral;  Bilateral L4-L5...Patient is requesting to be first case    INJECTION OF ANESTHETIC AGENT AROUND MEDIAL BRANCH NERVES INNERVATING LUMBAR FACET JOINT Bilateral 11/22/2023    Procedure: Block-nerve-medial branch-lumbar;  Surgeon: Gertrude Blanco MD;  Location: [facility] OR;  Service: Pain Management;  Laterality: Bilateral;  Bilateral MBB L3-5    INJECTION OF ANESTHETIC AGENT AROUND MEDIAL BRANCH NERVES INNERVATING LUMBAR FACET JOINT Bilateral 01/24/2024    Procedure: Block-nerve-medial branch-lumbar;  Surgeon: Gertrude Blanco MD;  Location: [facility] OR;  Service: Pain Management;  Laterality: Bilateral;  Bilateral MBB L3-5    KNEE ARTHROSCOPY Right     RADIOFREQUENCY ABLATION Left 03/27/2024    Procedure: Radiofrequency Ablation;  Surgeon: Gertrude Blanco MD;  Location: [facility] OR;  Service: Pain Management;  Laterality: Left;  RFA Lt L3-L5    RADIOFREQUENCY ABLATION Right 4/25/2024    Procedure: RADIOFREQUENCY ABLATION / Right L3 L5;  Surgeon: Gertrude Blanco MD;  Location: [facility] OR;  Service: Pain Management;  Laterality: Right;  Right RFA L3-L5    VASECTOMY  1998       Review of patient's allergies indicates:   Allergen Reactions    Sarilumab Other (See Comments)     Other reaction(s): fainting  Jennifer       Current Outpatient Medications   Medication Instructions    ACCU-CHEK GUIDE GLUCOSE METER Misc     alogliptin-pioglitazone (OSENI) 25-30 mg Tab 1 tablet, Oral, Daily    ammonium lactate 12 % Crea 1 application , Topical  (Top), 2 times daily    blood sugar diagnostic (ACCU-CHEK GUIDE TEST STRIPS) Strp 1 strip, skin prick, Daily    blood-glucose meter kit To check BG two times daily, to use with insurance preferred meter    carbidopa-levodopa  mg (SINEMET)  mg per tablet 2 tablets, Oral, 2 times daily    colchicine (COLCRYS) 0.6 mg, Oral, 2 times daily    gemfibroziL (LOPID) 600 mg, Oral, 2 times daily    lancets (ACCU-CHEK FASTCLIX LANCET DRUM MISC) Accu-Chek Fastclix Lancet Drum    lisinopriL 10 mg, Oral, Daily    metoprolol succinate (TOPROL-XL) 25 mg, Oral, Daily    mupirocin (BACTROBAN) 2 % ointment Topical (Top), 3 times daily    pantoprazole (PROTONIX) 40 mg, Oral, Daily    polyethylene glycol (MOVIPREP) 100-7.5-2.691 gram solution 2,000 mLs, Oral, See admin instructions    predniSONE (DELTASONE) 5 mg, Oral, Daily, Take 1 tab po qd prn RA flare.    rosuvastatin (CRESTOR) 20 mg, Oral, Daily    selegiline HCl (ELDEPRYL) 5 mg, Oral, 2 times daily with meals    tofacitinib (XELJANZ XR) 11 mg Tb24 1 tablet, Oral, Daily    vit A/vit C/vit E/zinc/copper (PRESERVISION AREDS ORAL) Take by mouth.    XARELTO 20 mg, Oral, Daily       Social History     Socioeconomic History    Marital status:     Number of children: 1   Tobacco Use    Smoking status: Former     Current packs/day: 0.00     Average packs/day: 1 pack/day for 15.0 years (15.0 ttl pk-yrs)     Types: Cigarettes     Start date: 1972     Quit date: 1987     Years since quittin.5     Passive exposure: Past    Smokeless tobacco: Never   Substance and Sexual Activity    Alcohol use: Not Currently     Comment: occasional    Drug use: Yes     Types: Marijuana    Sexual activity: Yes     Partners: Female     Social Determinants of Health     Financial Resource Strain: Medium Risk (1/10/2024)    Overall Financial Resource Strain (CARDIA)     Difficulty of Paying Living Expenses: Somewhat hard   Food Insecurity: Food Insecurity Present (1/10/2024)     Hunger Vital Sign     Worried About Running Out of Food in the Last Year: Often true     Ran Out of Food in the Last Year: Often true   Transportation Needs: No Transportation Needs (1/10/2024)    PRAPARE - Transportation     Lack of Transportation (Medical): No     Lack of Transportation (Non-Medical): No   Physical Activity: Inactive (1/10/2024)    Exercise Vital Sign     Days of Exercise per Week: 0 days     Minutes of Exercise per Session: 20 min   Stress: No Stress Concern Present (1/10/2024)    [employer] [place] [employer] - Occupational Stress Questionnaire     Feeling of Stress : Only a little   Housing Stability: Low Risk  (1/10/2024)    Housing Stability Vital Sign     Unable to Pay for Housing in the Last Year: No     Number of Places Lived in the Last Year: 1     Unstable Housing in the Last Year: No        Family History   Problem Relation Name Age of Onset    Heart failure Mother      Coronary artery disease Mother      Glaucoma Father      Alzheimer's disease Father      Emphysema Father      Autoimmune disease Sister      Pneumonia Sister Jenifer     Dementia Sister Jenifer         Patient Care Team:  Pau Gomes FNP as PCP - General (Family Medicine)  Taisha Wood LPN as Care Coordinator  Homar Barrios MD (Neurology)     Subjective:     Review of Systems     See HPI for details    Constitutional: Denies Change in appetite. Denies Chills. Denies Fever. Denies Night sweats.  Eye: Denies Blurred vision. Denies Discharge. Denies Eye pain.  ENT: Denies Decreased hearing. Denies Sore throat. Denies Swollen glands.  Respiratory: Denies Cough. Denies Shortness of breath. Denies Shortness of breath with exertion. Denies Wheezing.  Cardiovascular: DeniesChest pain at rest. Denies Chest pain with exertion. Denies Irregular heartbeat. Denies Palpitations. Denies Edema.  Gastrointestinal: Denies Abdominal pain. DeniesDiarrhea. Denies Nausea. Denies Vomiting. Denies Hematemesis  "or Hematochezia.  Genitourinary: Denies Dysuria. Denies Urinary frequency. Denies Urinary urgency. Denies Blood in urine.  Endocrine: Denies Cold intolerance. Denies Excessive thirst. Denies Heat intolerance. Denies Weight loss. Denies Weight gain.  Musculoskeletal: Denies Painful joints. Denies Weakness.  Integumentary: Denies Rash. Denies Itching. Denies Dry skin.  Neurologic: Denies Dizziness. Denies Fainting. Denies Headache.  Psychiatric: Denies Depression. Denies Anxiety. Denies Suicidal/Homicidal ideations.    All Other ROS: Negative except as stated in HPI.       Objective:     Visit Vitals  /66 (BP Location: Right arm, Patient Position: Sitting, BP Method: Large (Automatic))   Pulse 89   Temp 97.8 °F (36.6 °C) (Oral)   Resp 18   Ht 5' 10" (1.778 m)   Wt 106 kg (233 lb 11 oz)   BMI 33.53 kg/m²       Physical Exam    General: Alert and oriented, No acute distress.  Head: Normocephalic, Atraumatic.  Eye: Pupils are equal, round and reactive to light, Extraocular movements are intact, Sclera non-icteric.  Ears/Nose/Throat: Normal, Mucosa moist,Clear.  Neck/Thyroid: Supple, Non-tender, No carotid bruit, No lymphadenopathy, No JVD, Full range of motion.  Respiratory: Clear to auscultation bilaterally; No wheezes, rales or rhonchi,Non-labored respirations, Symmetrical chest wall expansion.  Cardiovascular: Regular rate and rhythm, S1/S2 normal, No murmurs, rubs or gallops.  Gastrointestinal: Soft, Non-tender, Non-distended, Normal bowel sounds, No palpable organomegaly.  Musculoskeletal: Normal range of motion.  Integumentary: Warm, Dry, Intact, No suspicious lesions or rashes.  Extremities: No clubbing, cyanosis or edema  Neurologic: No focal deficits, Cranial Nerves II-XII are grossly intact, Motor strength normal upper and lower extremities, Sensory exam intact.  Psychiatric: Normal interaction, Coherent speech, Euthymic mood, Appropriate affect       Labs Reviewed:     Chemistry:  Lab Results   Component " Value Date     07/08/2024    K 4.0 07/08/2024    BUN 25.3 07/08/2024    CREATININE 1.30 (H) 07/08/2024    EGFRNORACEVR >60 07/08/2024    GLUCOSE 129 (H) 07/08/2024    CALCIUM 10.1 (H) 07/08/2024    ALKPHOS 60 04/09/2024    LABPROT 7.7 (H) 04/09/2024    ALBUMIN 4.3 04/09/2024    BILIDIR 0.2 04/09/2024    IBILI 0.20 04/09/2024    AST 21 04/09/2024    ALT 24 04/09/2024    MG 1.60 12/04/2020    PHOS 3.3 12/04/2020    FSLPZPHL90TX 43.5 05/25/2021        Lab Results   Component Value Date    HGBA1C 6.3 04/09/2024        Hematology:  Lab Results   Component Value Date    WBC 4.87 07/08/2024    HGB 14.6 07/08/2024    HCT 41.2 (L) 07/08/2024     07/08/2024       Lipid Panel:  Lab Results   Component Value Date    CHOL 186 04/09/2024    HDL 52 04/09/2024    .00 04/09/2024    TRIG 112 04/09/2024    TOTALCHOLEST 4 04/09/2024        Urine:  Lab Results   Component Value Date    APPEARANCEUA Clear 11/10/2023    SGUA 1.022 11/10/2023    PROTEINUA Negative 11/10/2023    KETONESUA Negative 11/10/2023    LEUKOCYTESUR Negative 11/10/2023    RBCUA 0-5 11/10/2023    WBCUA 0-5 11/10/2023    BACTERIA None Seen 11/10/2023    SQEPUA None Seen 11/10/2023    HYALINECASTS None Seen 11/10/2023    CREATRANDUR 162.6 11/10/2023    PROTEINURINE 18.0 05/15/2023    UPROTCREA 0.1 05/15/2023        Assessment:       ICD-10-CM ICD-9-CM   1. Macrocytic anemia  D53.9 281.9   2. Primary hypertension  I10 401.9   3. Controlled type 2 diabetes mellitus without complication, without long-term current use of insulin  E11.9 250.00   4. Gastroesophageal reflux disease, unspecified whether esophagitis present  K21.9 530.81   5. Well adult exam  Z00.00 V70.0   6. Family history of colonic polyps  Z83.719 V18.51   7. Parkinson's disease, unspecified whether dyskinesia present, unspecified whether manifestations fluctuate  G20.A1 332.0        Plan:     1. Macrocytic anemia  CBC stable. CBC in 4 months.     2. Primary hypertension  BP controlled.  Low fat low salt diet and exercise. Cont Lisinopril HCTZ, Metoprolol as prescribed.     3. Controlled type 2 diabetes mellitus without complication, without long-term current use of insulin  A1c 6.3. ADA diet and exercise. Cont Oseni as prescribed. Urine microalbumin 11-10-23. DM foot 10-21-23. Dm eye done 3-14-23- pt [facility]. Keep appt.     4. Gastroesophageal reflux disease, unspecified whether esophagitis present  Avoid triggers. Cont Pantoprazole as prescribed.     5. Well adult exam  Labs in 4 months. UTD PSA. Colonoscopy done 3-12-12 no polyps, internal rrhoids noted- suggest repeat in 5 years- due 3/2017. Last colonoscopy done with [facility].     6. Family history of colonic polyps  Colonoscopy done 3-12-12 no polyps, internal rrhoids noted- suggest repeat in 5 years- due 3/2017. Last colonoscopy done with [facility]. Pt refusing colonoscopy at this time- states he said he heard they coming out with a blood test and will wait till that test is approved.      7. Parkinson's disease  Pt states he is participating in a research study dealing with stem cells for treating parkinson's disease.       Follow up in about 4 months (around 11/9/2024) for with labs 1 week prior to appt. . In addition to their scheduled follow up, the patient has also been instructed to follow up on as needed basis.     Future Appointments   Date Time Provider Department Center   7/11/2024  1:00 PM Barbara Zelaya FNP [facility] BRITNI Peña   7/24/2024  2:00 PM PROVIDERS, CONY Stallings   8/20/2024  8:00 AM Monica Chun FNP [facility] RADHA Peña   9/12/2024  3:30 PM Quincy Conrad MD [facility] ASHELY Gomes [facility]

## 2024-07-16 DIAGNOSIS — E11.9 TYPE 2 DIABETES MELLITUS WITHOUT COMPLICATION, WITHOUT LONG-TERM CURRENT USE OF INSULIN: ICD-10-CM

## 2024-07-16 RX ORDER — ALOGLIPTIN BENZOATE AND PIOGLITAZONE HYDROCHLORIDE 25; 30 MG/1; MG/1
1 TABLET, FILM COATED ORAL DAILY
Qty: 90 TABLET | Refills: 3 | Status: SHIPPED | OUTPATIENT
Start: 2024-07-16 | End: 2025-01-12

## 2024-07-16 NOTE — PROGRESS NOTES
Pt brought in paperwork for [employer] PAP program for assistance with oseni prescription. Informed pt any PAP assistance needs to be done through our pharmacy due to providers not having a dispensing license. Pt argueing with me regarding his outside provider completing paperwork and calling him to pickup meds when they come in from the company. Again I informed pt we do not dispense medications and any PAP program info has to be handled with our pharmacy. RX for Oseni sent to [facility] pharmacy to apply for PAP. Pt informed to go to our pharmacy to apply for PAP program.

## 2024-07-24 ENCOUNTER — OFFICE VISIT (OUTPATIENT)
Dept: OPHTHALMOLOGY | Facility: CLINIC | Age: 69
End: 2024-07-24
Payer: MEDICARE

## 2024-07-24 VITALS — WEIGHT: 233.69 LBS | HEIGHT: 70 IN | BODY MASS INDEX: 33.46 KG/M2

## 2024-07-24 DIAGNOSIS — E11.9 CONTROLLED TYPE 2 DIABETES MELLITUS WITHOUT COMPLICATION, WITHOUT LONG-TERM CURRENT USE OF INSULIN: ICD-10-CM

## 2024-07-24 DIAGNOSIS — Z98.890 HISTORY OF RADIAL KERATOTOMY: Primary | ICD-10-CM

## 2024-07-24 PROCEDURE — 99213 OFFICE O/P EST LOW 20 MIN: CPT | Mod: PBBFAC,PN

## 2024-07-24 PROCEDURE — 92134 CPTRZ OPH DX IMG PST SGM RTA: CPT | Mod: PBBFAC,PN

## 2024-07-24 NOTE — PROGRESS NOTES
[facility] annual DFE  Last edited by Indu Graham RN on 7/24/2024  1:33 PM.            Assessment /Plan     Topography 3/14/23  OD flat, 36.42 simK, 1.8D astigmatism 019  OS flat, 36.50D simK, 0.38D astigmatism    K Jun 8/24/23  OD: prolate, flat simmk 35.34, steep simk 37.5, astig 2.16 at 23  OS: prolate, flat simk 36.28, steep simk 37.16, astig 0.87 @ 107    CCT 8/24/23  OD:   OS:     OCT mac 7/24/24: Good foveal contour, no IRF/SRF       RK  - Fluctuating vision. Gets RX with optometry usually to 20/30-20/40. MRx 3/23 to 20/80 ou  - LOWELL 20/25 OU  - Unable to refract past 20/60 // 20/40 on 8/2/23. Patient reports fluctuating vision, worse in AM  - RT Vincent day 8/27 for evaluation of options for stabilization of RK. Repeat refraction if no surgical options   - 8/24/23: Dr Driscoll in OR for emergency, unable to see patient. Slight R eye fluctuation in R eye K jun, but minimal.  Mrx near and distance separate given and patient happy trying this result.   - Patient does not want bifocals  - 7/24/24: MRX to 20/100 // 20/40, pt prefers separate glasses     2. Iris nevus  - CTM       3. Diabetes w/o ophthalmic manifestations OU        Hemoglobin A1c   Date Value Ref Range Status   09/26/2022 6.4 <=7.0 % Final   03/29/2022 6.0 <=7.0     03/30/2021 6.2 <<=7.0 % Final   - no retinopathy on exam today  - encouraged good BG/HTN control  - recommend annual DFE    RT Vincent day to re-evaluate stabilization of RK

## 2024-07-26 DIAGNOSIS — E11.9 TYPE 2 DIABETES MELLITUS WITHOUT COMPLICATION, WITHOUT LONG-TERM CURRENT USE OF INSULIN: ICD-10-CM

## 2024-07-26 RX ORDER — ALOGLIPTIN BENZOATE AND PIOGLITAZONE HYDROCHLORIDE 25; 30 MG/1; MG/1
1 TABLET, FILM COATED ORAL DAILY
Qty: 90 TABLET | Refills: 3 | Status: SHIPPED | OUTPATIENT
Start: 2024-07-26 | End: 2025-01-22

## 2024-07-30 DIAGNOSIS — M51.36 DEGENERATION OF LUMBAR INTERVERTEBRAL DISC: ICD-10-CM

## 2024-07-30 DIAGNOSIS — G89.4 CHRONIC PAIN SYNDROME: ICD-10-CM

## 2024-07-31 DIAGNOSIS — M05.79 SEROPOSITIVE RHEUMATOID ARTHRITIS OF MULTIPLE SITES: ICD-10-CM

## 2024-07-31 RX ORDER — PREDNISONE 5 MG/1
5 TABLET ORAL DAILY
Qty: 10 TABLET | Refills: 0 | OUTPATIENT
Start: 2024-07-31

## 2024-07-31 RX ORDER — COLCHICINE 0.6 MG/1
0.6 TABLET ORAL 2 TIMES DAILY
Qty: 180 TABLET | Refills: 3 | Status: SHIPPED | OUTPATIENT
Start: 2024-07-31

## 2024-07-31 NOTE — TELEPHONE ENCOUNTER
Patient informed me of the medication he was requesting. Informed patient will contact him when RX has been refilled. Patient verified understanding.

## 2024-08-08 ENCOUNTER — LAB VISIT (OUTPATIENT)
Dept: LAB | Facility: HOSPITAL | Age: 69
End: 2024-08-08
Attending: NURSE PRACTITIONER
Payer: MEDICARE

## 2024-08-08 ENCOUNTER — OFFICE VISIT (OUTPATIENT)
Dept: RHEUMATOLOGY | Facility: CLINIC | Age: 69
End: 2024-08-08
Payer: MEDICARE

## 2024-08-08 VITALS
HEIGHT: 70 IN | RESPIRATION RATE: 20 BRPM | WEIGHT: 235.81 LBS | BODY MASS INDEX: 33.76 KG/M2 | OXYGEN SATURATION: 95 % | HEART RATE: 79 BPM | TEMPERATURE: 97 F | DIASTOLIC BLOOD PRESSURE: 72 MMHG | SYSTOLIC BLOOD PRESSURE: 114 MMHG

## 2024-08-08 DIAGNOSIS — Z79.899 HIGH RISK MEDICATION USE: ICD-10-CM

## 2024-08-08 DIAGNOSIS — D84.821 DRUG-INDUCED IMMUNODEFICIENCY: ICD-10-CM

## 2024-08-08 DIAGNOSIS — Z79.899 DRUG-INDUCED IMMUNODEFICIENCY: ICD-10-CM

## 2024-08-08 DIAGNOSIS — M05.79 SEROPOSITIVE RHEUMATOID ARTHRITIS OF MULTIPLE SITES: Primary | ICD-10-CM

## 2024-08-08 DIAGNOSIS — R93.89 ABNORMAL CXR: ICD-10-CM

## 2024-08-08 DIAGNOSIS — E11.9 CONTROLLED TYPE 2 DIABETES MELLITUS WITHOUT COMPLICATION, WITHOUT LONG-TERM CURRENT USE OF INSULIN: ICD-10-CM

## 2024-08-08 DIAGNOSIS — M05.79 SEROPOSITIVE RHEUMATOID ARTHRITIS OF MULTIPLE SITES: ICD-10-CM

## 2024-08-08 DIAGNOSIS — I48.92 ATRIAL FIBRILLATION AND FLUTTER: ICD-10-CM

## 2024-08-08 DIAGNOSIS — N48.6 PEYRONIE'S DISEASE: ICD-10-CM

## 2024-08-08 DIAGNOSIS — I10 PRIMARY HYPERTENSION: ICD-10-CM

## 2024-08-08 DIAGNOSIS — G20.A1 PARKINSON'S DISEASE, UNSPECIFIED WHETHER DYSKINESIA PRESENT, UNSPECIFIED WHETHER MANIFESTATIONS FLUCTUATE: ICD-10-CM

## 2024-08-08 DIAGNOSIS — K76.0 STEATOSIS OF LIVER: ICD-10-CM

## 2024-08-08 DIAGNOSIS — I48.91 ATRIAL FIBRILLATION AND FLUTTER: ICD-10-CM

## 2024-08-08 DIAGNOSIS — M48.061 SPINAL STENOSIS AT L4-L5 LEVEL: ICD-10-CM

## 2024-08-08 DIAGNOSIS — Z86.73 HISTORY OF CVA (CEREBROVASCULAR ACCIDENT): ICD-10-CM

## 2024-08-08 LAB
ALBUMIN SERPL-MCNC: 3.9 G/DL (ref 3.4–4.8)
ALBUMIN/GLOB SERPL: 1.1 RATIO (ref 1.1–2)
ALP SERPL-CCNC: 55 UNIT/L (ref 40–150)
ALT SERPL-CCNC: <5 UNIT/L (ref 0–55)
ANION GAP SERPL CALC-SCNC: 9 MEQ/L
AST SERPL-CCNC: 17 UNIT/L (ref 5–34)
BASOPHILS # BLD AUTO: 0.02 X10(3)/MCL
BASOPHILS NFR BLD AUTO: 0.4 %
BILIRUB SERPL-MCNC: 0.3 MG/DL
BUN SERPL-MCNC: 24 MG/DL (ref 8.4–25.7)
CALCIUM SERPL-MCNC: 9.9 MG/DL (ref 8.8–10)
CHLORIDE SERPL-SCNC: 108 MMOL/L (ref 98–107)
CO2 SERPL-SCNC: 22 MMOL/L (ref 23–31)
CREAT SERPL-MCNC: 1.26 MG/DL (ref 0.73–1.18)
CREAT/UREA NIT SERPL: 19
CRP SERPL-MCNC: 1.4 MG/L
EOSINOPHIL # BLD AUTO: 0.09 X10(3)/MCL (ref 0–0.9)
EOSINOPHIL NFR BLD AUTO: 1.9 %
ERYTHROCYTE [DISTWIDTH] IN BLOOD BY AUTOMATED COUNT: 13.5 % (ref 11.5–17)
ERYTHROCYTE [SEDIMENTATION RATE] IN BLOOD: 15 MM/HR (ref 0–15)
GFR SERPLBLD CREATININE-BSD FMLA CKD-EPI: >60 ML/MIN/1.73/M2
GLOBULIN SER-MCNC: 3.7 GM/DL (ref 2.4–3.5)
GLUCOSE SERPL-MCNC: 149 MG/DL (ref 82–115)
HBV CORE AB SERPL QL IA: NONREACTIVE
HBV SURFACE AB SER-ACNC: 0.15 MIU/ML
HBV SURFACE AB SERPL IA-ACNC: NONREACTIVE M[IU]/ML
HBV SURFACE AG SERPL QL IA: NONREACTIVE
HCT VFR BLD AUTO: 40.4 % (ref 42–52)
HCV AB SERPL QL IA: NONREACTIVE
HGB BLD-MCNC: 14.4 G/DL (ref 14–18)
HIV 1+2 AB+HIV1 P24 AG SERPL QL IA: NONREACTIVE
IMM GRANULOCYTES # BLD AUTO: 0.02 X10(3)/MCL (ref 0–0.04)
IMM GRANULOCYTES NFR BLD AUTO: 0.4 %
LYMPHOCYTES # BLD AUTO: 0.9 X10(3)/MCL (ref 0.6–4.6)
LYMPHOCYTES NFR BLD AUTO: 19.4 %
MCH RBC QN AUTO: 34.2 PG (ref 27–31)
MCHC RBC AUTO-ENTMCNC: 35.6 G/DL (ref 33–36)
MCV RBC AUTO: 96 FL (ref 80–94)
MONOCYTES # BLD AUTO: 0.69 X10(3)/MCL (ref 0.1–1.3)
MONOCYTES NFR BLD AUTO: 14.8 %
NEUTROPHILS # BLD AUTO: 2.93 X10(3)/MCL (ref 2.1–9.2)
NEUTROPHILS NFR BLD AUTO: 63.1 %
NRBC BLD AUTO-RTO: 0 %
PLATELET # BLD AUTO: 244 X10(3)/MCL (ref 130–400)
PMV BLD AUTO: 9.9 FL (ref 7.4–10.4)
POTASSIUM SERPL-SCNC: 4 MMOL/L (ref 3.5–5.1)
PROT SERPL-MCNC: 7.6 GM/DL (ref 5.8–7.6)
RBC # BLD AUTO: 4.21 X10(6)/MCL (ref 4.7–6.1)
SODIUM SERPL-SCNC: 139 MMOL/L (ref 136–145)
WBC # BLD AUTO: 4.65 X10(3)/MCL (ref 4.5–11.5)

## 2024-08-08 PROCEDURE — 80053 COMPREHEN METABOLIC PANEL: CPT

## 2024-08-08 PROCEDURE — 99215 OFFICE O/P EST HI 40 MIN: CPT | Mod: S$PBB,,, | Performed by: NURSE PRACTITIONER

## 2024-08-08 PROCEDURE — 87340 HEPATITIS B SURFACE AG IA: CPT

## 2024-08-08 PROCEDURE — 1101F PT FALLS ASSESS-DOCD LE1/YR: CPT | Mod: CPTII,,, | Performed by: NURSE PRACTITIONER

## 2024-08-08 PROCEDURE — 3008F BODY MASS INDEX DOCD: CPT | Mod: CPTII,,, | Performed by: NURSE PRACTITIONER

## 2024-08-08 PROCEDURE — 1160F RVW MEDS BY RX/DR IN RCRD: CPT | Mod: CPTII,,, | Performed by: NURSE PRACTITIONER

## 2024-08-08 PROCEDURE — 3074F SYST BP LT 130 MM HG: CPT | Mod: CPTII,,, | Performed by: NURSE PRACTITIONER

## 2024-08-08 PROCEDURE — 3288F FALL RISK ASSESSMENT DOCD: CPT | Mod: CPTII,,, | Performed by: NURSE PRACTITIONER

## 2024-08-08 PROCEDURE — 87389 HIV-1 AG W/HIV-1&-2 AB AG IA: CPT

## 2024-08-08 PROCEDURE — 85025 COMPLETE CBC W/AUTO DIFF WBC: CPT

## 2024-08-08 PROCEDURE — 99213 OFFICE O/P EST LOW 20 MIN: CPT | Mod: PBBFAC | Performed by: NURSE PRACTITIONER

## 2024-08-08 PROCEDURE — 85652 RBC SED RATE AUTOMATED: CPT

## 2024-08-08 PROCEDURE — 86480 TB TEST CELL IMMUN MEASURE: CPT

## 2024-08-08 PROCEDURE — 4010F ACE/ARB THERAPY RXD/TAKEN: CPT | Mod: CPTII,,, | Performed by: NURSE PRACTITIONER

## 2024-08-08 PROCEDURE — 3044F HG A1C LEVEL LT 7.0%: CPT | Mod: CPTII,,, | Performed by: NURSE PRACTITIONER

## 2024-08-08 PROCEDURE — 3078F DIAST BP <80 MM HG: CPT | Mod: CPTII,,, | Performed by: NURSE PRACTITIONER

## 2024-08-08 PROCEDURE — 86706 HEP B SURFACE ANTIBODY: CPT

## 2024-08-08 PROCEDURE — 36415 COLL VENOUS BLD VENIPUNCTURE: CPT

## 2024-08-08 PROCEDURE — 1159F MED LIST DOCD IN RCRD: CPT | Mod: CPTII,,, | Performed by: NURSE PRACTITIONER

## 2024-08-08 PROCEDURE — 1126F AMNT PAIN NOTED NONE PRSNT: CPT | Mod: CPTII,,, | Performed by: NURSE PRACTITIONER

## 2024-08-08 PROCEDURE — 86704 HEP B CORE ANTIBODY TOTAL: CPT

## 2024-08-08 PROCEDURE — 86803 HEPATITIS C AB TEST: CPT

## 2024-08-08 PROCEDURE — 86140 C-REACTIVE PROTEIN: CPT

## 2024-08-08 RX ORDER — TOFACITINIB 11 MG/1
1 TABLET, FILM COATED, EXTENDED RELEASE ORAL DAILY
Qty: 30 TABLET | Refills: 3 | Status: SHIPPED | OUTPATIENT
Start: 2024-08-08

## 2024-08-12 ENCOUNTER — TELEPHONE (OUTPATIENT)
Dept: RHEUMATOLOGY | Facility: CLINIC | Age: 69
End: 2024-08-12
Payer: MEDICARE

## 2024-08-12 LAB
GAMMA INTERFERON BACKGROUND BLD IA-ACNC: 0 IU/ML
M TB IFN-G BLD-IMP: NEGATIVE
M TB IFN-G CD4+ BCKGRND COR BLD-ACNC: 0 IU/ML
M TB IFN-G CD4+CD8+ BCKGRND COR BLD-ACNC: 0 IU/ML
MITOGEN IGNF BCKGRD COR BLD-ACNC: 10 IU/ML

## 2024-08-12 NOTE — TELEPHONE ENCOUNTER
Spoke to pt informed of message. Pt verbalized understanding          ----- Message from DAVIDSON Bond sent at 8/12/2024  1:48 PM CDT -----  Please advise the patient that all lab are noted to be clinically acceptable at this time, kidney functions are stable, infection w/u negative, we will continue to monitor at future lab draws

## 2024-08-14 ENCOUNTER — TELEPHONE (OUTPATIENT)
Dept: RHEUMATOLOGY | Facility: CLINIC | Age: 69
End: 2024-08-14
Payer: MEDICARE

## 2024-08-14 NOTE — TELEPHONE ENCOUNTER
Spoke pt requesting to just come to check his v/s . Instructed to walk in clinic or go to ER this clinic was not a walk in clinic

## 2024-08-14 NOTE — TELEPHONE ENCOUNTER
----- Message from Muna Humphreys sent at 8/14/2024 11:36 AM CDT -----  Pt called in to see if he can be seen to check his blood oxygen levels. He said he lost his device.     Callback number [id number]

## 2024-08-16 DIAGNOSIS — M05.79 SEROPOSITIVE RHEUMATOID ARTHRITIS OF MULTIPLE SITES: ICD-10-CM

## 2024-08-16 RX ORDER — PREDNISONE 5 MG/1
TABLET ORAL
Qty: 30 TABLET | Refills: 0 | Status: SHIPPED | OUTPATIENT
Start: 2024-08-16

## 2024-08-20 ENCOUNTER — HOSPITAL ENCOUNTER (OUTPATIENT)
Dept: WOUND CARE | Facility: HOSPITAL | Age: 69
Discharge: HOME | End: 2024-08-20
Attending: NURSE PRACTITIONER
Payer: MEDICARE

## 2024-08-20 VITALS
OXYGEN SATURATION: 95 % | RESPIRATION RATE: 18 BRPM | DIASTOLIC BLOOD PRESSURE: 83 MMHG | TEMPERATURE: 98 F | HEART RATE: 97 BPM | SYSTOLIC BLOOD PRESSURE: 137 MMHG

## 2024-08-20 DIAGNOSIS — E11.9 ENCOUNTER FOR COMPREHENSIVE DIABETIC FOOT EXAMINATION, TYPE 2 DIABETES MELLITUS: Primary | ICD-10-CM

## 2024-08-20 DIAGNOSIS — L60.2 OVERGROWN TOENAILS: ICD-10-CM

## 2024-08-20 DIAGNOSIS — E11.42 TYPE 2 DIABETES MELLITUS WITH PERIPHERAL NEUROPATHY: ICD-10-CM

## 2024-08-20 DIAGNOSIS — L84 CALLUS OF FOOT: ICD-10-CM

## 2024-08-20 DIAGNOSIS — G63 POLYNEUROPATHY ASSOCIATED WITH UNDERLYING DISEASE: ICD-10-CM

## 2024-08-20 PROCEDURE — 11719 TRIM NAIL(S) ANY NUMBER: CPT

## 2024-08-20 PROCEDURE — 99214 OFFICE O/P EST MOD 30 MIN: CPT | Mod: 25,,, | Performed by: NURSE PRACTITIONER

## 2024-08-20 PROCEDURE — 27000999 HC MEDICAL RECORD PHOTO DOCUMENTATION

## 2024-08-20 PROCEDURE — 11056 PARNG/CUTG B9 HYPRKR LES 2-4: CPT | Mod: Q9,,, | Performed by: NURSE PRACTITIONER

## 2024-08-20 PROCEDURE — 11719 TRIM NAIL(S) ANY NUMBER: CPT | Mod: ,,, | Performed by: NURSE PRACTITIONER

## 2024-08-20 PROCEDURE — 11056 PARNG/CUTG B9 HYPRKR LES 2-4: CPT

## 2024-08-20 PROCEDURE — 99211 OFF/OP EST MAY X REQ PHY/QHP: CPT

## 2024-08-20 NOTE — PROGRESS NOTES
[facility]   Outpatient Wound Care     Subjective:   Patient ID: Melquiades Rehman Jr. is a 68 y.o. male.    Chief Complaint: Nail Care      History of Present Illness:   68 y.o. White male presents to wound care clinic today for diabetic foot care, and callus care.  He was a patient of Monica Chun NP.  Voices he is currently under a stem cell research product receiving injections for his Parkinson's.  Followed by Pau Gomes FNP for PCP last appt 7/9/24.    Today's visit 08/20/2024:    Diabetic foot exam performed.  Monofilament test done decreased sensation noted in all areas. Bilateral lower extremities cool to touch and bilateral cool feet. Trimmed all 10 toenails using podiatry clippers, and filed with [place] board. Bilateral great toe callus pared using #4 Dermal curette. Rationale for paring to decrease pressure, and alleviate pain.  Tolerated well.  Instructed the importance of checking feet daily due to decrease sensation.  Instructed on proper footwear, nail care, and daily skin assessments.  Will have him return to the clinic in 3 months for diabetic foot care.  Instructed to call the office with any questions, concerns, or new skin issues.  Verbalized understanding of all instructions.                        History includes:      Past Medical History:   Diagnosis Date    Atrial fibrillation     Central stenosis of spinal canal     Chronic back pain greater than 3 months duration 10/14/2022    Constipation 09/30/2022    Degeneration of lumbar intervertebral disc 07/06/2022    Diastolic dysfunction 05/01/2023    Dysphonia 09/30/2022    Dystrophic nail 06/19/2023    Erectile dysfunction 09/30/2022    Fatty liver     GERD (gastroesophageal reflux disease) 09/30/2022    H/O cataract removal with insertion of prosthetic lens     History of CVA  (cerebrovascular accident) 09/30/2022    TIA    History of radial keratotomy 01/01/1998    HTN (hypertension)     Hyperlipidemia 09/30/2022    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/30/2022    Microhematuria 03/30/2023    Overgrown toenails 06/19/2023    Parkinson's disease     Peyronie disease     Polyneuropathy associated with underlying disease 06/19/2023    Rheumatoid arthritis 01/10/2022    Rheumatoid arthritis, unspecified     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    Spinal stenosis of lumbar region 09/30/2022    Type 2 diabetes mellitus without complications     Unspecified macular degeneration     Unspecified macular degeneration       Past Surgical History:   Procedure Laterality Date    BACK SURGERY Left 04/2024    CATARACT EXTRACTION W/  INTRAOCULAR LENS IMPLANT Bilateral     EPIDURAL STEROID INJECTION INTO LUMBAR SPINE      exc cyst Lt ear      INJECTION OF ANESTHETIC AGENT AROUND MEDIAL BRANCH NERVES INNERVATING LUMBAR FACET JOINT Bilateral 11/16/2022    Procedure: Block-nerve-medial branch-lumbar;  Surgeon: Gertrude Blanco MD;  Location: [facility] OR;  Service: Pain Management;  Laterality: Bilateral;  Bilateral L4-L5...Patient is requesting to be first case    INJECTION OF ANESTHETIC AGENT AROUND MEDIAL BRANCH NERVES INNERVATING LUMBAR FACET JOINT Bilateral 11/22/2023    Procedure: Block-nerve-medial branch-lumbar;  Surgeon: Gertrude Blanco MD;  Location: [facility] OR;  Service: Pain Management;  Laterality: Bilateral;  Bilateral MBB L3-5    INJECTION OF ANESTHETIC AGENT AROUND MEDIAL BRANCH NERVES INNERVATING LUMBAR FACET JOINT Bilateral 01/24/2024    Procedure: Block-nerve-medial branch-lumbar;  Surgeon: Gertrude Blanco MD;  Location: [facility] OR;  Service: Pain Management;  Laterality: Bilateral;  Bilateral MBB L3-5    KNEE ARTHROSCOPY Right     RADIOFREQUENCY ABLATION Left 03/27/2024    Procedure: Radiofrequency Ablation;  Surgeon: Gertrude Blanco MD;  Location: [facility] OR;  Service: Pain Management;  Laterality: Left;  RFA Lt  L3-L5    RADIOFREQUENCY ABLATION Right 2024    Procedure: RADIOFREQUENCY ABLATION / Right L3 L5;  Surgeon: Gertrude Blanco MD;  Location: [facility] OR;  Service: Pain Management;  Laterality: Right;  Right RFA L3-L5    VASECTOMY        Social History     Socioeconomic History    Marital status:     Number of children: 1   Tobacco Use    Smoking status: Former     Current packs/day: 0.00     Average packs/day: 1 pack/day for 15.0 years (15.0 ttl pk-yrs)     Types: Cigarettes     Start date: 1972     Quit date: 1987     Years since quittin.6     Passive exposure: Past    Smokeless tobacco: Never   Substance and Sexual Activity    Alcohol use: Not Currently     Comment: occasional    Drug use: Yes     Types: Marijuana    Sexual activity: Yes     Partners: Female     Social Determinants of Health     Financial Resource Strain: Medium Risk (1/10/2024)    Overall Financial Resource Strain (CARDIA)     Difficulty of Paying Living Expenses: Somewhat hard   Food Insecurity: Food Insecurity Present (1/10/2024)    Hunger Vital Sign     Worried About Running Out of Food in the Last Year: Often true     Ran Out of Food in the Last Year: Often true   Transportation Needs: No Transportation Needs (1/10/2024)    PRAPARE - Transportation     Lack of Transportation (Medical): No     Lack of Transportation (Non-Medical): No   Physical Activity: Inactive (1/10/2024)    Exercise Vital Sign     Days of Exercise per Week: 0 days     Minutes of Exercise per Session: 20 min   Stress: No Stress Concern Present (1/10/2024)    Citizen of [place] of Occupational Health - Occupational Stress Questionnaire     Feeling of Stress : Only a little   Housing Stability: Low Risk  (1/10/2024)    Housing Stability Vital Sign     Unable to Pay for Housing in the Last Year: No     Number of Places Lived in the Last Year: 1     Unstable Housing in the Last Year: No   .      Current Outpatient Medications   Medication Sig Dispense  Refill    ACCU-CHEK GUIDE GLUCOSE METER Mis       alcohol swabs PadM Apply 1 each topically once daily. 100 each 11    alogliptin-pioglitazone (OSENI) 25-30 mg Tab Take 1 tablet by mouth once daily. 90 tablet 3    ammonium lactate 12 % Crea Apply 1 application  topically 2 (two) times daily.      blood sugar diagnostic (ACCU-CHEK GUIDE TEST STRIPS) Strp 1 strip by skin prick route Daily. 100 strip 11    blood-glucose meter kit To check BG two times daily, to use with insurance preferred meter 1 each 0    carbidopa-levodopa  mg (SINEMET)  mg per tablet Take 2 tablets by mouth 2 (two) times a day.      colchicine (COLCRYS) 0.6 mg tablet TAKE 1 TABLET BY MOUTH TWICE  DAILY 180 tablet 3    gemfibroziL (LOPID) 600 MG tablet Take 1 tablet (600 mg total) by mouth 2 (two) times daily. 180 tablet 3    lancets (ACCU-CHEK SOFTCLIX LANCETS) Misc 1 each by Misc.(Non-Drug; Combo Route) route Daily. 100 each 11    lisinopriL 10 MG tablet Take 1 tablet (10 mg total) by mouth once daily. 90 tablet 3    metoprolol succinate (TOPROL-XL) 25 MG 24 hr tablet Take 1 tablet (25 mg total) by mouth once daily. 90 tablet 3    pantoprazole (PROTONIX) 40 MG tablet Take 1 tablet (40 mg total) by mouth once daily. 90 tablet 1    polyethylene glycol (MOVIPREP) 100-7.5-2.691 gram solution Take 2,000 mLs by mouth As instructed (take as directed per instructions provided by GI clinic). 1 kit 0    predniSONE (DELTASONE) 5 MG tablet Take 2 tab po qd x 3 days then take 1 tab po qd x 2 days prn RA flare. 30 tablet 0    rosuvastatin (CRESTOR) 20 MG tablet Take 1 tablet (20 mg total) by mouth once daily. 90 tablet 3    selegiline HCl (ELDEPRYL) 5 mg tablet Take 5 mg by mouth 2 (two) times daily with meals.      tofacitinib (XELJANZ XR) 11 mg Tb24 Take 1 tablet by mouth once daily. 30 tablet 3    XARELTO 20 mg Tab Take 1 tablet (20 mg total) by mouth once daily. 90 tablet 3     No current facility-administered medications for this encounter.        Review of Systems   All other systems reviewed and are negative.         Labs Reviewed:   Chemistry:  Lab Results   Component Value Date    BUN 24.0 08/08/2024    BUN 25.3 07/08/2024    CREATININE 1.26 (H) 08/08/2024    CREATININE 1.30 (H) 07/08/2024    EGFRNORACEVR >60 08/08/2024    EGFRNORACEVR >60 07/08/2024    GLUCOSE 149 (H) 08/08/2024    AST 17 08/08/2024    AST 21 04/09/2024    ALT <5 08/08/2024    ALT 24 04/09/2024    HGBA1C 6.3 04/09/2024        Hematology:  Lab Results   Component Value Date    WBC 4.65 08/08/2024    WBC 4.87 07/08/2024    HGB 14.4 08/08/2024    HGB 14.6 07/08/2024    HCT 40.4 (L) 08/08/2024    HCT 41.2 (L) 07/08/2024     08/08/2024     07/08/2024       Inflammatory Markers:  Lab Results   Component Value Date    SEDRATE 15 08/08/2024    SEDRATE 7 01/11/2024    SEDRATE 6 10/09/2023        Objective:        Physical Exam  Vitals reviewed.   Cardiovascular:      Pulses:           Dorsalis pedis pulses are 2+ on the right side and 2+ on the left side.        Posterior tibial pulses are 2+ on the right side and 2+ on the left side.   Feet:      Right foot:      Skin integrity: Callus present.      Toenail Condition: Right toenails are long.      Left foot:      Skin integrity: Callus present.      Toenail Condition: Left toenails are long.      Comments: Monofilament test decreased sensation in all areas.  Skin:     General: Skin is cool and dry.      Capillary Refill: Capillary refill takes less than 2 seconds.   Neurological:      Mental Status: He is alert.                        Assessment:         ICD-10-CM ICD-9-CM   1. Encounter for comprehensive diabetic foot examination, type 2 diabetes mellitus  E11.9 250.00   2. Overgrown toenails  L60.2 703.8   3. Callus of foot  L84 700   4. Polyneuropathy associated with underlying disease  G63 357.4   5. Type 2 diabetes mellitus with peripheral neuropathy  E11.42 250.60     357.2         Plan:   Tissue pathology and/or  culture taken:  [] Yes [x] No   Sharp debridement performed:   [] Yes [x] No   Labs ordered this visit:   [] Yes [x] No   Imaging ordered this visit:   [] Yes [x] No         1. Encounter for comprehensive diabetic foot examination, type 2 diabetes mellitus     DFC.  Instructed on proper foot care.    2. Overgrown toenails     Trimmed.  Instructed on proper nail care.   3. Callus of foot     Pared.  Instructed on proper footwear.   4. Polyneuropathy associated with underlying disease     Co-factor in wound development.     Will check feet daily.    5. Type 2 diabetes mellitus with peripheral neuropathy     Last A1c 6.3.    Must have a strict diabetic diet, take glucose lowering medications as prescribed and encourage lower HA1C.       The time spent including preparing to see the patient, obtaining patient history and assessment, evaluation of the plan of care, patient/caregiver counseling and education, orders, documentation, coordination of care, and other professional medical management activities for today's encounter was 20 minute.    Time spent performing procedures during today's encounter was 20 minute.    Follow up in about 3 months (around 11/20/2024).  Teaching provided on s/s to call wound clinic for promptly.   ER precautions taught for after hours and weekends.       DAVIDSON Mo

## 2024-09-04 ENCOUNTER — OFFICE VISIT (OUTPATIENT)
Facility: CLINIC | Age: 69
End: 2024-09-04
Payer: MEDICARE

## 2024-09-04 VITALS
SYSTOLIC BLOOD PRESSURE: 110 MMHG | WEIGHT: 235 LBS | BODY MASS INDEX: 33.64 KG/M2 | HEIGHT: 70 IN | DIASTOLIC BLOOD PRESSURE: 67 MMHG | HEART RATE: 85 BPM

## 2024-09-04 DIAGNOSIS — M51.36 DDD (DEGENERATIVE DISC DISEASE), LUMBAR: ICD-10-CM

## 2024-09-04 DIAGNOSIS — M54.16 LUMBAR RADICULOPATHY: ICD-10-CM

## 2024-09-04 DIAGNOSIS — M47.816 LUMBAR SPONDYLOSIS: Primary | ICD-10-CM

## 2024-09-04 DIAGNOSIS — M54.9 DORSALGIA, UNSPECIFIED: ICD-10-CM

## 2024-09-04 PROCEDURE — 3008F BODY MASS INDEX DOCD: CPT | Mod: CPTII,,, | Performed by: NURSE PRACTITIONER

## 2024-09-04 PROCEDURE — 1159F MED LIST DOCD IN RCRD: CPT | Mod: CPTII,,, | Performed by: NURSE PRACTITIONER

## 2024-09-04 PROCEDURE — 3078F DIAST BP <80 MM HG: CPT | Mod: CPTII,,, | Performed by: NURSE PRACTITIONER

## 2024-09-04 PROCEDURE — 3044F HG A1C LEVEL LT 7.0%: CPT | Mod: CPTII,,, | Performed by: NURSE PRACTITIONER

## 2024-09-04 PROCEDURE — 1101F PT FALLS ASSESS-DOCD LE1/YR: CPT | Mod: CPTII,,, | Performed by: NURSE PRACTITIONER

## 2024-09-04 PROCEDURE — 3288F FALL RISK ASSESSMENT DOCD: CPT | Mod: CPTII,,, | Performed by: NURSE PRACTITIONER

## 2024-09-04 PROCEDURE — 4010F ACE/ARB THERAPY RXD/TAKEN: CPT | Mod: CPTII,,, | Performed by: NURSE PRACTITIONER

## 2024-09-04 PROCEDURE — 1160F RVW MEDS BY RX/DR IN RCRD: CPT | Mod: CPTII,,, | Performed by: NURSE PRACTITIONER

## 2024-09-04 PROCEDURE — 99214 OFFICE O/P EST MOD 30 MIN: CPT | Mod: ,,, | Performed by: NURSE PRACTITIONER

## 2024-09-04 PROCEDURE — 1125F AMNT PAIN NOTED PAIN PRSNT: CPT | Mod: CPTII,,, | Performed by: NURSE PRACTITIONER

## 2024-09-04 PROCEDURE — 3074F SYST BP LT 130 MM HG: CPT | Mod: CPTII,,, | Performed by: NURSE PRACTITIONER
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
## 2024-09-17 ENCOUNTER — HOSPITAL ENCOUNTER (OUTPATIENT)
Dept: RADIOLOGY | Facility: HOSPITAL | Age: 69
Discharge: HOME | End: 2024-09-17
Attending: NURSE PRACTITIONER
Payer: MEDICARE

## 2024-09-17 DIAGNOSIS — M54.16 LUMBAR RADICULOPATHY: ICD-10-CM

## 2024-09-17 DIAGNOSIS — M51.36 DDD (DEGENERATIVE DISC DISEASE), LUMBAR: ICD-10-CM

## 2024-09-17 DIAGNOSIS — M54.9 DORSALGIA, UNSPECIFIED: ICD-10-CM

## 2024-09-17 DIAGNOSIS — M47.816 LUMBAR SPONDYLOSIS: ICD-10-CM

## 2024-09-17 PROCEDURE — 72148 MRI LUMBAR SPINE W/O DYE: CPT | Mod: TC

## 2024-09-30 ENCOUNTER — OFFICE VISIT (OUTPATIENT)
Facility: CLINIC | Age: 69
End: 2024-09-30
Payer: MEDICARE

## 2024-09-30 VITALS
HEIGHT: 70 IN | HEART RATE: 97 BPM | BODY MASS INDEX: 33.64 KG/M2 | DIASTOLIC BLOOD PRESSURE: 70 MMHG | TEMPERATURE: 98 F | WEIGHT: 235 LBS | SYSTOLIC BLOOD PRESSURE: 109 MMHG

## 2024-09-30 DIAGNOSIS — M54.16 LUMBAR RADICULAR PAIN: ICD-10-CM

## 2024-09-30 DIAGNOSIS — M51.369 DDD (DEGENERATIVE DISC DISEASE), LUMBAR: ICD-10-CM

## 2024-09-30 DIAGNOSIS — M47.816 LUMBAR SPONDYLOSIS: Primary | ICD-10-CM

## 2024-09-30 PROCEDURE — 3008F BODY MASS INDEX DOCD: CPT | Mod: CPTII,,, | Performed by: NURSE PRACTITIONER

## 2024-09-30 PROCEDURE — 1160F RVW MEDS BY RX/DR IN RCRD: CPT | Mod: CPTII,,, | Performed by: NURSE PRACTITIONER

## 2024-09-30 PROCEDURE — 99214 OFFICE O/P EST MOD 30 MIN: CPT | Mod: ,,, | Performed by: NURSE PRACTITIONER

## 2024-09-30 PROCEDURE — 1101F PT FALLS ASSESS-DOCD LE1/YR: CPT | Mod: CPTII,,, | Performed by: NURSE PRACTITIONER

## 2024-09-30 PROCEDURE — 3288F FALL RISK ASSESSMENT DOCD: CPT | Mod: CPTII,,, | Performed by: NURSE PRACTITIONER

## 2024-09-30 PROCEDURE — 1159F MED LIST DOCD IN RCRD: CPT | Mod: CPTII,,, | Performed by: NURSE PRACTITIONER

## 2024-09-30 PROCEDURE — 3044F HG A1C LEVEL LT 7.0%: CPT | Mod: CPTII,,, | Performed by: NURSE PRACTITIONER

## 2024-09-30 PROCEDURE — 3078F DIAST BP <80 MM HG: CPT | Mod: CPTII,,, | Performed by: NURSE PRACTITIONER

## 2024-09-30 PROCEDURE — 4010F ACE/ARB THERAPY RXD/TAKEN: CPT | Mod: CPTII,,, | Performed by: NURSE PRACTITIONER

## 2024-09-30 PROCEDURE — 3074F SYST BP LT 130 MM HG: CPT | Mod: CPTII,,, | Performed by: NURSE PRACTITIONER

## 2024-09-30 PROCEDURE — 1125F AMNT PAIN NOTED PAIN PRSNT: CPT | Mod: CPTII,,, | Performed by: NURSE PRACTITIONER

## 2024-09-30 RX ORDER — TIZANIDINE 4 MG/1
TABLET ORAL
COMMUNITY
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
## 2024-10-01 ENCOUNTER — TELEPHONE (OUTPATIENT)
Dept: INTERNAL MEDICINE | Facility: CLINIC | Age: 69
End: 2024-10-01
Payer: MEDICARE

## 2024-10-01 NOTE — TELEPHONE ENCOUNTER
Returned patient's call informed me he did not need to go to ER. Patient informed me he wanted an appointment with JOLENE Gomes. I informed patient he had an appointment scheduled for 11/12/2024 at 720 am. Patient verbalized understanding of all information given to her today.

## 2024-10-09 ENCOUNTER — TELEPHONE (OUTPATIENT)
Dept: CARDIOLOGY | Facility: CLINIC | Age: 69
End: 2024-10-09
Payer: MEDICARE

## 2024-10-09 NOTE — TELEPHONE ENCOUNTER
----- Message from Barbara sent at 10/9/2024 11:21 AM CDT -----  Patient took his Lisinopril this morning and now when he checked his B/P is 74/43 and pt is feeling a little faint.  Please call patient back.    Thank you,  Barbara

## 2024-10-09 NOTE — TELEPHONE ENCOUNTER
Spoke to pt who recheck his bp and reading was  now stable at 129/91 hr 51. Pt advised to ed if BP goes low again, it was as low 66/46 hr 77. C/o dizziness when standing and pre-syncope.  No other complaints at this time.

## 2024-10-14 PROBLEM — Z00.00 WELL ADULT EXAM: Status: RESOLVED | Noted: 2023-12-05 | Resolved: 2024-10-14

## 2024-10-18 ENCOUNTER — HOSPITAL ENCOUNTER (EMERGENCY)
Facility: HOSPITAL | Age: 69
Discharge: HOME | End: 2024-10-18
Attending: EMERGENCY MEDICINE
Payer: MEDICARE

## 2024-10-18 VITALS
DIASTOLIC BLOOD PRESSURE: 76 MMHG | RESPIRATION RATE: 18 BRPM | HEART RATE: 88 BPM | SYSTOLIC BLOOD PRESSURE: 126 MMHG | WEIGHT: 231.5 LBS | BODY MASS INDEX: 33.21 KG/M2 | OXYGEN SATURATION: 100 % | TEMPERATURE: 98 F

## 2024-10-18 DIAGNOSIS — H61.21 IMPACTED CERUMEN OF RIGHT EAR: Primary | ICD-10-CM

## 2024-10-18 PROCEDURE — 99281 EMR DPT VST MAYX REQ PHY/QHP: CPT

## 2024-10-18 NOTE — ED PROVIDER NOTES
Encounter Date: 10/18/2024       History     Chief Complaint   Patient presents with    Cerumen Impaction     CO RT EAR WAX MADE WORSE W Q TI YESTERDAY.  CO DECREASE HEARING SINCE.     Patient presents today c/o decreased hearing due to cerumen. He says he used a q-tip to clean his ear this morning and pushed the wax back further. Since then he has had difficulty hearing. He denies any ear pain.     The history is provided by the patient. No  was used.     Review of patient's allergies indicates:   Allergen Reactions    Sarilumab Other (See Comments)     Other reaction(s): fainting  Johnzara     Past Medical History:   Diagnosis Date    Atrial fibrillation     Central stenosis of spinal canal     Chronic back pain greater than 3 months duration 10/14/2022    Constipation 09/30/2022    Degeneration of lumbar intervertebral disc 07/06/2022    Diastolic dysfunction 05/01/2023    Dysphonia 09/30/2022    Dystrophic nail 06/19/2023    Erectile dysfunction 09/30/2022    Fatty liver     GERD (gastroesophageal reflux disease) 09/30/2022    H/O cataract removal with insertion of prosthetic lens     History of CVA (cerebrovascular accident) 09/30/2022    TIA    History of radial keratotomy 01/01/1998    HTN (hypertension)     Hyperlipidemia 09/30/2022    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/30/2022    Microhematuria 03/30/2023    Overgrown toenails 06/19/2023    Parkinson's disease     Peyronie disease     Polyneuropathy associated with underlying disease 06/19/2023    Rheumatoid arthritis 01/10/2022    Rheumatoid arthritis, unspecified     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    Spinal stenosis of lumbar region 09/30/2022    Type 2 diabetes mellitus without complications     Unspecified macular degeneration     Unspecified macular degeneration      Past Surgical History:   Procedure Laterality Date    BACK SURGERY Left 04/2024    CATARACT EXTRACTION W/  INTRAOCULAR LENS IMPLANT Bilateral     EPIDURAL STEROID INJECTION INTO  LUMBAR SPINE      exc cyst Lt ear      INJECTION OF ANESTHETIC AGENT AROUND MEDIAL BRANCH NERVES INNERVATING LUMBAR FACET JOINT Bilateral 2022    Procedure: Block-nerve-medial branch-lumbar;  Surgeon: Gertrued Blanco MD;  Location: [facility] OR;  Service: Pain Management;  Laterality: Bilateral;  Bilateral L4-L5...Patient is requesting to be first case    INJECTION OF ANESTHETIC AGENT AROUND MEDIAL BRANCH NERVES INNERVATING LUMBAR FACET JOINT Bilateral 2023    Procedure: Block-nerve-medial branch-lumbar;  Surgeon: Gertrude Blanco MD;  Location: [facility] OR;  Service: Pain Management;  Laterality: Bilateral;  Bilateral MBB L3-5    INJECTION OF ANESTHETIC AGENT AROUND MEDIAL BRANCH NERVES INNERVATING LUMBAR FACET JOINT Bilateral 2024    Procedure: Block-nerve-medial branch-lumbar;  Surgeon: Gertrude Blanco MD;  Location: [facility] OR;  Service: Pain Management;  Laterality: Bilateral;  Bilateral MBB L3-5    KNEE ARTHROSCOPY Right     RADIOFREQUENCY ABLATION Left 2024    Procedure: Radiofrequency Ablation;  Surgeon: Gertrude Blanco MD;  Location: [facility] OR;  Service: Pain Management;  Laterality: Left;  RFA Lt L3-L5    RADIOFREQUENCY ABLATION Right 2024    Procedure: RADIOFREQUENCY ABLATION / Right L3 L5;  Surgeon: Gertrude Blanco MD;  Location: [facility] OR;  Service: Pain Management;  Laterality: Right;  Right RFA L3-L5    VASECTOMY       Family History   Problem Relation Name Age of Onset    Heart failure Mother      Coronary artery disease Mother      Glaucoma Father      Alzheimer's disease Father      Emphysema Father      Autoimmune disease Sister      Pneumonia Sister Jenifer     Dementia Sister Jenifer      Social History     Tobacco Use    Smoking status: Former     Current packs/day: 0.00     Average packs/day: 1 pack/day for 15.0 years (15.0 ttl pk-yrs)     Types: Cigarettes     Start date: 1972     Quit date: 1987     Years since quittin.8     Passive exposure: Past     Smokeless tobacco: Never   Substance Use Topics    Alcohol use: Not Currently     Comment: occasional    Drug use: Yes     Types: Marijuana     Review of Systems   HENT:  Positive for hearing loss. Negative for ear pain, facial swelling, postnasal drip, rhinorrhea, sinus pressure, sinus pain and sore throat.    Respiratory:  Negative for cough and shortness of breath.    Cardiovascular:  Negative for chest pain.   Gastrointestinal:  Negative for abdominal pain, diarrhea and vomiting.   Genitourinary:  Negative for flank pain.   Musculoskeletal:  Negative for arthralgias and myalgias.   Skin:  Negative for rash.   Neurological:  Negative for light-headedness and headaches.   All other systems reviewed and are negative.      Physical Exam     Initial Vitals [10/18/24 0916]   BP Pulse Resp Temp SpO2   120/79 99 16 98 °F (36.7 °C) 96 %      MAP       --         Physical Exam    Vitals reviewed.  Constitutional: He is not diaphoretic. No distress.   HENT:   Head: Normocephalic and atraumatic.   Right Ear: External ear normal.   Left Ear: Tympanic membrane, external ear and ear canal normal. Mouth/Throat: Oropharynx is clear and moist. No oropharyngeal exudate.   Right canal with cerumen impaction. Unable to visualized TM.    Eyes: Conjunctivae are normal.   Neck: Neck supple.   Cardiovascular:  Normal rate and intact distal pulses.           Pulmonary/Chest: No respiratory distress.   Abdominal: He exhibits no distension.   Musculoskeletal:      Cervical back: Neck supple.     Neurological: He is alert and oriented to person, place, and time. GCS score is 15. GCS eye subscore is 4. GCS verbal subscore is 5. GCS motor subscore is 6.   Skin: Skin is warm and dry. Capillary refill takes less than 2 seconds. No rash noted.   Psychiatric: He has a normal mood and affect.         ED Course   Procedures  Labs Reviewed - No data to display       Imaging Results    None          Medications - No data to display  Medical Decision  Making  Ddx: cerumen impaction, sensorineural hearing loss, otitis externa, otitis media, KATY, amongst others     Risk  Risk Details: Given strict ED return precautions. I have spoken with the patient and/or caregivers. I have explained the patient's condition, diagnoses and treatment plan based on the information available to me at this time. I have answered the patient's and/or caregiver's questions and addressed any concerns. The patient and/or caregivers have as good an understanding of the patient's diagnosis, condition and treatment plan as can be expected at this point. The vital signs have been stable. The patient's condition is stable and appropriate for discharge from the emergency department.      The patient will pursue further outpatient evaluation with the primary care physician or other designated or consulting physician as outlined in the discharge instructions. The patient and/or caregivers are agreeable to this plan of care and follow-up instructions have been explained in detail. The patient and/or caregivers have received these instructions in written format and have expressed an understanding of the discharge instructions. The patient and/or caregivers are aware that any significant change in condition or worsening of symptoms should prompt an immediate return to this or the closest emergency department or a call to 911                                              Clinical Impression:  Final diagnoses:  [H61.21] Impacted cerumen of right ear (Primary)          ED Disposition Condition    Discharge Stable          ED Prescriptions    None       Follow-up Information       Follow up With Specialties Details Why Contact Info Additional Information    [facility] - Emergency Dept Emergency Medicine  If symptoms worsen return to ED immediately [address] [facility] [address]  [phone]     [facility] Family Medicine In 1 day  [address] [facility]  [address]  [phone]       [facility]-ENT, Entrance 6 Otolaryngology In 1 day  [address] [facility] [id number] [phone] [facility] - ENT,  Entrance #6 Please sign with the  when you arrive.             Ivis Napoles PA  10/18/24 1028

## 2024-10-20 ENCOUNTER — OFFICE VISIT (OUTPATIENT)
Dept: URGENT CARE | Facility: CLINIC | Age: 69
End: 2024-10-20
Payer: MEDICARE

## 2024-10-20 VITALS
HEIGHT: 70 IN | WEIGHT: 231.69 LBS | OXYGEN SATURATION: 96 % | DIASTOLIC BLOOD PRESSURE: 73 MMHG | RESPIRATION RATE: 18 BRPM | TEMPERATURE: 98 F | HEART RATE: 76 BPM | SYSTOLIC BLOOD PRESSURE: 109 MMHG | BODY MASS INDEX: 33.17 KG/M2

## 2024-10-20 DIAGNOSIS — H61.23 BILATERAL IMPACTED CERUMEN: Primary | ICD-10-CM

## 2024-10-20 PROCEDURE — 99214 OFFICE O/P EST MOD 30 MIN: CPT | Mod: S$PBB,,, | Performed by: FAMILY MEDICINE

## 2024-10-20 PROCEDURE — 99215 OFFICE O/P EST HI 40 MIN: CPT | Mod: PBBFAC | Performed by: FAMILY MEDICINE

## 2024-10-20 RX ORDER — DEUTETRABENAZINE 6 MG/1
1 TABLET, COATED ORAL 2 TIMES DAILY
COMMUNITY

## 2024-10-20 RX ORDER — MAGNESIUM GLUCONATE 30 MG(550)
TABLET ORAL
COMMUNITY

## 2024-10-20 NOTE — PROGRESS NOTES
"Subjective:       Patient ID: Melquiades Rehman Jr. is a 69 y.o. male.    Chief Complaint: Ear Fullness (Bilat ear fullness since Friday. Referred to ENT by ED 10/18, states canceled referral on his own.)      Ear Fullness   69-year-old male with bilateral ear fullness for 3 days.  He was referred to ENT but canceled that appointment.  It was not until the end of November.  He would like us to try to lavage his ears today.  Review of Systems   HENT:          As above       Objective:       Vital Signs  Temp: 97.9 °F (36.6 °C)  Pulse: 76  Resp: 18  SpO2: 96 %  BP: 109/73  Height and Weight  Height: 5' 10" (177.8 cm)  Weight: 105.1 kg (231 lb 11.2 oz)  BSA (Calculated - sq m): 2.28 sq meters  BMI (Calculated): 33.2  Weight in (lb) to have BMI = 25: 173.9]  Physical Exam  Vitals reviewed.   Constitutional:       Appearance: Normal appearance.   HENT:      Head: Normocephalic and atraumatic.      Right Ear: There is impacted cerumen.      Left Ear: There is impacted cerumen.      Ears:      Comments: Right ear has complete cerumen impaction.  Left ear has partial.  Eyes:      Extraocular Movements: Extraocular movements intact.      Conjunctiva/sclera: Conjunctivae normal.   Skin:     General: Skin is warm and dry.   Neurological:      General: No focal deficit present.      Mental Status: He is alert.   Psychiatric:         Mood and Affect: Mood normal.         Behavior: Behavior normal.       Assessment:       Problem List Items Addressed This Visit    None  Visit Diagnoses       Bilateral impacted cerumen    -  Primary            Plan:   Cerumen cleared after ear lavage   Encouraged patient to use baby oil ineffective ear  Allow oil to rest for 2 minutes, then let oil exit ear through gravity   ER precautions   Follow-up with PCP     "

## 2024-10-24 ENCOUNTER — OFFICE VISIT (OUTPATIENT)
Dept: OPHTHALMOLOGY | Facility: CLINIC | Age: 69
End: 2024-10-24
Payer: MEDICARE

## 2024-10-24 VITALS — WEIGHT: 231.69 LBS | HEIGHT: 70 IN | BODY MASS INDEX: 33.17 KG/M2

## 2024-10-24 DIAGNOSIS — H18.513 FUCHS' CORNEAL DYSTROPHY OF BOTH EYES: Primary | ICD-10-CM

## 2024-10-24 DIAGNOSIS — E11.9 CONTROLLED TYPE 2 DIABETES MELLITUS WITHOUT COMPLICATION, WITHOUT LONG-TERM CURRENT USE OF INSULIN: ICD-10-CM

## 2024-10-24 DIAGNOSIS — Z98.890 HISTORY OF RADIAL KERATOTOMY: ICD-10-CM

## 2024-10-24 PROCEDURE — 99213 OFFICE O/P EST LOW 20 MIN: CPT | Mod: PBBFAC,PN

## 2024-10-24 RX ORDER — SODIUM CHLORIDE 50 MG/ML
1 SOLUTION/ DROPS OPHTHALMIC 3 TIMES DAILY
Qty: 30 ML | Refills: 2 | Status: SHIPPED | OUTPATIENT
Start: 2024-10-24 | End: 2025-10-24

## 2024-10-24 RX ORDER — ALOGLIPTIN BENZOATE AND PIOGLITAZONE HYDROCHLORIDE 25; 30 MG/1; MG/1
TABLET, FILM COATED ORAL
COMMUNITY

## 2024-10-24 NOTE — PROGRESS NOTES
[facility]    RTC Vincent day to re-evaluate stabilization of RK  Last edited by Kenyatta Murillo on 10/24/2024  1:29 PM.        Assessment /Plan   Topography 3/14/23  OD flat, 36.42 simK, 1.8D astigmatism 019  OS flat, 36.50D simK, 0.38D astigmatism    K Jun 8/24/23  OD: prolate, flat simmk 35.34, steep simk 37.5, astig 2.16 at 23  OS: prolate, flat simk 36.28, steep simk 37.16, astig 0.87 @ 107    K Jun 10/24/24  OD: flat, flat simk 35.00, steep simk 37.85, astig 2.85 @ 38  OS: flat, flat simk 35.97, steep simk 36.79, astig 0.83 @ 101    CCT 8/24/23  OD:   OS:     OCT mac 7/24/24: Good foveal contour, no IRF/SRF     RK  - Fluctuating vision. Gets RX with optometry usually to 20/30-20/40. MRx 3/23 to 20/80 ou  - LOWELL 20/25 OU  - Unable to refract past 20/60 // 20/40 on 8/2/23. Patient reports fluctuating vision, worse in AM  - RTC Vincent day 8/27 for evaluation of options for stabilization of RK. Repeat refraction if no surgical options   - 8/24/23: Dr Driscoll in OR for emergency, unable to see patient. Slight R eye fluctuation in R eye K jun, but minimal.  Mrx near and distance separate given and patient happy trying this result.   - Patient does not want bifocals  - 7/24/24: MRX to 20/100 // 20/40, pt prefers separate glasses  - 10/24/24: Seen with Dr. Driscoll. Chief complaint is fluctuating VA, inability to achieve 20/20 BCVA with MRx. +3 Omar OU on exam. 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  - RTC 6 weeks for VA and MRx    2. Fuch's OU  - Start kaylynn TID    3. 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

## 2024-11-05 ENCOUNTER — TELEPHONE (OUTPATIENT)
Dept: INTERNAL MEDICINE | Facility: CLINIC | Age: 69
End: 2024-11-05
Payer: MEDICARE

## 2024-11-05 DIAGNOSIS — E11.9 TYPE 2 DIABETES MELLITUS WITHOUT COMPLICATION, WITHOUT LONG-TERM CURRENT USE OF INSULIN: ICD-10-CM

## 2024-11-05 RX ORDER — ALOGLIPTIN BENZOATE AND PIOGLITAZONE HYDROCHLORIDE 25; 30 MG/1; MG/1
1 TABLET, FILM COATED ORAL DAILY
Qty: 90 TABLET | Refills: 3 | Status: SHIPPED | OUTPATIENT
Start: 2024-11-05 | End: 2025-05-04

## 2024-11-05 NOTE — TELEPHONE ENCOUNTER
Patient requested the generic brand of Oseni was sent to [facility] pharmacy. Informed patient medication refill was sent. Patient verbalized understanding.

## 2024-11-06 NOTE — PROGRESS NOTES
Patient ID: [id number]     Chief Complaint: Seropositive rheumatoid arthritis of multiple sites (Patient states he is doing well. )      HPI:     Melquiades Rehman Jr. is a 68 y.o. male here today for follow-up of Rheumatoid Arthritis.    Presents today for follow up of RA, CCP (>250) and RF. He recalls his symptoms began 1994 w/ mainly elbow problems. But over a year, his symptoms worsened and involved hands. He was referred to Dr. Beck who started Enbrel 2-3 yrs but developed secondary failure. He then changed to Humira, which he took for 15 yrs but lost efficacy a few yrs ago. He briefly tried Kevzara but had poor rxn. He was then changed to Xeljanz, which worked wonderfully. However, he lost his insurance and started seeing [facility], NP April. He reports that she didn't want to start Xeljanz over concerns of blood clots. Instead, she started Orencia, which didn't work at all, however he reports never taken. He had one bottle left of Xeljanz and restarted it. It has worked great so it was resumed. He did have +Hep C ab but saw GI (Dr. Toledo) - had US and repeat testing for Hep C was negative. Told he may have fatty liver dz.    Chronic back pain and he has DJD and spinal stenosis, no sciatica symptoms. He has seen Dr Montero once, Neurosurgery and continues to follow with Dr Blanco, pain management. He has done PT, traction has helped in the past but no longer doing any therapy. Nerve block and VALERIE did not help. Pain worse with bending and better with sitting and lying down.     RA has been in remission, denies red, warm and swollen joints. No flares. May be once in a while he takes Prednisone- has not had in quit some time, he tried to stop meds in August 2023 as he had been asymptomatic however he resumed after 1 month as he reports he had the start of a flare and has remained on since. He is taking Xeljanz 11 mg daily, doing well. He has A fib and on AC. He sees [facility] here at [facility]. CHF was on his problem  list, but per patient he does not have HF and had tests done for that and the diagnosis was taken off the list.      July 20, 2024: Here today for follow-up. Currently taking Xeljanz 11 mg daily.  Tolerating medications well without any issues. Denies any red/warm/swollen joints. Morning stiffness lasting 10 minutes then resolves. Called for Prednisone refill last week, reports was almost out, just needed refills to have on hand. Overall he feels Xeljanz has continue to work well. He just had a stem cell infusion 8/1/2024- clinical trial, for his Parkinsons. So far he states he has noticed a few improvement in overall symptoms- he did have a fever right after the infusion but reports resolved quickly, some shoulder pain a few days after - did take Prednisone 5 mg x1 and helped then resolved and no issues since. Overall he continues to do well with current regimen.     November 2024: Here today for follow-up visit. Currently taking Xeljanz 11 mg daily.  Tolerating medications well without any issues. Denies any red/warm/swollen joints. Morning stiffness lasting 10 minutes then resolves. Having more issues with his back.  Still following with Pain Management for VALERIE. Denies numbness and tingling in lower extremities. Still in clinical trials for his Parkinsons- has 2 more infusions left. Overall joint wise he reports he has been doing really well.     Denies any recent illness or hospitalizations since last visit.     Dr. Barrios- Neurology  [facility]  -Cardiology   Dr. Blanco- Pain Management     PMH: AFib on Xarelto, HTN, Inc lipids, Parkinsons, Peyronie's dz, T2DM, fatty liver.  He has Parkinson's, he sees Dr Barrios. Symptoms are better with Sinemet. He also takes medication for movement disorders.  H/o CVA several years ago- did follow with Neurology until dx with Parkinson and now continues to follow with Dr. Barrios. No residual weakness from stroke.    Denies history of fevers, rashes, photosensitivity, oral or  "nasal ulcers, h/o MI, seizures, h/o PE or DVT, Raynaud's phenomenon, uveitis, malignancies.   Family history of autoimmune disease: Sister but unsure what "autoimmune" condition she had- reports "attacked her lungs and killed her".  Smoking: Quit smoking - smoked for roughly 15 years (12 ppd)  Social: 3-4 beers qow w/ playing music. No TBO but marijuana occ    Social History     Tobacco Use   Smoking Status Former    Current packs/day: 0.00    Average packs/day: 1 pack/day for 15.0 years (15.0 ttl pk-yrs)    Types: Cigarettes    Start date: 1972    Quit date: 1987    Years since quittin.2    Passive exposure: Past   Smokeless Tobacco Never          ----------------------------  Atrial fibrillation  Central stenosis of spinal canal  Chronic back pain greater than 3 months duration  Constipation  Degeneration of lumbar intervertebral disc  Diastolic dysfunction  Dysphonia  Dystrophic nail  Erectile dysfunction  Fatty liver  GERD (gastroesophageal reflux disease)  H/O cataract removal with insertion of prosthetic lens  History of CVA (cerebrovascular accident)  History of radial keratotomy  HTN (hypertension)  Hyperlipidemia  Induratio penis plastica  Microhematuria  Overgrown toenails  Parkinson's disease  Peyronie disease  Polyneuropathy associated with underlying disease  Rheumatoid arthritis  Rheumatoid arthritis, unspecified  Sixth nerve palsy  Spinal stenosis of lumbar region  Type 2 diabetes mellitus without complications  Unspecified macular degeneration  Unspecified macular degeneration     Past Surgical History:   Procedure Laterality Date    BACK SURGERY Left 2024    CATARACT EXTRACTION W/  INTRAOCULAR LENS IMPLANT Bilateral     EPIDURAL STEROID INJECTION INTO LUMBAR SPINE      exc cyst Lt ear      INJECTION OF ANESTHETIC AGENT AROUND MEDIAL BRANCH NERVES INNERVATING LUMBAR FACET JOINT Bilateral 2022    Procedure: Block-nerve-medial branch-lumbar;  Surgeon: Gertrude Blanco MD;  " Location: [facility] OR;  Service: Pain Management;  Laterality: Bilateral;  Bilateral L4-L5...Patient is requesting to be first case    INJECTION OF ANESTHETIC AGENT AROUND MEDIAL BRANCH NERVES INNERVATING LUMBAR FACET JOINT Bilateral 11/22/2023    Procedure: Block-nerve-medial branch-lumbar;  Surgeon: Gertrude Blanco MD;  Location: [facility] OR;  Service: Pain Management;  Laterality: Bilateral;  Bilateral MBB L3-5    INJECTION OF ANESTHETIC AGENT AROUND MEDIAL BRANCH NERVES INNERVATING LUMBAR FACET JOINT Bilateral 01/24/2024    Procedure: Block-nerve-medial branch-lumbar;  Surgeon: Gertrude Blanco MD;  Location: [facility] OR;  Service: Pain Management;  Laterality: Bilateral;  Bilateral MBB L3-5    KNEE ARTHROSCOPY Right     RADIOFREQUENCY ABLATION Left 03/27/2024    Procedure: Radiofrequency Ablation;  Surgeon: Gertrude Blanco MD;  Location: [facility] OR;  Service: Pain Management;  Laterality: Left;  RFA Lt L3-L5    VASECTOMY  1998       Review of patient's allergies indicates:   Allergen Reactions    Sarilumab Other (See Comments)     Other reaction(s): fainting  Kevzara       Outpatient Medications Marked as Taking for the 4/9/24 encounter (Office Visit) with Valeria Donovan MD   Medication Sig Dispense Refill    alogliptin-pioglitazone (OSENI) 25-30 mg Tab Take 1 tablet by mouth once daily. 90 tablet 0    ammonium lactate 12 % Crea Apply 1 application  topically 2 (two) times daily.      blood sugar diagnostic (ACCU-CHEK GUIDE TEST STRIPS) Strp 1 strip by skin prick route Daily. 100 strip 11    carbidopa-levodopa  mg (SINEMET)  mg per tablet Take 2 tablets by mouth 2 (two) times a day.      colchicine (COLCRYS) 0.6 mg tablet Take 1 tablet (0.6 mg total) by mouth 2 (two) times daily. 180 tablet 0    gemfibroziL (LOPID) 600 MG tablet Take 1 tablet (600 mg total) by mouth 2 (two) times daily. 180 tablet 3    lancets (ACCU-CHEK FASTCLIX LANCET DRUM [facility]) Accu-Chek Fastclix Lancet Drum       lisinopriL-hydrochlorothiazide (PRINZIDE,ZESTORETIC) 20-25 mg Tab Take 1 tablet by mouth once daily. (Patient taking differently: Take 1 tablet by mouth as needed.) 90 tablet 3    metoprolol succinate (TOPROL-XL) 25 MG 24 hr tablet Take 1 tablet (25 mg total) by mouth once daily. 90 tablet 3    pantoprazole (PROTONIX) 40 MG tablet Take 1 tablet (40 mg total) by mouth once daily. 90 tablet 0    predniSONE (DELTASONE) 5 MG tablet Take 1 tablet (5 mg total) by mouth once daily. 5 tablet 0    rosuvastatin (CRESTOR) 20 MG tablet Take 1 tablet (20 mg total) by mouth once daily. 90 tablet 3    selegiline HCl (ELDEPRYL) 5 mg tablet Take 5 mg by mouth 2 (two) times daily with meals.      vit A/vit C/vit E/zinc/copper (PRESERVISION AREDS ORAL) Take 1 capsule by mouth once daily.      XARELTO 20 mg Tab Take 1 tablet (20 mg total) by mouth once daily. 90 tablet 3    [DISCONTINUED] tofacitinib (XELJANZ XR) 11 mg Tb24 Take 1 tablet by mouth once daily. 30 tablet 2       Social History     Socioeconomic History    Marital status:     Number of children: 1   Tobacco Use    Smoking status: Former     Current packs/day: 0.00     Average packs/day: 1 pack/day for 15.0 years (15.0 ttl pk-yrs)     Types: Cigarettes     Start date: 1972     Quit date: 1987     Years since quittin.2     Passive exposure: Past    Smokeless tobacco: Never   Substance and Sexual Activity    Alcohol use: Not Currently    Drug use: Not Currently    Sexual activity: Yes     Partners: Female     Social Determinants of Health     Financial Resource Strain: Medium Risk (1/10/2024)    Overall Financial Resource Strain (CARDIA)     Difficulty of Paying Living Expenses: Somewhat hard   Food Insecurity: Food Insecurity Present (1/10/2024)    Hunger Vital Sign     Worried About Running Out of Food in the Last Year: Often true     Ran Out of Food in the Last Year: Often true   Transportation Needs: No Transportation Needs (1/10/2024)    PRAPARE -  Transportation     Lack of Transportation (Medical): No     Lack of Transportation (Non-Medical): No   Physical Activity: Inactive (1/10/2024)    Exercise Vital Sign     Days of Exercise per Week: 0 days     Minutes of Exercise per Session: 20 min   Stress: No Stress Concern Present (1/10/2024)    [employer] - Occupational Stress Questionnaire     Feeling of Stress : Only a little   Social Connections: Socially Isolated (1/10/2024)    Social Connection and Isolation Panel [NHANES]     Frequency of Communication with Friends and Family: More than three times a week     Frequency of Social Gatherings with Friends and Family: Three times a week     Attends Sabianism Services: Never     Active Member of Clubs or Organizations: No     Attends Club or Organization Meetings: Never     Marital Status:    Housing Stability: Low Risk  (1/10/2024)    Housing Stability Vital Sign     Unable to Pay for Housing in the Last Year: No     Number of Places Lived in the Last Year: 1     Unstable Housing in the Last Year: No        Family History   Problem Relation Age of Onset    Heart failure Mother     Coronary artery disease Mother     Glaucoma Father     Alzheimer's disease Father     Emphysema Father     Autoimmune disease Sister     Pneumonia Sister     Dementia Sister         Immunization History   Administered Date(s) Administered    COVID-19, vector-nr, rS-Ad26, PF (Danie) 03/08/2021, 10/25/2021    Influenza 10/28/2014    Influenza (FLUAD) - Quadrivalent - Adjuvanted - PF *Preferred* (65+) 10/21/2023    Influenza - Quadrivalent 09/30/2020    Influenza - Quadrivalent - PF *Preferred* (6 months and older) 12/04/2019, 09/30/2020    Influenza - Trivalent - PF (ADULT) 10/26/2018, 12/04/2019    Pneumococcal Conjugate - 13 Valent 05/03/2019    Pneumococcal Polysaccharide - 23 Valent 11/24/2020    Zoster Recombinant 11/12/2019, 02/13/2020       Patient Care Team:  Pau Gomes FNP as PCP  "- General (Family Medicine)  Taisha Wood LPN as Care Coordinator  Homar Barrios MD (Neurology)     Subjective:     Constitutional:  Denies chills. Denies fever. Denies night sweats. Denies weight loss.   Ophthalmology: Admits blurred vision- following with Ophthalmology here- just seen last month. Denies dry eyes. Denies eye pain. Denies Itching and redness.   ENT: Denies oral ulcers. Denies epistaxis. Denies dry mouth. Denies swollen glands.   Endocrine: Admits diabetes. Denies thyroid Problems.   Respiratory: Denies cough. Denies shortness of breath. Denies shortness of breath with exertion. Denies hemoptysis.   Cardiovascular: Denies chest pain at rest. Denies chest pain with exertion. Admits palpitations from A-fib- come and goes  Gastrointestinal: Denies abdominal pain. Denies diarrhea. Denies nausea. Denies vomiting. Denies hematemesis or hematochezia. Denies heartburn. Admits constipation, uses OTC meds and helps  Genitourinary: Denies blood in urine.  Musculoskeletal: See HPI for details  Integumentary: Denies rash. Denies photosensitivity.   Peripheral Vascular: Denies Ulcers of hands and/or feet. Denies Cold extremities.   Neurologic: Denies dizziness. Denies headache.  Denies loss of strength. Denies numbness or tingling.   Psychiatric: Denies depression. Denies anxiety. Denies suicidal/homicidal ideations.      Objective:     /81 (BP Location: Left arm, Patient Position: Sitting, BP Method: Medium (Automatic))   Pulse 79   Temp 97.8 °F (36.6 °C) (Oral)   Resp 18   Ht 5' 11" (1.803 m)   Wt 107.5 kg (236 lb 15.9 oz)   SpO2 95%   BMI 33.05 kg/m²     Physical Exam    General Appearance: alert, pleasant, in no acute distress.  Skin: Skin color, texture, turgor normal. No rashes or lesions.  Eyes:  extraocular movement intact (EOMI), pupils equal, round, reactive to light and accommodation, conjunctiva clear.  ENT: No oral or nasal ulcers.  Neck:  Neck supple. No adenopathy. "   Lungs: CTA throughout without crackles, rhonchi, or wheezes.   Heart: RRR w/o murmurs.  No edema.   Neuro: Alert, oriented, CN II-XII GI, sensory and motor innervation intact.  Very mild tremors present.  Rigidity of left upper extremity on exam, mild.  Musculoskeletal: No pain to bilateral MCPs, FROM noted to bilateral wrists, elbows, shoulders with no pain, no pain to bilateral MTPs.  No pain to bilateral knees, crepitus noted bilaterally   Psych: Alert, oriented, normal eye contact.    Labs:   3/21/23:  Creatinine 1.52, GFR 51.  CBC okay.  ESR, CRP normal.  HIV, hepatitis B and C, quantiferon tb negative. 1+ protein and no blood. CKD and protenuria could be due to DM and HTN.    5/15/23:   milligram/gram.  Creatinine 1.27, GFR greater than 60.     7/6/2023 CMP Creat 1.23 (previously 1.33), CBC WBC 4.33 (previous 5.5), ANC 1.78 (previously 3.47), CRP <0.40 (<5), Sed Rate 3 (<15). - stopped Xeljanz    8/16/2023 CBC ANC now 2.23, otherwise ok.  ANC improved.     10/9/2023 Sed Rate 6 (<15), CBC WBC 4.07, RBC 4.50, ANC 2.04 (has been 1.78 in past)- stable. Trace protein and no blood in urine.  Creatinine 1.28, GFR greater than 60.  CRP normal.    1/11/2024 CBC WBC 3.85, ANC ok, RBC 4.35, hct 41.7, otherwise ok. Sed Rate 7 (<15), CMP ok. CRP 4.80 (<5)      4/9/24: Cr 1.26, GFR >60. CBC ok.     07/08/2024 CBC hematocrit 41.2, otherwise okay, CMP 0.30, GFR> 60, glucose 129,Ca+ 10.1    08/08/2024 CMP glucose 149, creatinine 1.26, GFR > 60, CBC okay, hep B/C/HIV all negative, CRP 1.4 (<5), ESR 15 (<15), QuantiFERON negative    10/18/2024 CBC ok. CMP ok. A1C 6.5    Imaging:   ECHOS:   ECHO 1/15/21:  Left ventricular systolic function normal.  EF 60%.  Grade 1 diastolic dysfunction.  PASP not given.  Right ventricular size and function normal.  04/05/2023 ECHO:  LVEF 60%, LV hypertrophy noted, mild to moderate.  PASP 5 mmHg  10/02/2023: ECHO ejection fraction 60-65%, normal left ventricular systolic and diastolic  function.  PASP not given.    CXRays:   3/21/23: CXR showed chronic bilateral basilar interstitial changes.   10/9/2023 CXRay: Impression: No acute cardiopulmonary process identified.  04/09/2024: chest x-ray showed remote left rib.  No lobar consolidation or evidence of acute cardiac decompensation.    3/21/23:  Arthritis changes in bilateral feet, erosion of left 5th MTP.    X-ray of right foot showed moderate-to-severe arthritis.    DJD changes in bilateral hands.  No aggressive osseous lesions appreciated.    4/05/2023 FVC 94.7%, FEV1 92.2 %, FEV1/FVC 97% TLC 94%, DLCO 79.4%, DLCO/.  FEV1/FVC ratio normal, FEV1, FVC and TLC  normal.  DLCO normal, impression normal PFT     5/4/2023: AAA Screening, No AAA noted. Some ectasia of proximal abd aorta with no evidence of aneurysmal dilatation.     10/3/2023: Carotid u/s Left and right Internal Cardotid arteries less than 50% stenosis.     CT HEAD:   10/2/2023 CT Head w/o Contrast:  Impression:  No acute intracranial findings identified.  Chronic microangiopathic ischemia  10/26/2023 CT Head w/o Contrast: Impression: No acute intracranial findings or significant interval change compared to earlier this month.  10/26/2023 CT Head and Neck: Impression: Mild atherosclerotic changes seen as outlined above with no hemodynamically significant stenosis seen and no large plaque burden is seen.    10/26/2023 MRI Brain w/o Contrast: Impression: Chronic age related changes. Otherwise unremarkable    11/24/2023 CT Abd/Pelvis with and w/o Contrast: Impression: No nephrolithiasis, hydronephrosis, ureteral obstruction evident.  Possible mild renal cortical thinning. No acute process seen.       Assessment:       ICD-10-CM ICD-9-CM   1. Seropositive rheumatoid arthritis of multiple sites  M05.79 714.0   2. Drug-induced immunodeficiency  D84.821 279.3    Z79.899 E947.9   3. Spinal stenosis at L4-L5 level  M48.061 724.02   4. Atrial fibrillation and flutter  I48.91 427.31    I48.92  427.32   5. Parkinson's disease, unspecified whether dyskinesia present, unspecified whether manifestations fluctuate  G20.A1 332.0   6. Primary hypertension  I10 401.9   7. Steatosis of liver  K76.0 571.8   8. Controlled type 2 diabetes mellitus without complication, without long-term current use of insulin  E11.9 250.00   9. Hyperlipidemia, unspecified hyperlipidemia type  E78.5 272.4   10. Abnormal CXR  R93.89 793.2          Plan:       1. Strongly + CCP and RF rheumatoid arthritis dx 2014  - MTX was not used d/t hx of ETOH use. He had secondary failure of Enbrel after 2-3 yrs. Humira worked for 15 yrs but developed secondary failure. He briefly tried Kevzara but he did not tolerate it, states caused him to pass out after 1 dose. He was changed to Xeljanz, which worked well. Failed Orencia, did not help, however he does not recall taking Orencia in the past after reviewing medication. Restarted Xeljanz around October 2019 and he has been doing very well. 3/21/23:  Arthritis changes in bilateral feet, erosion of left 5th MTP.  Today on exam, no active synovitis noted.   - At length discussion in the past in regards to increase CV risk associated with JAIRO use (he is >53 y/o, history of CVA and CV risk factor of Afib), he wishes to continue medication as this has been very beneficial for him   - 04/09/2024: chest x-ray showed remote left rib.  No lobar consolidation or evidence of acute cardiac decompensation.   - Infection w/u negative 8/2024   - No labs today as he just had last month, reviewed, repeat lab in 3 months- orders placed  - C/w Xeljanz 11 mg daily.   - F/U OV in 6 months as he has been doing well with 3 month labs    2. Severe multifactorial spinal canal stenosis at L4-L5, moderate at L2-L3 and L3-L4, mild at L1-L2  - Follows Pain Management Dr. Blanco, he had nerve block with possible ablation.   - He also smokes marijuana to help with pain- march snot smoke often    3. Afib on Xarelto  - Heart  failure was ruled out, diagnosis was taken off his problem list per patient.   - Recent ECHO 10/02/2023: ejection fraction 60-65%, normal left ventricular systolic and diastolic function.  PASP not given  - Continue f/u with Cardio.     4. Parkinson's/History of CVA  -On carbidopa-levodopa.  Just had stem cell infusion  8/1/2024- clinical trial- overall does note he feels he has minor improvement in symptoms- doing well post infusion  -Follows with Neurologist Dr. Barrios.    5. Peyronie's dz  - Pt reports taking Colchicine.    6. Fatty liver  -One Hep C ab + but repeat w/ PCR negative. Possibly false +.   -Will monitor.     7. High risk med use/drug-induced immunodeficiency   -Persons with rheumatoid arthritis, lupus, psoriatic arthritis and other autoimmune diseases are at increased risk of cardiovascular disease including heart attack and stroke. We recommend that all patients with these conditions have annual health maintenance exams including lipid measurements, blood pressure measurements, and smoking cessation counseling when applicable at their primary care provider's office.   -Due for colonoscopy, had apt in June but deferred colonoscopy- plans to discuss further with PCP, PSA and screenings  with Urology  -Advised to stay up-to-date on age appropriate vaccinations and malignancy screening, including yearly skin exams.    -Continued lab monitoring.   -PPSV-23 11/20, Prevnar 5/19, Shingrix 6/2020. UTD w/ flu shot. UTD COVID vaccine.    8. HTN/T2DM/CHF  - Followed by PCP, currently stable blood pressure at today's visit, enc low Na+ diet  - 10/18/2024 A1C 6.5, encouraged continued diet modifications of sugar/carb/starch intake    9. Previous abnormal CXRay  -3/21/23: CXR showed chronic bilateral basilar interstitial changes.   -4/05/2023 FVC 94.7%, FEV1 92.2 %, FEV1/FVC 97% TLC 94%, DLCO 79.4%, DLCO/.  FEV1/FVC ratio normal, FEV1, FVC and TLC  normal.  DLCO normal, impression normal PFT   -May be  chronic as he reports history of pneumonia x 2 and Covid, which could have been the reason for previous abnormal CXR. Repeat CXRay 10/9/2023 CXRay: No acute cardiopulmonary process identified. He has no symptoms, no cough or SOB.  - Repeat CXRay 04/09/2024: chest x-ray showed remote left rib.  No lobar consolidation or evidence of acute cardiac decompensation. Continues to deny any SOB    Follow up for as scheduled with NP. In addition to their scheduled follow up, the patient has also been instructed to follow up on as needed basis.     Total time spent with patient and documentation is 35 minutes. All questions were answered to patient's satisfaction and patient verbalized understanding. This includes face to face time and non-face to face time preparing to see the patient (eg, review of tests), obtaining and/or reviewing separately obtained history, documenting clinical information in the electronic or other health record, independently interpreting results and communicating results to the patient/family/caregiver, or care coordinator.

## 2024-11-08 ENCOUNTER — OFFICE VISIT (OUTPATIENT)
Dept: RHEUMATOLOGY | Facility: CLINIC | Age: 69
End: 2024-11-08
Payer: MEDICARE

## 2024-11-08 VITALS
HEART RATE: 85 BPM | DIASTOLIC BLOOD PRESSURE: 80 MMHG | HEIGHT: 70 IN | RESPIRATION RATE: 20 BRPM | BODY MASS INDEX: 33.88 KG/M2 | WEIGHT: 236.63 LBS | SYSTOLIC BLOOD PRESSURE: 138 MMHG | OXYGEN SATURATION: 96 % | TEMPERATURE: 98 F

## 2024-11-08 DIAGNOSIS — K76.0 STEATOSIS OF LIVER: ICD-10-CM

## 2024-11-08 DIAGNOSIS — G20.A1 PARKINSON'S DISEASE, UNSPECIFIED WHETHER DYSKINESIA PRESENT, UNSPECIFIED WHETHER MANIFESTATIONS FLUCTUATE: ICD-10-CM

## 2024-11-08 DIAGNOSIS — Z86.73 HISTORY OF CVA (CEREBROVASCULAR ACCIDENT): ICD-10-CM

## 2024-11-08 DIAGNOSIS — N48.6 PEYRONIE'S DISEASE: ICD-10-CM

## 2024-11-08 DIAGNOSIS — M48.061 SPINAL STENOSIS AT L4-L5 LEVEL: ICD-10-CM

## 2024-11-08 DIAGNOSIS — D84.821 DRUG-INDUCED IMMUNODEFICIENCY: ICD-10-CM

## 2024-11-08 DIAGNOSIS — Z79.899 HIGH RISK MEDICATION USE: ICD-10-CM

## 2024-11-08 DIAGNOSIS — M05.79 SEROPOSITIVE RHEUMATOID ARTHRITIS OF MULTIPLE SITES: Primary | ICD-10-CM

## 2024-11-08 DIAGNOSIS — I50.9 CONGESTIVE HEART FAILURE, UNSPECIFIED HF CHRONICITY, UNSPECIFIED HEART FAILURE TYPE: ICD-10-CM

## 2024-11-08 DIAGNOSIS — I48.91 ATRIAL FIBRILLATION AND FLUTTER: ICD-10-CM

## 2024-11-08 DIAGNOSIS — I10 PRIMARY HYPERTENSION: ICD-10-CM

## 2024-11-08 DIAGNOSIS — Z79.899 DRUG-INDUCED IMMUNODEFICIENCY: ICD-10-CM

## 2024-11-08 DIAGNOSIS — I10 HYPERTENSION, UNSPECIFIED TYPE: ICD-10-CM

## 2024-11-08 DIAGNOSIS — I48.92 ATRIAL FIBRILLATION AND FLUTTER: ICD-10-CM

## 2024-11-08 PROCEDURE — 99215 OFFICE O/P EST HI 40 MIN: CPT | Mod: PBBFAC | Performed by: NURSE PRACTITIONER

## 2024-11-08 RX ORDER — TOFACITINIB 11 MG/1
1 TABLET, FILM COATED, EXTENDED RELEASE ORAL DAILY
Qty: 30 TABLET | Refills: 3 | Status: SHIPPED | OUTPATIENT
Start: 2024-11-08

## 2024-11-12 ENCOUNTER — OFFICE VISIT (OUTPATIENT)
Dept: CARDIOLOGY | Facility: CLINIC | Age: 69
End: 2024-11-12
Payer: MEDICARE

## 2024-11-12 ENCOUNTER — OFFICE VISIT (OUTPATIENT)
Dept: INTERNAL MEDICINE | Facility: CLINIC | Age: 69
End: 2024-11-12
Payer: MEDICARE

## 2024-11-12 ENCOUNTER — LAB VISIT (OUTPATIENT)
Dept: LAB | Facility: HOSPITAL | Age: 69
End: 2024-11-12
Attending: NURSE PRACTITIONER
Payer: MEDICARE

## 2024-11-12 VITALS
WEIGHT: 235.63 LBS | HEIGHT: 70 IN | SYSTOLIC BLOOD PRESSURE: 134 MMHG | BODY MASS INDEX: 33.73 KG/M2 | TEMPERATURE: 98 F | RESPIRATION RATE: 18 BRPM | HEART RATE: 82 BPM | DIASTOLIC BLOOD PRESSURE: 87 MMHG

## 2024-11-12 VITALS
WEIGHT: 235.63 LBS | BODY MASS INDEX: 33.73 KG/M2 | HEIGHT: 70 IN | SYSTOLIC BLOOD PRESSURE: 133 MMHG | OXYGEN SATURATION: 95 % | TEMPERATURE: 98 F | RESPIRATION RATE: 20 BRPM | DIASTOLIC BLOOD PRESSURE: 79 MMHG | HEART RATE: 94 BPM

## 2024-11-12 DIAGNOSIS — E11.9 CONTROLLED TYPE 2 DIABETES MELLITUS WITHOUT COMPLICATION, WITHOUT LONG-TERM CURRENT USE OF INSULIN: ICD-10-CM

## 2024-11-12 DIAGNOSIS — G20.A1 PARKINSON'S DISEASE, UNSPECIFIED WHETHER DYSKINESIA PRESENT, UNSPECIFIED WHETHER MANIFESTATIONS FLUCTUATE: ICD-10-CM

## 2024-11-12 DIAGNOSIS — I10 PRIMARY HYPERTENSION: ICD-10-CM

## 2024-11-12 DIAGNOSIS — D53.9 MACROCYTIC ANEMIA: Primary | ICD-10-CM

## 2024-11-12 DIAGNOSIS — I48.91 ATRIAL FIBRILLATION AND FLUTTER: ICD-10-CM

## 2024-11-12 DIAGNOSIS — I48.92 ATRIAL FIBRILLATION AND FLUTTER: ICD-10-CM

## 2024-11-12 DIAGNOSIS — Z83.719 FAMILY HISTORY OF COLONIC POLYPS: ICD-10-CM

## 2024-11-12 DIAGNOSIS — Z00.00 WELL ADULT EXAM: ICD-10-CM

## 2024-11-12 DIAGNOSIS — Z12.5 SCREENING FOR PROSTATE CANCER: ICD-10-CM

## 2024-11-12 DIAGNOSIS — I10 HYPERTENSION, UNSPECIFIED TYPE: Primary | ICD-10-CM

## 2024-11-12 DIAGNOSIS — I48.20 CHRONIC A-FIB: ICD-10-CM

## 2024-11-12 DIAGNOSIS — K21.9 GASTROESOPHAGEAL REFLUX DISEASE, UNSPECIFIED WHETHER ESOPHAGITIS PRESENT: ICD-10-CM

## 2024-11-12 LAB
BACTERIA #/AREA URNS AUTO: ABNORMAL /HPF
BILIRUB UR QL STRIP.AUTO: NEGATIVE
CLARITY UR: CLEAR
COLOR UR AUTO: YELLOW
CREAT UR-MCNC: 225.5 MG/DL (ref 63–166)
GLUCOSE UR QL STRIP: NORMAL
HGB UR QL STRIP: ABNORMAL
HYALINE CASTS #/AREA URNS LPF: ABNORMAL /LPF
KETONES UR QL STRIP: NEGATIVE
LEUKOCYTE ESTERASE UR QL STRIP: NEGATIVE
MICROALBUMIN UR-MCNC: 77.3 UG/ML
MICROALBUMIN/CREAT RATIO PNL UR: 34.3 MG/GM CR (ref 0–30)
MUCOUS THREADS URNS QL MICRO: ABNORMAL /LPF
NITRITE UR QL STRIP: NEGATIVE
PH UR STRIP: 5.5 [PH]
PROT UR QL STRIP: ABNORMAL
RBC #/AREA URNS AUTO: ABNORMAL /HPF
SP GR UR STRIP.AUTO: 1.03 (ref 1–1.03)
SQUAMOUS #/AREA URNS LPF: ABNORMAL /HPF
UROBILINOGEN UR STRIP-ACNC: ABNORMAL
WBC #/AREA URNS AUTO: ABNORMAL /HPF

## 2024-11-12 PROCEDURE — 81001 URINALYSIS AUTO W/SCOPE: CPT

## 2024-11-12 PROCEDURE — 3079F DIAST BP 80-89 MM HG: CPT | Mod: CPTII,,, | Performed by: NURSE PRACTITIONER

## 2024-11-12 PROCEDURE — 93005 ELECTROCARDIOGRAM TRACING: CPT

## 2024-11-12 PROCEDURE — 4010F ACE/ARB THERAPY RXD/TAKEN: CPT | Mod: CPTII,,, | Performed by: NURSE PRACTITIONER

## 2024-11-12 PROCEDURE — 1101F PT FALLS ASSESS-DOCD LE1/YR: CPT | Mod: CPTII,,, | Performed by: NURSE PRACTITIONER

## 2024-11-12 PROCEDURE — 99215 OFFICE O/P EST HI 40 MIN: CPT | Mod: PBBFAC,27 | Performed by: INTERNAL MEDICINE

## 2024-11-12 PROCEDURE — 1160F RVW MEDS BY RX/DR IN RCRD: CPT | Mod: CPTII,,, | Performed by: NURSE PRACTITIONER

## 2024-11-12 PROCEDURE — 3075F SYST BP GE 130 - 139MM HG: CPT | Mod: CPTII,,, | Performed by: NURSE PRACTITIONER

## 2024-11-12 PROCEDURE — 3288F FALL RISK ASSESSMENT DOCD: CPT | Mod: CPTII,,, | Performed by: NURSE PRACTITIONER

## 2024-11-12 PROCEDURE — 3044F HG A1C LEVEL LT 7.0%: CPT | Mod: CPTII,,, | Performed by: NURSE PRACTITIONER

## 2024-11-12 PROCEDURE — 99214 OFFICE O/P EST MOD 30 MIN: CPT | Mod: S$PBB,,, | Performed by: NURSE PRACTITIONER

## 2024-11-12 PROCEDURE — 3008F BODY MASS INDEX DOCD: CPT | Mod: CPTII,,, | Performed by: NURSE PRACTITIONER

## 2024-11-12 PROCEDURE — 82043 UR ALBUMIN QUANTITATIVE: CPT

## 2024-11-12 PROCEDURE — 1159F MED LIST DOCD IN RCRD: CPT | Mod: CPTII,,, | Performed by: NURSE PRACTITIONER

## 2024-11-12 PROCEDURE — 1126F AMNT PAIN NOTED NONE PRSNT: CPT | Mod: CPTII,,, | Performed by: NURSE PRACTITIONER

## 2024-11-12 PROCEDURE — 99215 OFFICE O/P EST HI 40 MIN: CPT | Mod: PBBFAC | Performed by: NURSE PRACTITIONER

## 2024-11-12 RX ORDER — PANTOPRAZOLE SODIUM 40 MG/1
40 TABLET, DELAYED RELEASE ORAL DAILY
Qty: 90 TABLET | Refills: 1 | Status: SHIPPED | OUTPATIENT
Start: 2024-11-12

## 2024-11-12 NOTE — PROGRESS NOTES
"Cardiology Attending  11/12/2024 5:23 PM    I evaluated Melquiades Rehman Jr. in Cardiology Clinic and discussed the patient's symptoms, findings, and management plan with the resident.   Mr. Melquiades Rehman Jr. is a 69 y.o. male.  The patient is seen in cardiology clinic for atrial fibrillation and hypertension.    Blood pressure 133/79, pulse 94, temperature 97.9 °F (36.6 °C), temperature source Oral, resp. rate 20, height 5' 10" (1.778 m), weight 106.9 kg (235 lb 9.6 oz), SpO2 95%.  The patient is in no apparent distress.      Lab Results   Component Value Date    CHOL 186 04/09/2024      Lab Results   Component Value Date    HGB 15.1 10/18/2024      No components found for: "POTASSIUM"   Lab Results   Component Value Date    CREATININE 1.19 10/18/2024      No results found for: "BNP"     No components found for: "UDS"    Plan is to continue his current cardiac medications.  Follow up 6 months    Quincy Conrad MD   "

## 2024-11-12 NOTE — PROGRESS NOTES
Patient ID: [id number]     Chief Complaint: LAB RESULTS        HPI:     HPI      Melquiades Rehman Jr. is a 69 y.o. male here today for a follow up.         Immunizations:   Immunization History   Administered Date(s) Administered    COVID-19, vector-nr, rS-Ad26, PF (Danie) 03/08/2021, 10/25/2021    Influenza 10/28/2014    Influenza (FLUAD) - Quadrivalent - Adjuvanted - PF *Preferred* (65+) 10/21/2023    Influenza - Quadrivalent 09/30/2020    Influenza - Quadrivalent - PF *Preferred* (6 months and older) 12/04/2019, 09/30/2020    Influenza - Trivalent - Fluarix, Flulaval, Fluzone, Afluria - PF 10/26/2018, 12/04/2019    Pneumococcal Conjugate - 13 Valent 05/03/2019    Pneumococcal Polysaccharide - 23 Valent 11/24/2020    Zoster Recombinant 11/12/2019, 02/13/2020        -------------------------------------    Atrial fibrillation    Central stenosis of spinal canal    Chronic back pain greater than 3 months duration    Constipation    Degeneration of lumbar intervertebral disc    Diastolic dysfunction    Dysphonia    Dystrophic nail    Erectile dysfunction    Fatty liver    GERD (gastroesophageal reflux disease)    H/O cataract removal with insertion of prosthetic lens    History of CVA (cerebrovascular accident)    TIA    History of radial keratotomy    HTN (hypertension)    Hyperlipidemia    Induratio penis plastica    Microhematuria    Overgrown toenails    Parkinson's disease    Peyronie disease    Polyneuropathy associated with underlying disease    Rheumatoid arthritis    Rheumatoid arthritis, unspecified    Sixth nerve palsy    Spinal stenosis of lumbar region    Type 2 diabetes mellitus without complications    Unspecified macular degeneration    Unspecified macular degeneration        Past Surgical History:   Procedure Laterality Date    BACK SURGERY Left 04/2024    CATARACT EXTRACTION W/  INTRAOCULAR LENS IMPLANT Bilateral     EPIDURAL STEROID INJECTION INTO LUMBAR SPINE      exc cyst Lt ear      INJECTION  OF ANESTHETIC AGENT AROUND MEDIAL BRANCH NERVES INNERVATING LUMBAR FACET JOINT Bilateral 11/16/2022    Procedure: Block-nerve-medial branch-lumbar;  Surgeon: Gertrude Blanco MD;  Location: [facility] OR;  Service: Pain Management;  Laterality: Bilateral;  Bilateral L4-L5...Patient is requesting to be first case    INJECTION OF ANESTHETIC AGENT AROUND MEDIAL BRANCH NERVES INNERVATING LUMBAR FACET JOINT Bilateral 11/22/2023    Procedure: Block-nerve-medial branch-lumbar;  Surgeon: Gertrude Blanco MD;  Location: [facility] OR;  Service: Pain Management;  Laterality: Bilateral;  Bilateral MBB L3-5    INJECTION OF ANESTHETIC AGENT AROUND MEDIAL BRANCH NERVES INNERVATING LUMBAR FACET JOINT Bilateral 01/24/2024    Procedure: Block-nerve-medial branch-lumbar;  Surgeon: Gertrude Blanco MD;  Location: [facility] OR;  Service: Pain Management;  Laterality: Bilateral;  Bilateral MBB L3-5    KNEE ARTHROSCOPY Right     RADIOFREQUENCY ABLATION Left 03/27/2024    Procedure: Radiofrequency Ablation;  Surgeon: Gertrude Blanco MD;  Location: [facility] OR;  Service: Pain Management;  Laterality: Left;  RFA Lt L3-L5    RADIOFREQUENCY ABLATION Right 04/25/2024    Procedure: RADIOFREQUENCY ABLATION / Right L3 L5;  Surgeon: Gertrude Blanco MD;  Location: [facility] OR;  Service: Pain Management;  Laterality: Right;  Right RFA L3-L5    VASECTOMY  1998       Review of patient's allergies indicates:   Allergen Reactions    Sarilumab Other (See Comments)     Other reaction(s): fainting  Kevzara       Current Outpatient Medications   Medication Instructions    ACCU-CHEK GUIDE GLUCOSE METER Mis     alcohol swabs PadM 1 each, Topical (Top), Daily    alogliptin-pioglitazone (OSENI) 25-30 mg Tab 1 tablet, Oral, Daily    ammonium lactate 12 % Crea 1 application , 2 times daily    AUSTEDO 6 mg Tab 1 tablet, 2 times daily    blood sugar diagnostic (ACCU-CHEK GUIDE TEST STRIPS) Strp 1 strip, skin prick, Daily    blood-glucose meter kit To check BG two times daily, to use  with insurance preferred meter    carbidopa-levodopa  mg (SINEMET)  mg per tablet 2 tablets, 2 times daily    colchicine (COLCRYS) 0.6 mg, Oral, 2 times daily    gemfibroziL (LOPID) 600 mg, Oral, 2 times daily    lancets (ACCU-CHEK SOFTCLIX LANCETS) Misc 1 each, Misc.(Non-Drug; Combo Route), Daily    lisinopriL 10 mg, Oral, Daily    metoprolol succinate (TOPROL-XL) 25 mg, Oral, Daily    pantoprazole (PROTONIX) 40 mg, Oral, Daily    polyethylene glycol (MOVIPREP) 100-7.5-2.691 gram solution 2,000 mLs, Oral, See admin instructions    predniSONE (DELTASONE) 5 MG tablet Take 2 tab po qd x 3 days then take 1 tab po qd x 2 days prn RA flare.    rosuvastatin (CRESTOR) 20 mg, Oral, Daily    selegiline HCl (ELDEPRYL) 5 mg, 2 times daily with meals    sodium chloride 5% (BHUMIKA 128) 5 % ophthalmic solution 1 drop, Both Eyes, 3 times daily    tofacitinib (XELJANZ XR) 11 mg Tb24 1 tablet, Oral, Daily    XARELTO 20 mg, Oral, Daily       Social History     Socioeconomic History    Marital status:     Number of children: 1   Tobacco Use    Smoking status: Former     Current packs/day: 0.00     Average packs/day: 1 pack/day for 15.0 years (15.0 ttl pk-yrs)     Types: Cigarettes     Start date: 1972     Quit date: 1987     Years since quittin.8     Passive exposure: Past    Smokeless tobacco: Never   Substance and Sexual Activity    Alcohol use: Not Currently     Comment: occasional    Drug use: Yes     Types: Marijuana    Sexual activity: Yes     Partners: Female     Social Drivers of Health     Financial Resource Strain: Medium Risk (1/10/2024)    Overall Financial Resource Strain (CARDIA)     Difficulty of Paying Living Expenses: Somewhat hard   Food Insecurity: Food Insecurity Present (1/10/2024)    Hunger Vital Sign     Worried About Running Out of Food in the Last Year: Often true     Ran Out of Food in the Last Year: Often true   Transportation Needs: No Transportation Needs (1/10/2024)     PRAPARE - Transportation     Lack of Transportation (Medical): No     Lack of Transportation (Non-Medical): No   Physical Activity: Inactive (1/10/2024)    Exercise Vital Sign     Days of Exercise per Week: 0 days     Minutes of Exercise per Session: 20 min   Stress: No Stress Concern Present (1/10/2024)    [employer] [place] [employer] - Occupational Stress Questionnaire     Feeling of Stress : Only a little   Housing Stability: Low Risk  (1/10/2024)    Housing Stability Vital Sign     Unable to Pay for Housing in the Last Year: No     Number of Places Lived in the Last Year: 1     Unstable Housing in the Last Year: No        Family History   Problem Relation Name Age of Onset    Heart failure Mother      Coronary artery disease Mother      Glaucoma Father      Alzheimer's disease Father      Emphysema Father      Autoimmune disease Sister      Pneumonia Sister Jenifer     Dementia Sister Jenifer         Patient Care Team:  Pau Gomes FNP as PCP - General (Family Medicine)  Taisha Wood LPN as Care Coordinator  Homar Barrios MD (Neurology)     Subjective:     Review of Systems     See HPI for details    Constitutional: Denies Change in appetite. Denies Chills. Denies Fever. Denies Night sweats.  Eye: Denies Blurred vision. Denies Discharge. Denies Eye pain.  ENT: Denies Decreased hearing. Denies Sore throat. Denies Swollen glands.  Respiratory: Denies Cough. Denies Shortness of breath. Denies Shortness of breath with exertion. Denies Wheezing.  Cardiovascular: DeniesChest pain at rest. Denies Chest pain with exertion. Denies Irregular heartbeat. Denies Palpitations. Denies Edema.  Gastrointestinal: Denies Abdominal pain. DeniesDiarrhea. Denies Nausea. Denies Vomiting. Denies Hematemesis or Hematochezia.  Genitourinary: Denies Dysuria. Denies Urinary frequency. Denies Urinary urgency. Denies Blood in urine.  Endocrine: Denies Cold intolerance. Denies Excessive thirst. Denies Heat  "intolerance. Denies Weight loss. Denies Weight gain.  Musculoskeletal: Denies Painful joints. Denies Weakness.  Integumentary: Denies Rash. Denies Itching. Denies Dry skin.  Neurologic: Denies Dizziness. Denies Fainting. Denies Headache.  Psychiatric: Denies Depression. Denies Anxiety. Denies Suicidal/Homicidal ideations.    All Other ROS: Negative except as stated in HPI.       Objective:     Visit Vitals  /87 (BP Location: Right arm, Patient Position: Sitting)   Pulse 82   Temp 98 °F (36.7 °C) (Oral)   Resp 18   Ht 5' 10" (1.778 m)   Wt 106.9 kg (235 lb 9.6 oz)   BMI 33.81 kg/m²       Physical Exam    General: Alert and oriented, No acute distress.  Head: Normocephalic, Atraumatic.  Eye: Pupils are equal, round and reactive to light, Extraocular movements are intact, Sclera non-icteric.  Ears/Nose/Throat: Normal, Mucosa moist,Clear.  Neck/Thyroid: Supple, Non-tender, No carotid bruit, No lymphadenopathy, No JVD, Full range of motion.  Respiratory: Clear to auscultation bilaterally; No wheezes, rales or rhonchi,Non-labored respirations, Symmetrical chest wall expansion.  Cardiovascular: Regular rate and rhythm, S1/S2 normal, No murmurs, rubs or gallops.  Gastrointestinal: Soft, Non-tender, Non-distended, Normal bowel sounds, No palpable organomegaly.  Musculoskeletal: Normal range of motion.  Integumentary: Warm, Dry, Intact, No suspicious lesions or rashes.  Extremities: No clubbing, cyanosis or edema  Neurologic: No focal deficits, Cranial Nerves II-XII are grossly intact, Motor strength normal upper and lower extremities, Sensory exam intact.  Psychiatric: Normal interaction, Coherent speech, Euthymic mood, Appropriate affect       Labs Reviewed:     Chemistry:  Lab Results   Component Value Date     10/18/2024    K 3.6 10/18/2024    BUN 14.2 10/18/2024    CREATININE 1.19 10/18/2024    EGFRNORACEVR >60 10/18/2024    GLUCOSE 134 (H) 10/18/2024    CALCIUM 9.6 10/18/2024    ALKPHOS 67 10/18/2024    " LABPROT 7.7 (H) 10/18/2024    ALBUMIN 4.1 10/18/2024    BILIDIR 0.2 04/09/2024    IBILI 0.20 04/09/2024    AST 17 10/18/2024    ALT 18 10/18/2024    MG 1.60 12/04/2020    PHOS 3.3 12/04/2020    IZDWWDDV32FE 43.5 05/25/2021        Lab Results   Component Value Date    HGBA1C 6.5 10/18/2024        Hematology:  Lab Results   Component Value Date    WBC 5.88 10/18/2024    HGB 15.1 10/18/2024    HCT 44.8 10/18/2024     10/18/2024       Lipid Panel:  Lab Results   Component Value Date    CHOL 186 04/09/2024    HDL 52 04/09/2024    .00 04/09/2024    TRIG 112 04/09/2024    TOTALCHOLEST 4 04/09/2024        Urine:  Lab Results   Component Value Date    APPEARANCEUA Clear 11/10/2023    SGUA 1.022 11/10/2023    PROTEINUA Negative 11/10/2023    KETONESUA Negative 11/10/2023    LEUKOCYTESUR Negative 11/10/2023    RBCUA 0-5 11/10/2023    WBCUA 0-5 11/10/2023    BACTERIA None Seen 11/10/2023    SQEPUA None Seen 11/10/2023    HYALINECASTS None Seen 11/10/2023    CREATRANDUR 162.6 11/10/2023    PROTEINURINE 18.0 05/15/2023    UPROTCREA 0.1 05/15/2023        Assessment:       ICD-10-CM ICD-9-CM   1. Macrocytic anemia  D53.9 281.9   2. Primary hypertension  I10 401.9   3. Controlled type 2 diabetes mellitus without complication, without long-term current use of insulin  E11.9 250.00   4. Gastroesophageal reflux disease, unspecified whether esophagitis present  K21.9 530.81   5. Well adult exam  Z00.00 V70.0   6. Family history of colonic polyps  Z83.719 V18.51   7. Parkinson's disease, unspecified whether dyskinesia present, unspecified whether manifestations fluctuate  G20.A1 332.0   8. Screening for prostate cancer  Z12.5 V76.44        Plan:     1. Macrocytic anemia (Primary)  CBC stable. Will monitor.     2. Primary hypertension  BP controlled. Low fat low salt diet and exercise. Cont Lisinopril HCTZ, Metoprolol as prescribed.     3. Controlled type 2 diabetes mellitus without complication, without long-term current  use of insulin  A1c 6.5. ADA diet and exercise. Cont Oseni as prescribed. Urine microalbumin 11-10-23. DM foot 10-21-23. Dm eye done 3-14-23- pt [facility]. Keep appt.     Protective Sensation (w/ 10 gram monofilament):  Right: Intact  Left: Intact    Visual Inspection:  Normal -  Bilateral    Pedal Pulses:   Right: Present  Left: Present    Posterior Tibialis Pulses:   Right:Present  Left: Present     4. Gastroesophageal reflux disease, unspecified whether esophagitis present  Avoid triggers. Cont Pantoprazole as prescribed.     5. Well adult exam  Labs in 6 months. UTD PSA. Colonoscopy done 3-12-12 no polyps, internal rrhoids noted- suggest repeat in 5 years- due 3/2017. Last colonoscopy done with [facility].     6. Family history of colonic polyps  Colonoscopy done 3-12-12 no polyps, internal rrhoids noted- suggest repeat in 5 years- due 3/2017. Last colonoscopy done with [facility]. Offered Colonoscopy referral. Pt refused- states he will wait for the blood test that is suppose to get FDA approval.     7. Parkinson's disease, unspecified whether dyskinesia present, unspecified whether manifestations fluctuate  Pt states he is participating in a research study dealing with stem cells for treating parkinson's disease.     8. Screening for prostate cancer  UTD PSA.          Follow up in about 6 months (around 5/12/2025) for with labs 1 week prior to appt. . In addition to their scheduled follow up, the patient has also been instructed to follow up on as needed basis.     Future Appointments   Date Time Provider Department Center   11/12/2024  3:30 PM Quincy Conrad MD [facility] ASHELY [place]    11/27/2024  8:00 AM Ewa Lao [facility] RADHA [place]    12/12/2024  2:30 PM Mark Driscoll MD [facility] Paul    5/8/2025  7:20 AM Barbara Zelaya CHARI [facility] BRITNI [place] Un        Pau Gomes [facility]

## 2024-11-12 NOTE — PROGRESS NOTES
Mr. Melquiades Rehman Jr. is a 69 y.o. male with:   Chief Complaint   Patient presents with    f/u denies chest pain or sob since LV no questions          HPI  Mr. Melquiades Rehman Jr. was seen in Cardiology Clinic.  The patient has Afib and HTN  Today the patient is seen for a routine appointment. Denies acute complaints today. Denies chest pain, palpitations, SOB on exertion, and LE edema. Reports that he is currently taking Lisinopril-HCTZ on PRN basis. States that BP runs from 110s/70s-130s/80's at home. Staes he takes his lisinopril in AM if BP > 135.     ROS:  Please see HPI    PMH:    Patient Active Problem List   Diagnosis    Family history of colonic polyps    High risk medication use    History of CVA (cerebrovascular accident)    Atrial fibrillation and flutter    Chronic pain syndrome    Degeneration of lumbar intervertebral disc    Controlled type 2 diabetes mellitus without complication, without long-term current use of insulin    Dysphonia    Erectile dysfunction    GERD (gastroesophageal reflux disease)    History of right cataract extraction    Hyperlipidemia    Hypertension    Induratio penis plastica    Obesity    Parkinson's disease    Seropositive rheumatoid arthritis of multiple sites    Sixth nerve palsy    Spinal stenosis at L4-L5 level    Steatosis of liver    Constipation    Chronic back pain greater than 3 months duration    Lumbar spondylosis    Decreased GFR    Other microscopic hematuria    Diastolic dysfunction    Encounter for comprehensive diabetic foot examination, type 2 diabetes mellitus    Dystrophic nail    Overgrown toenails    Type 2 diabetes mellitus with peripheral neuropathy    Polyneuropathy associated with underlying disease    Stage 3a chronic kidney disease    ILD (interstitial lung disease)    Foreign body in right foot    Macrocytic anemia    Colon cancer screening    Well adult exam    Callus of foot     Surgical Hx:    Past Surgical History:   Procedure Laterality  Date    BACK SURGERY Left 2024    CATARACT EXTRACTION W/  INTRAOCULAR LENS IMPLANT Bilateral     EPIDURAL STEROID INJECTION INTO LUMBAR SPINE      exc cyst Lt ear      EYE SURGERY      INJECTION OF ANESTHETIC AGENT AROUND MEDIAL BRANCH NERVES INNERVATING LUMBAR FACET JOINT Bilateral 2022    Procedure: Block-nerve-medial branch-lumbar;  Surgeon: Gertrude Blanco MD;  Location: [facility] OR;  Service: Pain Management;  Laterality: Bilateral;  Bilateral L4-L5...Patient is requesting to be first case    INJECTION OF ANESTHETIC AGENT AROUND MEDIAL BRANCH NERVES INNERVATING LUMBAR FACET JOINT Bilateral 2023    Procedure: Block-nerve-medial branch-lumbar;  Surgeon: Gertrude Blanco MD;  Location: [facility] OR;  Service: Pain Management;  Laterality: Bilateral;  Bilateral MBB L3-5    INJECTION OF ANESTHETIC AGENT AROUND MEDIAL BRANCH NERVES INNERVATING LUMBAR FACET JOINT Bilateral 2024    Procedure: Block-nerve-medial branch-lumbar;  Surgeon: Gertrude Blanco MD;  Location: [facility] OR;  Service: Pain Management;  Laterality: Bilateral;  Bilateral MBB L3-5    KNEE ARTHROSCOPY Right     RADIOFREQUENCY ABLATION Left 2024    Procedure: Radiofrequency Ablation;  Surgeon: Gertrude Blanco MD;  Location: [facility] OR;  Service: Pain Management;  Laterality: Left;  RFA Lt L3-L5    RADIOFREQUENCY ABLATION Right 2024    Procedure: RADIOFREQUENCY ABLATION / Right L3 L5;  Surgeon: Gertrude Blanco MD;  Location: [facility];  Service: Pain Management;  Laterality: Right;  Right RFA L3-L5    VASECTOMY         Social History     Socioeconomic History    Marital status:     Number of children: 1   Tobacco Use    Smoking status: Former     Current packs/day: 0.00     Average packs/day: 1 pack/day for 15.0 years (15.0 ttl pk-yrs)     Types: Cigarettes     Start date: 1972     Quit date: 1987     Years since quittin.8     Passive exposure: Past    Smokeless tobacco: Never   Substance and Sexual  Activity    Alcohol use: Never     Comment: occasional    Drug use: Yes     Types: Marijuana    Sexual activity: Yes     Partners: Female     Social Drivers of Health     Financial Resource Strain: Medium Risk (1/10/2024)    Overall Financial Resource Strain (CARDIA)     Difficulty of Paying Living Expenses: Somewhat hard   Food Insecurity: Food Insecurity Present (1/10/2024)    Hunger Vital Sign     Worried About Running Out of Food in the Last Year: Often true     Ran Out of Food in the Last Year: Often true   Transportation Needs: No Transportation Needs (1/10/2024)    PRAPARE - Transportation     Lack of Transportation (Medical): No     Lack of Transportation (Non-Medical): No   Physical Activity: Inactive (1/10/2024)    Exercise Vital Sign     Days of Exercise per Week: 0 days     Minutes of Exercise per Session: 20 min   Stress: No Stress Concern Present (1/10/2024)    [employer] - Occupational Stress Questionnaire     Feeling of Stress : Only a little   Housing Stability: Low Risk  (1/10/2024)    Housing Stability Vital Sign     Unable to Pay for Housing in the Last Year: No     Number of Places Lived in the Last Year: 1     Unstable Housing in the Last Year: No       Family History   Problem Relation Name Age of Onset    Heart failure Mother      Coronary artery disease Mother      Glaucoma Father      Alzheimer's disease Father      Emphysema Father      Autoimmune disease Sister      Pneumonia Sister Jenifer     Dementia Sister Jenifer        Review of patient's allergies indicates:   Allergen Reactions    Sarilumab Other (See Comments)     Other reaction(s): fainting  Kevzara       Current Medications:    Current Outpatient Medications:     alogliptin-pioglitazone (OSENI) 25-30 mg Tab, Take 1 tablet by mouth once daily., Disp: 90 tablet, Rfl: 3    AUSTEDO 6 mg Tab, Take 1 tablet by mouth 2 (two) times daily., Disp: , Rfl:     carbidopa-levodopa  mg (SINEMET)  mg  per tablet, Take 2 tablets by mouth 2 (two) times a day., Disp: , Rfl:     colchicine (COLCRYS) 0.6 mg tablet, TAKE 1 TABLET BY MOUTH TWICE  DAILY, Disp: 180 tablet, Rfl: 3    gemfibroziL (LOPID) 600 MG tablet, Take 1 tablet (600 mg total) by mouth 2 (two) times daily., Disp: 180 tablet, Rfl: 3    lisinopriL 10 MG tablet, Take 1 tablet (10 mg total) by mouth once daily. (Patient taking differently: Take 10 mg by mouth as needed.), Disp: 90 tablet, Rfl: 3    metoprolol succinate (TOPROL-XL) 25 MG 24 hr tablet, Take 1 tablet (25 mg total) by mouth once daily., Disp: 90 tablet, Rfl: 3    pantoprazole (PROTONIX) 40 MG tablet, Take 1 tablet (40 mg total) by mouth once daily., Disp: 90 tablet, Rfl: 1    predniSONE (DELTASONE) 5 MG tablet, Take 2 tab po qd x 3 days then take 1 tab po qd x 2 days prn RA flare. (Patient taking differently: Take 5 mg by mouth as needed. Take 2 tab po qd x 3 days then take 1 tab po qd x 2 days prn RA flare.), Disp: 30 tablet, Rfl: 0    rosuvastatin (CRESTOR) 20 MG tablet, Take 1 tablet (20 mg total) by mouth once daily., Disp: 90 tablet, Rfl: 3    selegiline HCl (ELDEPRYL) 5 mg tablet, Take 5 mg by mouth 2 (two) times daily with meals., Disp: , Rfl:     tofacitinib (XELJANZ XR) 11 mg Tb24, Take 1 tablet by mouth once daily., Disp: 30 tablet, Rfl: 3    XARELTO 20 mg Tab, Take 1 tablet (20 mg total) by mouth once daily., Disp: 90 tablet, Rfl: 3    ACCU-CHEK GUIDE GLUCOSE METER [facility] [place], [place], , Disp: , Rfl:     alcohol swabs PadM, Apply 1 each topically once daily., Disp: 100 each, Rfl: 11    ammonium lactate 12 % Crea, Apply 1 application  topically 2 (two) times daily., Disp: , Rfl:     blood sugar diagnostic (ACCU-CHEK GUIDE TEST STRIPS) Strp, 1 strip by skin prick route Daily., Disp: 100 strip, Rfl: 11    blood-glucose meter kit, To check BG two times daily, to use with insurance preferred meter, Disp: 1 each, Rfl: 0    lancets (ACCU-CHEK SOFTCLIX LANCETS) Misc, 1 each by Misc.(Non-Drug; Combo Route)  route Daily., Disp: 100 each, Rfl: 11    polyethylene glycol (MOVIPREP) 100-7.5-2.691 gram solution, Take 2,000 mLs by mouth As instructed (take as directed per instructions provided by GI clinic). (Patient not taking: Reported on 11/8/2024), Disp: 1 kit, Rfl: 0    sodium chloride 5% (BHUMIKA 128) 5 % ophthalmic solution, Place 1 drop into both eyes 3 (three) times daily., Disp: 30 mL, Rfl: 2  Current Outpatient Medications   Medication Instructions    ACCU-CHEK GUIDE GLUCOSE METER Mis     alcohol swabs PadM 1 each, Topical (Top), Daily    alogliptin-pioglitazone (OSENI) 25-30 mg Tab 1 tablet, Oral, Daily    ammonium lactate 12 % Crea 1 application , 2 times daily    AUSTEDO 6 mg Tab 1 tablet, 2 times daily    blood sugar diagnostic (ACCU-CHEK GUIDE TEST STRIPS) Strp 1 strip, skin prick, Daily    blood-glucose meter kit To check BG two times daily, to use with insurance preferred meter    carbidopa-levodopa  mg (SINEMET)  mg per tablet 2 tablets, 2 times daily    colchicine (COLCRYS) 0.6 mg, Oral, 2 times daily    gemfibroziL (LOPID) 600 mg, Oral, 2 times daily    lancets (ACCU-CHEK SOFTCLIX LANCETS) Misc 1 each, Misc.(Non-Drug; Combo Route), Daily    lisinopriL 10 mg, Oral, Daily    metoprolol succinate (TOPROL-XL) 25 mg, Oral, Daily    pantoprazole (PROTONIX) 40 mg, Oral, Daily    polyethylene glycol (MOVIPREP) 100-7.5-2.691 gram solution 2,000 mLs, Oral, See admin instructions    predniSONE (DELTASONE) 5 MG tablet Take 2 tab po qd x 3 days then take 1 tab po qd x 2 days prn RA flare.    rosuvastatin (CRESTOR) 20 mg, Oral, Daily    selegiline HCl (ELDEPRYL) 5 mg, 2 times daily with meals    sodium chloride 5% (BHUMIKA 128) 5 % ophthalmic solution 1 drop, Both Eyes, 3 times daily    tofacitinib (XELJANZ XR) 11 mg Tb24 1 tablet, Oral, Daily    XARELTO 20 mg, Oral, Daily       ROS:  Please see HPI.    BP Readings from Last 3 Encounters:   11/12/24 133/79   11/12/24 134/87   11/08/24 138/80        Pulse  "Readings from Last 3 Encounters:   11/12/24 94   11/12/24 82   11/08/24 85        Temp Readings from Last 3 Encounters:   11/12/24 97.9 °F (36.6 °C) (Oral)   11/12/24 98 °F (36.7 °C) (Oral)   11/08/24 98 °F (36.7 °C) (Oral)     Wt Readings from Last 3 Encounters:   11/12/24 106.9 kg (235 lb 9.6 oz)   11/12/24 106.9 kg (235 lb 9.6 oz)   11/08/24 107.3 kg (236 lb 9.6 oz)     BMI:  33.81 kg/m^2    PE  Blood pressure 133/79, pulse 94, temperature 97.9 °F (36.6 °C), temperature source Oral, resp. rate 20, height 5' 10" (1.778 m), weight 106.9 kg (235 lb 9.6 oz), SpO2 95%.  CONSTITUTIONAL:  No acute distress.  Not ill appearing.  GENERAL:  Pleasant  NECK:  supple  RESPIRATIONS:  no apparent distress  ABDOMEN:   obese  SKIN:  dry     CARDIAC TESTS  ECHO  Results for orders placed during the hospital encounter of 10/02/23    Echo    Interpretation Summary    Left Ventricle: There is normal systolic function with a visually estimated ejection fraction of 60 - 65%. There is normal diastolic function.    Right Ventricle: Normal right ventricular cavity size. Systolic function is normal.    Less than mild valvular stenosis/regurgitation.    Agitated saline study of the atrial septum is negative after vasalva maneuver, suggesting absence of intracardiac shunt at the atrial level.    STRESS TEST  No results found for this or any previous visit.    [facility]  No results found for this or any previous visit.      EKG 11/12/2024  Rate 97 - NSR. Left axis. Poor R wave progression. No acute ST changes.       LABS  Last BMP  Lab Results   Component Value Date     10/18/2024    K 3.6 10/18/2024     10/18/2024    CO2 24 10/18/2024    BUN 14.2 10/18/2024    CREATININE 1.19 10/18/2024    CALCIUM 9.6 10/18/2024    EGFRNORACEVR >60 10/18/2024       Lab Results   Component Value Date    CREATININE 1.19 10/18/2024    CREATININE 1.26 (H) 08/08/2024    CREATININE 1.30 (H) 07/08/2024     No results found for: "BNP"  Lab Results   Component " "Value Date    TROPONINI <0.010 12/08/2020    TROPONINI <0.010 12/07/2020    TROPONINI <0.010 12/07/2020     Last CBC     Lab Results   Component Value Date    WBC 5.88 10/18/2024    HGB 15.1 10/18/2024    HCT 44.8 10/18/2024    MCV 94.7 (H) 10/18/2024     10/18/2024           Last lipids    Lab Results   Component Value Date    CHOL 186 04/09/2024    CHOL 167 10/09/2023    CHOL 190 03/21/2023    HDL 52 04/09/2024    HDL 53 10/09/2023    HDL 52 03/21/2023    .00 04/09/2024    LDL 79.00 10/09/2023    .00 03/21/2023    TRIG 112 04/09/2024    TRIG 174 (H) 10/09/2023    TRIG 124 03/21/2023    TOTALCHOLEST 4 04/09/2024    TOTALCHOLEST 3 10/09/2023    TOTALCHOLEST 4 03/21/2023       LFT   No components found for: "LFT"    ASSESSMENT  There are no diagnoses linked to this encounter.      10 Year Cardiovascular Risk:  The 10-year ASCVD risk score (Aidan DK, et al., 2019) is: 30.3%    Values used to calculate the score:      Age: 69 years      Sex: Male      Is Non- : No      Diabetic: Yes      Tobacco smoker: No      Systolic Blood Pressure: 133 mmHg      Is BP treated: Yes      HDL Cholesterol: 52 mg/dL      Total Cholesterol: 186 mg/dL  BMI:  Body mass index is 33.81 kg/m².  Patient is obese (BMI 30-34.9)    Last PCP visit:  11/12/2024    PLAN  Medications:    Continue Lisinopril 10mg, toprol 25mg daily. Continue Xarelto. CV4, HB 2.   Follow up:   6 months    Pradip Quintana DO  [facility], [facility]      "

## 2024-11-13 LAB
OHS QRS DURATION: 84 MS
OHS QTC CALCULATION: 444 MS

## 2024-11-20 ENCOUNTER — TELEPHONE (OUTPATIENT)
Dept: INTERNAL MEDICINE | Facility: CLINIC | Age: 69
End: 2024-11-20
Payer: MEDICARE

## 2024-11-20 NOTE — TELEPHONE ENCOUNTER
Patient given UA results informed me he had burning with urination 4 months ago and it never happened since. Patient verbalized understanding of all information given to him today.

## 2024-11-20 NOTE — TELEPHONE ENCOUNTER
Call pt and inform urine microalbumin level was slightly elevated. Encourage low protein low salt diet and to continue Lisinopril as prescribed as this protects the kidney. It is also important that pt make sure his blood sugar levels are controlled as elevated glucose can cause elevated urine microalbumin levels. Inform pt urine test results show blood in urine. Ask pt if he is having any pain or burning with urination. Pt had some ketones in urine but not bacteria or WBC indicating bladder infection.

## 2024-11-20 NOTE — TELEPHONE ENCOUNTER
Patient requesting lab results appointment not till 05/2025. Informed patient I would schedule him a sooner appointment. Patient verbalized understanding.

## 2024-11-27 ENCOUNTER — HOSPITAL ENCOUNTER (OUTPATIENT)
Dept: WOUND CARE | Facility: HOSPITAL | Age: 69
Discharge: HOME | End: 2024-11-27
Attending: NURSE PRACTITIONER
Payer: MEDICARE

## 2024-11-27 VITALS
HEART RATE: 79 BPM | WEIGHT: 235.69 LBS | TEMPERATURE: 98 F | OXYGEN SATURATION: 96 % | BODY MASS INDEX: 33.74 KG/M2 | DIASTOLIC BLOOD PRESSURE: 88 MMHG | HEIGHT: 70 IN | SYSTOLIC BLOOD PRESSURE: 145 MMHG | RESPIRATION RATE: 18 BRPM

## 2024-11-27 DIAGNOSIS — E11.42 TYPE 2 DIABETES MELLITUS WITH PERIPHERAL NEUROPATHY: ICD-10-CM

## 2024-11-27 DIAGNOSIS — L60.2 OVERGROWN TOENAILS: ICD-10-CM

## 2024-11-27 DIAGNOSIS — G63 POLYNEUROPATHY ASSOCIATED WITH UNDERLYING DISEASE: ICD-10-CM

## 2024-11-27 DIAGNOSIS — E11.9 ENCOUNTER FOR COMPREHENSIVE DIABETIC FOOT EXAMINATION, TYPE 2 DIABETES MELLITUS: Primary | ICD-10-CM

## 2024-11-27 PROCEDURE — 11719 TRIM NAIL(S) ANY NUMBER: CPT | Mod: ,,, | Performed by: NURSE PRACTITIONER

## 2024-11-27 PROCEDURE — 99213 OFFICE O/P EST LOW 20 MIN: CPT | Mod: 25,,, | Performed by: NURSE PRACTITIONER

## 2024-11-27 PROCEDURE — 27000999 HC MEDICAL RECORD PHOTO DOCUMENTATION

## 2024-11-27 PROCEDURE — 11719 TRIM NAIL(S) ANY NUMBER: CPT

## 2024-11-27 PROCEDURE — 99211 OFF/OP EST MAY X REQ PHY/QHP: CPT | Mod: 25

## 2024-12-02 ENCOUNTER — TELEPHONE (OUTPATIENT)
Dept: RHEUMATOLOGY | Facility: CLINIC | Age: 69
End: 2024-12-02
Payer: MEDICARE

## 2024-12-02 NOTE — TELEPHONE ENCOUNTER
Spoke to pt informed of pharmacy Avinash has made several attempts to contact to re fill Xeljanz xr 11 mg tab. Pt stated he will call [facility]

## 2024-12-06 ENCOUNTER — HOSPITAL ENCOUNTER (OUTPATIENT)
Dept: RADIOLOGY | Facility: HOSPITAL | Age: 69
Discharge: HOME | End: 2024-12-06
Attending: NURSE PRACTITIONER
Payer: MEDICARE

## 2024-12-06 ENCOUNTER — TELEPHONE (OUTPATIENT)
Dept: INTERNAL MEDICINE | Facility: CLINIC | Age: 69
End: 2024-12-06
Payer: MEDICARE

## 2024-12-06 ENCOUNTER — OFFICE VISIT (OUTPATIENT)
Dept: URGENT CARE | Facility: CLINIC | Age: 69
End: 2024-12-06
Payer: MEDICARE

## 2024-12-06 VITALS
OXYGEN SATURATION: 96 % | HEART RATE: 75 BPM | TEMPERATURE: 98 F | BODY MASS INDEX: 33.5 KG/M2 | WEIGHT: 234 LBS | DIASTOLIC BLOOD PRESSURE: 77 MMHG | RESPIRATION RATE: 18 BRPM | HEIGHT: 70 IN | SYSTOLIC BLOOD PRESSURE: 120 MMHG

## 2024-12-06 DIAGNOSIS — R07.81 PLEURITIC CHEST PAIN: ICD-10-CM

## 2024-12-06 DIAGNOSIS — R07.81 PLEURITIC CHEST PAIN: Primary | ICD-10-CM

## 2024-12-06 PROCEDURE — 99215 OFFICE O/P EST HI 40 MIN: CPT | Mod: PBBFAC,25 | Performed by: NURSE PRACTITIONER

## 2024-12-06 PROCEDURE — 71046 X-RAY EXAM CHEST 2 VIEWS: CPT | Mod: TC

## 2024-12-06 NOTE — PROGRESS NOTES
"Subjective:      Patient ID: Melquiades Rehman Jr. is a 69 y.o. male.    Vitals:  height is 5' 10" (1.778 m) and weight is 106.1 kg (234 lb). His temperature is 97.9 °F (36.6 °C). His blood pressure is 120/77 and his pulse is 75. His respiration is 18 and oxygen saturation is 96%.     Chief Complaint: Pain (Pt c/o pain when taking a deep breath. )    Pain     Pt presents with c/o intermittent sharp chest pain with breathing on and off for two weeks. Pt reports that he coughed this morning which induced a sharp pain that has since subsided. No pain or any symptoms currently. Pt denies persistent cough, States "if I just make myself cough or deep breath, I can feel that pain". Pt reports known hx of pneumonia in the past and was told by his PCP today to come have an evaluation. Pt states he is followed by cardiology, for afib was last seen one week ago but did not mention chest pain. Pt denies fever, headache, dizziness, shortness of breath, cough, sore throat, stomach pain.     Skin:  Negative for erythema.      Objective:     Physical Exam   Constitutional: He is oriented to person, place, and time. He appears well-developed. He is cooperative.  Non-toxic appearance. He does not appear ill. No distress. awake  HENT:   Head: Normocephalic and atraumatic.   Ears:   Right Ear: Tympanic membrane normal.   Left Ear: Tympanic membrane normal.   Nose: Nose normal. No purulent discharge. Right sinus exhibits no maxillary sinus tenderness and no frontal sinus tenderness. Left sinus exhibits no maxillary sinus tenderness and no frontal sinus tenderness.   Mouth/Throat: Uvula is midline. Mucous membranes are moist.   Eyes: Conjunctivae are normal. Right eye exhibits no discharge. Left eye exhibits no discharge. Extraocular movement intact   Neck: Neck supple. Normal carotid pulses and no JVD present. Carotid bruit is not present. No neck rigidity present.   Cardiovascular: Normal rate and normal pulses.   Pulmonary/Chest: " Effort normal and breath sounds normal. No stridor. No respiratory distress. He has no wheezes. He has no rhonchi. He has no rales. He exhibits no tenderness.   Abdominal: Normal appearance. He exhibits no distension. Soft. There is no left CVA tenderness and no right CVA tenderness. A hernia is present.   Musculoskeletal: Normal range of motion.         General: Normal range of motion.      Cervical back: He exhibits no tenderness.      Right lower leg: No edema.      Left lower leg: No edema.   Lymphadenopathy:     He has no cervical adenopathy.   Neurological: no focal deficit. He is alert and oriented to person, place, and time.   Skin: Skin is warm, dry, not diaphoretic and no rash. Capillary refill takes less than 2 seconds. No erythema and No lesion   Psychiatric: His behavior is normal. Mood, judgment and thought content normal.   Nursing note and vitals reviewed.      Assessment:     1. Pleuritic chest pain    XR CHEST PA AND LATERAL    Result Date: 12/6/2024  EXAMINATION: XR CHEST PA AND LATERAL CLINICAL HISTORY: Pleurodyniapleuritic chest pain; TECHNIQUE: Two-view COMPARISON: April 9, 2024. FINDINGS: Cardiopericardial silhouette is within normal limits.  No acute dense focal or segmental consolidation, congestion, pleural effusion or pneumothorax.     No acute cardiopulmonary process identified. Electronically signed by: Neal Ibarra Date:    12/06/2024 Time:    16:37      Plan:   Pt  given strict ER precautions and instructed on when to return for evaluation.   Stable for discharge.   Xray normal without any cardiopulmonary process noted. Pt is without pain at this time. Pt instructed to go to ER if pain returns   Pleuritic chest pain  -     XR CHEST PA AND LATERAL; Future; Expected date: 12/06/2024  -     POCT EKG 12-LEAD (NOT FOR [employer] USE)          Medical Decision Making:   Differential Diagnosis:   Pleurisy, Indigestion

## 2024-12-06 NOTE — TELEPHONE ENCOUNTER
Returned patient's call informed patient per NP Eliseo wants him to go to the Urgent care for evaluation and management of his symptoms. Patient verbalized understanding.

## 2024-12-06 NOTE — PATIENT INSTRUCTIONS
Please follow instructions on patient education material.      Return to urgent care in 2 to 3 days if symptoms are not improving, immediately if you develop any new or worsening symptoms.         Go to the ER if you experience chest pain with shortness of breath, shortness of breath when moving around your house, high fevers 103.0+, excessive vomiting/diarrhea, or general distress.

## 2024-12-12 ENCOUNTER — OFFICE VISIT (OUTPATIENT)
Dept: OPHTHALMOLOGY | Facility: CLINIC | Age: 69
End: 2024-12-12
Payer: MEDICARE

## 2024-12-12 VITALS — WEIGHT: 233.69 LBS | BODY MASS INDEX: 33.46 KG/M2 | HEIGHT: 70 IN

## 2024-12-12 DIAGNOSIS — H18.513 FUCHS' CORNEAL DYSTROPHY OF BOTH EYES: Primary | ICD-10-CM

## 2024-12-12 DIAGNOSIS — Z98.890 HISTORY OF RADIAL KERATOTOMY: ICD-10-CM

## 2024-12-12 PROCEDURE — 99213 OFFICE O/P EST LOW 20 MIN: CPT | Mod: PBBFAC,PN | Performed by: STUDENT IN AN ORGANIZED HEALTH CARE EDUCATION/TRAINING PROGRAM

## 2024-12-12 RX ORDER — TIZANIDINE 4 MG/1
TABLET ORAL
COMMUNITY
Start: 2024-11-22

## 2024-12-12 NOTE — PROGRESS NOTES
[facility]    RTC 6 weeks for VA and MRx    Not tolerating the kaylynn drops  Now on #6 of 6 stem cell clinical trial  Last edited by Indu Graham RN on 12/12/2024  3:13 PM.        Assessment /Plan   Topography 3/14/23  OD flat, 36.42 simK, 1.8D astigmatism 019  OS flat, 36.50D simK, 0.38D astigmatism    K Jun 8/24/23  OD: prolate, flat simmk 35.34, steep simk 37.5, astig 2.16 at 23  OS: prolate, flat simk 36.28, steep simk 37.16, astig 0.87 @ 107    K Jun 10/24/24  OD: flat, flat simk 35.00, steep simk 37.85, astig 2.85 @ 38  OS: flat, flat simk 35.97, steep simk 36.79, astig 0.83 @ 101    CCT 8/24/23  OD:   OS:     OCT mac 7/24/24: Good foveal contour, no IRF/SRF     RK  - Fluctuating vision. Gets RX with optometry usually to 20/30-20/40. MRx 3/23 to 20/80 ou  - LOWELL 20/25 OU  - Unable to refract past 20/60 // 20/40 on 8/2/23. Patient reports fluctuating vision, worse in AM  - RTC Americo day 8/27 for evaluation of options for stabilization of RK. Repeat refraction if no surgical options   - 8/24/23: Dr Driscoll in OR for emergency, unable to see patient. Slight R eye fluctuation in R eye K jun, but minimal.  Mrx near and distance separate given and patient happy trying this result.   - Patient does not want bifocals  - 7/24/24: MRX to 20/100 // 20/40, pt prefers separate glasses  - 10/24/24: Seen with Dr. Driscoll. Chief complaint is fluctuating VA, inability to achieve 20/20 BCVA with MRx. +3 Omar OU on exam. 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  - 12/12/24: Patient only using kaylynn once daily. Encouraged use TID. Not interested in PKP at this time. Monitor. Seen with Dr. Driscoll      2. Fuch's OU  - Kaylynn TID    3. Iris nevus  - CTM       4. 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

## 2024-12-20 ENCOUNTER — TELEPHONE (OUTPATIENT)
Dept: INTERNAL MEDICINE | Facility: CLINIC | Age: 69
End: 2024-12-20
Payer: MEDICARE

## 2025-01-08 DIAGNOSIS — I48.92 ATRIAL FIBRILLATION AND FLUTTER: Primary | ICD-10-CM

## 2025-01-08 DIAGNOSIS — I48.91 ATRIAL FIBRILLATION AND FLUTTER: Primary | ICD-10-CM

## 2025-01-09 ENCOUNTER — TELEPHONE (OUTPATIENT)
Dept: INTERNAL MEDICINE | Facility: CLINIC | Age: 70
End: 2025-01-09
Payer: MEDICARE

## 2025-01-09 NOTE — TELEPHONE ENCOUNTER
Attempted to return patients call no answer left voice mail message to return my call at [phone].

## 2025-01-10 ENCOUNTER — TELEPHONE (OUTPATIENT)
Dept: CARDIOLOGY | Facility: CLINIC | Age: 70
End: 2025-01-10
Payer: MEDICARE

## 2025-01-10 ENCOUNTER — HOSPITAL ENCOUNTER (OUTPATIENT)
Dept: CARDIOLOGY | Facility: HOSPITAL | Age: 70
Discharge: HOME | End: 2025-01-10
Attending: INTERNAL MEDICINE
Payer: MEDICARE

## 2025-01-10 DIAGNOSIS — I48.91 ATRIAL FIBRILLATION AND FLUTTER: ICD-10-CM

## 2025-01-10 DIAGNOSIS — I48.92 ATRIAL FIBRILLATION AND FLUTTER: ICD-10-CM

## 2025-01-10 LAB
OHS QRS DURATION: 92 MS
OHS QTC CALCULATION: 435 MS

## 2025-01-10 PROCEDURE — 93005 ELECTROCARDIOGRAM TRACING: CPT

## 2025-01-10 NOTE — TELEPHONE ENCOUNTER
----- Message from Quincy Conrad MD sent at 1/10/2025 10:45 AM CST -----  Today's EKG does not show the previous abnormal findings.  On the prior EKG most likely the chest leads were not placed correctly and that is what caused the abnormal finding on the EKG.  Patient does not need any further cardiac testing at the moment unless he is having concerning symptoms.  ----- Message -----  From: Nina Juarez LPN  Sent: 1/10/2025   9:57 AM CST  To: Quincy Conrad MD    EKG obtained as ordered for you're review.  ----- Message -----  From: Barbara Serra  Sent: 1/10/2025   8:55 AM CST  To: [facility] Clinical Support Staff    Good morning,    Patient came in this morning and had his EKG completed.  Please advise.    Thank you,  Barbara

## 2025-01-13 ENCOUNTER — CLINICAL SUPPORT (OUTPATIENT)
Dept: INTERNAL MEDICINE | Facility: CLINIC | Age: 70
End: 2025-01-13
Payer: MEDICARE

## 2025-01-13 DIAGNOSIS — Z23 NEED FOR VACCINATION: Primary | ICD-10-CM

## 2025-01-13 PROCEDURE — 90750 HZV VACC RECOMBINANT IM: CPT | Mod: PBBFAC

## 2025-01-13 PROCEDURE — 90653 IIV ADJUVANT VACCINE IM: CPT | Mod: PBBFAC

## 2025-01-13 PROCEDURE — 99212 OFFICE O/P EST SF 10 MIN: CPT | Mod: PBBFAC

## 2025-01-13 PROCEDURE — 90472 IMMUNIZATION ADMIN EACH ADD: CPT | Mod: PBBFAC

## 2025-01-13 PROCEDURE — G0008 ADMIN INFLUENZA VIRUS VAC: HCPCS | Mod: PBBFAC

## 2025-01-13 RX ADMIN — INFLUENZA A VIRUS A/VICTORIA/4897/2022 IVR-238 (H1N1) ANTIGEN (FORMALDEHYDE INACTIVATED), INFLUENZA A VIRUS A/THAILAND/8/2022 IVR-237 (H3N2) ANTIGEN (FORMALDEHYDE INACTIVATED), INFLUENZA B VIRUS B/AUSTRIA/1359417/2021 BVR-26 ANTIGEN (FORMALDEHYDE INACTIVATED) 0.5 ML: 15; 15; 15 INJECTION, SUSPENSION INTRAMUSCULAR at 07:01

## 2025-01-13 RX ADMIN — ZOSTER VACCINE RECOMBINANT, ADJUVANTED 0.5 ML: KIT at 07:01

## 2025-01-13 NOTE — PROGRESS NOTES
Pt here for nurse visit for flu vaccine and pneumovax. Orders for flu and pneumovax ordered per pt request.

## 2025-01-14 ENCOUNTER — TELEPHONE (OUTPATIENT)
Facility: CLINIC | Age: 70
End: 2025-01-14
Payer: MEDICARE

## 2025-01-14 DIAGNOSIS — M48.061 SPINAL STENOSIS AT L4-L5 LEVEL: ICD-10-CM

## 2025-01-14 DIAGNOSIS — G89.29 CHRONIC BACK PAIN GREATER THAN 3 MONTHS DURATION: Primary | ICD-10-CM

## 2025-01-14 DIAGNOSIS — M54.9 CHRONIC BACK PAIN GREATER THAN 3 MONTHS DURATION: Primary | ICD-10-CM

## 2025-01-14 NOTE — TELEPHONE ENCOUNTER
----- Message from Carolina sent at 1/14/2025  9:45 AM CST -----  Regarding: Order for Acpuncture  Mr. Rehman phoned the office today requesting an order for Acpuncture @ [facility], you can fax the order to Atn: [phone], the phone number is [phone].

## 2025-01-14 NOTE — TELEPHONE ENCOUNTER
Virgie see message below, last OV was 9/2024, does he need an appt or you can enter referral please advise thansk

## 2025-01-14 NOTE — PROGRESS NOTES
Pt requesting referral to [facility] in [place] for Acupuncture. Referral sent per pt request. Pt was followed by Pain management Dr Dempsey due to lumbar spinal stenosis and states he had LESI, and nerve ablation without improvement in symtoms and states he will not longer being seeing Dr Dempsey so is requesting to be referred to [facility] to try accupuncture therapy.

## 2025-01-24 ENCOUNTER — PATIENT MESSAGE (OUTPATIENT)
Dept: ADMINISTRATIVE | Facility: OTHER | Age: 70
End: 2025-01-24
Payer: MEDICARE

## 2025-01-24 NOTE — PROGRESS NOTES
Patient presented to clinic for a influenza and shingrix vaccination . Injection given without incident.

## 2025-01-30 DIAGNOSIS — I48.92 ATRIAL FIBRILLATION AND FLUTTER: ICD-10-CM

## 2025-01-30 DIAGNOSIS — I48.91 ATRIAL FIBRILLATION AND FLUTTER: ICD-10-CM

## 2025-01-30 DIAGNOSIS — E78.2 MIXED HYPERLIPIDEMIA: ICD-10-CM

## 2025-01-30 RX ORDER — METOPROLOL SUCCINATE 25 MG/1
25 TABLET, EXTENDED RELEASE ORAL DAILY
Qty: 90 TABLET | Refills: 3 | Status: SHIPPED | OUTPATIENT
Start: 2025-01-30 | End: 2026-01-30

## 2025-01-30 RX ORDER — ROSUVASTATIN CALCIUM 20 MG/1
20 TABLET, COATED ORAL DAILY
Qty: 90 TABLET | Refills: 3 | Status: SHIPPED | OUTPATIENT
Start: 2025-01-30 | End: 2026-01-30

## 2025-02-03 DIAGNOSIS — E78.2 MIXED HYPERLIPIDEMIA: Primary | ICD-10-CM

## 2025-02-03 RX ORDER — GEMFIBROZIL 600 MG/1
600 TABLET, FILM COATED ORAL
Qty: 180 TABLET | Refills: 3 | OUTPATIENT
Start: 2025-02-03 | End: 2026-02-03

## 2025-02-03 NOTE — TELEPHONE ENCOUNTER
Patient last seen on 11/12/24, next appointment scheduled on 5/6/2025. Patient request refill of gemfibrozil 600 mg twice daily.

## 2025-02-07 ENCOUNTER — TELEPHONE (OUTPATIENT)
Dept: INTERNAL MEDICINE | Facility: CLINIC | Age: 70
End: 2025-02-07
Payer: MEDICARE

## 2025-02-07 DIAGNOSIS — H61.20 IMPACTED CERUMEN, UNSPECIFIED LATERALITY: ICD-10-CM

## 2025-02-07 DIAGNOSIS — H91.93 DECREASED HEARING OF BOTH EARS: Primary | ICD-10-CM

## 2025-02-07 NOTE — PROGRESS NOTES
Pt called clinic requesting referral to ENT cl for c/o bilat eating decreased hearing and itching to bilat ears. Per chart noted pt was referred to ENT 10-18-24 for cerumen impaction- appt was scheduled 11-1-24 but appt was cancelled. Refer to ENT cl for further eval and mgmt. Inform pt per Medicare requirements- pt has to have audiology evaluation for ENT referral so referral for audiology has also been ordered.

## 2025-02-07 NOTE — TELEPHONE ENCOUNTER
Patient requesting a referral be sent to ENT clinic. Patient informed me both ears itch and he is having trouble hearing out of his left ear. Informed patient I would inform NP Boudreax and get back with him. Patient verbalized understanding.

## 2025-02-07 NOTE — PROGRESS NOTES
Informed patient a referral was sent to Audiology and ENT clinic 2/7/2025. Patient verified understanding.

## 2025-02-16 NOTE — INTERVAL H&P NOTE
Anesthesia Pre Eval Note        COLONOSCOPY    Anesthesia ROS/Med Hx        Anesthetic Complication History:    Patient does not have a history of anesthetic complications      Pulmonary Review:  Patient does not have a pulmonary history      Neuro/Psych Review:  Patient does not have a neuro/psych history         Cardiovascular Review:  Patient does not have a cardiovascular history       GI/HEPATIC/RENAL Review:  Patient does not have a GI/hepatic/renalhistory       End/Other Review:  Patient does not have an endo/other history      Overall Review of Systems Comments:  Varicose veins  Endometriosis  Additional Results:      ALLERGIES:  No Known Allergies   No results found for: \"WBC\", \"RBC\", \"HGB\", \"HCT\", \"MCV\", \"MCH\", \"MCHC\", \"RDWCV\", \"SODIUM\", \"POTASSIUM\", \"CHLORIDE\", \"CO2\", \"GLUCOSE\", \"BUN\", \"CREATININE\", \"GFRESTIMATE\", \"EGFRNONAFR\", \"GFRA\", \"GFRNA\", \"CALCIUM\", \"HCG\", \"PLT\", \"PTT\", \"INR\"   Past Medical History:  No date: Ectopic pregnancy (CMD)  No date: Endometriosis of cervix  No date: No known problems  Past Surgical History:  09/10/2020: Salpingectomy; Right      Comment:  Right Salpingectomy, Removal of Possible Endometriosis  No date:  guidance for egg retrieval ob dept   Prior to Admission medications :  Not on File       No data found.  Social history reviewed:  Social History     Tobacco Use   Smoking Status Never   Smokeless Tobacco Never        E-Cigarette/Vaping Substances & Devices    E-Cigarette/Vaping Use Never Used     Nicotine No     THC No     CBD No     Flavoring No     Disposable No     Pre-filled or Refillable Cartridge No     Refillable Tank No     Pre-filled Pod No        Social History     Substance and Sexual Activity   Alcohol Use Yes    Alcohol/week: 2.0 standard drinks of alcohol    Types: 2 Standard drinks or equivalent per week    Comment: socially           Relevant Problems   No relevant active problems       Physical Exam     Airway   Mallampati: II  TM Distance: >3 FB  Neck  The patient has been examined and the H&P has been reviewed:    I concur with the findings and no changes have occurred since H&P was written.    Procedure risks, benefits and alternative options discussed and understood by patient/family.          There are no hospital problems to display for this patient.     ROM: Full  TMJ Mobility: Good    Cardiovascular    Cardio Rhythm: Regular    Head Assessment  Head assessment: Normocephalic    General Assessment  General Assessment: No acute distress and Alert and oriented    Dental Exam  Dental exam normal      Anesthesia Plan:    ASA Status: 1  Anesthesia Type: MAC    Induction: Intravenous  Maintenance: TIVA    Post-op Pain Management: Per Surgeon      Checklist  Reviewed: NPO Status, Allergies, Medications, Problem list, Past Med History and Patient Summary  Consent/Risks Discussed Statement:  The proposed anesthetic plan, including its risks and benefits, have been discussed with the Patient along with the risks and benefits of alternatives. Questions were encouraged and answered and the patient and/or representative understands and agrees to proceed.        I discussed with the patient (and/or patient's legal representative) the risks and benefits of the proposed anesthesia plan, MAC, which may include services performed by other anesthesia providers.    Alternative anesthesia plans, if available, were reviewed with the patient (and/or patient's legal representative). Discussion has been held with the patient (and/or patient's legal representative) regarding risks of anesthesia, which include Intra-operative Awareness, allergic reaction, anxiety, conversion to general anesthesia, depressed breathing, hypotension, nausea, vomiting and intra-operative awareness and emergent situations that may require change in anesthesia plan.    The patient (and/or patient's legal representative) has indicated understanding, his/her questions have been answered, and he/she wishes to proceed with the planned anesthetic.    Blood Products: Not Anticipated

## 2025-02-20 ENCOUNTER — TELEPHONE (OUTPATIENT)
Dept: RHEUMATOLOGY | Facility: CLINIC | Age: 70
End: 2025-02-20
Payer: MEDICARE

## 2025-02-20 ENCOUNTER — TELEPHONE (OUTPATIENT)
Dept: INTERNAL MEDICINE | Facility: CLINIC | Age: 70
End: 2025-02-20
Payer: MEDICARE

## 2025-02-20 NOTE — TELEPHONE ENCOUNTER
----- Message from Princess sent at 2/20/2025  1:08 PM CST -----  .Type:  Patient Returning CallWho Called:ptWho Left Message for Patient:ptDoes the patient know what this is regarding?: Journavx Would the patient rather a call back or a response via [facility]? [facility] Call Back Number:[phone] Information: Please call back would like this medication for pain  Journavx. OPIOID Free medication

## 2025-02-20 NOTE — TELEPHONE ENCOUNTER
Spoke with patient, he is inquiring if Journavx would be beneficial for his lower back pain. Patient is interested in starting this medication.

## 2025-02-20 NOTE — TELEPHONE ENCOUNTER
----- Message from Muna sent at 2/20/2025  8:15 AM CST -----  Pt wants to be prescribed Journavx for his pain its non addictive.Callback [phone]

## 2025-02-20 NOTE — TELEPHONE ENCOUNTER
Spooked to pt informed pt Journavx  would have to be discuss with his PCP. Pt verbalized understand

## 2025-02-20 NOTE — TELEPHONE ENCOUNTER
Spoke to pt requesting to be prescribe Journavx for his lower back pain stated back pain is chronic

## 2025-02-25 RX ORDER — RIVAROXABAN 20 MG/1
20 TABLET, FILM COATED ORAL DAILY
Qty: 90 TABLET | Refills: 2 | Status: SHIPPED | OUTPATIENT
Start: 2025-02-25

## 2025-02-26 ENCOUNTER — TELEPHONE (OUTPATIENT)
Dept: INTERNAL MEDICINE | Facility: CLINIC | Age: 70
End: 2025-02-26
Payer: MEDICARE

## 2025-02-27 NOTE — TELEPHONE ENCOUNTER
Attempted to contact patient with follow up on JOLENE Gomes prescribing non - opioid medication for him.

## 2025-03-07 ENCOUNTER — HOSPITAL ENCOUNTER (OUTPATIENT)
Dept: WOUND CARE | Facility: HOSPITAL | Age: 70
Discharge: HOME | End: 2025-03-07
Attending: NURSE PRACTITIONER
Payer: MEDICARE

## 2025-03-07 VITALS
TEMPERATURE: 98 F | DIASTOLIC BLOOD PRESSURE: 74 MMHG | SYSTOLIC BLOOD PRESSURE: 120 MMHG | HEART RATE: 80 BPM | OXYGEN SATURATION: 95 % | RESPIRATION RATE: 20 BRPM

## 2025-03-07 DIAGNOSIS — E11.42 TYPE 2 DIABETES MELLITUS WITH PERIPHERAL NEUROPATHY: ICD-10-CM

## 2025-03-07 DIAGNOSIS — L60.2 OVERGROWN TOENAILS: ICD-10-CM

## 2025-03-07 DIAGNOSIS — G63 POLYNEUROPATHY ASSOCIATED WITH UNDERLYING DISEASE: ICD-10-CM

## 2025-03-07 DIAGNOSIS — L84 CALLUS OF FOOT: ICD-10-CM

## 2025-03-07 DIAGNOSIS — E11.9 ENCOUNTER FOR COMPREHENSIVE DIABETIC FOOT EXAMINATION, TYPE 2 DIABETES MELLITUS: Primary | ICD-10-CM

## 2025-03-07 PROCEDURE — 11056 PARNG/CUTG B9 HYPRKR LES 2-4: CPT

## 2025-03-07 PROCEDURE — 11719 TRIM NAIL(S) ANY NUMBER: CPT | Mod: Q9,59,, | Performed by: NURSE PRACTITIONER

## 2025-03-07 PROCEDURE — 11719 TRIM NAIL(S) ANY NUMBER: CPT

## 2025-03-07 PROCEDURE — 27000999 HC MEDICAL RECORD PHOTO DOCUMENTATION

## 2025-03-07 PROCEDURE — 11056 PARNG/CUTG B9 HYPRKR LES 2-4: CPT | Mod: ,,, | Performed by: NURSE PRACTITIONER

## 2025-03-07 PROCEDURE — 99211 OFF/OP EST MAY X REQ PHY/QHP: CPT

## 2025-03-07 NOTE — PROGRESS NOTES
[facility]   Outpatient Wound Care     Subjective:   Patient ID: Melquiades Rehman Jr. is a 69 y.o. male.    Chief Complaint: Nail Care (dfc)      History of Present Illness:   69 y.o. White male presents to wound care clinic today for diabetic foot care, and callus care.  Presents to clinic alone.  Ambulates without difficulty.   Reviewed previous medical history since last visit of 03/07/2025.  Past medical history:  He was a patient of Monica Chun NP.  Voices he is currently under a stem cell research product receiving injections for his Parkinson's.  Followed by Pau Gomes FNP for PCP last appt 7/9/24.    Today's visit 03/07/2025:  Progress notes since last visit of 11/27/2024. Diabetic foot exam performed.  Monofilament test done decreased sensation noted in all areas.  Bilateral lower extremities cool to touch with absent hair growth.  Trimmed all 10 toenails using podiatry clippers, and filed with [employer] board. Callus to right foot plantar surface. Pared callus using #4 dermal curette. Rationale for paring today to decrease pressure, and prevent ulcers.  Instructed the importance of checking feet daily due to decrease sensation high risk for diabetic foot ulcers.  Instructed on proper footwear, nail care, and daily skin assessment.  Will have him return to the clinic in 3 months.  Instructed to call the office with any questions, concerns, or new skin issues.  Verbalized understanding of all instructions.    11/27/2024:  Reviewed previous progress notes on 08/20/2024. Diabetic foot exam performed.  Monofilament test done decreased sensation noted in all areas.  Bilateral lower extremities cool to touch with absent hair growth.  Trimmed all 10 toenails using podiatry clippers, and filed with [employer] board.  Instructed the importance of checking  feet daily due to decrease sensation high risk for diabetic foot ulcers.  Instructed on proper footwear, nail care, and daily skin assessment.  Will have him return to the clinic in 3 months.  Instructed to call the office with any questions, concerns, or new skin issues.  Verbalized understanding of all instructions.    08/20/2024:  Diabetic foot exam performed.  Monofilament test done decreased sensation noted in all areas. Bilateral lower extremities cool to touch and bilateral cool feet. Trimmed all 10 toenails using podiatry clippers, and filed with [employer] board. Bilateral great toe callus pared using #4 Dermal curette. Rationale for paring to decrease pressure, and alleviate pain.  Tolerated well.  Instructed the importance of checking feet daily due to decrease sensation.  Instructed on proper footwear, nail care, and daily skin assessments.  Will have him return to the clinic in 3 months for diabetic foot care.  Instructed to call the office with any questions, concerns, or new skin issues.  Verbalized understanding of all instructions.                      History includes:      Past Medical History:   Diagnosis Date    Atrial fibrillation     Central stenosis of spinal canal     Chronic back pain greater than 3 months duration 10/14/2022    Constipation 09/30/2022    Degeneration of lumbar intervertebral disc 07/06/2022    Diastolic dysfunction 05/01/2023    Dysphonia 09/30/2022    Dystrophic nail 06/19/2023    Erectile dysfunction 09/30/2022    Fatty liver     GERD (gastroesophageal reflux disease) 09/30/2022    H/O cataract removal with insertion of prosthetic lens     History of CVA (cerebrovascular accident) 09/30/2022    TIA    History of radial keratotomy 01/01/1998    HTN (hypertension)     Hyperlipidemia 09/30/2022    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/30/2022    Microhematuria 03/30/2023    Overgrown toenails 06/19/2023    Parkinson's disease     Peyronie disease     Polyneuropathy associated with  underlying disease 06/19/2023    Rheumatoid arthritis 01/10/2022    Rheumatoid arthritis, unspecified     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    Spinal stenosis of lumbar region 09/30/2022    Type 2 diabetes mellitus without complications     Unspecified macular degeneration     Unspecified macular degeneration       Past Surgical History:   Procedure Laterality Date    BACK SURGERY Left 04/2024    CATARACT EXTRACTION W/  INTRAOCULAR LENS IMPLANT Bilateral     EPIDURAL STEROID INJECTION INTO LUMBAR SPINE      exc cyst Lt ear      EYE SURGERY      INJECTION OF ANESTHETIC AGENT AROUND MEDIAL BRANCH NERVES INNERVATING LUMBAR FACET JOINT Bilateral 11/16/2022    Procedure: Block-nerve-medial branch-lumbar;  Surgeon: Gertrude Blanco MD;  Location: [facility] OR;  Service: Pain Management;  Laterality: Bilateral;  Bilateral L4-L5...Patient is requesting to be first case    INJECTION OF ANESTHETIC AGENT AROUND MEDIAL BRANCH NERVES INNERVATING LUMBAR FACET JOINT Bilateral 11/22/2023    Procedure: Block-nerve-medial branch-lumbar;  Surgeon: Gertrude Blanco MD;  Location: [facility] OR;  Service: Pain Management;  Laterality: Bilateral;  Bilateral MBB L3-5    INJECTION OF ANESTHETIC AGENT AROUND MEDIAL BRANCH NERVES INNERVATING LUMBAR FACET JOINT Bilateral 01/24/2024    Procedure: Block-nerve-medial branch-lumbar;  Surgeon: Gertrude Blanco MD;  Location: [facility] OR;  Service: Pain Management;  Laterality: Bilateral;  Bilateral MBB L3-5    KNEE ARTHROSCOPY Right     RADIOFREQUENCY ABLATION Left 03/27/2024    Procedure: Radiofrequency Ablation;  Surgeon: Gertrude Blanco MD;  Location: [facility] OR;  Service: Pain Management;  Laterality: Left;  RFA Lt L3-L5    RADIOFREQUENCY ABLATION Right 04/25/2024    Procedure: RADIOFREQUENCY ABLATION / Right L3 L5;  Surgeon: Gertrude Blanco MD;  Location: [facility] OR;  Service: Pain Management;  Laterality: Right;  Right RFA L3-L5    VASECTOMY  1998      Social History     Socioeconomic History    Marital status:      Number of children: 1   Tobacco Use    Smoking status: Former     Current packs/day: 0.00     Average packs/day: 1 pack/day for 15.0 years (15.0 ttl pk-yrs)     Types: Cigarettes     Start date: 1972     Quit date: 1987     Years since quittin.2     Passive exposure: Past    Smokeless tobacco: Never   Substance and Sexual Activity    Alcohol use: Never     Comment: occasional    Drug use: Yes     Types: Marijuana    Sexual activity: Yes     Partners: Female     Social Drivers of Health     Financial Resource Strain: Low Risk  (2025)    Overall Financial Resource Strain (CARDIA)     Difficulty of Paying Living Expenses: Not hard at all   Food Insecurity: No Food Insecurity (2025)    Hunger Vital Sign     Worried About Running Out of Food in the Last Year: Never true     Ran Out of Food in the Last Year: Never true   Transportation Needs: No Transportation Needs (1/10/2024)    PRAPARE - Transportation     Lack of Transportation (Medical): No     Lack of Transportation (Non-Medical): No   Physical Activity: Unknown (2025)    Exercise Vital Sign     Days of Exercise per Week: 0 days   Stress: No Stress Concern Present (2025)    Citizen of [place] of Occupational Health - Occupational Stress Questionnaire     Feeling of Stress : Not at all   Housing Stability: Low Risk  (1/10/2024)    Housing Stability Vital Sign     Unable to Pay for Housing in the Last Year: No     Number of Places Lived in the Last Year: 1     Unstable Housing in the Last Year: No   .      Current Outpatient Medications   Medication Sig Dispense Refill    ACCU-CHEK GUIDE GLUCOSE METER Misc       alcohol swabs PadM Apply 1 each topically once daily. 100 each 11    alogliptin-pioglitazone (OSENI) 25-30 mg Tab Take 1 tablet by mouth once daily. 90 tablet 3    ammonium lactate 12 % Crea Apply 1 application  topically 2 (two) times daily.      AUSTEDO 6 mg Tab Take 1 tablet by mouth 2 (two) times daily.       blood sugar diagnostic (ACCU-CHEK GUIDE TEST STRIPS) Strp 1 strip by skin prick route Daily. 100 strip 11    blood-glucose meter kit To check BG two times daily, to use with insurance preferred meter 1 each 0    carbidopa-levodopa  mg (SINEMET)  mg per tablet Take 2 tablets by mouth 2 (two) times a day.      colchicine (COLCRYS) 0.6 mg tablet TAKE 1 TABLET BY MOUTH TWICE  DAILY 180 tablet 3    lancets (ACCU-CHEK SOFTCLIX LANCETS) Misc 1 each by Misc.(Non-Drug; Combo Route) route Daily. 100 each 11    lisinopriL 10 MG tablet Take 1 tablet (10 mg total) by mouth once daily. (Patient taking differently: Take 10 mg by mouth as needed.) 90 tablet 3    metoprolol succinate (TOPROL-XL) 25 MG 24 hr tablet Take 1 tablet (25 mg total) by mouth once daily. 90 tablet 3    pantoprazole (PROTONIX) 40 MG tablet Take 1 tablet (40 mg total) by mouth once daily. 90 tablet 1    polyethylene glycol (MOVIPREP) 100-7.5-2.691 gram solution Take 2,000 mLs by mouth As instructed (take as directed per instructions provided by GI clinic). 1 kit 0    predniSONE (DELTASONE) 5 MG tablet Take 2 tab po qd x 3 days then take 1 tab po qd x 2 days prn RA flare. 30 tablet 0    rosuvastatin (CRESTOR) 20 MG tablet Take 1 tablet (20 mg total) by mouth once daily. 90 tablet 3    selegiline HCl (ELDEPRYL) 5 mg tablet Take 5 mg by mouth 2 (two) times daily with meals.      sodium chloride 5% (BHUMIKA 128) 5 % ophthalmic solution Place 1 drop into both eyes 3 (three) times daily. (Patient not taking: Reported on 12/12/2024) 30 mL 2    tiZANidine (ZANAFLEX) 4 MG tablet       tofacitinib (XELJANZ XR) 11 mg Tb24 Take 1 tablet by mouth once daily. 30 tablet 3    XARELTO 20 mg Tab Take 1 tablet (20 mg total) by mouth once daily. 90 tablet 2     Current Facility-Administered Medications   Medication Dose Route Frequency Provider Last Rate Last Admin    influenza (adjuvanted) (Fluad) 45 mcg/0.5 mL IM vaccine (> or = 64 yo) 0.5 mL  0.5 mL Intramuscular 1  time in Clinic/HOD         pneumoc 20-jocelyn conj-dip cr(PF) (PREVNAR-20 (PF)) injection Syrg 0.5 mL  0.5 mL Intramuscular 1 time in Clinic/HOD            Review of Systems   All other systems reviewed and are negative.         Labs Reviewed:   Chemistry:  Lab Results   Component Value Date    BUN 14.2 10/18/2024    BUN 24.0 08/08/2024    CREATININE 1.19 10/18/2024    CREATININE 1.26 (H) 08/08/2024    EGFRNORACEVR >60 10/18/2024    EGFRNORACEVR >60 08/08/2024    GLUCOSE 134 (H) 10/18/2024    AST 17 10/18/2024    AST 17 08/08/2024    ALT 18 10/18/2024    ALT <5 08/08/2024    HGBA1C 6.5 10/18/2024        Hematology:  Lab Results   Component Value Date    WBC 5.88 10/18/2024    WBC 4.65 08/08/2024    HGB 15.1 10/18/2024    HGB 14.4 08/08/2024    HCT 44.8 10/18/2024    HCT 40.4 (L) 08/08/2024     10/18/2024     08/08/2024       Inflammatory Markers:  Lab Results   Component Value Date    SEDRATE 15 08/08/2024    SEDRATE 7 01/11/2024    SEDRATE 6 10/09/2023        Objective:        Physical Exam  Vitals reviewed.   Cardiovascular:      Pulses:           Dorsalis pedis pulses are 2+ on the right side and 2+ on the left side.        Posterior tibial pulses are 2+ on the right side and 2+ on the left side.   Feet:      Right foot:      Toenail Condition: Right toenails are long.      Left foot:      Toenail Condition: Left toenails are long.      Comments: Monofilament test done decreased sensation in all areas.  Skin:     General: Skin is cool and dry.      Capillary Refill: Capillary refill takes less than 2 seconds.   Neurological:      Mental Status: He is alert.                          Assessment:         ICD-10-CM ICD-9-CM   1. Encounter for comprehensive diabetic foot examination, type 2 diabetes mellitus  E11.9 250.00   2. Overgrown toenails  L60.2 703.8   3. Callus of foot  L84 700   4. Polyneuropathy associated with underlying disease  G63 357.4   5. Type 2 diabetes mellitus with peripheral neuropathy   E11.42 250.60     357.2         Plan:   Tissue pathology and/or culture taken:  [] Yes [x] No   Sharp debridement performed:   [] Yes [x] No   Labs ordered this visit:   [] Yes [x] No   Imaging ordered this visit:   [] Yes [x] No         1. Encounter for comprehensive diabetic foot examination, type 2 diabetes mellitus     Diabetic foot exam performed.  Instructed on proper foot care.   2. Overgrown toenails     Trimmed.  Instructed on proper nail care.   3. Polyneuropathy associated with underlying disease     Co-factor in wound development.     Will check feet daily.    4. Type 2 diabetes mellitus with peripheral neuropathy     Last A1c 6.5.    Must have a strict diabetic diet, take glucose lowering medications as prescribed and encourage lower HA1C.       The time spent including preparing to see the patient, obtaining patient history and assessment, evaluation of the plan of care, patient/caregiver counseling and education, orders, documentation, coordination of care, and other professional medical management activities for today's encounter was 20 minute.    Time spent performing procedures during today's encounter was 20 minute.    Follow up in about 3 weeks (around 3/28/2025).  Teaching provided on s/s to call wound clinic for promptly.   ER precautions taught for after hours and weekends.       DAVIDSON Mo

## 2025-03-17 ENCOUNTER — TELEPHONE (OUTPATIENT)
Dept: INTERNAL MEDICINE | Facility: CLINIC | Age: 70
End: 2025-03-17
Payer: MEDICARE

## 2025-03-17 NOTE — TELEPHONE ENCOUNTER
----- Message from Vibha sent at 3/17/2025  8:30 AM CDT -----  Who Called: Melquiades Rehman Jr.Caller is requesting assistance/information from provider's office.Symptoms (please be specific): n/a How long has patient had these symptoms:  n/aList of preferred pharmacies on file (remove unneeded):  n/aPreferred Method of Contact: Phone CallPatient's Preferred Phone Number on File: [phone] Best Call Back Number, if different:Additional Information: pt wants to know if he has to do a urine sample prior to his upcoming appt May 12 because a year ago, there was blood in the urine.Please advise

## 2025-03-18 ENCOUNTER — TELEPHONE (OUTPATIENT)
Dept: RHEUMATOLOGY | Facility: CLINIC | Age: 70
End: 2025-03-18
Payer: MEDICARE

## 2025-03-18 DIAGNOSIS — M05.79 SEROPOSITIVE RHEUMATOID ARTHRITIS OF MULTIPLE SITES: ICD-10-CM

## 2025-03-18 NOTE — TELEPHONE ENCOUNTER
----- Message from Nadir sent at 3/18/2025 10:00 AM CDT -----  Regarding: Medication Refill  Pt needs a refill on Rx: predniSONE (DELTASONE) 5 MG tabletPharmacy [facility] - [place], [place] - [address] back: [phone]  
No

## 2025-03-20 ENCOUNTER — TELEPHONE (OUTPATIENT)
Dept: RHEUMATOLOGY | Facility: CLINIC | Age: 70
End: 2025-03-20
Payer: MEDICARE

## 2025-03-20 ENCOUNTER — PATIENT MESSAGE (OUTPATIENT)
Dept: RHEUMATOLOGY | Facility: CLINIC | Age: 70
End: 2025-03-20
Payer: MEDICARE

## 2025-03-20 DIAGNOSIS — D84.821 DRUG-INDUCED IMMUNODEFICIENCY: ICD-10-CM

## 2025-03-20 DIAGNOSIS — Z79.899 HIGH RISK MEDICATION USE: ICD-10-CM

## 2025-03-20 DIAGNOSIS — M05.79 SEROPOSITIVE RHEUMATOID ARTHRITIS OF MULTIPLE SITES: ICD-10-CM

## 2025-03-20 DIAGNOSIS — Z79.899 DRUG-INDUCED IMMUNODEFICIENCY: ICD-10-CM

## 2025-03-20 DIAGNOSIS — M05.79 SEROPOSITIVE RHEUMATOID ARTHRITIS OF MULTIPLE SITES: Primary | ICD-10-CM

## 2025-03-20 RX ORDER — TOFACITINIB 11 MG/1
1 TABLET, FILM COATED, EXTENDED RELEASE ORAL DAILY
Qty: 30 TABLET | Refills: 2 | Status: SHIPPED | OUTPATIENT
Start: 2025-03-20

## 2025-03-20 RX ORDER — PREDNISONE 5 MG/1
TABLET ORAL
Qty: 30 TABLET | Refills: 0 | Status: SHIPPED | OUTPATIENT
Start: 2025-03-20

## 2025-03-20 NOTE — TELEPHONE ENCOUNTER
Patient is past due for lab work, please have him come by this week to have completed, Prednisone was filled but labs also due, thanks

## 2025-03-20 NOTE — TELEPHONE ENCOUNTER
"Returned patient tc. Made him aware I called him to let him know rx sent in. Also made him aware of labs due. States he has an appt. "Next month" with Cardiology. Noted appt. With Cardiology is 05.2025. I advised patient labs needed sooner  he stated he does not have a car just add labs needed. And he will do when he comes in for Cardiology appt. I made him aware he need to be sure to let lab know to draw labs for Barbara Tran NP as well. Patient stated okay and verbalized full understanding.  "

## 2025-03-27 ENCOUNTER — LAB VISIT (OUTPATIENT)
Dept: LAB | Facility: HOSPITAL | Age: 70
End: 2025-03-27
Attending: NURSE PRACTITIONER
Payer: MEDICARE

## 2025-03-27 ENCOUNTER — RESULTS FOLLOW-UP (OUTPATIENT)
Dept: RHEUMATOLOGY | Facility: CLINIC | Age: 70
End: 2025-03-27

## 2025-03-27 DIAGNOSIS — Z79.899 DRUG-INDUCED IMMUNODEFICIENCY: ICD-10-CM

## 2025-03-27 DIAGNOSIS — I10 PRIMARY HYPERTENSION: ICD-10-CM

## 2025-03-27 DIAGNOSIS — M05.79 SEROPOSITIVE RHEUMATOID ARTHRITIS OF MULTIPLE SITES: ICD-10-CM

## 2025-03-27 DIAGNOSIS — Z12.5 SCREENING FOR PROSTATE CANCER: ICD-10-CM

## 2025-03-27 DIAGNOSIS — Z79.899 HIGH RISK MEDICATION USE: ICD-10-CM

## 2025-03-27 DIAGNOSIS — D84.821 DRUG-INDUCED IMMUNODEFICIENCY: ICD-10-CM

## 2025-03-27 DIAGNOSIS — E11.9 CONTROLLED TYPE 2 DIABETES MELLITUS WITHOUT COMPLICATION, WITHOUT LONG-TERM CURRENT USE OF INSULIN: ICD-10-CM

## 2025-03-27 LAB
ALBUMIN SERPL-MCNC: 4.2 G/DL (ref 3.4–4.8)
ALBUMIN/GLOB SERPL: 1.1 RATIO (ref 1.1–2)
ALP SERPL-CCNC: 68 UNIT/L (ref 40–150)
ALT SERPL-CCNC: 26 UNIT/L (ref 0–55)
ANION GAP SERPL CALC-SCNC: 8 MEQ/L
AST SERPL-CCNC: 20 UNIT/L (ref 11–45)
BASOPHILS # BLD AUTO: 0.02 X10(3)/MCL
BASOPHILS NFR BLD AUTO: 0.4 %
BILIRUB SERPL-MCNC: 0.5 MG/DL
BUN SERPL-MCNC: 16.9 MG/DL (ref 8.4–25.7)
CALCIUM SERPL-MCNC: 9.4 MG/DL (ref 8.8–10)
CHLORIDE SERPL-SCNC: 103 MMOL/L (ref 98–107)
CHOLEST SERPL-MCNC: 172 MG/DL
CHOLEST/HDLC SERPL: 4 {RATIO} (ref 0–5)
CO2 SERPL-SCNC: 27 MMOL/L (ref 23–31)
CREAT SERPL-MCNC: 1.27 MG/DL (ref 0.72–1.25)
CREAT/UREA NIT SERPL: 13
EOSINOPHIL # BLD AUTO: 0.07 X10(3)/MCL (ref 0–0.9)
EOSINOPHIL NFR BLD AUTO: 1.4 %
ERYTHROCYTE [DISTWIDTH] IN BLOOD BY AUTOMATED COUNT: 13.2 % (ref 11.5–17)
EST. AVERAGE GLUCOSE BLD GHB EST-MCNC: 142.7 MG/DL
GFR SERPLBLD CREATININE-BSD FMLA CKD-EPI: >60 ML/MIN/1.73/M2
GLOBULIN SER-MCNC: 4 GM/DL (ref 2.4–3.5)
GLUCOSE SERPL-MCNC: 136 MG/DL (ref 82–115)
HBA1C MFR BLD: 6.6 %
HCT VFR BLD AUTO: 45.2 % (ref 42–52)
HDLC SERPL-MCNC: 44 MG/DL (ref 35–60)
HGB BLD-MCNC: 15.6 G/DL (ref 14–18)
IMM GRANULOCYTES # BLD AUTO: 0.02 X10(3)/MCL (ref 0–0.04)
IMM GRANULOCYTES NFR BLD AUTO: 0.4 %
LDLC SERPL CALC-MCNC: 79 MG/DL (ref 50–140)
LYMPHOCYTES # BLD AUTO: 1.26 X10(3)/MCL (ref 0.6–4.6)
LYMPHOCYTES NFR BLD AUTO: 25.1 %
MCH RBC QN AUTO: 31.6 PG (ref 27–31)
MCHC RBC AUTO-ENTMCNC: 34.5 G/DL (ref 33–36)
MCV RBC AUTO: 91.7 FL (ref 80–94)
MONOCYTES # BLD AUTO: 0.57 X10(3)/MCL (ref 0.1–1.3)
MONOCYTES NFR BLD AUTO: 11.4 %
NEUTROPHILS # BLD AUTO: 3.07 X10(3)/MCL (ref 2.1–9.2)
NEUTROPHILS NFR BLD AUTO: 61.3 %
NRBC BLD AUTO-RTO: 0 %
PLATELET # BLD AUTO: 225 X10(3)/MCL (ref 130–400)
PMV BLD AUTO: 9.5 FL (ref 7.4–10.4)
POTASSIUM SERPL-SCNC: 4.2 MMOL/L (ref 3.5–5.1)
PROT SERPL-MCNC: 8.2 GM/DL (ref 5.8–7.6)
PSA SERPL-MCNC: 1.49 NG/ML
RBC # BLD AUTO: 4.93 X10(6)/MCL (ref 4.7–6.1)
SODIUM SERPL-SCNC: 138 MMOL/L (ref 136–145)
TRIGL SERPL-MCNC: 246 MG/DL (ref 34–140)
TSH SERPL-ACNC: 2.83 UIU/ML (ref 0.35–4.94)
VLDLC SERPL CALC-MCNC: 49 MG/DL
WBC # BLD AUTO: 5.01 X10(3)/MCL (ref 4.5–11.5)

## 2025-03-27 PROCEDURE — 84153 ASSAY OF PSA TOTAL: CPT

## 2025-03-27 PROCEDURE — 80061 LIPID PANEL: CPT

## 2025-03-27 PROCEDURE — 83036 HEMOGLOBIN GLYCOSYLATED A1C: CPT

## 2025-03-27 PROCEDURE — 85025 COMPLETE CBC W/AUTO DIFF WBC: CPT

## 2025-03-27 PROCEDURE — 36415 COLL VENOUS BLD VENIPUNCTURE: CPT

## 2025-03-27 PROCEDURE — 80053 COMPREHEN METABOLIC PANEL: CPT

## 2025-03-27 PROCEDURE — 84443 ASSAY THYROID STIM HORMONE: CPT

## 2025-03-31 ENCOUNTER — PROCEDURE VISIT (OUTPATIENT)
Dept: NEUROLOGY | Facility: CLINIC | Age: 70
End: 2025-03-31
Payer: MEDICARE

## 2025-03-31 VITALS
HEIGHT: 70 IN | BODY MASS INDEX: 33.36 KG/M2 | DIASTOLIC BLOOD PRESSURE: 84 MMHG | WEIGHT: 233 LBS | SYSTOLIC BLOOD PRESSURE: 126 MMHG

## 2025-03-31 DIAGNOSIS — G89.29 CHRONIC MIDLINE LOW BACK PAIN WITHOUT SCIATICA: Primary | ICD-10-CM

## 2025-03-31 DIAGNOSIS — M54.50 CHRONIC MIDLINE LOW BACK PAIN WITHOUT SCIATICA: Primary | ICD-10-CM

## 2025-03-31 NOTE — PROCEDURES
ACP self referral  Chronic low back pain without sciatica  S/p LESI/RFA; no help  On xarelto for stroke  No opiates  Has seen pain mgt  Has parkinsons as well  Difficulty sitting and standing 2.2 pain    Exam  LUE tremor  Normal mental status  TTP midline SI region      Date: 33125  Time: 9am  Name of the procedure being done:   Indications:   Patient consent: yes   Risks and alternatives to the procedure were explained to the patient. Note that the patient was given the opportunity to ask questions and that the patient consented to the procedure in writing  Pertinent Lab Values: none  Type of Anesthesia used: none  Description of the procedure:     8 needle kidney mu/lawrence, L5/SI PENS 100-1000 hz  Ki3-->ki 10; 4 hz    Complications: none  Estimated blood loss: <1 mL   Disposition: Pt tolerated the procedure well

## 2025-04-10 ENCOUNTER — TELEPHONE (OUTPATIENT)
Dept: CARDIOLOGY | Facility: CLINIC | Age: 70
End: 2025-04-10
Payer: MEDICARE

## 2025-04-10 NOTE — TELEPHONE ENCOUNTER
Patient report his question is why is his pressure running low at times? Is it because of a certain medication for Parkinson's or is it because of Parkinson's disease itself?

## 2025-04-10 NOTE — TELEPHONE ENCOUNTER
----- Message from Barbara sent at 4/10/2025  7:50 AM CDT -----  Good morning,  Patient is calling wanting to know if it is his medication or parkinson's disease that is making his blood pressure drop so much.  He would like to talk to a nurse or provider about this issue.Thank you,Barbara

## 2025-04-11 NOTE — TELEPHONE ENCOUNTER
Pt advised and verbalized understanding.        View All Conversations on this Encounter  Quincy Conrad MD to Me (Selected Message)        4/10/25  2:49 PM  Patient's Parkinson's medication usually does not cause hypotension.  Some people have labile blood pressure.  Patient can check blood pressure before taking metoprolol.  If the patient has  low blood pressure (systolic less than 100), consider not taking the metoprolol at that time.  Wait until the blood pressure gets higher.       4/10/25  9:09 AM  You routed this conversation to Quincy Conrad MD  [facility]    4/10/25  9:09 AM  Note  Patient report his question is why is his pressure running low at times? Is it because of a certain medication for Parkinson's or is it because of Parkinson's disease itself?              4/10/25  9:04 AM  Melquiades Rehman Jr. contacted Me  [facility]    4/10/25  9:04 AM  Note  ----- Message from Barbara sent at 4/10/2025  7:50 AM CDT -----  Good morning,  Patient is calling wanting to know if it is his medication or parkinson's disease that is making his blood pressure drop so much.  He would like to talk to a nurse or provider about this issue.Thank you,Barbara

## 2025-04-16 ENCOUNTER — PATIENT MESSAGE (OUTPATIENT)
Dept: ADMINISTRATIVE | Facility: OTHER | Age: 70
End: 2025-04-16
Payer: MEDICARE

## 2025-04-24 ENCOUNTER — PROCEDURE VISIT (OUTPATIENT)
Dept: NEUROLOGY | Facility: CLINIC | Age: 70
End: 2025-04-24
Payer: MEDICARE

## 2025-04-24 DIAGNOSIS — G89.29 CHRONIC MIDLINE LOW BACK PAIN WITHOUT SCIATICA: Primary | ICD-10-CM

## 2025-04-24 DIAGNOSIS — M54.50 CHRONIC MIDLINE LOW BACK PAIN WITHOUT SCIATICA: Primary | ICD-10-CM

## 2025-04-24 NOTE — PROCEDURES
Date: 42425  Time: 1pm  Name of the procedure being done: acupuncture  Indications: chronic low back pain  Patient consent: yes   Risks and alternatives to the procedure were explained to the patient. Note that the patient was given the opportunity to ask questions and that the patient consented to the procedure in writing  Pertinent Lab Values: none  Type of Anesthesia used: none  Description of the procedure:     12 needle L spine PENS, 600-1000 hz  8 needle jacqueline tuo jareth ji; L spine 1-5; 4 hz  R ear; L spine, p zero, leonardo men    Complications: none  Estimated blood loss: <1 mL   Disposition: Pt tolerated the procedure well    Was pain-free x 3 hours after last tx

## 2025-05-01 ENCOUNTER — HOSPITAL ENCOUNTER (EMERGENCY)
Facility: HOSPITAL | Age: 70
Discharge: HOME | End: 2025-05-01
Attending: EMERGENCY MEDICINE
Payer: MEDICARE

## 2025-05-01 VITALS
BODY MASS INDEX: 30.8 KG/M2 | OXYGEN SATURATION: 94 % | TEMPERATURE: 98 F | RESPIRATION RATE: 16 BRPM | SYSTOLIC BLOOD PRESSURE: 155 MMHG | HEIGHT: 71 IN | DIASTOLIC BLOOD PRESSURE: 99 MMHG | WEIGHT: 220 LBS | HEART RATE: 75 BPM

## 2025-05-01 DIAGNOSIS — R41.0 DISORIENTATION: ICD-10-CM

## 2025-05-01 DIAGNOSIS — R41.82 ALTERED MENTAL STATUS, UNSPECIFIED ALTERED MENTAL STATUS TYPE: Primary | ICD-10-CM

## 2025-05-01 LAB
ACCEPTIBLE SP GR UR QL: 1.01 (ref 1–1.03)
ALBUMIN SERPL-MCNC: 4 G/DL (ref 3.4–4.8)
ALBUMIN/GLOB SERPL: 1.1 RATIO (ref 1.1–2)
ALP SERPL-CCNC: 66 UNIT/L (ref 40–150)
ALT SERPL-CCNC: 21 UNIT/L (ref 0–55)
AMPHET UR QL SCN: NEGATIVE
ANION GAP SERPL CALC-SCNC: 9 MEQ/L
AST SERPL-CCNC: 18 UNIT/L (ref 11–45)
BARBITURATE SCN PRESENT UR: NEGATIVE
BASOPHILS # BLD AUTO: 0.01 X10(3)/MCL
BASOPHILS NFR BLD AUTO: 0.2 %
BENZODIAZ UR QL SCN: NEGATIVE
BILIRUB SERPL-MCNC: 0.4 MG/DL
BUN SERPL-MCNC: 15.1 MG/DL (ref 8.4–25.7)
CALCIUM SERPL-MCNC: 8.7 MG/DL (ref 8.8–10)
CANNABINOIDS UR QL SCN: NEGATIVE
CHLORIDE SERPL-SCNC: 107 MMOL/L (ref 98–107)
CO2 SERPL-SCNC: 22 MMOL/L (ref 23–31)
COCAINE UR QL SCN: NEGATIVE
CREAT SERPL-MCNC: 1.18 MG/DL (ref 0.72–1.25)
CREAT/UREA NIT SERPL: 13
EOSINOPHIL # BLD AUTO: 0.07 X10(3)/MCL (ref 0–0.9)
EOSINOPHIL NFR BLD AUTO: 1.2 %
ERYTHROCYTE [DISTWIDTH] IN BLOOD BY AUTOMATED COUNT: 13.3 % (ref 11.5–17)
FENTANYL UR QL SCN: NEGATIVE
FLUAV AG UPPER RESP QL IA.RAPID: NOT DETECTED
FLUBV AG UPPER RESP QL IA.RAPID: NOT DETECTED
GFR SERPLBLD CREATININE-BSD FMLA CKD-EPI: >60 ML/MIN/1.73/M2
GLOBULIN SER-MCNC: 3.6 GM/DL (ref 2.4–3.5)
GLUCOSE SERPL-MCNC: 121 MG/DL (ref 82–115)
HCT VFR BLD AUTO: 44.3 % (ref 42–52)
HGB BLD-MCNC: 14.8 G/DL (ref 14–18)
IMM GRANULOCYTES # BLD AUTO: 0.01 X10(3)/MCL (ref 0–0.04)
IMM GRANULOCYTES NFR BLD AUTO: 0.2 %
LYMPHOCYTES # BLD AUTO: 1.23 X10(3)/MCL (ref 0.6–4.6)
LYMPHOCYTES NFR BLD AUTO: 21.6 %
MAGNESIUM SERPL-MCNC: 1.7 MG/DL (ref 1.6–2.6)
MCH RBC QN AUTO: 31.5 PG (ref 27–31)
MCHC RBC AUTO-ENTMCNC: 33.4 G/DL (ref 33–36)
MCV RBC AUTO: 94.3 FL (ref 80–94)
MDMA UR QL SCN: NEGATIVE
MONOCYTES # BLD AUTO: 0.85 X10(3)/MCL (ref 0.1–1.3)
MONOCYTES NFR BLD AUTO: 14.9 %
NEUTROPHILS # BLD AUTO: 3.52 X10(3)/MCL (ref 2.1–9.2)
NEUTROPHILS NFR BLD AUTO: 61.9 %
NRBC BLD AUTO-RTO: 0 %
OHS QRS DURATION: 86 MS
OHS QTC CALCULATION: 426 MS
OPIATES UR QL SCN: NEGATIVE
PCP UR QL: NEGATIVE
PH UR: 6 [PH] (ref 3–11)
PLATELET # BLD AUTO: 209 X10(3)/MCL (ref 130–400)
PMV BLD AUTO: 9.7 FL (ref 7.4–10.4)
POTASSIUM SERPL-SCNC: 3.7 MMOL/L (ref 3.5–5.1)
PROT SERPL-MCNC: 7.6 GM/DL (ref 5.8–7.6)
RBC # BLD AUTO: 4.7 X10(6)/MCL (ref 4.7–6.1)
SARS-COV-2 RNA RESP QL NAA+PROBE: NOT DETECTED
SODIUM SERPL-SCNC: 138 MMOL/L (ref 136–145)
TROPONIN I SERPL-MCNC: <0.01 NG/ML (ref 0–0.04)
WBC # BLD AUTO: 5.69 X10(3)/MCL (ref 4.5–11.5)

## 2025-05-01 PROCEDURE — 83735 ASSAY OF MAGNESIUM: CPT | Performed by: EMERGENCY MEDICINE

## 2025-05-01 PROCEDURE — 0240U COVID/FLU A&B PCR: CPT | Performed by: EMERGENCY MEDICINE

## 2025-05-01 PROCEDURE — 85025 COMPLETE CBC W/AUTO DIFF WBC: CPT | Performed by: EMERGENCY MEDICINE

## 2025-05-01 PROCEDURE — 80307 DRUG TEST PRSMV CHEM ANLYZR: CPT | Performed by: EMERGENCY MEDICINE

## 2025-05-01 PROCEDURE — 80053 COMPREHEN METABOLIC PANEL: CPT | Performed by: EMERGENCY MEDICINE

## 2025-05-01 PROCEDURE — 93005 ELECTROCARDIOGRAM TRACING: CPT

## 2025-05-01 PROCEDURE — 93010 ELECTROCARDIOGRAM REPORT: CPT | Mod: ,,, | Performed by: INTERNAL MEDICINE

## 2025-05-01 PROCEDURE — 99285 EMERGENCY DEPT VISIT HI MDM: CPT | Mod: 25

## 2025-05-01 PROCEDURE — 84484 ASSAY OF TROPONIN QUANT: CPT | Performed by: EMERGENCY MEDICINE

## 2025-05-01 NOTE — ED PROVIDER NOTES
Encounter Date: 5/1/2025    SCRIBE #1 NOTE: I, Louis Hall, am scribing for, and in the presence of,  Josesito Wade MD. I have scribed the following portions of the note - Other sections scribed: HPI, ROS, and PE.       History     Chief Complaint   Patient presents with    Altered Mental Status     Patient reports episode of confusion and right side numbness onset at 5 am that has resolved. No unilateral weakness, numbness/tingling, slurred speech, facial droop, aphasia. Denies vision changes in triage. . GCS 15. PMH stroke on Xarelto.      Melquiades Rehman Jr. is a 69 y.o. male patient with a PMHx of Afib, CVA, DM2, fatty liver, GERD, HLD, HTN, parkinson's disease, RA, and TIA who presents to the ED for evaluation of confusion and right arm numbness which onset around 0500. Pt states he was drinking coffee when the sxs began and lasted for one hour, now resolved. He states he had a similar episode around 1 year ago and was told it was anxiety. He also c/o chest discomfort and cough for 2 weeks. Associated symptoms include slurred speech. Patient denies any vision changes.       The history is provided by the patient. No  was used.     Review of patient's allergies indicates:   Allergen Reactions    Sarilumab Other (See Comments)     Other reaction(s): fainting  Jennifer     Past Medical History:   Diagnosis Date    Atrial fibrillation     Central stenosis of spinal canal     Chronic back pain greater than 3 months duration 10/14/2022    Constipation 09/30/2022    Degeneration of lumbar intervertebral disc 07/06/2022    Diastolic dysfunction 05/01/2023    Dysphonia 09/30/2022    Dystrophic nail 06/19/2023    Erectile dysfunction 09/30/2022    Fatty liver     GERD (gastroesophageal reflux disease) 09/30/2022    H/O cataract removal with insertion of prosthetic lens     History of CVA (cerebrovascular accident) 09/30/2022    TIA    History of radial keratotomy 01/01/1998    HTN  (hypertension)     Hyperlipidemia 09/30/2022    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/30/2022    Microhematuria 03/30/2023    Overgrown toenails 06/19/2023    Parkinson's disease     Peyronie disease     Polyneuropathy associated with underlying disease 06/19/2023    Rheumatoid arthritis 01/10/2022    Rheumatoid arthritis, unspecified     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    Spinal stenosis of lumbar region 09/30/2022    Type 2 diabetes mellitus without complications     Unspecified macular degeneration     Unspecified macular degeneration      Past Surgical History:   Procedure Laterality Date    BACK SURGERY Left 04/2024    CATARACT EXTRACTION W/  INTRAOCULAR LENS IMPLANT Bilateral     EPIDURAL STEROID INJECTION INTO LUMBAR SPINE      exc cyst Lt ear      EYE SURGERY      INJECTION OF ANESTHETIC AGENT AROUND MEDIAL BRANCH NERVES INNERVATING LUMBAR FACET JOINT Bilateral 11/16/2022    Procedure: Block-nerve-medial branch-lumbar;  Surgeon: Gertrude Blanco MD;  Location: [facility] OR;  Service: Pain Management;  Laterality: Bilateral;  Bilateral L4-L5...Patient is requesting to be first case    INJECTION OF ANESTHETIC AGENT AROUND MEDIAL BRANCH NERVES INNERVATING LUMBAR FACET JOINT Bilateral 11/22/2023    Procedure: Block-nerve-medial branch-lumbar;  Surgeon: Gertrude Blanco MD;  Location: [facility] OR;  Service: Pain Management;  Laterality: Bilateral;  Bilateral MBB L3-5    INJECTION OF ANESTHETIC AGENT AROUND MEDIAL BRANCH NERVES INNERVATING LUMBAR FACET JOINT Bilateral 01/24/2024    Procedure: Block-nerve-medial branch-lumbar;  Surgeon: Gertrude Blanco MD;  Location: [facility] OR;  Service: Pain Management;  Laterality: Bilateral;  Bilateral MBB L3-5    KNEE ARTHROSCOPY Right     RADIOFREQUENCY ABLATION Left 03/27/2024    Procedure: Radiofrequency Ablation;  Surgeon: Gertrude Blanco MD;  Location: [facility] OR;  Service: Pain Management;  Laterality: Left;  RFA Lt L3-L5    RADIOFREQUENCY ABLATION Right 04/25/2024    Procedure: RADIOFREQUENCY  ABLATION / Right L3 L5;  Surgeon: Gertrude Blanco MD;  Location: [facility] OR;  Service: Pain Management;  Laterality: Right;  Right RFA L3-L5    VASECTOMY  1998     Family History   Problem Relation Name Age of Onset    Heart failure Mother      Coronary artery disease Mother      Glaucoma Father      Alzheimer's disease Father      Emphysema Father      Autoimmune disease Sister      Pneumonia Sister Jenifer     Dementia Sister Jenifer      Social History[1]  Review of Systems   Constitutional:  Negative for chills, fatigue and fever.   HENT:  Negative for congestion and sore throat.    Eyes:  Negative for visual disturbance.   Respiratory:  Positive for cough. Negative for shortness of breath.    Cardiovascular:  Negative for chest pain.   Gastrointestinal:  Negative for abdominal pain, diarrhea, nausea and vomiting.   Genitourinary:  Negative for dysuria.   Musculoskeletal:  Negative for myalgias.   Skin:  Negative for rash.   Neurological:  Positive for speech difficulty and numbness (right arm). Negative for weakness and headaches.   Psychiatric/Behavioral:  Positive for confusion.    All other systems reviewed and are negative.      Physical Exam     Initial Vitals [05/01/25 0715]   BP Pulse Resp Temp SpO2   (!) 183/103 85 17 97.7 °F (36.5 °C) 97 %      MAP       --         Physical Exam    Constitutional: He appears well-developed. He is Obese .   HENT:   Head: Normocephalic and atraumatic. Mouth/Throat: Oropharynx is clear and moist.   Eyes: Pupils are equal, round, and reactive to light.   Neck: Neck supple.   Normal range of motion.  Cardiovascular:  Normal rate, regular rhythm, normal heart sounds and intact distal pulses.           Pulmonary/Chest: Breath sounds normal. No respiratory distress.   Abdominal: Abdomen is soft. Bowel sounds are normal. There is no abdominal tenderness. There is no rebound and no guarding.   Musculoskeletal:         General: No tenderness or edema. Normal range of motion.       Cervical back: Normal range of motion and neck supple.      Comments: Healing, small scattered abrasion to left arm.      Neurological: He is alert and oriented to person, place, and time. He has normal strength. No sensory deficit. GCS score is 15. GCS eye subscore is 4. GCS verbal subscore is 5. GCS motor subscore is 6.   LUE tremors.    Skin: Skin is warm and dry. Capillary refill takes less than 2 seconds.   Psychiatric: His mood appears anxious.         ED Course   Procedures  Labs Reviewed   COMPREHENSIVE METABOLIC PANEL - Abnormal       Result Value    Sodium 138      Potassium 3.7      Chloride 107      CO2 22 (*)     Glucose 121 (*)     Blood Urea Nitrogen 15.1      Creatinine 1.18      Calcium 8.7 (*)     Protein Total 7.6      Albumin 4.0      Globulin 3.6 (*)     Albumin/Globulin Ratio 1.1      Bilirubin Total 0.4      ALP 66      ALT 21      AST 18      eGFR >60      Anion Gap 9.0      BUN/Creatinine Ratio 13     CBC WITH DIFFERENTIAL - Abnormal    WBC 5.69      RBC 4.70      Hgb 14.8      Hct 44.3      MCV 94.3 (*)     MCH 31.5 (*)     MCHC 33.4      RDW 13.3      Platelet 209      MPV 9.7      Neut % 61.9      Lymph % 21.6      Mono % 14.9      Eos % 1.2      Basophil % 0.2      Imm Grans % 0.2      Neut # 3.52      Lymph # 1.23      Mono # 0.85      Eos # 0.07      Baso # 0.01      Imm Gran # 0.01      NRBC% 0.0     COVID/FLU A&B PCR - Normal    Influenza A PCR Not Detected      Influenza B PCR Not Detected      SARS-CoV-2 PCR Not Detected      Narrative:     The Xpert Xpress SARS-CoV-2/FLU/RSV plus is a rapid, multiplexed real-time PCR test intended for the simultaneous qualitative detection and differentiation of SARS-CoV-2, Influenza A, Influenza B, and respiratory syncytial virus (RSV) viral RNA in either nasopharyngeal swab or nasal swab specimens.         MAGNESIUM - Normal    Magnesium Level 1.70     TROPONIN I - Normal    Troponin-I <0.010     DRUG SCREEN, URINE (BEAKER) - Normal     Amphetamines, Urine Negative      Barbiturates, Urine Negative      Benzodiazepine, Urine Negative      Cannabinoids, Urine Negative      Cocaine, Urine Negative      Fentanyl, Urine Negative      MDMA, Urine Negative      Opiates, Urine Negative      Phencyclidine, Urine Negative      pH, Urine 6.0      Specific Gravity, Urine Auto 1.010      Narrative:     Cut off concentrations:    Amphetamines - 1000 ng/ml  Barbiturates - 200 ng/ml  Benzodiazepine - 200 ng/ml  Cannabinoids (THC) - 50 ng/ml  Cocaine - 300 ng/ml  Fentanyl - 1.0 ng/ml  MDMA - 500 ng/ml  Opiates - 300 ng/ml   Phencyclidine (PCP) - 25 ng/ml    Specimen submitted for drug analysis and tested for pH and specific gravity in order to evaluate sample integrity. Suspect tampering if specific gravity is <1.003 and/or pH is not within the range of 4.5 - 8.0  False negatives may result form substances such as bleach added to urine.  False positives may result for the presence of a substance with similar chemical structure to the drug or its metabolite.    This test provides only a PRELIMINARY analytical test result. A more specific alternate chemical method must be used in order to obtain a confirmed analytical result. Gas chromatography/mass spectrometry (GC/MS) is the preferred confirmatory method. Other chemical confirmation methods are available. Clinical consideration and professional judgement should be applied to any drug of abuse test result, particularly when preliminary positive results are used.    Positive results will be confirmed only at the physicians request. Unconfirmed screening results are to be used only for medical purposes (treatment).        CBC W/ AUTO DIFFERENTIAL    Narrative:     The following orders were created for panel order CBC auto differential.  Procedure                               Abnormality         Status                     ---------                               -----------         ------                     CBC with  Differential[[id number]]       Abnormal            Final result                 Please view results for these tests on the individual orders.     EKG Readings: (Independently Interpreted)   Initial Reading: No STEMI. Rhythm: Normal Sinus Rhythm. Heart Rate: 85. Ectopy: No Ectopy. Conduction: Normal. ST Segments: Normal ST Segments. T Waves: Normal. Axis: Normal.   0731.     ECG Results              EKG 12-lead (Final result)        Collection Time Result Time QRS Duration OHS QTC Calculation    05/01/25 07:31:12 05/01/25 10:52:16 86 426                     Final result by Interface, [facility] (05/01/25 10:52:26)                   Narrative:    Test Reason : R41.82,    Vent. Rate :  85 BPM     Atrial Rate :  85 BPM     P-R Int : 152 ms          QRS Dur :  86 ms      QT Int : 358 ms       P-R-T Axes :  25 -47  26 degrees    QTcB Int : 426 ms    Normal sinus rhythm  Left axis deviation  Possible Anterior infarct ,age undetermined  Abnormal ECG  When compared with ECG of 10-Marquis-2025 08:34,  No significant change was found  Confirmed by Saad Adam ([id number]) on 5/1/2025 10:52:15 AM    Referred By: AAAREFERRAL SELF           Confirmed By: Saad Adam                                  Imaging Results              X-Ray Chest AP Portable (Final result)  Result time 05/01/25 08:02:20      Final result by Brandon Storm MD (05/01/25 08:02:20)                   Impression:      No acute findings.      Electronically signed by: Brandon Storm  Date:    05/01/2025  Time:    08:02               Narrative:    EXAMINATION:  XR CHEST AP PORTABLE    CLINICAL HISTORY:  Chest pain;    COMPARISON:  6 December 2024    FINDINGS:  Frontal view of the chest was obtained. Heart and mediastinum unchanged.  Lungs are grossly clear.  No pneumothorax.                                       CT Head Without Contrast (Final result)  Result time 05/01/25 08:03:51      Final result by Neal Ibarra MD (05/01/25 08:03:51)                    Impression:      1.  No acute intracranial findings identified.    2.  Chronic microangiopathic ischemia and atrophy.      Electronically signed by: Neal Jeffersahim  Date:    05/01/2025  Time:    08:03               Narrative:    EXAMINATION:  CT HEAD WITHOUT CONTRAST    CLINICAL HISTORY:  Neuro deficit, acute, stroke suspected;    TECHNIQUE:  Sequential axial images were performed of the brain without contrast.    Dose product length of 1056 mGycm. Automated exposure control was utilized to minimize radiation dose.    COMPARISON:  CT brain and MRI brain October 26, 2023..    FINDINGS:  There is no intracranial mass effect, midline shift, hydrocephalus or hemorrhage. There is no sulcal effacement or low attenuation changes to suggest recent large vessel territory infarction.  There is pronounced cerebral leukoencephalopathy likely caused by chronic microangiopathic ischemia with extensive periventricular and deep white matter patchy hypodensities.  Ventricular system and sulcal markings prominence is consistent with mild atrophy.  There is no acute extra axial fluid collection.    Left maxillary sinus small retention cyst.  Visualized paranasal sinuses are clear without mucosal thickening, polypoidal abnormality or air-fluid levels. Mastoid air cells aeration is optimal.                                       Medications - No data to display  Medical Decision Making  The differential diagnosis includes, but is not limited to, TIA, CVA, metabolic abnormality, ACS, and COVID.     Amount and/or Complexity of Data Reviewed  Labs: ordered. Decision-making details documented in ED Course.  Radiology: ordered and independent interpretation performed.  ECG/medicine tests: ordered and independent interpretation performed.     Details: 0731. No STEMI. Rhythm: Normal Sinus Rhythm. Heart Rate: 85. Ectopy: No Ectopy. Conduction: Normal. ST Segments: Normal ST Segments. T Waves: Normal. Axis: Normal.       Risk  Decision regarding  hospitalization.            Scribe Attestation:   Scribe #1: I performed the above scribed service and the documentation accurately describes the services I performed. I attest to the accuracy of the note.    Attending Attestation:           Physician Attestation for Scribe:  Physician Attestation Statement for Scribe #1: I, Josesito Wade MD, reviewed documentation, as scribed by Louis Hall in my presence, and it is both accurate and complete.             ED Course as of 05/01/25 1157   Thu May 01, 2025   1153 Patient's workup unremarkable, specifically CMP normal, CBC normal, troponin normal, urine drug screen normal, viral swabs normal CT head without any acute findings as well as chest x-ray. [MP]   1153 Discussed the findings with the patient, he remains without any further complaints in the ED. I discussed with the patient, they are still has a concern over possible TIAs, and recommend at least overnight admission, MRI and neuro checks.  Patient states he has animals at home, he has had various neurologic/TIA type symptoms in the past like this that have resolved, the tests have not showed an answer and he wishes to be discharged and follow up with his PCP for outpatient testing.  After this shared medical decision-making, patient will be discharged [MP]      ED Course User Index  [MP] Josesito Wade MD                           Clinical Impression:  Final diagnoses:  [R41.0] Disorientation          ED Disposition Condition    Discharge Stable          ED Prescriptions    None       Follow-up Information       Follow up With Specialties Details Why Contact Info    [facility] In 1 week  [address] [facility] [address]  [phone]      [facility] - Emergency Dept Emergency Medicine Go to  If symptoms worsen, As needed [address] [facility] [id number] [phone]               [1]   Social History  Tobacco Use    Smoking status:  Former     Current packs/day: 0.00     Average packs/day: 1 pack/day for 15.0 years (15.0 ttl pk-yrs)     Types: Cigarettes     Start date: 1972     Quit date: 1987     Years since quittin.3     Passive exposure: Past    Smokeless tobacco: Never   Substance Use Topics    Alcohol use: Never     Comment: occasional    Drug use: Yes     Types: Marijuana        Josesito Wade MD  25 [id number]

## 2025-05-01 NOTE — DISCHARGE INSTRUCTIONS
Return to the emergency department if you change your mind about being admitted for further testing and observation, or any worsening symptoms or concerns.

## 2025-05-06 ENCOUNTER — OFFICE VISIT (OUTPATIENT)
Dept: CARDIOLOGY | Facility: CLINIC | Age: 70
End: 2025-05-06
Payer: MEDICARE

## 2025-05-06 VITALS
DIASTOLIC BLOOD PRESSURE: 98 MMHG | WEIGHT: 232.19 LBS | OXYGEN SATURATION: 97 % | SYSTOLIC BLOOD PRESSURE: 168 MMHG | HEART RATE: 79 BPM | RESPIRATION RATE: 20 BRPM | TEMPERATURE: 98 F | BODY MASS INDEX: 32.51 KG/M2 | HEIGHT: 71 IN

## 2025-05-06 DIAGNOSIS — Z86.73 HISTORY OF CVA (CEREBROVASCULAR ACCIDENT): ICD-10-CM

## 2025-05-06 DIAGNOSIS — I48.91 ATRIAL FIBRILLATION AND FLUTTER: ICD-10-CM

## 2025-05-06 DIAGNOSIS — E78.2 MIXED HYPERLIPIDEMIA: Primary | ICD-10-CM

## 2025-05-06 DIAGNOSIS — I10 HYPERTENSION, UNSPECIFIED TYPE: ICD-10-CM

## 2025-05-06 DIAGNOSIS — I48.92 ATRIAL FIBRILLATION AND FLUTTER: ICD-10-CM

## 2025-05-06 PROCEDURE — 99215 OFFICE O/P EST HI 40 MIN: CPT | Mod: PBBFAC | Performed by: INTERNAL MEDICINE

## 2025-05-06 RX ORDER — ALOGLIPTIN BENZOATE AND PIOGLITAZONE HYDROCHLORIDE 25; 30 MG/1; MG/1
1 TABLET, FILM COATED ORAL DAILY
COMMUNITY
Start: 2025-04-27

## 2025-05-06 NOTE — PATIENT INSTRUCTIONS
REMINDERS:    You are scheduled for a nurse visit / blood pressure check on May 26th (appointment below). Take your medications at least 1-2 hours before the nurse visit and bring blood pressure log, all medications and your home monitor to the visit as well.

## 2025-05-06 NOTE — PROGRESS NOTES
"      [facility]   Cardiology Clinic - Follow Up     Cardiology Attending: Dr. Conrad  Date of Visit: 5/6/2025    Subjective:      Melquiades Rehman Jr. is a 69 y.o. male with AF, HTN, HLD, Parkinsons, and reported history of CVA who presents for follow up.     The patient is without angina or anginal equivalents.     The patient denies congestive symptoms of heart failure.     The patient reports he only takes Lisinopril 10 mg approximately 5 times per month because his blood pressure machine at home records his readings in the 80/60s but when he goes to the hospital or clinic he is hypertensive. On review of outpatient blood pressure recordings he is persistently hypertensive.     The patient denies bleeding complications from use of OAC. The patient denies pre-syncope or syncope.       Medications:   Current Medications[1]    I have reviewed and updated the patient's medications, allergies, past medical history, surgical history, social history and family history as needed.    Review of Systems:     The patient denies headaches, changes in vision, nausea, emesis, fevers, chills, chest pain, palpitations, dyspnea, abdominal pain, or changes in urinary or bowel habits.     Objective:     Wt Readings from Last 3 Encounters:   05/06/25 105.3 kg (232 lb 3.2 oz)   05/01/25 99.8 kg (220 lb)   03/31/25 105.7 kg (233 lb)     Temp Readings from Last 3 Encounters:   05/06/25 98.1 °F (36.7 °C) (Oral)   05/01/25 97.7 °F (36.5 °C) (Oral)   03/07/25 97.9 °F (36.6 °C)     BP Readings from Last 3 Encounters:   05/06/25 (!) 152/94   05/01/25 (!) 155/99   03/31/25 126/84     Pulse Readings from Last 3 Encounters:   05/06/25 79   05/01/25 75   03/07/25 80       Vitals:    05/06/25 1415   BP: (!) 152/94   BP Location: Left arm   Patient Position: Sitting   Pulse: 79   Resp: 20   Temp: 98.1 °F (36.7 °C)   TempSrc: Oral   SpO2: 97%   Weight: 105.3 kg (232 lb 3.2 oz)   Height: 5' 11" (1.803 m)     Body mass index is 32.39 " kg/m².    Physical Exam  Vitals reviewed.   Constitutional:       General: He is not in acute distress.     Appearance: Normal appearance. He is obese. He is not ill-appearing.   HENT:      Head: Normocephalic and atraumatic.      Right Ear: External ear normal.      Left Ear: External ear normal.      Nose: Nose normal.      Mouth/Throat:      Mouth: Mucous membranes are moist.   Eyes:      Conjunctiva/sclera: Conjunctivae normal.   Cardiovascular:      Rate and Rhythm: Normal rate and regular rhythm.      Pulses: Normal pulses.      Heart sounds: Normal heart sounds. No murmur heard.  Pulmonary:      Effort: Pulmonary effort is normal. No respiratory distress.      Breath sounds: Normal breath sounds. No stridor. No wheezing, rhonchi or rales.   Chest:      Chest wall: No tenderness.   Abdominal:      General: Abdomen is flat. Bowel sounds are normal. There is no distension.      Palpations: Abdomen is soft.   Musculoskeletal:      Cervical back: Neck supple.      Right lower leg: No edema.      Left lower leg: No edema.      Comments: LUE tremor.    Skin:     General: Skin is warm and dry.      Capillary Refill: Capillary refill takes less than 2 seconds.   Neurological:      Mental Status: He is alert and oriented to person, place, and time.   Psychiatric:         Mood and Affect: Mood normal.         Behavior: Behavior normal.        Labs:   I have reviewed the following labs below:      Lab Results   Component Value Date    WBC 5.69 05/01/2025    HGB 14.8 05/01/2025    HCT 44.3 05/01/2025     05/01/2025    MCV 94.3 (H) 05/01/2025    RDW 13.3 05/01/2025     Lab Results   Component Value Date     05/01/2025    K 3.7 05/01/2025     05/01/2025    CO2 22 (L) 05/01/2025    BUN 15.1 05/01/2025     (H) 05/01/2025    CALCIUM 8.7 (L) 05/01/2025    MG 1.70 05/01/2025    PHOS 3.3 12/04/2020     Lab Results   Component Value Date    PROT 7.6 05/01/2025    ALBUMIN 4.0 05/01/2025    BILITOT 0.4  05/01/2025    AST 18 05/01/2025    ALKPHOS 66 05/01/2025    ALT 21 05/01/2025     Cholesterol Total   Date Value Ref Range Status   03/27/2025 172 <=200 mg/dL Final     HDL Cholesterol   Date Value Ref Range Status   03/27/2025 44 35 - 60 mg/dL Final     LDL Cholesterol   Date Value Ref Range Status   03/27/2025 79.00 50.00 - 140.00 mg/dL Final     Comment:     LDL calculated using the Friedewald equation.     Triglyceride   Date Value Ref Range Status   03/27/2025 246 (H) 34 - 140 mg/dL Final     Lab Results   Component Value Date    HGBA1C 6.6 03/27/2025    HGBA1C 6.5 10/18/2024    HGBA1C 6.3 04/09/2024    CREATININE 1.18 05/01/2025     Lab Results   Component Value Date    TSH 2.831 03/27/2025     Lab Results   Component Value Date    IRON 128 02/08/2022    TIBC 382 02/08/2022    FERRITIN 816.80 (H) 12/09/2020    FCESKSVN20 228 12/05/2023    FOLATE 7.4 12/05/2023     Lab Results   Component Value Date    INR 1.1 10/26/2023    PROTIME 14.2 10/26/2023    APTT 28.8 10/26/2023      Lab Results   Component Value Date    TROPONINI <0.010 05/01/2025    TROPONINI <0.010 12/08/2020    TROPONINI <0.010 12/07/2020    TROPONINI <0.010 12/07/2020    TROPONINI <0.010 12/04/2020     Cardiovascular Studies:   I have reviewed the following studies below:      TTE:   Echocardiography Findings    Left Ventricle There is normal systolic function with a visually estimated ejection fraction of 60 - 65%. There is normal diastolic function.   Right Ventricle Normal right ventricular cavity size. Systolic function is normal.   Left Atrium Normal left atrial size. Agitated saline study of the atrial septum is negative after vasalva maneuver, suggesting absence of intracardiac shunt at the atrial level.   Right Atrium Normal right atrial size.   Aortic Valve The aortic valve is a trileaflet valve. There is normal leaflet mobility. Aortic valve peak velocity is 1.07 m/s. Mean gradient is 2 mmHg.   Mitral Valve The mitral valve is  structurally normal. The mean pressure gradient across the mitral valve is 1 mmHg at a heart rate of  bpm. There is trace regurgitation.   Tricuspid Valve The tricuspid valve is structurally normal. There is normal leaflet mobility. There is trace regurgitation.   Pulmonic Valve The pulmonic valve is structurally normal. There is trace regurgitation.   IVC/SVC Normal venous pressure at 3 mmHg.   Pericardium and Other Findings There is no pericardial effusion.       LHC/Cors:  None    Assessment/Plan:   Melquiades Rehman Jr. is a 69 y.o. male with AF, HTN, HLD, Parkinsons, and reported history of CVA who presents for follow up.     Plan:  PAF  -Continue Toprol XL 25 mg daily for rate control.   -Continue Xarelto 20 mg daily for CVA risk reduction in the setting of elevated CV2 score.     HTN  -Continue Lisinopril 10 mg daily. Nurse visit with blood pressure log and bring blood pressure cuff for calibration.   -Has been persistently hypertensive on outpatient recording in chart.   -If he begins having documented objective hypotension consider autonomic dysfunction in the setting of Parkinson's disease or amyloid evaluation (less likely but with spinal stenosis).     HLD  -Crestor 20 mg daily.     History of CVA  -Antiplatelet therapy and statin.     Return to clinic in 6 months. Nurse visit in 2-4 weeks. Blood pressure log. Calibrate BP cuff.     Clint Del Cid  \[facility] [place] [facility]\"" Cardiology Chief Fellow, PGY-6  05/06/2025 2:19 PM  \[facility]\"" Health          [1]   Current Outpatient Medications   Medication Sig Dispense Refill    alogliptin-pioglitazone 25-30 mg Tab Take 1 tablet by mouth Daily.      AUSTEDO 6 mg Tab Take 1 tablet by mouth 2 (two) times daily.      carbidopa-levodopa  mg (SINEMET)  mg per tablet Take 2 tablets by mouth 2 (two) times a day.      colchicine (COLCRYS) 0.6 mg tablet TAKE 1 TABLET BY MOUTH TWICE  DAILY 180 tablet 3    lisinopriL 10 MG tablet Take 1 tablet (10 mg total) by mouth once daily. (Patient  taking differently: Take 10 mg by mouth as needed.) 90 tablet 3    metoprolol succinate (TOPROL-XL) 25 MG 24 hr tablet Take 1 tablet (25 mg total) by mouth once daily. 90 tablet 3    pantoprazole (PROTONIX) 40 MG tablet Take 1 tablet (40 mg total) by mouth once daily. 90 tablet 1    rosuvastatin (CRESTOR) 20 MG tablet Take 1 tablet (20 mg total) by mouth once daily. 90 tablet 3    selegiline HCl (ELDEPRYL) 5 mg tablet Take 5 mg by mouth 2 (two) times daily with meals.      tiZANidine (ZANAFLEX) 4 MG tablet Take 4 mg by mouth once daily.      tofacitinib (XELJANZ XR) 11 mg Tb24 Take 1 tablet by mouth once daily. 30 tablet 2    XARELTO 20 mg Tab Take 1 tablet (20 mg total) by mouth once daily. 90 tablet 2    ACCU-CHEK GUIDE GLUCOSE METER [facility]       alcohol swabs PadM Apply 1 each topically once daily. 100 each 11    ammonium lactate 12 % Crea Apply 1 application  topically 2 (two) times daily.      blood sugar diagnostic (ACCU-CHEK GUIDE TEST STRIPS) Strp 1 strip by skin prick route Daily. 100 strip 11    blood-glucose meter kit To check BG two times daily, to use with insurance preferred meter 1 each 0    lancets (ACCU-CHEK SOFTCLIX LANCETS) Misc 1 each by Misc.(Non-Drug; Combo Route) route Daily. 100 each 11    polyethylene glycol (MOVIPREP) 100-7.5-2.691 gram solution Take 2,000 mLs by mouth As instructed (take as directed per instructions provided by GI clinic). (Patient not taking: Reported on 5/6/2025) 1 kit 0    predniSONE (DELTASONE) 5 MG tablet Take 2 tab po qd x 3 days then take 1 tab po qd x 2 days prn RA flare. (Patient not taking: Reported on 5/6/2025) 30 tablet 0    sodium chloride 5% (BHUMIKA 128) 5 % ophthalmic solution Place 1 drop into both eyes 3 (three) times daily. 30 mL 2     Current Facility-Administered Medications   Medication Dose Route Frequency Provider Last Rate Last Admin    influenza (adjuvanted) (Fluad) 45 mcg/0.5 mL IM vaccine (> or = 66 yo) 0.5 mL  0.5 mL Intramuscular 1 time in  Clinic/HOD         pneumoc 20-jocelyn conj-dip cr(PF) (PREVNAR-20 (PF)) injection Syrg 0.5 mL  0.5 mL Intramuscular 1 time in Clinic/HOD

## 2025-05-07 ENCOUNTER — TELEPHONE (OUTPATIENT)
Dept: CARDIOLOGY | Facility: CLINIC | Age: 70
End: 2025-05-07
Payer: MEDICARE

## 2025-05-07 ENCOUNTER — HOSPITAL ENCOUNTER (EMERGENCY)
Facility: HOSPITAL | Age: 70
Discharge: HOME | End: 2025-05-07
Attending: FAMILY MEDICINE
Payer: MEDICARE

## 2025-05-07 VITALS
HEIGHT: 71 IN | OXYGEN SATURATION: 96 % | WEIGHT: 230 LBS | HEART RATE: 58 BPM | SYSTOLIC BLOOD PRESSURE: 144 MMHG | TEMPERATURE: 98 F | RESPIRATION RATE: 16 BRPM | BODY MASS INDEX: 32.2 KG/M2 | DIASTOLIC BLOOD PRESSURE: 85 MMHG

## 2025-05-07 DIAGNOSIS — I95.9 LOW BP: ICD-10-CM

## 2025-05-07 DIAGNOSIS — R42 DIZZINESS: Primary | ICD-10-CM

## 2025-05-07 DIAGNOSIS — R53.1 WEAKNESS: ICD-10-CM

## 2025-05-07 LAB
ANION GAP SERPL CALC-SCNC: 11 MEQ/L
BASOPHILS # BLD AUTO: 0.02 X10(3)/MCL
BASOPHILS NFR BLD AUTO: 0.4 %
BUN SERPL-MCNC: 16.2 MG/DL (ref 8.4–25.7)
CALCIUM SERPL-MCNC: 8.8 MG/DL (ref 8.8–10)
CHLORIDE SERPL-SCNC: 103 MMOL/L (ref 98–107)
CO2 SERPL-SCNC: 25 MMOL/L (ref 23–31)
CREAT SERPL-MCNC: 1.29 MG/DL (ref 0.72–1.25)
CREAT/UREA NIT SERPL: 13
EOSINOPHIL # BLD AUTO: 0.05 X10(3)/MCL (ref 0–0.9)
EOSINOPHIL NFR BLD AUTO: 1.1 %
ERYTHROCYTE [DISTWIDTH] IN BLOOD BY AUTOMATED COUNT: 13 % (ref 11.5–17)
GFR SERPLBLD CREATININE-BSD FMLA CKD-EPI: >60 ML/MIN/1.73/M2
GLUCOSE SERPL-MCNC: 132 MG/DL (ref 82–115)
HCT VFR BLD AUTO: 40.9 % (ref 42–52)
HGB BLD-MCNC: 14.3 G/DL (ref 14–18)
HOLD SPECIMEN: NORMAL
IMM GRANULOCYTES # BLD AUTO: 0.01 X10(3)/MCL (ref 0–0.04)
IMM GRANULOCYTES NFR BLD AUTO: 0.2 %
LYMPHOCYTES # BLD AUTO: 1.81 X10(3)/MCL (ref 0.6–4.6)
LYMPHOCYTES NFR BLD AUTO: 38.6 %
MAGNESIUM SERPL-MCNC: 1.7 MG/DL (ref 1.6–2.6)
MCH RBC QN AUTO: 32.6 PG (ref 27–31)
MCHC RBC AUTO-ENTMCNC: 35 G/DL (ref 33–36)
MCV RBC AUTO: 93.4 FL (ref 80–94)
MONOCYTES # BLD AUTO: 0.73 X10(3)/MCL (ref 0.1–1.3)
MONOCYTES NFR BLD AUTO: 15.6 %
NEUTROPHILS # BLD AUTO: 2.07 X10(3)/MCL (ref 2.1–9.2)
NEUTROPHILS NFR BLD AUTO: 44.1 %
NRBC BLD AUTO-RTO: 0 %
PLATELET # BLD AUTO: 202 X10(3)/MCL (ref 130–400)
PMV BLD AUTO: 9.7 FL (ref 7.4–10.4)
POTASSIUM SERPL-SCNC: 3.7 MMOL/L (ref 3.5–5.1)
RBC # BLD AUTO: 4.38 X10(6)/MCL (ref 4.7–6.1)
SODIUM SERPL-SCNC: 139 MMOL/L (ref 136–145)
TROPONIN I SERPL-MCNC: <0.01 NG/ML (ref 0–0.04)
WBC # BLD AUTO: 4.69 X10(3)/MCL (ref 4.5–11.5)

## 2025-05-07 PROCEDURE — 93005 ELECTROCARDIOGRAM TRACING: CPT

## 2025-05-07 PROCEDURE — 84484 ASSAY OF TROPONIN QUANT: CPT | Performed by: FAMILY MEDICINE

## 2025-05-07 PROCEDURE — 99285 EMERGENCY DEPT VISIT HI MDM: CPT | Mod: 25

## 2025-05-07 PROCEDURE — 80048 BASIC METABOLIC PNL TOTAL CA: CPT | Performed by: FAMILY MEDICINE

## 2025-05-07 PROCEDURE — 85025 COMPLETE CBC W/AUTO DIFF WBC: CPT | Performed by: FAMILY MEDICINE

## 2025-05-07 PROCEDURE — 83735 ASSAY OF MAGNESIUM: CPT | Performed by: FAMILY MEDICINE

## 2025-05-07 NOTE — PROGRESS NOTES
Patient ID: [id number]     Chief Complaint: Seropositive rheumatoid arthritis of multiple sites (Patient states he is doing well. )      HPI:     Melquiades Rehman Jr. is a 68 y.o. male here today for follow-up of Rheumatoid Arthritis.    Presents today for follow up of RA, CCP (>250) and RF. He recalls his symptoms began 1994 w/ mainly elbow problems. But over a year, his symptoms worsened and involved hands. He was referred to Dr. Beck who started Enbrel 2-3 yrs but developed secondary failure. He then changed to Humira, which he took for 15 yrs but lost efficacy a few yrs ago. He briefly tried Kevzara but had poor rxn. He was then changed to Xeljanz, which worked wonderfully. However, he lost his insurance and started seeing [facility], NP April. He reports that she didn't want to start Xeljanz over concerns of blood clots. Instead, she started Orencia, which didn't work at all, however he reports never taken. He had one bottle left of Xeljanz and restarted it. It has worked great so it was resumed. He did have +Hep C ab but saw GI (Dr. Toledo) - had US and repeat testing for Hep C was negative. Told he may have fatty liver dz.    Chronic back pain and he has DJD and spinal stenosis, no sciatica symptoms. He has seen Dr Montero once, Neurosurgery and continues to follow with Dr Blanco, pain management. He has done PT, traction has helped in the past but no longer doing any therapy. Nerve block and VALERIE did not help. Pain worse with bending and better with sitting and lying down.     RA has been in remission, denies red, warm and swollen joints. No flares. May be once in a while he takes Prednisone- has not had in quit some time, he tried to stop meds in August 2023 as he had been asymptomatic however he resumed after 1 month as he reports he had the start of a flare and has remained on since. He is taking Xeljanz 11 mg daily, doing well. He has A fib and on AC. He sees [facility] here at [facility]. CHF was on his problem  list, but per patient he does not have HF and had tests done for that and the diagnosis was taken off the list.      July 20, 2024: Here today for follow-up. Currently taking Xeljanz 11 mg daily.  Tolerating medications well without any issues. Denies any red/warm/swollen joints. Morning stiffness lasting 10 minutes then resolves. Called for Prednisone refill last week, reports was almost out, just needed refills to have on hand. Overall he feels Xeljanz has continue to work well. He just had a stem cell infusion 8/1/2024- clinical trial, for his Parkinsons. So far he states he has noticed a few improvement in overall symptoms- he did have a fever right after the infusion but reports resolved quickly, some shoulder pain a few days after - did take Prednisone 5 mg x1 and helped then resolved and no issues since. Overall he continues to do well with current regimen.     November 2024: Here today for follow-up visit. Currently taking Xeljanz 11 mg daily.  Tolerating medications well without any issues. Denies any red/warm/swollen joints. Morning stiffness lasting 10 minutes then resolves. Having more issues with his back.  Still following with Pain Management for VALERIE. Denies numbness and tingling in lower extremities. Still in clinical trials for his Parkinsons- has 2 more infusions left. Overall joint wise he reports he has been doing really well.     May 2025: Here today for follow up. Currently taking Xeljanz 11 mg daily.  Tolerating medications well without any issues. Denies any red/warm/swollen joints. Morning stiffness lasting 10 minutes then resolves. He did have an ER visit yesterday for dizziness and low blood pressure- seen by Cardiology recently as well- unsure if his machine was not accurate- but today also low pressure- did take Lisinopril this am - he is keeping a blood pressure log for Cardiology and will plan to follow up if continues to have low readings- today he reports feeling fine, no dizziness.  "  He also had an  ER visit for disorientation, CT Head ok- no issues since- follows with Neurology and states is aware. He is doing acupuncture with Dr. Alvarez right now for his back and report is helping.     Denies any recent illness or hospitalizations since last visit.     Dr. Barrios- Neurology  [facility]  -Cardiology   Dr. Blanco- Pain Management   Dr. Alvarez- Neurology- acupuncture     PMH: AFib on Xarelto, HTN, Inc lipids, Parkinsons, Peyronie's dz, T2DM, fatty liver.  He has Parkinson's, he sees Dr Barrios. Symptoms are better with Sinemet. He also takes medication for movement disorders.  H/o CVA several years ago- did follow with Neurology until dx with Parkinson and now continues to follow with Dr. Barrios. No residual weakness from stroke.    Denies history of fevers, rashes, photosensitivity, oral or nasal ulcers, h/o MI, seizures, h/o PE or DVT, Raynaud's phenomenon, uveitis, malignancies.   Family history of autoimmune disease: Sister but unsure what "autoimmune" condition she had- reports "attacked her lungs and killed her".  Smoking: Quit smoking - smoked for roughly 15 years (2 ppd)  Social: 3-4 beers qow w/ playing music- denies use at this time. No TBO but marijuana occ- denies use at this time    Social History     Tobacco Use   Smoking Status Former    Current packs/day: 0.00    Average packs/day: 1 pack/day for 15.0 years (15.0 ttl pk-yrs)    Types: Cigarettes    Start date: 1972    Quit date: 1987    Years since quittin.2    Passive exposure: Past   Smokeless Tobacco Never          ----------------------------  Atrial fibrillation  Central stenosis of spinal canal  Chronic back pain greater than 3 months duration  Constipation  Degeneration of lumbar intervertebral disc  Diastolic dysfunction  Dysphonia  Dystrophic nail  Erectile dysfunction  Fatty liver  GERD (gastroesophageal reflux disease)  H/O cataract removal with insertion of prosthetic lens  History of CVA " (cerebrovascular accident)  History of radial keratotomy  HTN (hypertension)  Hyperlipidemia  Induratio penis plastica  Microhematuria  Overgrown toenails  Parkinson's disease  Peyronie disease  Polyneuropathy associated with underlying disease  Rheumatoid arthritis  Rheumatoid arthritis, unspecified  Sixth nerve palsy  Spinal stenosis of lumbar region  Type 2 diabetes mellitus without complications  Unspecified macular degeneration  Unspecified macular degeneration     Past Surgical History:   Procedure Laterality Date    BACK SURGERY Left 04/2024    CATARACT EXTRACTION W/  INTRAOCULAR LENS IMPLANT Bilateral     EPIDURAL STEROID INJECTION INTO LUMBAR SPINE      exc cyst Lt ear      INJECTION OF ANESTHETIC AGENT AROUND MEDIAL BRANCH NERVES INNERVATING LUMBAR FACET JOINT Bilateral 11/16/2022    Procedure: Block-nerve-medial branch-lumbar;  Surgeon: Gertrude Blanco MD;  Location: [facility] OR;  Service: Pain Management;  Laterality: Bilateral;  Bilateral L4-L5...Patient is requesting to be first case    INJECTION OF ANESTHETIC AGENT AROUND MEDIAL BRANCH NERVES INNERVATING LUMBAR FACET JOINT Bilateral 11/22/2023    Procedure: Block-nerve-medial branch-lumbar;  Surgeon: Gertrude Blanco MD;  Location: [facility] OR;  Service: Pain Management;  Laterality: Bilateral;  Bilateral MBB L3-5    INJECTION OF ANESTHETIC AGENT AROUND MEDIAL BRANCH NERVES INNERVATING LUMBAR FACET JOINT Bilateral 01/24/2024    Procedure: Block-nerve-medial branch-lumbar;  Surgeon: Gertrude Blanco MD;  Location: [facility] OR;  Service: Pain Management;  Laterality: Bilateral;  Bilateral MBB L3-5    KNEE ARTHROSCOPY Right     RADIOFREQUENCY ABLATION Left 03/27/2024    Procedure: Radiofrequency Ablation;  Surgeon: Gertrude Blanco MD;  Location: [facility] OR;  Service: Pain Management;  Laterality: Left;  RFA Lt L3-L5    VASECTOMY  1998       Review of patient's allergies indicates:   Allergen Reactions    Sarilumab Other (See Comments)     Other reaction(s):  fainting  Jennifer       Outpatient Medications Marked as Taking for the 4/9/24 encounter (Office Visit) with Valeria Donovan MD   Medication Sig Dispense Refill    alogliptin-pioglitazone (OSENI) 25-30 mg Tab Take 1 tablet by mouth once daily. 90 tablet 0    ammonium lactate 12 % Crea Apply 1 application  topically 2 (two) times daily.      blood sugar diagnostic (ACCU-CHEK GUIDE TEST STRIPS) Strp 1 strip by skin prick route Daily. 100 strip 11    carbidopa-levodopa  mg (SINEMET)  mg per tablet Take 2 tablets by mouth 2 (two) times a day.      colchicine (COLCRYS) 0.6 mg tablet Take 1 tablet (0.6 mg total) by mouth 2 (two) times daily. 180 tablet 0    gemfibroziL (LOPID) 600 MG tablet Take 1 tablet (600 mg total) by mouth 2 (two) times daily. 180 tablet 3    lancets (ACCU-CHEK FASTCLIX LANCET DRUM MISC) Accu-Chek Fastclix Lancet Drum      lisinopriL-hydrochlorothiazide (PRINZIDE,ZESTORETIC) 20-25 mg Tab Take 1 tablet by mouth once daily. (Patient taking differently: Take 1 tablet by mouth as needed.) 90 tablet 3    metoprolol succinate (TOPROL-XL) 25 MG 24 hr tablet Take 1 tablet (25 mg total) by mouth once daily. 90 tablet 3    pantoprazole (PROTONIX) 40 MG tablet Take 1 tablet (40 mg total) by mouth once daily. 90 tablet 0    predniSONE (DELTASONE) 5 MG tablet Take 1 tablet (5 mg total) by mouth once daily. 5 tablet 0    rosuvastatin (CRESTOR) 20 MG tablet Take 1 tablet (20 mg total) by mouth once daily. 90 tablet 3    selegiline HCl (ELDEPRYL) 5 mg tablet Take 5 mg by mouth 2 (two) times daily with meals.      vit A/vit C/vit E/zinc/copper (PRESERVISION AREDS ORAL) Take 1 capsule by mouth once daily.      XARELTO 20 mg Tab Take 1 tablet (20 mg total) by mouth once daily. 90 tablet 3    [DISCONTINUED] tofacitinib (XELJANZ XR) 11 mg Tb24 Take 1 tablet by mouth once daily. 30 tablet 2       Social History     Socioeconomic History    Marital status:     Number of children: 1   Tobacco Use     Smoking status: Former     Current packs/day: 0.00     Average packs/day: 1 pack/day for 15.0 years (15.0 ttl pk-yrs)     Types: Cigarettes     Start date: 1972     Quit date: 1987     Years since quittin.2     Passive exposure: Past    Smokeless tobacco: Never   Substance and Sexual Activity    Alcohol use: Not Currently    Drug use: Not Currently    Sexual activity: Yes     Partners: Female     Social Determinants of Health     Financial Resource Strain: Medium Risk (1/10/2024)    Overall Financial Resource Strain (CARDIA)     Difficulty of Paying Living Expenses: Somewhat hard   Food Insecurity: Food Insecurity Present (1/10/2024)    Hunger Vital Sign     Worried About Running Out of Food in the Last Year: Often true     Ran Out of Food in the Last Year: Often true   Transportation Needs: No Transportation Needs (1/10/2024)    PRAPARE - Transportation     Lack of Transportation (Medical): No     Lack of Transportation (Non-Medical): No   Physical Activity: Inactive (1/10/2024)    Exercise Vital Sign     Days of Exercise per Week: 0 days     Minutes of Exercise per Session: 20 min   Stress: No Stress Concern Present (1/10/2024)    [employer] - Occupational Stress Questionnaire     Feeling of Stress : Only a little   Social Connections: Socially Isolated (1/10/2024)    Social Connection and Isolation Panel [NHANES]     Frequency of Communication with Friends and Family: More than three times a week     Frequency of Social Gatherings with Friends and Family: Three times a week     Attends Cheondoism Services: Never     Active Member of Clubs or Organizations: No     Attends Club or Organization Meetings: Never     Marital Status:    Housing Stability: Low Risk  (1/10/2024)    Housing Stability Vital Sign     Unable to Pay for Housing in the Last Year: No     Number of Places Lived in the Last Year: 1     Unstable Housing in the Last Year: No        Family History    Problem Relation Age of Onset    Heart failure Mother     Coronary artery disease Mother     Glaucoma Father     Alzheimer's disease Father     Emphysema Father     Autoimmune disease Sister     Pneumonia Sister     Dementia Sister         Immunization History   Administered Date(s) Administered    COVID-19, vector-nr, rS-Ad26, PF (Danie) 03/08/2021, 10/25/2021    Influenza 10/28/2014    Influenza (FLUAD) - Quadrivalent - Adjuvanted - PF *Preferred* (65+) 10/21/2023    Influenza - Quadrivalent 09/30/2020    Influenza - Quadrivalent - PF *Preferred* (6 months and older) 12/04/2019, 09/30/2020    Influenza - Trivalent - PF (ADULT) 10/26/2018, 12/04/2019    Pneumococcal Conjugate - 13 Valent 05/03/2019    Pneumococcal Polysaccharide - 23 Valent 11/24/2020    Zoster Recombinant 11/12/2019, 02/13/2020       Patient Care Team:  Pau Gomes FNP as PCP - General (Family Medicine)  Taisha Wood LPN as Care Coordinator  Homar Barrios MD (Neurology)     Subjective:     Constitutional:  Denies chills. Denies fever. Denies night sweats. Denies weight loss.   Ophthalmology: Admits blurred vision- following with Ophthalmology here. Denies dry eyes. Denies eye pain. Denies Itching and redness.   ENT: Denies oral ulcers. Denies epistaxis. Denies dry mouth. Denies swollen glands.   Endocrine: Admits diabetes. Denies thyroid Problems.   Respiratory: Denies cough. Denies shortness of breath. Admits shortness of breath with exertion- states Cardiology aware- states occurred when his pressure was low, otherwise no issues. Denies hemoptysis.   Cardiovascular: Denies chest pain at rest. Denies chest pain with exertion. Admits palpitations from A-fib- come and goes  Gastrointestinal: Denies abdominal pain. Denies diarrhea. Denies nausea. Denies vomiting. Denies hematemesis or hematochezia. Denies heartburn. Admits constipation, uses OTC meds and helps  Genitourinary: Denies blood in urine.  Musculoskeletal: See  "[facility] for details  Integumentary: Denies rash. Denies photosensitivity.   Peripheral Vascular: Denies Ulcers of hands and/or feet. Denies Cold extremities.   Neurologic: Denies dizziness-occurred with low pressure but resolved. Denies headache.  Denies loss of strength. Denies numbness or tingling.   Psychiatric: Denies depression. Denies anxiety. Denies suicidal/homicidal ideations.      Objective:     /81 (BP Location: Left arm, Patient Position: Sitting, BP Method: Medium (Automatic))   Pulse 79   Temp 97.8 °F (36.6 °C) (Oral)   Resp 18   Ht 5' 11" (1.803 m)   Wt 107.5 kg (236 lb 15.9 oz)   SpO2 95%   BMI 33.05 kg/m²     Physical Exam    General Appearance: alert, pleasant, in no acute distress.  Skin: Skin color, texture, turgor normal. No rashes or lesions. Scratches noted to bilateral upper arms from new puppy- no infection noted   Eyes:  extraocular movement intact (EOMI), pupils equal, round, reactive to light and accommodation, conjunctiva clear.  ENT: No oral or nasal ulcers. Requests ears to be evaluated as he has been having issues with wax- bilateral cerumen impaction noted- unable to assess TM bilaterally   Neck:  Neck supple. No adenopathy.   Lungs: CTA throughout without crackles, rhonchi, or wheezes.   Heart: RRR w/o murmurs.  No edema.   Neuro: Alert, oriented, CN II-XII GI, sensory and motor innervation intact.  Tremors present.  Rigidity of left upper extremity on exam, mild.  Musculoskeletal: No pain to bilateral MCPs, FROM noted to bilateral wrists, elbows, shoulders with no pain, no pain to bilateral MTPs.  No pain to bilateral knees, crepitus noted bilaterally   Psych: Alert, oriented, normal eye contact.    Labs:   3/21/23:  Creatinine 1.52, GFR 51.  CBC okay.  ESR, CRP normal.  HIV, hepatitis B and C, quantiferon tb negative. 1+ protein and no blood. CKD and protenuria could be due to DM and HTN.    5/15/23:   milligram/gram.  Creatinine 1.27, GFR greater than 60. "     7/6/2023 CMP Creat 1.23 (previously 1.33), CBC WBC 4.33 (previous 5.5), ANC 1.78 (previously 3.47), CRP <0.40 (<5), Sed Rate 3 (<15). - stopped Xeljanz    8/16/2023 CBC ANC now 2.23, otherwise ok.  ANC improved.     10/9/2023 Sed Rate 6 (<15), CBC WBC 4.07, RBC 4.50, ANC 2.04 (has been 1.78 in past)- stable. Trace protein and no blood in urine.  Creatinine 1.28, GFR greater than 60.  CRP normal.    1/11/2024 CBC WBC 3.85, ANC ok, RBC 4.35, hct 41.7, otherwise ok. Sed Rate 7 (<15), CMP ok. CRP 4.80 (<5)      4/9/24: Cr 1.26, GFR >60. CBC ok.     07/08/2024 CBC hematocrit 41.2, otherwise okay, CMP 0.30, GFR> 60, glucose 129,Ca+ 10.1    08/08/2024 CMP glucose 149, creatinine 1.26, GFR > 60, CBC okay, hep B/C/HIV all negative, CRP 1.4 (<5), ESR 15 (<15), QuantiFERON negative    10/18/2024 CBC ok. CMP ok. A1C 6.5    03/27/2025 CMP glucose 137, creatinine 1.27, GFR > 60, otherwise okay, CBC okay    5/1/2025 CMP Ca 8.7, otherwise ok   5/7/2025 CBC hct 40.9, WBC ok, ANC 2.07, BMP 1.29 GFR >60     Imaging:   ECHO:   ECHO 1/15/21:  Left ventricular systolic function normal.  EF 60%.  Grade 1 diastolic dysfunction.  PASP not given.  Right ventricular size and function normal.  04/05/2023 ECHO:  LVEF 60%, LV hypertrophy noted, mild to moderate.  PASP 5 mmHg  10/02/2023: ECHO ejection fraction 60-65%, normal left ventricular systolic and diastolic function.  PASP not given.    CXRays:   3/21/23: CXR showed chronic bilateral basilar interstitial changes.   10/9/2023 CXRay: Impression: No acute cardiopulmonary process identified.  04/09/2024: chest x-ray showed remote left rib.  No lobar consolidation or evidence of acute cardiac decompensation.    3/21/23:  Arthritis changes in bilateral feet, erosion of left 5th MTP.    X-ray of right foot showed moderate-to-severe arthritis.    DJD changes in bilateral hands.  No aggressive osseous lesions appreciated.    4/05/2023 FVC 94.7%, FEV1 92.2 %, FEV1/FVC 97% TLC 94%, DLCO 79.4%,  DLCO/.  FEV1/FVC ratio normal, FEV1, FVC and TLC  normal.  DLCO normal, impression normal PFT     5/4/2023: AAA Screening, No AAA noted. Some ectasia of proximal abd aorta with no evidence of aneurysmal dilatation.     10/3/2023: Carotid u/s Left and right Internal Cardotid arteries less than 50% stenosis.     CT HEAD:   10/2/2023 CT Head w/o Contrast:  Impression:  No acute intracranial findings identified.  Chronic microangiopathic ischemia  10/26/2023 CT Head w/o Contrast: Impression: No acute intracranial findings or significant interval change compared to earlier this month.  10/26/2023 CT Head and Neck: Impression: Mild atherosclerotic changes seen as outlined above with no hemodynamically significant stenosis seen and no large plaque burden is seen.  05/01/2025 CT head without contrast: Impression: No acute intracranial findings identified.  Chronic microangiopathic ischemia and atrophy    10/26/2023 MRI Brain w/o Contrast: Impression: Chronic age related changes. Otherwise unremarkable    11/24/2023 CT Abd/Pelvis with and w/o Contrast: Impression: No nephrolithiasis, hydronephrosis, ureteral obstruction evident.  Possible mild renal cortical thinning. No acute process seen.    05/01/2025 chest x-ray:  Impression: No acute findings          Assessment:       ICD-10-CM ICD-9-CM   1. Seropositive rheumatoid arthritis of multiple sites  M05.79 714.0   2. Drug-induced immunodeficiency  D84.821 279.3    Z79.899 E947.9   3. Spinal stenosis at L4-L5 level  M48.061 724.02   4. Atrial fibrillation and flutter  I48.91 427.31    I48.92 427.32   5. Parkinson's disease, unspecified whether dyskinesia present, unspecified whether manifestations fluctuate  G20.A1 332.0   6. Primary hypertension  I10 401.9   7. Steatosis of liver  K76.0 571.8   8. Controlled type 2 diabetes mellitus without complication, without long-term current use of insulin  E11.9 250.00   9. Hyperlipidemia, unspecified hyperlipidemia type  E78.5  272.4   10. Abnormal CXR  R93.89 793.2          Plan:       1. Strongly + CCP and RF rheumatoid arthritis dx 2014  - MTX was not used d/t hx of ETOH use. He had secondary failure of Enbrel after 2-3 yrs. Humira worked for 15 yrs but developed secondary failure. He briefly tried Kevzara but he did not tolerate it, states caused him to pass out after 1 dose. He was changed to Xeljanz, which worked well. Failed Orencia, did not help, however he does not recall taking Orencia in the past after reviewing medication. Restarted Xeljanz around October 2019 and he has been doing very well. 3/21/23:  Arthritis changes in bilateral feet, erosion of left 5th MTP.  Today on exam, no active synovitis noted.   - At length discussion in the past in regards to increase CV risk associated with JAIRO use (he is >49 y/o, history of CVA and CV risk factor of Afib), he wishes to continue medication as this has been very beneficial for him- again reviewed today, wishes to continue treatment   - 5/2025 CXRay Ok   - Infection w/u negative 8/2024   - No labs today as he just had yesterday, repeat lab in 3-4 months- orders placed  - C/w Xeljanz 11 mg daily.   - F/U OV in 6 months as he has been doing well with 3 month labs    2. Severe multifactorial spinal canal stenosis at L4-L5, moderate at L2-L3 and L3-L4, mild at L1-L2  - Follows Pain Management Dr. Blanco, he had nerve block with possible ablation. Now seeing Dr. Alvarez for acupuncture   - He also smokes marijuana to help with pain- does not smoke often- today denies use -states no longer smoking    3. Afib on Xarelto  - Heart failure was ruled out, diagnosis was taken off his problem list per patient.   - Recent ECHO 10/02/2023: ejection fraction 60-65%, normal left ventricular systolic and diastolic function.  PASP not given  - Continue f/u with Cardio.     4. Parkinson's/History of CVA  -On carbidopa-levodopa.  Just had stem cell infusion  8/1/2024- clinical trial- overall does  note he feels he has minor improvement in symptoms- doing well post infusion  -Follows with Neurologist Dr. Barrios.    5. Peyronie's dz  - Pt reports taking Colchicine.    6. Fatty liver  -One Hep C ab + but repeat w/ PCR negative. Possibly false +.   -Will monitor.     7. High risk med use/drug-induced immunodeficiency   -Persons with rheumatoid arthritis, lupus, psoriatic arthritis and other autoimmune diseases are at increased risk of cardiovascular disease including heart attack and stroke. We recommend that all patients with these conditions have annual health maintenance exams including lipid measurements, blood pressure measurements, and smoking cessation counseling when applicable at their primary care provider's office.   -Due for colonoscopy, had apt in June but deferred colonoscopy- plans to discuss further with PCP, PSA and screenings  with Urology  -Advised to stay up-to-date on age appropriate vaccinations and malignancy screening, including yearly skin exams.    -Continued lab monitoring.   -PPSV-23 11/20, Prevnar 5/19, Shingrix 6/2020. UTD w/ flu shot. UTD COVID vaccine.    8. HTN/T2DM/AFib  - Followed by PCP, currently low today- asymptomatic at this time, reports 111/82- took meds, has been having issues with pressure being low- keeping a log for Cardiology if readings remain low , enc low Na+ diet  - 3/27/2025 6.6, encouraged continued diet modifications of sugar/carb/starch intake    9. Previous abnormal CXRay- repeat ok  -3/21/23: CXR showed chronic bilateral basilar interstitial changes.   -4/05/2023 FVC 94.7%, FEV1 92.2 %, FEV1/FVC 97% TLC 94%, DLCO 79.4%, DLCO/.  FEV1/FVC ratio normal, FEV1, FVC and TLC  normal.  DLCO normal, impression normal PFT   -May be chronic as he reports history of pneumonia x 2 and Covid, which could have been the reason for previous abnormal CXR. Repeat CXRay 10/9/2023 CXRay: No acute cardiopulmonary process identified. He has no symptoms, no cough or SOB.  -  Repeat CXRay 04/09/2024: chest x-ray showed remote left rib.  No lobar consolidation or evidence of acute cardiac decompensation. Continues to deny any SOB  - Repeat 5/1/25 remains ok    10. Bilateral Cerumen Impaction  - Enc use of DeBrox OTC as directed- if no relief in the next 2-3 days, enc to follow up with ENT/PCP- states has apt with ENT next week and will follow up at this time- denies pain or discomfort     Follow up for as scheduled with NP in 6 months, sooner if needed. In addition to their scheduled follow up, the patient has also been instructed to follow up on as needed basis.     Total time spent with patient and documentation is 35 minutes. All questions were answered to patient's satisfaction and patient verbalized understanding. This includes face to face time and non-face to face time preparing to see the patient (eg, review of tests), obtaining and/or reviewing separately obtained history, documenting clinical information in the electronic or other health record, independently interpreting results and communicating results to the patient/family/caregiver, or care coordinator.

## 2025-05-07 NOTE — TELEPHONE ENCOUNTER
I spoke to pt and he reports bp has been up and down (as noted in message below), but patient reports his bp machine is not readings at all now and just showing an error. He reports he is going to get a new machine. I advised him to check bp 2 hrs after meds and notify clinic of adverse readings. Ed precautions given, as well. Understanding verbalized. Pt reports he is asystematic, but told the par he was weak, please defer to first message for readings.

## 2025-05-07 NOTE — TELEPHONE ENCOUNTER
----- Message from Cerenity sent at 5/7/2025 10:33 AM CDT -----  Regarding: Low B/P and Heart Rate  Pt was seen yesterday and was instructed to record his b/p readings for his nurse visit on 5/26/25. He called to let us know that his b/p machine is not working properly and to give his b/p readings:Yesterday (5/06): 161/110This morning (5/07) at 8:50 AM: 134/63He did 2 more readings today between 10:20-10:30 AM:- first one was 97/42- second one was 79/50, heart rate was 80I told him that he should go to an ER or urgent care since it seems that his b/p is dropping fast, and he said that he cannot go. He would like to speak to a nurse.Call-back #: [phone]  ----- Message -----  From: Hank Erickson  Sent: 5/7/2025  10:40 AM CDT  To: [facility] Cardiology Clinical Support Staff  Subject: Low B/P and Heart Rate                           Pt was seen yesterday and was instructed to record his b/p readings for his nurse visit on 5/26/25. He called to let us know that his b/p machine is not working properly and to give his b/p readings:Yesterday (5/07): 161/110This morning (5/07) at 8:50 AM: 134/63He did 2 more readings today between 10:20-10:30 AM:- first one was 97/42- second one was 79/50, heart rate was 80I told him that he should go to an ER or urgent care since it seems that his b/p is dropping fast, and he said that he cannot go. He would like to speak to a nurse.Call-back #: [phone]

## 2025-05-07 NOTE — ED PROVIDER NOTES
Encounter Date: 5/7/2025       History     Chief Complaint   Patient presents with    Dizziness    blood pressure evaluation     Patient reports to the ed secondary to blood pressure evaluation. States he took blood pressure at home and got a reading of 70/55. Also states he took bp meds at 7 a.m. and was seen in cardiology on yesterday for the same complaints.  (+) sob with dizziness. Denies chest pain. Current ac=609/69. Vss. 12 lead EKG in progress.      69-year-old presents said he has a low blood pressure at home this morning some lightheadedness says blood pressure has been going up and down he thought it was machine was seen by his cardiologist yesterday blood pressure is normal in clinic blood pressure is normal upon arrival to ER and was said blood pressure was low on his home device unsure if his home device is working not currently is asymptomatic no weakness no chest pain no shortness a breath vital signs are stable        Review of patient's allergies indicates:   Allergen Reactions    Sarilumab Other (See Comments)     Other reaction(s): fainting  Kevzara     Past Medical History:   Diagnosis Date    Atrial fibrillation     Cataract 2017    Central stenosis of spinal canal     Chronic back pain greater than 3 months duration 10/14/2022    Constipation 09/30/2022    Degeneration of lumbar intervertebral disc 07/06/2022    Diastolic dysfunction 05/01/2023    Dysphonia 09/30/2022    Dystrophic nail 06/19/2023    Erectile dysfunction 09/30/2022    Fatty liver     GERD (gastroesophageal reflux disease) 09/30/2022    H/O cataract removal with insertion of prosthetic lens     Hearing loss 40 years    History of CVA (cerebrovascular accident) 09/30/2022    TIA    History of radial keratotomy 01/01/1998    HTN (hypertension)     Hyperlipidemia 09/30/2022    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/30/2022    Microhematuria 03/30/2023    Overgrown toenails 06/19/2023    Parkinson's disease     Peyronie disease      Polyneuropathy associated with underlying disease 06/19/2023    Rheumatoid arthritis 01/10/2022    Rheumatoid arthritis, unspecified     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    Spinal stenosis of lumbar region 09/30/2022    Type 2 diabetes mellitus without complications     Unspecified macular degeneration     Unspecified macular degeneration      Past Surgical History:   Procedure Laterality Date    BACK SURGERY Left 04/2024    CATARACT EXTRACTION W/  INTRAOCULAR LENS IMPLANT Bilateral     EPIDURAL STEROID INJECTION INTO LUMBAR SPINE      exc cyst Lt ear      EXTERNAL EAR SURGERY  2019    EYE SURGERY      INJECTION OF ANESTHETIC AGENT AROUND MEDIAL BRANCH NERVES INNERVATING LUMBAR FACET JOINT Bilateral 11/16/2022    Procedure: Block-nerve-medial branch-lumbar;  Surgeon: Gertrude Blanco MD;  Location: [facility] OR;  Service: Pain Management;  Laterality: Bilateral;  Bilateral L4-L5...Patient is requesting to be first case    INJECTION OF ANESTHETIC AGENT AROUND MEDIAL BRANCH NERVES INNERVATING LUMBAR FACET JOINT Bilateral 11/22/2023    Procedure: Block-nerve-medial branch-lumbar;  Surgeon: Gertrude Blanco MD;  Location: [facility] OR;  Service: Pain Management;  Laterality: Bilateral;  Bilateral MBB L3-5    INJECTION OF ANESTHETIC AGENT AROUND MEDIAL BRANCH NERVES INNERVATING LUMBAR FACET JOINT Bilateral 01/24/2024    Procedure: Block-nerve-medial branch-lumbar;  Surgeon: Gertrude Blanco MD;  Location: [facility] OR;  Service: Pain Management;  Laterality: Bilateral;  Bilateral MBB L3-5    KNEE ARTHROSCOPY Right     RADIOFREQUENCY ABLATION Left 03/27/2024    Procedure: Radiofrequency Ablation;  Surgeon: Gertrude Blanco MD;  Location: [facility] OR;  Service: Pain Management;  Laterality: Left;  RFA Lt L3-L5    RADIOFREQUENCY ABLATION Right 04/25/2024    Procedure: RADIOFREQUENCY ABLATION / Right L3 L5;  Surgeon: Gertrude Blanco MD;  Location: [facility] OR;  Service: Pain Management;  Laterality: Right;  Right RFA L3-L5    VASECTOMY  1998      Family History   Problem Relation Name Age of Onset    Heart failure Mother delta     Coronary artery disease Mother delta     Glaucoma Father      Alzheimer's disease Father      Emphysema Father      Autoimmune disease Sister      Pneumonia Sister Jenifer     Dementia Sister Jenifer      Social History[1]  Review of Systems   All other systems reviewed and are negative.      Physical Exam     Initial Vitals [05/07/25 1158]   BP Pulse Resp Temp SpO2   111/69 81 18 97.5 °F (36.4 °C) 99 %      MAP       --         Physical Exam    Nursing note and vitals reviewed.  Constitutional: He appears well-developed and well-nourished. He is active.   HENT:   Head: Normocephalic and atraumatic.   Eyes: Conjunctivae, EOM and lids are normal. Pupils are equal, round, and reactive to light.   Neck: Trachea normal and phonation normal. Neck supple. No thyroid mass present.   Normal range of motion.  Cardiovascular:  Normal rate, regular rhythm, normal heart sounds and normal pulses.           Pulmonary/Chest: Breath sounds normal.   Abdominal: Abdomen is soft. Bowel sounds are normal.   Musculoskeletal:         General: Normal range of motion.      Cervical back: Normal range of motion and neck supple.     Neurological: He is alert and oriented to person, place, and time. He has normal strength and normal reflexes.   Skin: Skin is warm and intact.   Psychiatric: He has a normal mood and affect. His speech is normal and behavior is normal. Judgment and thought content normal. Cognition and memory are normal.         ED Course   Procedures  Labs Reviewed   BASIC METABOLIC PANEL - Abnormal       Result Value    Sodium 139      Potassium 3.7      Chloride 103      CO2 25      Glucose 132 (*)     Blood Urea Nitrogen 16.2      Creatinine 1.29 (*)     BUN/Creatinine Ratio 13      Calcium 8.8      Anion Gap 11.0      eGFR >60     CBC WITH DIFFERENTIAL - Abnormal    WBC 4.69      RBC 4.38 (*)     Hgb 14.3      Hct 40.9 (*)     MCV  93.4      MCH 32.6 (*)     MCHC 35.0      RDW 13.0      Platelet 202      MPV 9.7      Neut % 44.1      Lymph % 38.6      Mono % 15.6      Eos % 1.1      Basophil % 0.4      Imm Grans % 0.2      Neut # 2.07 (*)     Lymph # 1.81      Mono # 0.73      Eos # 0.05      Baso # 0.02      Imm Gran # 0.01      NRBC% 0.0     TROPONIN I - Normal    Troponin-I <0.010     MAGNESIUM - Normal    Magnesium Level 1.70     CBC W/ AUTO DIFFERENTIAL    Narrative:     The following orders were created for panel order CBC Auto Differential.  Procedure                               Abnormality         Status                     ---------                               -----------         ------                     CBC with Differential[[id number]]       Abnormal            Final result                 Please view results for these tests on the individual orders.   EXTRA TUBES    Narrative:     The following orders were created for panel order EXTRA TUBES.  Procedure                               Abnormality         Status                     ---------                               -----------         ------                     Light Blue Top Hold[[id number]]                             In process                 Lavender Top Hold[[id number]]                               In process                 Gold Top Hold[[id number]]                                   In process                 Pink Top Hold[[id number]]                                   In process                   Please view results for these tests on the individual orders.   LIGHT BLUE TOP HOLD   LAVENDER TOP HOLD   GOLD TOP HOLD   PINK TOP HOLD     EKG Readings: (Independently Interpreted)   Initial Reading: No STEMI. Rhythm: Normal Sinus Rhythm. Heart Rate: 77. ST Segments: Normal ST Segments. T Waves: Normal. Clinical Impression: Normal Sinus Rhythm     ECG Results              EKG 12-lead (In process)        Collection Time Result Time QRS Duration OHS QTC Calculation     05/07/25 12:06:59 05/07/25 12:56:10 84 405                     In process by Interface, Lab In [facility] (05/07/25 12:56:14)                   Narrative:    Test Reason : I95.9,    Vent. Rate :  77 BPM     Atrial Rate :  77 BPM     P-R Int : 158 ms          QRS Dur :  84 ms      QT Int : 358 ms       P-R-T Axes :  12 -47 -13 degrees    QTcB Int : 405 ms    Normal sinus rhythm  Left axis deviation  Nonspecific T wave abnormality  Abnormal ECG  When compared with ECG of 01-May-2025 07:31,  Inverted T waves have replaced nonspecific T wave abnormality in Inferior  leads  Nonspecific T wave abnormality now evident in Lateral leads    Referred By: AAAREFERRAL SELF           Confirmed By:                       In process by Interface, Lab In [facility] (05/07/25 12:56:08)                   Narrative:    Test Reason : I95.9,    Vent. Rate :  77 BPM     Atrial Rate :  77 BPM     P-R Int : 158 ms          QRS Dur :  84 ms      QT Int : 358 ms       P-R-T Axes :  12 -47 -13 degrees    QTcB Int : 405 ms    Normal sinus rhythm  Left axis deviation  Nonspecific T wave abnormality  Abnormal ECG  When compared with ECG of 01-May-2025 07:31,  Inverted T waves have replaced nonspecific T wave abnormality in Inferior  leads  Nonspecific T wave abnormality now evident in Lateral leads    Referred By: AAAREFERRAL SELF           Confirmed By:                                   Imaging Results              X-Ray Chest PA And Lateral (Final result)  Result time 05/07/25 13:51:36      Final result by Gilbert Rincon MD (05/07/25 13:51:36)                   Impression:      No acute chest disease is identified.      Electronically signed by: Gilbert Rincon  Date:    05/07/2025  Time:    13:51               Narrative:    EXAMINATION:  XR CHEST PA AND LATERAL    CLINICAL HISTORY:  , Hypotension, unspecified.    FINDINGS:  No alveolar consolidation, effusion, or pneumothorax is seen.   The thoracic aorta is normal  cardiac silhouette is at  the upper limits of normal, central pulmonary vessels and mediastinum are normal in size and are grossly unremarkable.   visualized osseous structures are grossly unremarkable.                                       Medications - No data to display  Medical Decision Making  69-year-old presents said he has a low blood pressure at home this morning some lightheadedness says blood pressure has been going up and down he thought it was machine was seen by his cardiologist yesterday blood pressure is normal in clinic blood pressure is normal upon arrival to ER and was said blood pressure was low on his home device unsure if his home device is working not currently is asymptomatic no weakness no chest pain no shortness a breath vital signs are stable          Amount and/or Complexity of Data Reviewed  Labs: ordered. Decision-making details documented in ED Course.  Radiology: ordered and independent interpretation performed.    Risk  Risk Details: Differential diagnosis over medicated with blood pressure meds orthostatic hypotension broken blood pressure machine               ED Course as of 05/07/25 1433   Wed May 07, 2025   1427 Magnesium : 1.70 [BL]   1427 Troponin I: <0.010 [BL]   1427 Glucose(!): 132 [BL]   1427 BUN: 16.2 [BL]   1427 Creatinine(!): 1.29 [BL]   1427 Hemoglobin: 14.3 [BL]   1427 Hematocrit(!): 40.9 [BL]      ED Course User Index  [BL] Kelby Roberson MD                           Clinical Impression:  Final diagnoses:  [R53.1] Weakness  [I95.9] Low BP  [R42] Dizziness (Primary)          ED Disposition Condition    Discharge Stable          ED Prescriptions    None       Follow-up Information       Follow up With Specialties Details Why Contact Info    [facility]au Gomes, [facility]  As needed [address] [facility] [address] [phone]                   [1]   Social History  Tobacco Use    Smoking status: Former     Current packs/day: 0.00     Average packs/day: 1 pack/day for 15.0  years (15.0 ttl pk-yrs)     Types: Cigarettes     Start date: 1972     Quit date: 1987     Years since quittin.3     Passive exposure: Past    Smokeless tobacco: Never   Substance Use Topics    Alcohol use: Never     Comment: occasional    Drug use: Yes     Types: Marijuana        Kelby Roberson MD  25 143

## 2025-05-08 ENCOUNTER — OFFICE VISIT (OUTPATIENT)
Dept: RHEUMATOLOGY | Facility: CLINIC | Age: 70
End: 2025-05-08
Payer: MEDICARE

## 2025-05-08 VITALS
DIASTOLIC BLOOD PRESSURE: 56 MMHG | BODY MASS INDEX: 32.79 KG/M2 | HEART RATE: 72 BPM | TEMPERATURE: 98 F | SYSTOLIC BLOOD PRESSURE: 99 MMHG | WEIGHT: 234.19 LBS | OXYGEN SATURATION: 97 % | RESPIRATION RATE: 16 BRPM | HEIGHT: 71 IN

## 2025-05-08 DIAGNOSIS — N48.6 PEYRONIE'S DISEASE: ICD-10-CM

## 2025-05-08 DIAGNOSIS — I10 PRIMARY HYPERTENSION: ICD-10-CM

## 2025-05-08 DIAGNOSIS — I48.92 ATRIAL FIBRILLATION AND FLUTTER: ICD-10-CM

## 2025-05-08 DIAGNOSIS — Z79.899 HIGH RISK MEDICATION USE: ICD-10-CM

## 2025-05-08 DIAGNOSIS — H61.23 BILATERAL IMPACTED CERUMEN: ICD-10-CM

## 2025-05-08 DIAGNOSIS — E11.9 CONTROLLED TYPE 2 DIABETES MELLITUS WITHOUT COMPLICATION, WITHOUT LONG-TERM CURRENT USE OF INSULIN: ICD-10-CM

## 2025-05-08 DIAGNOSIS — G20.A1 PARKINSON'S DISEASE, UNSPECIFIED WHETHER DYSKINESIA PRESENT, UNSPECIFIED WHETHER MANIFESTATIONS FLUCTUATE: ICD-10-CM

## 2025-05-08 DIAGNOSIS — E66.01 MORBID (SEVERE) OBESITY DUE TO EXCESS CALORIES: ICD-10-CM

## 2025-05-08 DIAGNOSIS — Z79.899 DRUG-INDUCED IMMUNODEFICIENCY: ICD-10-CM

## 2025-05-08 DIAGNOSIS — D84.821 DRUG-INDUCED IMMUNODEFICIENCY: ICD-10-CM

## 2025-05-08 DIAGNOSIS — Z86.73 HISTORY OF CVA (CEREBROVASCULAR ACCIDENT): ICD-10-CM

## 2025-05-08 DIAGNOSIS — M48.061 SPINAL STENOSIS AT L4-L5 LEVEL: ICD-10-CM

## 2025-05-08 DIAGNOSIS — I50.9 CONGESTIVE HEART FAILURE, UNSPECIFIED HF CHRONICITY, UNSPECIFIED HEART FAILURE TYPE: ICD-10-CM

## 2025-05-08 DIAGNOSIS — M05.79 SEROPOSITIVE RHEUMATOID ARTHRITIS OF MULTIPLE SITES: Primary | ICD-10-CM

## 2025-05-08 DIAGNOSIS — K76.0 STEATOSIS OF LIVER: ICD-10-CM

## 2025-05-08 DIAGNOSIS — I48.91 ATRIAL FIBRILLATION AND FLUTTER: ICD-10-CM

## 2025-05-08 LAB
OHS QRS DURATION: 84 MS
OHS QTC CALCULATION: 405 MS

## 2025-05-08 PROCEDURE — 3008F BODY MASS INDEX DOCD: CPT | Mod: CPTII,,, | Performed by: NURSE PRACTITIONER

## 2025-05-08 PROCEDURE — 1125F AMNT PAIN NOTED PAIN PRSNT: CPT | Mod: CPTII,,, | Performed by: NURSE PRACTITIONER

## 2025-05-08 PROCEDURE — 3288F FALL RISK ASSESSMENT DOCD: CPT | Mod: CPTII,,, | Performed by: NURSE PRACTITIONER

## 2025-05-08 PROCEDURE — 1160F RVW MEDS BY RX/DR IN RCRD: CPT | Mod: CPTII,,, | Performed by: NURSE PRACTITIONER

## 2025-05-08 PROCEDURE — 99215 OFFICE O/P EST HI 40 MIN: CPT | Mod: PBBFAC | Performed by: NURSE PRACTITIONER

## 2025-05-08 PROCEDURE — 3074F SYST BP LT 130 MM HG: CPT | Mod: CPTII,,, | Performed by: NURSE PRACTITIONER

## 2025-05-08 PROCEDURE — 3044F HG A1C LEVEL LT 7.0%: CPT | Mod: CPTII,,, | Performed by: NURSE PRACTITIONER

## 2025-05-08 PROCEDURE — 1101F PT FALLS ASSESS-DOCD LE1/YR: CPT | Mod: CPTII,,, | Performed by: NURSE PRACTITIONER

## 2025-05-08 PROCEDURE — 99214 OFFICE O/P EST MOD 30 MIN: CPT | Mod: S$PBB,,, | Performed by: NURSE PRACTITIONER

## 2025-05-08 PROCEDURE — 1159F MED LIST DOCD IN RCRD: CPT | Mod: CPTII,,, | Performed by: NURSE PRACTITIONER

## 2025-05-08 PROCEDURE — 3078F DIAST BP <80 MM HG: CPT | Mod: CPTII,,, | Performed by: NURSE PRACTITIONER

## 2025-05-08 RX ORDER — TOFACITINIB 11 MG/1
1 TABLET, FILM COATED, EXTENDED RELEASE ORAL DAILY
Qty: 30 TABLET | Refills: 2 | Status: SHIPPED | OUTPATIENT
Start: 2025-05-08

## 2025-05-13 ENCOUNTER — HOSPITAL ENCOUNTER (EMERGENCY)
Facility: HOSPITAL | Age: 70
Discharge: HOME | End: 2025-05-13
Attending: EMERGENCY MEDICINE
Payer: MEDICARE

## 2025-05-13 ENCOUNTER — OFFICE VISIT (OUTPATIENT)
Dept: OTOLARYNGOLOGY | Facility: CLINIC | Age: 70
End: 2025-05-13
Payer: MEDICARE

## 2025-05-13 VITALS
HEART RATE: 68 BPM | SYSTOLIC BLOOD PRESSURE: 174 MMHG | RESPIRATION RATE: 15 BRPM | OXYGEN SATURATION: 95 % | DIASTOLIC BLOOD PRESSURE: 104 MMHG | TEMPERATURE: 99 F

## 2025-05-13 VITALS
HEIGHT: 71 IN | HEART RATE: 69 BPM | OXYGEN SATURATION: 99 % | RESPIRATION RATE: 18 BRPM | BODY MASS INDEX: 32.62 KG/M2 | TEMPERATURE: 97 F | DIASTOLIC BLOOD PRESSURE: 66 MMHG | SYSTOLIC BLOOD PRESSURE: 98 MMHG | WEIGHT: 233 LBS

## 2025-05-13 DIAGNOSIS — H61.20 IMPACTED CERUMEN, UNSPECIFIED LATERALITY: ICD-10-CM

## 2025-05-13 DIAGNOSIS — R29.818 ACUTE FOCAL NEUROLOGICAL DEFICIT: ICD-10-CM

## 2025-05-13 DIAGNOSIS — H91.93 DECREASED HEARING OF BOTH EARS: ICD-10-CM

## 2025-05-13 DIAGNOSIS — G45.9 TIA (TRANSIENT ISCHEMIC ATTACK): Primary | ICD-10-CM

## 2025-05-13 DIAGNOSIS — R20.2 PARESTHESIA: ICD-10-CM

## 2025-05-13 DIAGNOSIS — G20.A1 PARKINSON'S DISEASE, UNSPECIFIED WHETHER DYSKINESIA PRESENT, UNSPECIFIED WHETHER MANIFESTATIONS FLUCTUATE: ICD-10-CM

## 2025-05-13 PROBLEM — R29.90 EPISODE OF TRANSIENT NEUROLOGIC SYMPTOMS: Status: ACTIVE | Noted: 2025-05-13

## 2025-05-13 LAB
ALBUMIN SERPL-MCNC: 4.2 G/DL (ref 3.4–4.8)
ALBUMIN/GLOB SERPL: 1.2 RATIO (ref 1.1–2)
ALP SERPL-CCNC: 61 UNIT/L (ref 40–150)
ALT SERPL-CCNC: 11 UNIT/L (ref 0–55)
ANION GAP SERPL CALC-SCNC: 11 MEQ/L
AST SERPL-CCNC: 20 UNIT/L (ref 11–45)
BASOPHILS # BLD AUTO: 0.02 X10(3)/MCL
BASOPHILS NFR BLD AUTO: 0.4 %
BILIRUB SERPL-MCNC: 0.4 MG/DL
BUN SERPL-MCNC: 19.9 MG/DL (ref 8.4–25.7)
CALCIUM SERPL-MCNC: 9.7 MG/DL (ref 8.8–10)
CHLORIDE SERPL-SCNC: 102 MMOL/L (ref 98–107)
CHOLEST SERPL-MCNC: 159 MG/DL
CHOLEST/HDLC SERPL: 4 {RATIO} (ref 0–5)
CO2 SERPL-SCNC: 26 MMOL/L (ref 23–31)
CREAT SERPL-MCNC: 1.71 MG/DL (ref 0.72–1.25)
CREAT/UREA NIT SERPL: 12
EOSINOPHIL # BLD AUTO: 0.06 X10(3)/MCL (ref 0–0.9)
EOSINOPHIL NFR BLD AUTO: 1.1 %
ERYTHROCYTE [DISTWIDTH] IN BLOOD BY AUTOMATED COUNT: 13.1 % (ref 11.5–17)
GFR SERPLBLD CREATININE-BSD FMLA CKD-EPI: 43 ML/MIN/1.73/M2
GLOBULIN SER-MCNC: 3.6 GM/DL (ref 2.4–3.5)
GLUCOSE SERPL-MCNC: 119 MG/DL (ref 82–115)
HCT VFR BLD AUTO: 43.7 % (ref 42–52)
HDLC SERPL-MCNC: 45 MG/DL (ref 35–60)
HGB BLD-MCNC: 15.1 G/DL (ref 14–18)
HOLD SPECIMEN: NORMAL
IMM GRANULOCYTES # BLD AUTO: 0.02 X10(3)/MCL (ref 0–0.04)
IMM GRANULOCYTES NFR BLD AUTO: 0.4 %
INR PPP: 1.7
LDLC SERPL CALC-MCNC: 71 MG/DL (ref 50–140)
LYMPHOCYTES # BLD AUTO: 2.46 X10(3)/MCL (ref 0.6–4.6)
LYMPHOCYTES NFR BLD AUTO: 43.2 %
MCH RBC QN AUTO: 32.5 PG (ref 27–31)
MCHC RBC AUTO-ENTMCNC: 34.6 G/DL (ref 33–36)
MCV RBC AUTO: 94 FL (ref 80–94)
MONOCYTES # BLD AUTO: 0.97 X10(3)/MCL (ref 0.1–1.3)
MONOCYTES NFR BLD AUTO: 17 %
NEUTROPHILS # BLD AUTO: 2.17 X10(3)/MCL (ref 2.1–9.2)
NEUTROPHILS NFR BLD AUTO: 37.9 %
NRBC BLD AUTO-RTO: 0 %
PLATELET # BLD AUTO: 233 X10(3)/MCL (ref 130–400)
PMV BLD AUTO: 9.8 FL (ref 7.4–10.4)
POTASSIUM SERPL-SCNC: 4.2 MMOL/L (ref 3.5–5.1)
PROT SERPL-MCNC: 7.8 GM/DL (ref 5.8–7.6)
PROTHROMBIN TIME: 20.2 SECONDS (ref 11.4–14)
RBC # BLD AUTO: 4.65 X10(6)/MCL (ref 4.7–6.1)
SODIUM SERPL-SCNC: 139 MMOL/L (ref 136–145)
TRIGL SERPL-MCNC: 214 MG/DL (ref 34–140)
TSH SERPL-ACNC: 2.73 UIU/ML (ref 0.35–4.94)
VLDLC SERPL CALC-MCNC: 43 MG/DL
WBC # BLD AUTO: 5.7 X10(3)/MCL (ref 4.5–11.5)

## 2025-05-13 PROCEDURE — 80053 COMPREHEN METABOLIC PANEL: CPT

## 2025-05-13 PROCEDURE — G0426 INPT/ED TELECONSULT50: HCPCS | Mod: GT,,, | Performed by: PSYCHIATRY & NEUROLOGY

## 2025-05-13 PROCEDURE — 84443 ASSAY THYROID STIM HORMONE: CPT

## 2025-05-13 PROCEDURE — 93005 ELECTROCARDIOGRAM TRACING: CPT

## 2025-05-13 PROCEDURE — 82330 ASSAY OF CALCIUM: CPT | Mod: 59

## 2025-05-13 PROCEDURE — 82962 GLUCOSE BLOOD TEST: CPT

## 2025-05-13 PROCEDURE — 99285 EMERGENCY DEPT VISIT HI MDM: CPT | Mod: 25,27

## 2025-05-13 PROCEDURE — 80061 LIPID PANEL: CPT

## 2025-05-13 PROCEDURE — 99215 OFFICE O/P EST HI 40 MIN: CPT | Mod: PBBFAC

## 2025-05-13 PROCEDURE — 85025 COMPLETE CBC W/AUTO DIFF WBC: CPT

## 2025-05-13 PROCEDURE — 80047 BASIC METABLC PNL IONIZED CA: CPT

## 2025-05-13 PROCEDURE — 25500020 PHARM REV CODE 255

## 2025-05-13 PROCEDURE — 85610 PROTHROMBIN TIME: CPT

## 2025-05-13 RX ADMIN — IOHEXOL 75 ML: 350 INJECTION, SOLUTION INTRAVENOUS at 05:05

## 2025-05-13 NOTE — PROGRESS NOTES
[facility]  Otolaryngology-Head & Neck Surgery    Office Visit    Melquiades Rehman Jr.  [id number]  1955    CC: hearing loss    HPI: Melquiades Rehman Jr. is a 69 y.o. male with a history of AFib on Eliquis, Parkinson's disease, that presents for evaluation of sensorineural hearing loss, tinnitus, epistaxis.  He is not had any issues with epistaxis for the last 6 months.  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  He has not had a hearing test in nearly 2 years.  No otalgia or otorrhea.  He does have prior history of spontaneous eardrum rupture on the left side with routine healing and scar tissue.  He does have a history of Parkinson's disease, he has been in speech therapy prior for his voice.  It has been about the same he states.    5/30/23: Here today to follow up after his recent audiogram. He reports that he intermittently has trouble hearing two different tones in the left ear but this symptom has improved. He occasionally hears cricket sounds in both ears. No changes to his hearing. No ear pain, drainage or dizziness. He's not interested in hearing aids at this time. Not having any further issues with nosebleeds.     7/5/23:  Video visit today to discuss MRI results.  He says his hearing has been stable.  Every now and then he has some fluctuation but it overall does not bother him.  He is comfortable with his current level is not interested in hearing aids at this time.    5/13/25:  Here today for new sudden worsening right hearing loss.  Was seen by doctor last week and was told he had significant wax build up.  No drainage, otalgia, or vertigo.  He does report continued/worsening tinnitus on the right and itching.    ROS:   General: Negative except per HPI  Skin: Denies rash, ulcer, or lesion.  Eyes: Denies vision changes or diplopia.  Ears: Negative except per  HPI  Nose: Negative except per HPI  Throat/mouth: Negative except per HPI  Cardiovascular: Negative except per HPI  Respiratory: Negative except per HPI  Neck: Negative except per HPI  Endocrine: Negative except per HPI  Neurologic: Negative except per HPI    Review of patient's allergies indicates:   Allergen Reactions    Sarilumab Other (See Comments)     Other reaction(s): fainting  Jennifer       Past Medical History:   Diagnosis Date    Atrial fibrillation     Cataract 2017    Central stenosis of spinal canal     Chronic back pain greater than 3 months duration 10/14/2022    Constipation 09/30/2022    Degeneration of lumbar intervertebral disc 07/06/2022    Diastolic dysfunction 05/01/2023    Dysphonia 09/30/2022    Dystrophic nail 06/19/2023    Erectile dysfunction 09/30/2022    Fatty liver     GERD (gastroesophageal reflux disease) 09/30/2022    H/O cataract removal with insertion of prosthetic lens     Hearing loss 40 years    History of CVA (cerebrovascular accident) 09/30/2022    TIA    History of radial keratotomy 01/01/1998    HTN (hypertension)     Hyperlipidemia 09/30/2022    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/30/2022    Microhematuria 03/30/2023    Overgrown toenails 06/19/2023    Parkinson's disease     Peyronie disease     Polyneuropathy associated with underlying disease 06/19/2023    Rheumatoid arthritis 1/10/1994    Rheumatoid arthritis, unspecified     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    Spinal stenosis of lumbar region 09/30/2022    Type 2 diabetes mellitus without complications     Unspecified macular degeneration     Unspecified macular degeneration        Past Surgical History:   Procedure Laterality Date    BACK SURGERY Left 04/2024    CATARACT EXTRACTION W/  INTRAOCULAR LENS IMPLANT Bilateral     EPIDURAL STEROID INJECTION INTO LUMBAR SPINE      exc cyst Lt ear      EXTERNAL EAR SURGERY  2019    EYE SURGERY      INJECTION OF ANESTHETIC AGENT AROUND MEDIAL BRANCH NERVES INNERVATING LUMBAR FACET JOINT  Bilateral 2022    Procedure: Block-nerve-medial branch-lumbar;  Surgeon: Gertrude Blanco MD;  Location: [facility] OR;  Service: Pain Management;  Laterality: Bilateral;  Bilateral L4-L5...Patient is requesting to be first case    INJECTION OF ANESTHETIC AGENT AROUND MEDIAL BRANCH NERVES INNERVATING LUMBAR FACET JOINT Bilateral 2023    Procedure: Block-nerve-medial branch-lumbar;  Surgeon: Gertrude Blanco MD;  Location: [facility] OR;  Service: Pain Management;  Laterality: Bilateral;  Bilateral MBB L3-5    INJECTION OF ANESTHETIC AGENT AROUND MEDIAL BRANCH NERVES INNERVATING LUMBAR FACET JOINT Bilateral 2024    Procedure: Block-nerve-medial branch-lumbar;  Surgeon: Gertrude Blanco MD;  Location: [facility] OR;  Service: Pain Management;  Laterality: Bilateral;  Bilateral MBB L3-5    KNEE ARTHROSCOPY Right     RADIOFREQUENCY ABLATION Left 2024    Procedure: Radiofrequency Ablation;  Surgeon: Gertrude Blanco MD;  Location: [facility] OR;  Service: Pain Management;  Laterality: Left;  RFA Lt L3-L5    RADIOFREQUENCY ABLATION Right 2024    Procedure: RADIOFREQUENCY ABLATION / Right L3 L5;  Surgeon: Gertrude Blanco MD;  Location: [facility] OR;  Service: Pain Management;  Laterality: Right;  Right RFA L3-L5    VASECTOMY         Social History     Socioeconomic History    Marital status:     Number of children: 1   Tobacco Use    Smoking status: Former     Current packs/day: 0.00     Average packs/day: 1 pack/day for 15.0 years (15.0 ttl pk-yrs)     Types: Cigarettes     Start date: 1972     Quit date: 1987     Years since quittin.3     Passive exposure: Past    Smokeless tobacco: Never   Substance and Sexual Activity    Alcohol use: Never     Comment: occasional    Drug use: Yes     Types: Marijuana     Comment: not using anymore    Sexual activity: Yes     Partners: Female     Social Drivers of Health     Financial Resource Strain: Low Risk  (2025)    Overall Financial Resource  Strain (CARDIA)     Difficulty of Paying Living Expenses: Not hard at all   Food Insecurity: No Food Insecurity (2025)    Hunger Vital Sign     Worried About Running Out of Food in the Last Year: Never true     Ran Out of Food in the Last Year: Never true   Transportation Needs: No Transportation Needs (1/10/2024)    PRAPARE - Transportation     Lack of Transportation (Medical): No     Lack of Transportation (Non-Medical): No   Physical Activity: Unknown (2025)    Exercise Vital Sign     Days of Exercise per Week: 0 days   Stress: No Stress Concern Present (2025)    [employer] [place] [employer] - Occupational Stress Questionnaire     Feeling of Stress : Not at all   Housing Stability: Low Risk  (1/10/2024)    Housing Stability Vital Sign     Unable to Pay for Housing in the Last Year: No     Number of Places Lived in the Last Year: 1     Unstable Housing in the Last Year: No       Family History   Problem Relation Name Age of Onset    Heart failure Mother delta     Coronary artery disease Mother delta     Glaucoma Father      Alzheimer's disease Father      Emphysema Father      Autoimmune disease Sister      Pneumonia Sister Jenifer     Dementia Sister Jenifer        Outpatient Encounter Medications as of 2025   Medication Sig Dispense Refill    ACCU-CHEK GUIDE GLUCOSE METER Misc       alcohol swabs PadM Apply 1 each topically once daily. 100 each 11    [] alogliptin-pioglitazone (OSENI) 25-30 mg Tab Take 1 tablet by mouth once daily. 90 tablet 3    alogliptin-pioglitazone 25-30 mg Tab Take 1 tablet by mouth Daily.      ammonium lactate 12 % Crea Apply 1 application  topically 2 (two) times daily.      AUSTEDO 6 mg Tab Take 1 tablet by mouth 2 (two) times daily.      blood sugar diagnostic (ACCU-CHEK GUIDE TEST STRIPS) Strp 1 strip by skin prick route Daily. 100 strip 11    blood-glucose meter kit To check BG two times daily, to use with insurance preferred meter 1 each 0     carbidopa-levodopa  mg (SINEMET)  mg per tablet Take 2 tablets by mouth 2 (two) times a day.      colchicine (COLCRYS) 0.6 mg tablet TAKE 1 TABLET BY MOUTH TWICE  DAILY 180 tablet 3    lancets (ACCU-CHEK SOFTCLIX LANCETS) Misc 1 each by Misc.(Non-Drug; Combo Route) route Daily. 100 each 11    lisinopriL 10 MG tablet Take 1 tablet (10 mg total) by mouth once daily. (Patient taking differently: Take 10 mg by mouth as needed.) 90 tablet 3    metoprolol succinate (TOPROL-XL) 25 MG 24 hr tablet Take 1 tablet (25 mg total) by mouth once daily. 90 tablet 3    pantoprazole (PROTONIX) 40 MG tablet Take 1 tablet (40 mg total) by mouth once daily. 90 tablet 1    polyethylene glycol (MOVIPREP) 100-7.5-2.691 gram solution Take 2,000 mLs by mouth As instructed (take as directed per instructions provided by GI clinic). (Patient not taking: Reported on 5/8/2025) 1 kit 0    predniSONE (DELTASONE) 5 MG tablet Take 2 tab po qd x 3 days then take 1 tab po qd x 2 days prn RA flare. (Patient taking differently: as needed. Take 2 tab po qd x 3 days then take 1 tab po qd x 2 days prn RA flare.) 30 tablet 0    rosuvastatin (CRESTOR) 20 MG tablet Take 1 tablet (20 mg total) by mouth once daily. 90 tablet 3    selegiline HCl (ELDEPRYL) 5 mg tablet Take 5 mg by mouth 2 (two) times daily with meals.      sodium chloride 5% (BHUMIKA 128) 5 % ophthalmic solution Place 1 drop into both eyes 3 (three) times daily. 30 mL 2    tiZANidine (ZANAFLEX) 4 MG tablet Take 4 mg by mouth once daily.      tofacitinib (XELJANZ XR) 11 mg Tb24 Take 1 tablet by mouth once daily. 30 tablet 2    XARELTO 20 mg Tab Take 1 tablet (20 mg total) by mouth once daily. 90 tablet 2    [DISCONTINUED] tofacitinib (XELJANZ XR) 11 mg Tb24 Take 1 tablet by mouth once daily. 30 tablet 2     Facility-Administered Encounter Medications as of 5/13/2025   Medication Dose Route Frequency Provider Last Rate Last Admin    influenza (adjuvanted) (Fluad) 45 mcg/0.5 mL IM vaccine  (> or = 64 yo) 0.5 mL  0.5 mL Intramuscular 1 time in Clinic/HOD         pneumoc 20-jocelyn conj-dip cr(PF) (PREVNAR-20 (PF)) injection Syrg 0.5 mL  0.5 mL Intramuscular 1 time in Clinic/HOD            PHYSICAL EXAM:  Vitals:    05/13/25 1443   BP: 98/66   Pulse: 69   Resp: 18   Temp: 97.4 °F (36.3 °C)       Physical Exam  GENERAL: NAD, AAO, no dyspnea/inc WOB, no stridor, tolerates secretions   NEURO: CN II - XII exam: intact bilaterally except  EYES: EOMI, PERRLA  EARS: Hearing well at normal conversation volume  NOSE: nares normal, septum midline, MMM, no drainage or sinus tenderness, no polyps, no crusting or bleeding points   ORAL CAVITY: MMM, no lesions or ulcerations, no leukoplakia, no edema; BOT and FOM are soft/NT and without lesions/ ulcerations/ leukoplakia; dentitions is poor  VOICE: communicates effectively via voicing which is normal  NECK: no asymmetry, masses, or scars, no adenopathy, trachea midline, palpable landmarks; thyroid is soft, mobile, normal in size without nodules or tenderness; no carotid bruit, no JVD      Procedure: Otomicroscopy and Cerumen Removal  Logan Morgan MD Date: 5/13/25    The patient was seated in the procedure chair in the reclined position. The microscope was used to visualize the right EAC. Otologic instruments were used to carefully remove cerumen to visualize the canal and TM. The microscope was then turned and used to visualize the left EAC. Otologic instruments were used to carefully remove cerumen and visualize the canal and TM.    AD: cerumen completely blocking right external auditory canal.  This was removed with currette and suction.  TM visualized, intact with no middle ear effusion.  AS: cerumen partially blocking right external auditory canal.  This was removed with currette and suction.  TM visualized, intact with no middle ear effusion.        PERTINENT DATA: Audio 5/8/23            MRI IAC/Temporal Bone 6/11/2023  There is no restricted diffusion, hemorrhage  or edema.  Moderate patchy T2/FLAIR hyperintensities in the subcortical and periventricular white matter, basal ganglia and krystian likely represent chronic microvascular ischemic changes.  There is no abnormal parenchymal or leptomeningeal enhancement.  There is no mass effect or midline shift.  The basal cisterns are patent.  There is mild diffuse parenchymal volume loss.  There is no hydrocephalus or abnormal extra-axial fluid collection. There is mild scattered paranasal sinus mucosal thickening.  The 7th and 8th cranial nerves are intact.  There is normal fluid signal within the cochlea and semicircular canals.  There is no enhancing mass or abnormal signal to in the internal auditory canals or membranous labyrinths.  There is no CP angle mass.  There is a vascular loop on the right, type 1.  The mastoid air cells are clear.  Impression:  1. No acute intracranial abnormality.  2. Moderate chronic microvascular ischemic changes.  3. No acute abnormality of the internal auditory canals or membranous labyrinths.    ASSESSMENT:  Melquiades Rehman Jr. is a 69 y.o. male with history of AFib on Xarelto with epistaxis, since resolved.  Also with history of Parkinson's disease.  Asymmetric sensorineural hearing loss on prior audiogram from 2021 and again on recent audiogram. He has intermittent tinnitus and difficulty differentiating tones with the left ear when he plays music which hasn't been very bothersome to him lately.  MRI for asymmetric hearing loss within normal limits.   Cerumen impaction removed today in clinic and hearing improved to baseline.      PLAN:  - He is due for his annual audiogram - referral sent today.  - Mineral oil to EAC as need for itching.  - RTC after audiogram.      Logan oMrgan MD  [facility]  3:32 PM 05/13/2025

## 2025-05-13 NOTE — TELEMEDICINE CONSULT
[facility]  Vascular Neurology  Comprehensive Stroke Center  TeleVascular Neurology Acute Consultation Note        Consult Information  Consult to Telemedicine - Acute Stroke  Consult performed by: Wade Garcia MD  Consult ordered by: Anthony Barton MD          Consulting Provider: ANIBAL MARCANO   Current Providers  No providers found    Patient Location:  [facility] EMERGENCY DEPARTMENT Emergency Department    Spoke hospital nurse at bedside with patient assisting consultant.  Patient information was obtained from patient.       Stroke Documentation  Acute Stroke Times   Last Known Normal Date: 05/13/25  Last Known Normal Time: 1530  Stroke Team Called Time: 1628  Stroke Team Arrival Time: 1632    NIH Scale:  1a. Level of Consciousness: 0-->Alert, keenly responsive  1b. LOC Questions: 0-->Answers both questions correctly  1c. LOC Commands: 0-->Performs both tasks correctly  2. Best Gaze: 0-->Normal  3. Visual: 0-->No visual loss  4. Facial Palsy: 0-->Normal symmetrical movements  5a. Motor Arm, Left: 0-->No drift, limb holds 90 (or 45) degrees for full 10 secs  5b. Motor Arm, Right: 0-->No drift, limb holds 90 (or 45) degrees for full 10 secs  6a. Motor Leg, Left: 0-->No drift, leg holds 30 degree position for full 5 secs  6b. Motor Leg, Right: 0-->No drift, leg holds 30 degree position for full 5 secs  7. Limb Ataxia: 0-->Absent  8. Sensory: 0-->Normal, no sensory loss  9. Best Language: 0-->No aphasia, normal  10. Dysarthria: 0-->Normal  11. Extinction and Inattention (formerly Neglect): 0-->No abnormality  Total (NIH Stroke Scale): 0      Modified [place]:    [place] Coma Scale:     ABCD2 Score:    [id number] Score:    HAS -BLED Score:    ICH Score:    [place] Classification:      Blood pressure (!) 147/91, pulse 68, temperature 98.6 °F (37 °C), temperature source Temporal, resp. rate 18, SpO2 98%.      In my opinion, this was a: Tier 1; VAN Stroke Assessment: Negative     Medical  Decision Making  HPI:  69 y.o. male left sided face/arm numbness, left arm weakness, headache, blurry vision.     Imaging personally reviewed & interpreted:  CT Brain: no evidence of early or subacute ischemic changes, intracerebral hemorrhage or hyperdense vessel signs  CTA Head & Neck: cervico-cephalic vasculature negative for any clear contributing large or medium vessel occlusion related to the patient's current presentation and no targets identified for acute or urgent reperfusion therapy       Current Outpatient Medications   Medication Instructions    ACCU-CHEK GUIDE GLUCOSE METER Misc     alcohol swabs PadM 1 each, Topical (Top), Daily    alogliptin-pioglitazone 25-30 mg Tab 1 tablet, Daily    ammonium lactate 12 % Crea 1 application , 2 times daily    AUSTEDO 6 mg Tab 1 tablet, 2 times daily    blood sugar diagnostic (ACCU-CHEK GUIDE TEST STRIPS) Strp 1 strip, skin prick, Daily    blood-glucose meter kit To check BG two times daily, to use with insurance preferred meter    carbidopa-levodopa  mg (SINEMET)  mg per tablet 2 tablets, 2 times daily    colchicine (COLCRYS) 0.6 mg, Oral, 2 times daily    lancets (ACCU-CHEK SOFTCLIX LANCETS) Misc 1 each, Misc.(Non-Drug; Combo Route), Daily    lisinopriL 10 mg, Oral, Daily    metoprolol succinate (TOPROL-XL) 25 mg, Oral, Daily    pantoprazole (PROTONIX) 40 mg, Oral, Daily    polyethylene glycol (MOVIPREP) 100-7.5-2.691 gram solution 2,000 mLs, Oral, See admin instructions    predniSONE (DELTASONE) 5 MG tablet Take 2 tab po qd x 3 days then take 1 tab po qd x 2 days prn RA flare.    rosuvastatin (CRESTOR) 20 mg, Oral, Daily    selegiline HCl (ELDEPRYL) 5 mg, 2 times daily with meals    sodium chloride 5% (BHUMIKA 128) 5 % ophthalmic solution 1 drop, Both Eyes, 3 times daily    tiZANidine (ZANAFLEX) 4 mg, Daily    tofacitinib (XELJANZ XR) 11 mg Tb24 1 tablet, Oral, Daily    XARELTO 20 mg, Oral, Daily     Past Medical History:   Diagnosis Date    Atrial  fibrillation     Cataract 2017    Central stenosis of spinal canal     Chronic back pain greater than 3 months duration 10/14/2022    Constipation 09/30/2022    Degeneration of lumbar intervertebral disc 07/06/2022    Diastolic dysfunction 05/01/2023    Dysphonia 09/30/2022    Dystrophic nail 06/19/2023    Erectile dysfunction 09/30/2022    Fatty liver     GERD (gastroesophageal reflux disease) 09/30/2022    H/O cataract removal with insertion of prosthetic lens     Hearing loss 40 years    History of CVA (cerebrovascular accident) 09/30/2022    TIA    History of radial keratotomy 01/01/1998    HTN (hypertension)     Hyperlipidemia 09/30/2022    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/30/2022    Microhematuria 03/30/2023    Overgrown toenails 06/19/2023    Parkinson's disease     Peyronie disease     Polyneuropathy associated with underlying disease 06/19/2023    Rheumatoid arthritis 1/10/1994    Rheumatoid arthritis, unspecified     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 Patient already on xarelto.Continue xarelto  Plan on stat CTA @ spoke to r/o high risk vascular lesion requiring intervention or further monitoring and transfer (e.g symptomatic stenosis of carotid requiring surgical consult, etc)    Lytics recommendation: Thrombolytic therapy not recommended due to Patient back to neurological baseline    Thrombectomy recommendation: No; at this time symptoms not suggestive of large vessel occlusion  Placement recommendation: pending further studies               ROS  Physical Exam  Past Medical History:   Diagnosis Date    Atrial fibrillation     Cataract 2017    Central stenosis of spinal canal     Chronic back pain greater than 3 months duration 10/14/2022    Constipation 09/30/2022     Degeneration of lumbar intervertebral disc 07/06/2022    Diastolic dysfunction 05/01/2023    Dysphonia 09/30/2022    Dystrophic nail 06/19/2023    Erectile dysfunction 09/30/2022    Fatty liver     GERD (gastroesophageal reflux disease) 09/30/2022    H/O cataract removal with insertion of prosthetic lens     Hearing loss 40 years    History of CVA (cerebrovascular accident) 09/30/2022    TIA    History of radial keratotomy 01/01/1998    HTN (hypertension)     Hyperlipidemia 09/30/2022    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/30/2022    Microhematuria 03/30/2023    Overgrown toenails 06/19/2023    Parkinson's disease     Peyronie disease     Polyneuropathy associated with underlying disease 06/19/2023    Rheumatoid arthritis 1/10/1994    Rheumatoid arthritis, unspecified     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    Spinal stenosis of lumbar region 09/30/2022    Type 2 diabetes mellitus without complications     Unspecified macular degeneration     Unspecified macular degeneration      Past Surgical History:   Procedure Laterality Date    BACK SURGERY Left 04/2024    CATARACT EXTRACTION W/  INTRAOCULAR LENS IMPLANT Bilateral     EPIDURAL STEROID INJECTION INTO LUMBAR SPINE      exc cyst Lt ear      EXTERNAL EAR SURGERY  2019    EYE SURGERY      INJECTION OF ANESTHETIC AGENT AROUND MEDIAL BRANCH NERVES INNERVATING LUMBAR FACET JOINT Bilateral 11/16/2022    Procedure: Block-nerve-medial branch-lumbar;  Surgeon: Gertrude Blanco MD;  Location: [facility] OR;  Service: Pain Management;  Laterality: Bilateral;  Bilateral L4-L5...Patient is requesting to be first case    INJECTION OF ANESTHETIC AGENT AROUND MEDIAL BRANCH NERVES INNERVATING LUMBAR FACET JOINT Bilateral 11/22/2023    Procedure: Block-nerve-medial branch-lumbar;  Surgeon: Gertrude Blanco MD;  Location: [facility] OR;  Service: Pain Management;  Laterality: Bilateral;  Bilateral MBB L3-5    INJECTION OF ANESTHETIC AGENT AROUND MEDIAL BRANCH NERVES INNERVATING LUMBAR FACET JOINT Bilateral  01/24/2024    Procedure: Block-nerve-medial branch-lumbar;  Surgeon: Gertrude Blanco MD;  Location: [facility] OR;  Service: Pain Management;  Laterality: Bilateral;  Bilateral MBB L3-5    KNEE ARTHROSCOPY Right     RADIOFREQUENCY ABLATION Left 03/27/2024    Procedure: Radiofrequency Ablation;  Surgeon: Gertrude Blanco MD;  Location: [facility] OR;  Service: Pain Management;  Laterality: Left;  RFA Lt L3-L5    RADIOFREQUENCY ABLATION Right 04/25/2024    Procedure: RADIOFREQUENCY ABLATION / Right L3 L5;  Surgeon: Gertrude Blanco MD;  Location: [facility] OR;  Service: Pain Management;  Laterality: Right;  Right RFA L3-L5    VASECTOMY  1998     Family History   Problem Relation Name Age of Onset    Heart failure Mother delta     Coronary artery disease Mother delta     Glaucoma Father      Alzheimer's disease Father      Emphysema Father      Autoimmune disease Sister      Pneumonia Sister Jenifer     Dementia Sister Jenifer        Diagnoses  Problem Noted   Episode of Transient Neurologic Symptoms 5/13/2025       Wade Garcia MD      Emergent/Acute neurological consultation requested by spoke provider due to critical concerns for possible cerebrovascular event that could result in permanent loss of neurologic/bodily function, severe disability or death of this patient.  Immediate/timely evaluation by a highly prepared expert is paramount for optimal outcomes  High risk for neurological deterioration if not properly diagnosed  High risk for neurological deterioration if not treated promplty/as soon as possible  Complex diagnostic evaluation may be required (advanced imaging)  High risk treatment options (thrombolytics and/or thrombectomy)    Patient care was coordinated with spoke provider, including but not limted to    Discussing likely diagnosis/etiology of symptoms  Making recommendations for further diagnostic studies  Making recommendations for intravenous thrombolytics or other advanced therapies  Making recommendations  for disposition (admission/transfer for higher level of care)      Neurology consultation requested by spoke provider. Audiovisual encounter with the patient performed using a secure connection.  Results and impressions from the visit are documented on this note and were communicated to the consulting provider/team via direct communication. The note has been shared for addition to the patients electronic medical record.

## 2025-05-13 NOTE — SUBJECTIVE & OBJECTIVE
HPI:  69 y.o. male left sided face/arm numbness, left arm weakness, headache, blurry vision.     Imaging personally reviewed & interpreted:  CT Brain: no evidence of early or subacute ischemic changes, intracerebral hemorrhage or hyperdense vessel signs  CTA Head & Neck: cervico-cephalic vasculature negative for any clear contributing large or medium vessel occlusion related to the patient's current presentation and no targets identified for acute or urgent reperfusion therapy       Current Outpatient Medications   Medication Instructions    ACCU-CHEK GUIDE GLUCOSE METER Misc     alcohol swabs PadM 1 each, Topical (Top), Daily    alogliptin-pioglitazone 25-30 mg Tab 1 tablet, Daily    ammonium lactate 12 % Crea 1 application , 2 times daily    AUSTEDO 6 mg Tab 1 tablet, 2 times daily    blood sugar diagnostic (ACCU-CHEK GUIDE TEST STRIPS) Strp 1 strip, skin prick, Daily    blood-glucose meter kit To check BG two times daily, to use with insurance preferred meter    carbidopa-levodopa  mg (SINEMET)  mg per tablet 2 tablets, 2 times daily    colchicine (COLCRYS) 0.6 mg, Oral, 2 times daily    lancets (ACCU-CHEK SOFTCLIX LANCETS) Misc 1 each, Misc.(Non-Drug; Combo Route), Daily    lisinopriL 10 mg, Oral, Daily    metoprolol succinate (TOPROL-XL) 25 mg, Oral, Daily    pantoprazole (PROTONIX) 40 mg, Oral, Daily    polyethylene glycol (MOVIPREP) 100-7.5-2.691 gram solution 2,000 mLs, Oral, See admin instructions    predniSONE (DELTASONE) 5 MG tablet Take 2 tab po qd x 3 days then take 1 tab po qd x 2 days prn RA flare.    rosuvastatin (CRESTOR) 20 mg, Oral, Daily    selegiline HCl (ELDEPRYL) 5 mg, 2 times daily with meals    sodium chloride 5% (BHUMIKA 128) 5 % ophthalmic solution 1 drop, Both Eyes, 3 times daily    tiZANidine (ZANAFLEX) 4 mg, Daily    tofacitinib (XELJANZ XR) 11 mg Tb24 1 tablet, Oral, Daily    XARELTO 20 mg, Oral, Daily     Past Medical History:   Diagnosis Date    Atrial fibrillation      Cataract 2017    Central stenosis of spinal canal     Chronic back pain greater than 3 months duration 10/14/2022    Constipation 09/30/2022    Degeneration of lumbar intervertebral disc 07/06/2022    Diastolic dysfunction 05/01/2023    Dysphonia 09/30/2022    Dystrophic nail 06/19/2023    Erectile dysfunction 09/30/2022    Fatty liver     GERD (gastroesophageal reflux disease) 09/30/2022    H/O cataract removal with insertion of prosthetic lens     Hearing loss 40 years    History of CVA (cerebrovascular accident) 09/30/2022    TIA    History of radial keratotomy 01/01/1998    HTN (hypertension)     Hyperlipidemia 09/30/2022    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/30/2022    Microhematuria 03/30/2023    Overgrown toenails 06/19/2023    Parkinson's disease     Peyronie disease     Polyneuropathy associated with underlying disease 06/19/2023    Rheumatoid arthritis 1/10/1994    Rheumatoid arthritis, unspecified     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 Patient already on xarelto.Continue xarelto  Plan on stat CTA @ spoke to r/o high risk vascular lesion requiring intervention or further monitoring and transfer (e.g symptomatic stenosis of carotid requiring surgical consult, etc)    Lytics recommendation: Thrombolytic therapy not recommended due to Patient back to neurological baseline    Thrombectomy recommendation: No; at this time symptoms not suggestive of large vessel occlusion  Placement recommendation: pending further studies

## 2025-05-13 NOTE — ED PROVIDER NOTES
"Encounter Date: 5/13/2025       History     Chief Complaint   Patient presents with    Numbness     Pt presents to ER STATING "I THINK I'M HAVING A STROKE"  CO LT SIDED FACIAL NUMBNESS/HA/BLURRED VISION AND LT ARM WEAKNESS,  X 30 MIN.   NO FACIAL DROOP, NO SLURRED SPEECH , SLIGHT LT ARM DRIFT NOTED. CODE STROKE CALLED @16:05.   @16:10.  DR MARCANO IN TO EVAL PT IN TRIAGE @16:11 PT TO CT 16:17.  TELE NEURO CALLED @ 16:20.  DR JULIAN NOTIFIED @16:26.  PT IS ON BLOOD THINNER.     Melquiades Rehman is a 68 yo M w Pmhx of A fib on Xarelto, CVA in 2022, Parkinsons, ILD, RA, and DM2 who presented to ED on 5/13/25 from ENT clinic complaining of left sided facial numbness that started following his clinic visit. He states he also experienced LUE weakness and blurry vision but this has since resolved. There was also mention of leftward gaze during clinic visit, however this had resolved on arrival, but also had extensive cerumen removal.       Review of patient's allergies indicates:   Allergen Reactions    Sarilumab Other (See Comments)     Other reaction(s): fainting  Johnzara     Past Medical History:   Diagnosis Date    Atrial fibrillation     Cataract 2017    Central stenosis of spinal canal     Chronic back pain greater than 3 months duration 10/14/2022    Constipation 09/30/2022    Degeneration of lumbar intervertebral disc 07/06/2022    Diastolic dysfunction 05/01/2023    Dysphonia 09/30/2022    Dystrophic nail 06/19/2023    Erectile dysfunction 09/30/2022    Fatty liver     GERD (gastroesophageal reflux disease) 09/30/2022    H/O cataract removal with insertion of prosthetic lens     Hearing loss 40 years    History of CVA (cerebrovascular accident) 09/30/2022    TIA    History of radial keratotomy 01/01/1998    HTN (hypertension)     Hyperlipidemia 09/30/2022    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/30/2022    Microhematuria 03/30/2023    Overgrown toenails 06/19/2023    Parkinson's disease     Peyronie disease     " Polyneuropathy associated with underlying disease 06/19/2023    Rheumatoid arthritis 1/10/1994    Rheumatoid arthritis, unspecified     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    Spinal stenosis of lumbar region 09/30/2022    Type 2 diabetes mellitus without complications     Unspecified macular degeneration     Unspecified macular degeneration      Past Surgical History:   Procedure Laterality Date    BACK SURGERY Left 04/2024    CATARACT EXTRACTION W/  INTRAOCULAR LENS IMPLANT Bilateral     EPIDURAL STEROID INJECTION INTO LUMBAR SPINE      exc cyst Lt ear      EXTERNAL EAR SURGERY  2019    EYE SURGERY      INJECTION OF ANESTHETIC AGENT AROUND MEDIAL BRANCH NERVES INNERVATING LUMBAR FACET JOINT Bilateral 11/16/2022    Procedure: Block-nerve-medial branch-lumbar;  Surgeon: Gertrude Blanco MD;  Location: [facility] OR;  Service: Pain Management;  Laterality: Bilateral;  Bilateral L4-L5...Patient is requesting to be first case    INJECTION OF ANESTHETIC AGENT AROUND MEDIAL BRANCH NERVES INNERVATING LUMBAR FACET JOINT Bilateral 11/22/2023    Procedure: Block-nerve-medial branch-lumbar;  Surgeon: Gertrude Blanco MD;  Location: [facility] OR;  Service: Pain Management;  Laterality: Bilateral;  Bilateral MBB L3-5    INJECTION OF ANESTHETIC AGENT AROUND MEDIAL BRANCH NERVES INNERVATING LUMBAR FACET JOINT Bilateral 01/24/2024    Procedure: Block-nerve-medial branch-lumbar;  Surgeon: Gertrude Blanco MD;  Location: [facility] OR;  Service: Pain Management;  Laterality: Bilateral;  Bilateral MBB L3-5    KNEE ARTHROSCOPY Right     RADIOFREQUENCY ABLATION Left 03/27/2024    Procedure: Radiofrequency Ablation;  Surgeon: Gertrude Blanco MD;  Location: [facility] OR;  Service: Pain Management;  Laterality: Left;  RFA Lt L3-L5    RADIOFREQUENCY ABLATION Right 04/25/2024    Procedure: RADIOFREQUENCY ABLATION / Right L3 L5;  Surgeon: Gertrude Blanco MD;  Location: [facility];  Service: Pain Management;  Laterality: Right;  Right RFA L3-L5    VASECTOMY  1998      Family History   Problem Relation Name Age of Onset    Heart failure Mother delta     Coronary artery disease Mother delta     Glaucoma Father      Alzheimer's disease Father      Emphysema Father      Autoimmune disease Sister      Pneumonia Sister Jenifer     Dementia Sister Jenifer      Social History[1]  Review of Systems   Constitutional:  Negative for fatigue and fever.   HENT:  Negative for nosebleeds, rhinorrhea and trouble swallowing.    Eyes:  Positive for visual disturbance. Negative for pain.   Respiratory:  Negative for chest tightness and shortness of breath.    Cardiovascular:  Negative for chest pain and palpitations.   Gastrointestinal:  Negative for abdominal pain and blood in stool.   Musculoskeletal:  Negative for neck pain and neck stiffness.   Neurological:  Positive for weakness, numbness and headaches. Negative for dizziness, syncope, facial asymmetry, speech difficulty and light-headedness.   Psychiatric/Behavioral:  Negative for confusion.        Physical Exam     Initial Vitals [05/13/25 1626]   BP Pulse Resp Temp SpO2   (!) 147/91 68 18 98.6 °F (37 °C) 98 %      MAP       --         Physical Exam    Nursing note and vitals reviewed.  Constitutional: He appears well-developed. He is not diaphoretic. He is cooperative.  Non-toxic appearance. No distress.   HENT:   Head: Normocephalic and atraumatic.   Eyes: Pupils are equal, round, and reactive to light. No scleral icterus.   Neck: No JVD present.   Normal range of motion.  Cardiovascular:  Normal rate and intact distal pulses. An irregularly irregular rhythm present.           No murmur heard.  Pulmonary/Chest: Breath sounds normal. No respiratory distress. He has no wheezes. He has no rhonchi. He has no rales. He exhibits no tenderness.   Musculoskeletal:      Cervical back: Normal range of motion.     Neurological: He is alert and oriented to person, place, and time. He displays tremor. A sensory deficit is present. No cranial nerve  deficit. He displays no seizure activity. GCS score is 15. GCS eye subscore is 4. GCS verbal subscore is 5. GCS motor subscore is 6.   Ambulates in a wheelchair   At patient baseline    Psychiatric: He has a normal mood and affect. His behavior is normal. Thought content normal.         ED Course   Procedures  Labs Reviewed   COMPREHENSIVE METABOLIC PANEL - Abnormal       Result Value    Sodium 139      Potassium 4.2      Chloride 102      CO2 26      Glucose 119 (*)     Blood Urea Nitrogen 19.9      Creatinine 1.71 (*)     Calcium 9.7      Protein Total 7.8 (*)     Albumin 4.2      Globulin 3.6 (*)     Albumin/Globulin Ratio 1.2      Bilirubin Total 0.4      ALP 61      ALT 11      AST 20      eGFR 43      Anion Gap 11.0      BUN/Creatinine Ratio 12     PROTIME-INR - Abnormal    PT 20.2 (*)     INR 1.7 (*)     Narrative:     Protimes are used to monitor anticoagulant agents such as warfarin. PT INR values are based on the current patient normal mean and the SUMMER value for the specific instrument reagent used.  **Routine theraputic target values for the INR are 2.0-3.0**   LIPID PANEL - Abnormal    Cholesterol Total 159      HDL Cholesterol 45      Triglyceride 214 (*)     Cholesterol/HDL Ratio 4      Very Low Density Lipoprotein 43      LDL Cholesterol 71.00     CBC WITH DIFFERENTIAL - Abnormal    WBC 5.70      RBC 4.65 (*)     Hgb 15.1      Hct 43.7      MCV 94.0      MCH 32.5 (*)     MCHC 34.6      RDW 13.1      Platelet 233      MPV 9.8      Neut % 37.9      Lymph % 43.2      Mono % 17.0      Eos % 1.1      Basophil % 0.4      Imm Grans % 0.4      Neut # 2.17      Lymph # 2.46      Mono # 0.97      Eos # 0.06      Baso # 0.02      Imm Gran # 0.02      NRBC% 0.0     TSH - Normal    TSH 2.733     CBC W/ AUTO DIFFERENTIAL    Narrative:     The following orders were created for panel order CBC W/ AUTO DIFFERENTIAL.  Procedure                               Abnormality         Status                     ---------                                -----------         ------                     CBC with Differential[[id number]]       Abnormal            Final result                 Please view results for these tests on the individual orders.   EXTRA TUBES    Narrative:     The following orders were created for panel order EXTRA TUBES.  Procedure                               Abnormality         Status                     ---------                               -----------         ------                     Light Green Top Hold[[id number]]                            Final result               Lavender Top Hold[[id number]]                               Final result               Gold Top Hold[[id number]]                                   Final result                 Please view results for these tests on the individual orders.   LIGHT GREEN TOP HOLD    Extra Tube Hold for add-ons.     LAVENDER TOP HOLD    Extra Tube Hold for add-ons.     GOLD TOP HOLD    Extra Tube Hold for add-ons.     POCT GLUCOSE, HAND-HELD DEVICE        ECG Results              ECG 12 lead (In process)        Collection Time Result Time QRS Duration [facility]HS QTC Calculation    05/13/25 16:32:57 05/13/25 16:57:22 88 396                     In process by Interface, Lab In [facility] (05/13/25 16:57:30)                   Narrative:    Test Reason : R29.818,    Vent. Rate :  64 BPM     Atrial Rate :  64 BPM     P-R Int : 136 ms          QRS Dur :  88 ms      QT Int : 384 ms       P-R-T Axes :  12 -37   8 degrees    QTcB Int : 396 ms    Normal sinus rhythm  Left axis deviation  Abnormal ECG  When compared with ECG of 07-May-2025 12:06,  Nonspecific T wave abnormality no longer evident in Lateral leads    Referred By: AAAREFERRAL SELF           Confirmed By:                                   Imaging Results              CTA Head and Neck (xpd) (Final result)  Result time 05/13/25 17:45:59      Final result by Linda Dorsey MD (05/13/25 17:45:59)                    Impression:      No large vessel occlusion or flow-limiting stenosis.      Electronically signed by: Linda Dorsey  Date:    05/13/2025  Time:    17:45               Narrative:    EXAMINATION:  CTA HEAD AND NECK (XPD)    CLINICAL HISTORY:  Stroke/TIA, determine embolic source;Rule out carotid stenosis;    TECHNIQUE:  Axial images obtained through the cervical region and [place] before and after the administration of intravenous contrast.    Coronal, sagittal, MIP and 3D reconstructions were obtained from the axial data.    Automatic exposure control was utilized to limit radiation dose.    Radiation Dose:    Total DLP: 18 80 mGy*cm    COMPARISON:  CT head dated 05/13/2025    FINDINGS:  Head CT with contrast:    No interval changes when compared to the previous CT.    No enhancing abnormalities.    If present, stenosis of the carotid bulbs is measured based on NASCET criteria,    i.e. area of maximal stenosis compared to the cervical ICA distal to the bulb.    Cervical CTA:    The origins of the great vessels are patent with mild scattered calcifications.    The common carotid arteries are patent.  There are calcifications at the carotid bulbs and proximal internal carotid arteries without hemodynamically significant stenosis.  The internal arteries are patent.    The vertebral arteries are patent.    Intracranial CTA:    There are calcifications in the course carotid siphons without hemodynamically significant stenosis.  The middle cerebral arteries and anterior cerebral arteries are patent    The vertebral arteries, basilar artery and posterior cerebral arteries are patent    The dural venous sinuses are patent.                                       CT HEAD FOR CODE STROKE (Final result)  Result time 05/13/25 16:37:48      Final result by Lidia Dominguez MD (05/13/25 16:37:48)                   Impression:      Chronic age-related changes.  No acute process    Findings were relayed to the  emergency department physician Dr. Huynh      Electronically signed by: Tyler Dominguez  Date:    05/13/2025  Time:    16:37               Narrative:    EXAMINATION:  CT HEAD FOR CODE STROKE    CLINICAL HISTORY:  Neuro deficit, acute, stroke suspected;    TECHNIQUE:  Multiple axial images were obtained from the base of the brain to the vertex without contrast administration.  Sagittal and coronal reconstructions were performed..Automatic exposure control is utilized to reduce patient radiation exposure.    COMPARISON:  05/01/2025    FINDINGS:  There is no intracranial mass or lesion seen.  No hemorrhage is seen.  No acute infarct is seen.  There is some cerebral atrophy seen.  There is some compensatory ventricular dilatation and periventricular white matter changes consistent with the patient's age.  Calvarium is intact.  The posterior fossa is unremarkable..  The visualized portions of the paranasal sinuses show no acute abnormality.                                       Medications   iohexoL (OMNIPAQUE 350) 350 mg iodine/mL injection (75 mLs Intravenous Given 5/13/25 1715)     Medical Decision Making  Code Stroke called   CT head wo contrast negative for ICH   Patient states symptoms have resolved completely in fast track holding   Tele stroke consulted recommended CTA head and neck; does not recommend anti-platelet therapy unless LVO or significant carotid stenosis is visualized; if negative stable for discharge. Stated MRI is not absolutely necessary at this time give complete resolution of symptoms.    CTA head and neck negative for LVO and significant flow limiting stenosis   Offered patient admission for TIA work-up, as previous 2 ED visits, and patient refused admission for a 3rd time     Amount and/or Complexity of Data Reviewed  Labs: ordered.  Radiology: ordered.    Risk  Prescription drug management.                              Thrombolysis Candidate? No, Current use of direct thrombin inhibitors  (dabigatran) or direct factor Xa inhibitors within 48 hours Delays to Thrombolysis?  Not Applicable        Clinical Impression:  Final diagnoses:  [G45.9] TIA (transient ischemic attack) (Primary)  [G20.A1] Parkinson's disease, unspecified whether dyskinesia present, unspecified whether manifestations fluctuate  [R20.2] Paresthesia                     [1]   Social History  Tobacco Use    Smoking status: Former     Current packs/day: 0.00     Average packs/day: 1 pack/day for 15.0 years (15.0 ttl pk-yrs)     Types: Cigarettes     Start date: 1972     Quit date: 1987     Years since quittin.3     Passive exposure: Past    Smokeless tobacco: Never   Substance Use Topics    Alcohol use: Never     Comment: occasional    Drug use: Yes     Types: Marijuana     Comment: not using anymore        Anthony Barton MD  Resident  [phone]

## 2025-05-14 LAB
OHS QRS DURATION: 88 MS
OHS QTC CALCULATION: 396 MS
POCT GLUCOSE: 115 MG/DL (ref 70–110)

## 2025-05-15 DIAGNOSIS — R42 DIZZINESS: Primary | ICD-10-CM

## 2025-05-15 NOTE — PROGRESS NOTES
I have reviewed and agree with the resident's findings, including all diagnostic interpretations and plans as written.     Chintan Riley M.D.

## 2025-05-18 ENCOUNTER — HOSPITAL ENCOUNTER (EMERGENCY)
Facility: HOSPITAL | Age: 70
Discharge: LEFT BEFORE DISCHARGE | End: 2025-05-19
Attending: STUDENT IN AN ORGANIZED HEALTH CARE EDUCATION/TRAINING PROGRAM | Admitting: INTERNAL MEDICINE
Payer: MEDICARE

## 2025-05-18 DIAGNOSIS — R07.9 CHEST PAIN: ICD-10-CM

## 2025-05-18 DIAGNOSIS — R07.9 CHEST PAIN, UNSPECIFIED TYPE: Primary | ICD-10-CM

## 2025-05-18 LAB
ALBUMIN SERPL-MCNC: 4.3 G/DL (ref 3.4–4.8)
ALBUMIN/GLOB SERPL: 1 RATIO (ref 1.1–2)
ALP SERPL-CCNC: 72 UNIT/L (ref 40–150)
ALT SERPL-CCNC: 16 UNIT/L (ref 0–55)
ANION GAP SERPL CALC-SCNC: 12 MEQ/L
AST SERPL-CCNC: 19 UNIT/L (ref 11–45)
BASOPHILS # BLD AUTO: 0.03 X10(3)/MCL
BASOPHILS NFR BLD AUTO: 0.7 %
BILIRUB SERPL-MCNC: 0.4 MG/DL
BUN SERPL-MCNC: 21.4 MG/DL (ref 8.4–25.7)
CALCIUM SERPL-MCNC: 9.9 MG/DL (ref 8.8–10)
CHLORIDE SERPL-SCNC: 102 MMOL/L (ref 98–107)
CO2 SERPL-SCNC: 25 MMOL/L (ref 23–31)
CREAT SERPL-MCNC: 1.43 MG/DL (ref 0.72–1.25)
CREAT/UREA NIT SERPL: 15
EOSINOPHIL # BLD AUTO: 0.07 X10(3)/MCL (ref 0–0.9)
EOSINOPHIL NFR BLD AUTO: 1.6 %
ERYTHROCYTE [DISTWIDTH] IN BLOOD BY AUTOMATED COUNT: 13.4 % (ref 11.5–17)
GFR SERPLBLD CREATININE-BSD FMLA CKD-EPI: 54 ML/MIN/1.73/M2
GLOBULIN SER-MCNC: 4.3 GM/DL (ref 2.4–3.5)
GLUCOSE SERPL-MCNC: 115 MG/DL (ref 82–115)
HCT VFR BLD AUTO: 44.4 % (ref 42–52)
HGB BLD-MCNC: 15.6 G/DL (ref 14–18)
IMM GRANULOCYTES # BLD AUTO: 0.01 X10(3)/MCL (ref 0–0.04)
IMM GRANULOCYTES NFR BLD AUTO: 0.2 %
LYMPHOCYTES # BLD AUTO: 1.87 X10(3)/MCL (ref 0.6–4.6)
LYMPHOCYTES NFR BLD AUTO: 43.1 %
MCH RBC QN AUTO: 32.3 PG (ref 27–31)
MCHC RBC AUTO-ENTMCNC: 35.1 G/DL (ref 33–36)
MCV RBC AUTO: 91.9 FL (ref 80–94)
MONOCYTES # BLD AUTO: 0.7 X10(3)/MCL (ref 0.1–1.3)
MONOCYTES NFR BLD AUTO: 16.1 %
NEUTROPHILS # BLD AUTO: 1.66 X10(3)/MCL (ref 2.1–9.2)
NEUTROPHILS NFR BLD AUTO: 38.3 %
NRBC BLD AUTO-RTO: 0 %
PLATELET # BLD AUTO: 239 X10(3)/MCL (ref 130–400)
PMV BLD AUTO: 10.5 FL (ref 7.4–10.4)
POTASSIUM SERPL-SCNC: 4.2 MMOL/L (ref 3.5–5.1)
PROT SERPL-MCNC: 8.6 GM/DL (ref 5.8–7.6)
RBC # BLD AUTO: 4.83 X10(6)/MCL (ref 4.7–6.1)
SODIUM SERPL-SCNC: 139 MMOL/L (ref 136–145)
TROPONIN I SERPL-MCNC: <0.01 NG/ML (ref 0–0.04)
WBC # BLD AUTO: 4.34 X10(3)/MCL (ref 4.5–11.5)

## 2025-05-18 PROCEDURE — 99285 EMERGENCY DEPT VISIT HI MDM: CPT | Mod: 25

## 2025-05-18 PROCEDURE — 93005 ELECTROCARDIOGRAM TRACING: CPT

## 2025-05-18 PROCEDURE — 85025 COMPLETE CBC W/AUTO DIFF WBC: CPT | Performed by: PHYSICIAN ASSISTANT

## 2025-05-18 PROCEDURE — 84484 ASSAY OF TROPONIN QUANT: CPT | Performed by: PHYSICIAN ASSISTANT

## 2025-05-18 PROCEDURE — 93010 ELECTROCARDIOGRAM REPORT: CPT | Mod: ,,, | Performed by: INTERNAL MEDICINE

## 2025-05-18 PROCEDURE — 80053 COMPREHEN METABOLIC PANEL: CPT | Performed by: PHYSICIAN ASSISTANT

## 2025-05-18 NOTE — FIRST PROVIDER EVALUATION
"Medical screening examination initiated.  I have conducted a focused provider triage encounter, findings are as follows:    Brief history of present illness:  69-year-old male presents to ED for evaluation of left-sided chest pain    Vitals:    05/18/25 1807   BP: 99/64   Pulse: 82   Resp: 18   Temp: 97.9 °F (36.6 °C)   TempSrc: Oral   SpO2: 95%   Weight: 99.8 kg (220 lb)   Height: 5' 11" (1.803 m)       Pertinent physical exam:  Patient is awake and alert and oriented.  In no acute distress.      Brief workup plan:  labs, EKG, CXR    Preliminary workup initiated; this workup will be continued and followed by the physician or advanced practice provider that is assigned to the patient when roomed.  "

## 2025-05-18 NOTE — Clinical Note
Diagnosis: Chest pain [[id number]]  Future Attending Provider: ELMIRA YIP [[id number]]  Admit to which facility:: [facility] [[id number]]

## 2025-05-19 VITALS
TEMPERATURE: 98 F | SYSTOLIC BLOOD PRESSURE: 155 MMHG | OXYGEN SATURATION: 96 % | RESPIRATION RATE: 18 BRPM | HEIGHT: 71 IN | WEIGHT: 220 LBS | HEART RATE: 74 BPM | DIASTOLIC BLOOD PRESSURE: 94 MMHG | BODY MASS INDEX: 30.8 KG/M2

## 2025-05-19 LAB
OHS QRS DURATION: 80 MS
OHS QTC CALCULATION: 415 MS
TROPONIN I SERPL-MCNC: 0.01 NG/ML (ref 0–0.04)

## 2025-05-19 PROCEDURE — 25000003 PHARM REV CODE 250: Performed by: STUDENT IN AN ORGANIZED HEALTH CARE EDUCATION/TRAINING PROGRAM

## 2025-05-19 PROCEDURE — 84484 ASSAY OF TROPONIN QUANT: CPT | Performed by: STUDENT IN AN ORGANIZED HEALTH CARE EDUCATION/TRAINING PROGRAM

## 2025-05-19 RX ORDER — ASPIRIN 325 MG
325 TABLET, DELAYED RELEASE (ENTERIC COATED) ORAL
Status: COMPLETED | OUTPATIENT
Start: 2025-05-19 | End: 2025-05-19

## 2025-05-19 RX ADMIN — ASPIRIN 325 MG: 325 TABLET, COATED ORAL at 02:05

## 2025-05-19 NOTE — ED PROVIDER NOTES
Encounter Date: 5/18/2025    SCRIBE #1 NOTE: I, Michael Torres, am scribing for, and in the presence of,  Efren Oliver MD. I have scribed the following portions of the note - Other sections scribed: HPI,ROS,PE.       History     Chief Complaint   Patient presents with    Chest Pain     Pt reports left sided chest pain earlier that has since gone away.      68 y/o male with PMHx of Parkinson's disease, Afib, CVA, HTN, HLD, GERD, and DM presents to ED c/o chest pain onset tonight. Pt reports for the past several weeks, his blood pressure has been running low. He reports he was told by his cardiologist to take his BP meds as needed. He reports tonight, he started having a stabbing pain to the left his of his chest. He denies any associated shortness of breath, leg swelling, diaphoresis, nausea, vomiting, diarrhea, fever, or chills.     Cardiology: Susana Arechiga MD     The history is provided by the patient and medical records.     Review of patient's allergies indicates:   Allergen Reactions    Sarilumab Other (See Comments)     Other reaction(s): fainting  Kevzara     Past Medical History:   Diagnosis Date    Atrial fibrillation     Cataract 2017    Central stenosis of spinal canal     Chronic back pain greater than 3 months duration 10/14/2022    Constipation 09/30/2022    Degeneration of lumbar intervertebral disc 07/06/2022    Diastolic dysfunction 05/01/2023    Dysphonia 09/30/2022    Dystrophic nail 06/19/2023    Erectile dysfunction 09/30/2022    Fatty liver     GERD (gastroesophageal reflux disease) 09/30/2022    H/O cataract removal with insertion of prosthetic lens     Hearing loss 40 years    History of CVA (cerebrovascular accident) 09/30/2022    TIA    History of radial keratotomy 01/01/1998    HTN (hypertension)     Hyperlipidemia 09/30/2022    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/30/2022    Microhematuria 03/30/2023    Overgrown toenails 06/19/2023    Parkinson's disease     Peyronie disease      Polyneuropathy associated with underlying disease 06/19/2023    Rheumatoid arthritis 1/10/1994    Rheumatoid arthritis, unspecified     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    Spinal stenosis of lumbar region 09/30/2022    Type 2 diabetes mellitus without complications     Unspecified macular degeneration     Unspecified macular degeneration      Past Surgical History:   Procedure Laterality Date    BACK SURGERY Left 04/2024    CATARACT EXTRACTION W/  INTRAOCULAR LENS IMPLANT Bilateral     EPIDURAL STEROID INJECTION INTO LUMBAR SPINE      exc cyst Lt ear      EXTERNAL EAR SURGERY  2019    EYE SURGERY      INJECTION OF ANESTHETIC AGENT AROUND MEDIAL BRANCH NERVES INNERVATING LUMBAR FACET JOINT Bilateral 11/16/2022    Procedure: Block-nerve-medial branch-lumbar;  Surgeon: Gertrude Blanco MD;  Location: [facility] OR;  Service: Pain Management;  Laterality: Bilateral;  Bilateral L4-L5...Patient is requesting to be first case    INJECTION OF ANESTHETIC AGENT AROUND MEDIAL BRANCH NERVES INNERVATING LUMBAR FACET JOINT Bilateral 11/22/2023    Procedure: Block-nerve-medial branch-lumbar;  Surgeon: Gertrude Blanco MD;  Location: [facility] OR;  Service: Pain Management;  Laterality: Bilateral;  Bilateral MBB L3-5    INJECTION OF ANESTHETIC AGENT AROUND MEDIAL BRANCH NERVES INNERVATING LUMBAR FACET JOINT Bilateral 01/24/2024    Procedure: Block-nerve-medial branch-lumbar;  Surgeon: Gertrude Blanco MD;  Location: [facility] OR;  Service: Pain Management;  Laterality: Bilateral;  Bilateral MBB L3-5    KNEE ARTHROSCOPY Right     RADIOFREQUENCY ABLATION Left 03/27/2024    Procedure: Radiofrequency Ablation;  Surgeon: Gertrude Blanco MD;  Location: [facility] OR;  Service: Pain Management;  Laterality: Left;  RFA Lt L3-L5    RADIOFREQUENCY ABLATION Right 04/25/2024    Procedure: RADIOFREQUENCY ABLATION / Right L3 L5;  Surgeon: Gertrude Blanco MD;  Location: [facility];  Service: Pain Management;  Laterality: Right;  Right RFA L3-L5    VASECTOMY  1998      Family History   Problem Relation Name Age of Onset    Heart failure Mother delta     Coronary artery disease Mother delta     Glaucoma Father      Alzheimer's disease Father      Emphysema Father      Autoimmune disease Sister      Pneumonia Sister Jenifer     Dementia Sister Jenifer      Social History[1]  Review of Systems   Constitutional:  Negative for chills, diaphoresis and fever.   Respiratory:  Negative for shortness of breath.    Cardiovascular:  Positive for chest pain. Negative for leg swelling.   Gastrointestinal:  Negative for abdominal pain, diarrhea, nausea and vomiting.       Physical Exam     Initial Vitals [05/18/25 1807]   BP Pulse Resp Temp SpO2   99/64 82 18 97.9 °F (36.6 °C) 95 %      MAP       --         Physical Exam    Constitutional: He appears well-developed and well-nourished. He is not diaphoretic. No distress.   HENT:   Head: Normocephalic and atraumatic.   Right Ear: External ear normal.   Left Ear: External ear normal.   Nose: Nose normal.   Eyes: EOM are normal. Pupils are equal, round, and reactive to light. Right eye exhibits no discharge. Left eye exhibits no discharge.   Cardiovascular:  Normal rate, regular rhythm and normal heart sounds.     Exam reveals no gallop and no friction rub.       No murmur heard.  Pulmonary/Chest: Effort normal and breath sounds normal. No respiratory distress. He has no wheezes. He has no rhonchi. He has no rales. He exhibits no tenderness.   Abdominal: Abdomen is soft. Bowel sounds are normal. He exhibits no distension and no mass. There is no abdominal tenderness. There is no rebound and no guarding.   Musculoskeletal:         General: No edema. Normal range of motion.     Neurological: He is alert and oriented to person, place, and time. No cranial nerve deficit or sensory deficit.   Skin: Skin is warm and dry. Capillary refill takes less than 2 seconds.         ED Course   Procedures  Labs Reviewed   COMPREHENSIVE METABOLIC PANEL -  Abnormal       Result Value    Sodium 139      Potassium 4.2      Chloride 102      CO2 25      Glucose 115      Blood Urea Nitrogen 21.4      Creatinine 1.43 (*)     Calcium 9.9      Protein Total 8.6 (*)     Albumin 4.3      Globulin 4.3 (*)     Albumin/Globulin Ratio 1.0 (*)     Bilirubin Total 0.4      ALP 72      ALT 16      AST 19      eGFR 54      Anion Gap 12.0      BUN/Creatinine Ratio 15     CBC WITH DIFFERENTIAL - Abnormal    WBC 4.34 (*)     RBC 4.83      Hgb 15.6      Hct 44.4      MCV 91.9      MCH 32.3 (*)     MCHC 35.1      RDW 13.4      Platelet 239      MPV 10.5 (*)     Neut % 38.3      Lymph % 43.1      Mono % 16.1      Eos % 1.6      Basophil % 0.7      Imm Grans % 0.2      Neut # 1.66 (*)     Lymph # 1.87      Mono # 0.70      Eos # 0.07      Baso # 0.03      Imm Gran # 0.01      NRBC% 0.0     TROPONIN I - Normal    Troponin-I <0.010     TROPONIN I - Normal    Troponin-I 0.014     CBC W/ AUTO DIFFERENTIAL    Narrative:     The following orders were created for panel order CBC auto differential.  Procedure                               Abnormality         Status                     ---------                               -----------         ------                     CBC with Differential[[id number]]       Abnormal            Final result                 Please view results for these tests on the individual orders.          Imaging Results              X-Ray Chest 1 View (Final result)  Result time 05/18/25 21:15:40      Final result by Neal Ibarra MD (05/18/25 21:15:40)                   Impression:      No acute cardiopulmonary process identified.      Electronically signed by: Neal Ibarra  Date:    05/18/2025  Time:    21:15               Narrative:    EXAMINATION:  XR CHEST 1 VIEW    CLINICAL HISTORY:  Chest pain, unspecified    TECHNIQUE:  One view    COMPARISON:  May 7, 2025.    FINDINGS:  Cardiopericardial silhouette appearance is similar.  Lungs are without dense focal or segmental  consolidation, congestive process, pleural effusions or pneumothorax.                                       Medications   aspirin EC tablet 325 mg (325 mg Oral Given 5/19/25 0221)             Scribe Attestation:   Scribe #1: I performed the above scribed service and the documentation accurately describes the services I performed. I attest to the accuracy of the note.    Attending Attestation:           Physician Attestation for Scribe:  Physician Attestation Statement for Scribe #1: I, Efren Oliver MD, reviewed documentation, as scribed by Michael Torres in my presence, and it is both accurate and complete.         Medical Decision Making  The differential diagnosis includes, but is not limited to, Chest pain emergent diagnoses: ACS, PE, dissection, cardiac tamponade, tension pneumothorax.  Chest pain non-emergent diagnoses: Musculoskeletal, trauma, pleurisy, pneumonia, pleural effusion, GERD, other GI, neurologic, psychiatric and other non emergent diagnoses considered.        Patient presents with a chief complaint of some intermittent chest pain as well as some low blood pressures over the past several months.  No low blood pressures here in the emergency department however he does report some chest discomfort that comes and goes.  Initial troponin undetectable that has labs are unrevealing however delta trop is minimally elevated.  Discussed with the patient as well as Cardiology evidently has not been seen in several years by Cardiology they were okay with the admitting him for monitoring, serial troponins.  That has that has with the initial plan and patient was admitted to observation however he is now changes mind and wishes to leave.  Discussed with the patient my concerns he was still like to leave.  He is awake alert GCS 15 has capacity.  We will discharge against medical advice.  He is informed that he is welcome to return emergency department any time for any worsening symptoms.  Had discussed  patient with Pancho on-call for [facility] he will arrange close follow up for patient.    Amount and/or Complexity of Data Reviewed  External Data Reviewed: notes.     Details: See ED course  Labs: ordered. Decision-making details documented in ED Course.  Radiology: ordered and independent interpretation performed. Decision-making details documented in ED Course.  ECG/medicine tests: ordered and independent interpretation performed. Decision-making details documented in ED Course.    Risk  OTC drugs.  Prescription drug management.            ED Course as of 05/19/25 0424   Sun May 18, 2025   2241 Chart review reveals patient was seen at outside hospital 05/13/2025 diagnosed with a TIA.  History of AFib on Xarelto, ultimately discharged home. [MM]   2241 Troponin I: <0.010 [MM]   2241 Comprehensive metabolic panel(!)  No significant electrolyte abnormality or renal dysfunction [MM]   2241 CBC auto differential(!)  No significant leukocytosis or anemia [MM]   2242 EKG done at 6:12 p.m. shows sinus rhythm rate of 83 .  Left axis deviation.  No obvious ST elevation or depression. [MM]   Mon May 19, 2025   0135 CBC auto differential(!)  That has significant electrolyte abnormality or renal dysfunction [MM]   0136 Troponin I: 0.014 [MM]   0142 Paged [facility]  [ABBE]   8901 Discussed patient with Pancho agrees with the admit to observation due to not seeing patient in clinic for the past 3 or so years.  Patient initially is agreeable to stay however he is now change his mind he would like to leave.  Discussed that I am concerned about that has elevating troponin.  He is still like to leave.  He will leave against medical advice did discuss risks including worsening cardiac function, MI, death.  He voiced understanding.  He is encouraged take and baby aspirin daily.  We will contact [facility] to schedule close follow up. [MM]      ED Course User Index  [ABBE] Michael Torres  [MM] Efren Oliver MD                            Clinical Impression:  Final diagnoses:  [R07.9] Chest pain  [R07.9] Chest pain, unspecified type (Primary)          ED Disposition Condition    AMA                     [1]   Social History  Tobacco Use    Smoking status: Former     Current packs/day: 0.00     Average packs/day: 1 pack/day for 15.0 years (15.0 ttl pk-yrs)     Types: Cigarettes     Start date: 1972     Quit date: 1987     Years since quittin.4     Passive exposure: Past    Smokeless tobacco: Never   Substance Use Topics    Alcohol use: Never     Comment: occasional    Drug use: Yes     Types: Marijuana     Comment: not using anymore        Efren Oliver MD  25 0424

## 2025-05-19 NOTE — DISCHARGE INSTRUCTIONS
Thanks for letting us take care of you today!  It is our goal to give you courteous care and to keep you comfortable and informed, if you have any questions before you leave I will be happy to try and answer them.    Here is some advice after your visit:    Your visit in the emergency department is NOT definitive care - please follow-up with your primary care doctor and/or specialist within 1-2 days. Please return to the emergency department if you develop worsening symptoms including: fever, chills, chest pain, shortness of breath, weakness, numbness, tingling, nausea, vomiting, inability to eat, drink, or take your medication. Please return if you have any worsening in your condition or if you have any other concerns.    If you had radiology exams like an XRAY or CT in the emergency Department the interpreation on them may be preliminary - there may be less time sensitive findings on the reports please obtain these reports within 24 hours from the hospital or by using your out on your mobile phone to access records.  Bring these to your primary care doctor and/or specialist for further review of incidental findings.    Please review any LAB WORK from your visit today with your primary care physician.    Please take an 81 mg aspirin daily.    That has the appearance a phone call from your cardiologist to schedule close follow up.

## 2025-05-20 ENCOUNTER — HOSPITAL ENCOUNTER (OUTPATIENT)
Facility: HOSPITAL | Age: 70
Discharge: HOME | End: 2025-05-21
Attending: INTERNAL MEDICINE | Admitting: INTERNAL MEDICINE
Payer: MEDICARE

## 2025-05-20 ENCOUNTER — TELEPHONE (OUTPATIENT)
Dept: CARDIOLOGY | Facility: CLINIC | Age: 70
End: 2025-05-20
Payer: MEDICARE

## 2025-05-20 DIAGNOSIS — E11.42 TYPE 2 DIABETES MELLITUS WITH PERIPHERAL NEUROPATHY: Primary | ICD-10-CM

## 2025-05-20 DIAGNOSIS — R07.9 CHEST PAIN: ICD-10-CM

## 2025-05-20 DIAGNOSIS — R29.90 EPISODE OF TRANSIENT NEUROLOGIC SYMPTOMS: ICD-10-CM

## 2025-05-20 DIAGNOSIS — N18.31 STAGE 3A CHRONIC KIDNEY DISEASE: ICD-10-CM

## 2025-05-20 DIAGNOSIS — R53.1 WEAKNESS: ICD-10-CM

## 2025-05-20 LAB
ALBUMIN SERPL-MCNC: 4.1 G/DL (ref 3.4–4.8)
ALBUMIN/GLOB SERPL: 1.2 RATIO (ref 1.1–2)
ALP SERPL-CCNC: 64 UNIT/L (ref 40–150)
ALT SERPL-CCNC: <5 UNIT/L (ref 0–55)
ANION GAP SERPL CALC-SCNC: 11 MEQ/L
AST SERPL-CCNC: 18 UNIT/L (ref 11–45)
BASOPHILS # BLD AUTO: 0.02 X10(3)/MCL
BASOPHILS NFR BLD AUTO: 0.4 %
BILIRUB SERPL-MCNC: 0.4 MG/DL
BUN SERPL-MCNC: 26.5 MG/DL (ref 8.4–25.7)
CALCIUM SERPL-MCNC: 9.5 MG/DL (ref 8.8–10)
CHLORIDE SERPL-SCNC: 108 MMOL/L (ref 98–107)
CO2 SERPL-SCNC: 20 MMOL/L (ref 23–31)
CREAT SERPL-MCNC: 1.71 MG/DL (ref 0.72–1.25)
CREAT/UREA NIT SERPL: 15
EOSINOPHIL # BLD AUTO: 0.04 X10(3)/MCL (ref 0–0.9)
EOSINOPHIL NFR BLD AUTO: 0.8 %
ERYTHROCYTE [DISTWIDTH] IN BLOOD BY AUTOMATED COUNT: 13.3 % (ref 11.5–17)
GFR SERPLBLD CREATININE-BSD FMLA CKD-EPI: 43 ML/MIN/1.73/M2
GLOBULIN SER-MCNC: 3.4 GM/DL (ref 2.4–3.5)
GLUCOSE SERPL-MCNC: 128 MG/DL (ref 82–115)
HCT VFR BLD AUTO: 42.4 % (ref 42–52)
HGB BLD-MCNC: 14.4 G/DL (ref 14–18)
IMM GRANULOCYTES # BLD AUTO: 0.02 X10(3)/MCL (ref 0–0.04)
IMM GRANULOCYTES NFR BLD AUTO: 0.4 %
LYMPHOCYTES # BLD AUTO: 1.25 X10(3)/MCL (ref 0.6–4.6)
LYMPHOCYTES NFR BLD AUTO: 23.7 %
MCH RBC QN AUTO: 31.6 PG (ref 27–31)
MCHC RBC AUTO-ENTMCNC: 34 G/DL (ref 33–36)
MCV RBC AUTO: 93 FL (ref 80–94)
MONOCYTES # BLD AUTO: 0.8 X10(3)/MCL (ref 0.1–1.3)
MONOCYTES NFR BLD AUTO: 15.2 %
NEUTROPHILS # BLD AUTO: 3.14 X10(3)/MCL (ref 2.1–9.2)
NEUTROPHILS NFR BLD AUTO: 59.5 %
NRBC BLD AUTO-RTO: 0 %
OHS QRS DURATION: 92 MS
OHS QTC CALCULATION: 416 MS
PLATELET # BLD AUTO: 230 X10(3)/MCL (ref 130–400)
PMV BLD AUTO: 9.7 FL (ref 7.4–10.4)
POTASSIUM SERPL-SCNC: 4.1 MMOL/L (ref 3.5–5.1)
PROT SERPL-MCNC: 7.5 GM/DL (ref 5.8–7.6)
RBC # BLD AUTO: 4.56 X10(6)/MCL (ref 4.7–6.1)
SODIUM SERPL-SCNC: 139 MMOL/L (ref 136–145)
TROPONIN I SERPL-MCNC: <0.01 NG/ML (ref 0–0.04)
TROPONIN I SERPL-MCNC: <0.01 NG/ML (ref 0–0.04)
WBC # BLD AUTO: 5.27 X10(3)/MCL (ref 4.5–11.5)

## 2025-05-20 PROCEDURE — 80053 COMPREHEN METABOLIC PANEL: CPT | Performed by: PHYSICIAN ASSISTANT

## 2025-05-20 PROCEDURE — 85025 COMPLETE CBC W/AUTO DIFF WBC: CPT | Performed by: PHYSICIAN ASSISTANT

## 2025-05-20 PROCEDURE — 99285 EMERGENCY DEPT VISIT HI MDM: CPT | Mod: 25

## 2025-05-20 PROCEDURE — 25000003 PHARM REV CODE 250: Performed by: PHYSICIAN ASSISTANT

## 2025-05-20 PROCEDURE — G0378 HOSPITAL OBSERVATION PER HR: HCPCS

## 2025-05-20 PROCEDURE — 84484 ASSAY OF TROPONIN QUANT: CPT | Performed by: PHYSICIAN ASSISTANT

## 2025-05-20 PROCEDURE — 93005 ELECTROCARDIOGRAM TRACING: CPT

## 2025-05-20 PROCEDURE — 93010 ELECTROCARDIOGRAM REPORT: CPT | Mod: ,,, | Performed by: INTERNAL MEDICINE

## 2025-05-20 RX ORDER — SODIUM CHLORIDE 9 MG/ML
1000 INJECTION, SOLUTION INTRAVENOUS
Status: COMPLETED | OUTPATIENT
Start: 2025-05-20 | End: 2025-05-20

## 2025-05-20 RX ADMIN — SODIUM CHLORIDE 1000 ML: 9 INJECTION, SOLUTION INTRAVENOUS at 08:05

## 2025-05-20 NOTE — TELEPHONE ENCOUNTER
----- Message from Quincy Conrad MD sent at 5/20/2025 11:05 AM CDT -----  The patient was seen in [facility] ER and recommended admission.  However, he left AMA.  If patient is having chest discomfort he needs to return to the emergency room.  ----- Message -----  From: Nina Juarez LPN  Sent: 5/19/2025   8:31 AM CDT  To: Quincy Conrad MD    Please advise, if pt needs a sooner visit. Pt was seen this month.  ----- Message -----  From: Barbara Serra  Sent: 5/19/2025   8:01 AM CDT  To: [facility] Cardiology Clinical Support Staff    Good morning,Patient called stating that he spent the night at [facility] and he was told that his enzyme for his heart is messed up and he is having a lot of chest pain.  Please advise if patient needs a sooner appt.  Thank you,Barbara

## 2025-05-20 NOTE — FIRST PROVIDER EVALUATION
"Medical screening examination initiated.  I have conducted a focused provider triage encounter, findings are as follows:    Brief history of present illness:  Male reports having lightheadedness weakness this morning the by Cardiology to emergency department for evaluation.  Patient reports recent hospitalization for chest pain in the last 2-3 days for cardiac evaluation    Vitals:    05/20/25 1335   BP: 120/77   BP Location: Left arm   Pulse: 81   Resp: 20   Temp: 97.2 °F (36.2 °C)   TempSrc: Temporal   Weight: 104.3 kg (230 lb)   Height: 5' 11" (1.803 m)       Pertinent physical exam:  Awake alert ambulatory male in triage speaks in complete sentences    Brief workup plan:  Lab work, EKG, next available room with next provider for continued care planning and final disposition    Preliminary workup initiated; this workup will be continued and followed by the physician or advanced practice provider that is assigned to the patient when roomed.  "

## 2025-05-20 NOTE — TELEPHONE ENCOUNTER
Patient was advised to return to ed for treatment and evaluation. He stated he did not know if he wanted to do that, he was again advised to ed fro treatment and evaluation. Understanding  verbalized.

## 2025-05-21 VITALS
WEIGHT: 226 LBS | BODY MASS INDEX: 31.64 KG/M2 | HEIGHT: 71 IN | SYSTOLIC BLOOD PRESSURE: 112 MMHG | DIASTOLIC BLOOD PRESSURE: 76 MMHG | OXYGEN SATURATION: 93 % | TEMPERATURE: 98 F | RESPIRATION RATE: 18 BRPM | HEART RATE: 102 BPM

## 2025-05-21 PROBLEM — R07.9 CHEST PAIN: Status: ACTIVE | Noted: 2025-05-21

## 2025-05-21 LAB
ANION GAP SERPL CALC-SCNC: 15 MMOL/L (ref 8–16)
BUN SERPL-MCNC: 22 MG/DL (ref 6–30)
CHLORIDE SERPL-SCNC: 100 MMOL/L (ref 95–110)
CREAT SERPL-MCNC: 1.9 MG/DL (ref 0.5–1.4)
GLUCOSE SERPL-MCNC: 119 MG/DL (ref 70–110)
HCT VFR BLD CALC: 46 %PCV (ref 36–54)
HGB BLD-MCNC: 16 G/DL
POC IONIZED CALCIUM: 1.23 MMOL/L (ref 1.06–1.42)
POC PTINR: 1.9 (ref 0.9–1.2)
POC TCO2 (MEASURED): 28 MMOL/L (ref 23–29)
POTASSIUM BLD-SCNC: 4.1 MMOL/L (ref 3.5–5.1)
SAMPLE: ABNORMAL
SAMPLE: ABNORMAL
SODIUM BLD-SCNC: 137 MMOL/L (ref 136–145)

## 2025-05-21 PROCEDURE — G0378 HOSPITAL OBSERVATION PER HR: HCPCS

## 2025-05-21 PROCEDURE — 96374 THER/PROPH/DIAG INJ IV PUSH: CPT

## 2025-05-21 PROCEDURE — 63600175 PHARM REV CODE 636 W HCPCS: Performed by: NURSE PRACTITIONER

## 2025-05-21 RX ORDER — HYDRALAZINE HYDROCHLORIDE 20 MG/ML
10 INJECTION INTRAMUSCULAR; INTRAVENOUS EVERY 6 HOURS PRN
Status: DISCONTINUED | OUTPATIENT
Start: 2025-05-21 | End: 2025-05-21 | Stop reason: HOSPADM

## 2025-05-21 RX ORDER — LABETALOL HYDROCHLORIDE 5 MG/ML
10 INJECTION, SOLUTION INTRAVENOUS EVERY 6 HOURS PRN
Status: DISCONTINUED | OUTPATIENT
Start: 2025-05-21 | End: 2025-05-21 | Stop reason: HOSPADM

## 2025-05-21 RX ORDER — MIDODRINE HYDROCHLORIDE 5 MG/1
5 TABLET ORAL 3 TIMES DAILY PRN
Qty: 90 TABLET | Refills: 11 | Status: SHIPPED | OUTPATIENT
Start: 2025-05-21 | End: 2026-05-21

## 2025-05-21 RX ADMIN — HYDRALAZINE HYDROCHLORIDE 10 MG: 20 INJECTION INTRAMUSCULAR; INTRAVENOUS at 04:05

## 2025-05-21 NOTE — CONSULTS
Subjective:      Patient ID: Melquiades Rehman Jr. is a 69 y.o. male.    Chief Complaint: Chest Pain (Pt co chest pain without radiation for past 3 days. Denies n/v/d/fever/chills. )    HPI  Review of Systems   Objective:     Physical Exam   Assessment:     1. Weakness    2. Chest pain           Wound 05/21/25 0357 Skin Tear Left anterior Knee (Active)   05/21/25 0357 Knee   Present on Original Admission: Y   Primary Wound Type: Skin tear   Side: Left   Orientation: anterior   Wound Approximate Age at First Assessment (Weeks):    Wound Number:    Is this injury device related?:    Incision Type:    Closure Method:    Wound Description (Comments):    Type:    Additional Comments:    Ankle-Brachial Index:    Pulses:    Removal Indication and Assessment:    Wound Outcome:    Wound Image   05/21/25 0900   Dressing Appearance Open to air 05/21/25 0900   Drainage Amount None 05/21/25 0900   Appearance Maroon;Dry 05/21/25 0900   Red (%), Wound Tissue Color 100 % 05/21/25 0900   Periwound Area Dry;Intact 05/21/25 0900   Wound Edges Irregular 05/21/25 0900   Wound Length (cm) 3 cm 05/21/25 0900   Wound Width (cm) 3 cm 05/21/25 0900   Wound Surface Area (cm^2) 7.07 cm^2 05/21/25 0900   Care Cleansed with:;Wound cleanser;Applied:;Povidone iodine 05/21/25 0900            Wound 05/21/25 0357 Abrasion(s) Right proximal;anterior Leg (Active)   05/21/25 0357 Leg   Present on Original Admission: Y   Primary Wound Type: Abrasion(s)   Side: Right   Orientation: proximal;anterior   Wound Approximate Age at First Assessment (Weeks):    Wound Number:    Is this injury device related?:    Incision Type:    Closure Method:    Wound Description (Comments):    Type:    Additional Comments:    Ankle-Brachial Index:    Pulses:    Removal Indication and Assessment:    Wound Outcome:    Wound Image   05/21/25 0900   Dressing Appearance Open to air 05/21/25 0900   Drainage Amount None 05/21/25 0900   Care Cleansed with:;Wound  cleanser;Applied:;Povidone iodine 05/21/25 0900            Wound 05/21/25 0357 Skin Tear Right (Active)   05/21/25 0357    Present on Original Admission: Y   Primary Wound Type: Skin tear   Side: Right   Orientation:    Wound Approximate Age at First Assessment (Weeks):    Wound Number:    Is this injury device related?:    Incision Type:    Closure Method:    Wound Description (Comments):    Type:    Additional Comments:    Ankle-Brachial Index:    Pulses:    Removal Indication and Assessment:    Wound Outcome:    Wound Image   05/21/25 0900   Dressing Appearance Open to air 05/21/25 0900   Drainage Amount None 05/21/25 0900   Care Cleansed with:;Wound cleanser;Applied:;Povidone iodine 05/21/25 0900       Plan:          Wocn consult-68 y/o male in with  chest discomfort x 3days.  Awake alert oriented and mobil with some issues with his parkinsons.  He lives alone with  his dog Jaimee who he reports scratches him a lot.    Consulted on the multiple cuts and scratches including the left below the knee abrasion.  See photos.  Care put in place with orders to nursing.    Probable angiogram today.  Will check on him weekly while in hospital.

## 2025-05-21 NOTE — PLAN OF CARE
Pt requested HH , [facility] list provided , [facility] selected   Referral sent to

## 2025-05-21 NOTE — PLAN OF CARE
05/21/25 1132   Discharge Assessment   Assessment Type Discharge Planning Assessment   Confirmed/corrected address, phone number and insurance Yes   Confirmed Demographics Correct on Facesheet   Source of Information patient   Communicated JANIS with patient/caregiver Yes   People in Home alone   Facility Arrived From: home   Do you expect to return to your current living situation? Yes   Do you have help at home or someone to help you manage your care at home? Yes   Who are your caregiver(s) and their phone number(s)? daughter Barbara   Prior to hospitilization cognitive status: Unable to Assess   Current cognitive status: Alert/Oriented   Walking or Climbing Stairs Difficulty no   Dressing/Bathing Difficulty no   Home Accessibility   (3 steps to enter home)   Equipment Currently Used at Home other (see comments)  (inversion table)   Do you currently have service(s) that help you manage your care at home? No   Do you take prescription medications? Yes   Do you have any problems affording any of your prescribed medications? No   Is the patient taking medications as prescribed? yes   Who is going to help you get home at discharge? fmly   How do you get to doctors appointments? car, drives self   Are you on dialysis? No   Discharge Plan A Home Health   Discharge Plan B Home Health   DME Needed Upon Discharge  other (see comments)  (tbd)   Discharge Plan discussed with: Patient   Transition of Care Barriers None   Housing Stability   In the last 12 months, was there a time when you were not able to pay the mortgage or rent on time? N   At any time in the past 12 months, were you homeless or living in a shelter (including now)? N   Transportation Needs   In the past 12 months, has lack of transportation kept you from medical appointments or from getting medications? no   In the past 12 months, has lack of transportation kept you from meetings, work, or from getting things needed for daily living? No   Food Insecurity    Within the past 12 months, you worried that your food would run out before you got the money to buy more. Never true   Within the past 12 months, the food you bought just didn't last and you didn't have money to get more. Never true   Utilities   In the past 12 months has the electric, gas, oil, or water company threatened to shut off services in your home? No   Health Literacy   How often do you need to have someone help you when you read instructions, pamphlets, or other written material from your doctor or pharmacy? Sometimes     Pt lives alone, He does have a daughter Barbara that helps  him when needed. He is independent with adls, drives. Only dme is an inversion table. He would like HH for dc. Other needs TBD.

## 2025-05-21 NOTE — H&P
[facility]  Cardiology  History and Physical     Patient Name: Melquiades Rehman Jr.  MRN: [id number]  Admission Date: 5/20/2025  Code Status: Prior   Attending Provider: Alejandro Edwards MD   Primary Care Physician: Pau Gomes, DAVIDSON  Principal Problem:<principal problem not specified>    Patient information was obtained from patient, past medical records, and ER records.     Subjective:     Chief Complaint:  chest pain     HPI:   This is a 69-year-old male, who has distantly known to Dr. Arechiga last seen in 2022, with a history of PAF, HTN, HLD, RA, DM type 2, CVA, Parkinson's disease.  He presented to the ER on 05/20/2025 with complaints of chest pain.  Of note he was recently seen in the ER 2 days prior with similar complaints but decided to be discharged when offered admission.  He described the pain as stabbing in nature.  He said the pain was on the left side of his chest and did not radiate.  Troponin has remained negative times multiple sets.  EKG was negative for acute ischemia.  [facility] has admitted the patient for chest pain.      PMH: PAF, HTN, HLD, RA, DM type 2, CVA, Parkinson's disease  PSH:  Knee surgery, back surgery, cataract extraction, external ear surgery, vasectomy  Social History:  Denies EtOH, tobacco, and illicit drug use  Family History:  Mother-CAD, heart failure    Previous Cardiac Diagnostics:   Carotid US 10.2.23  The right internal carotid artery demonstrated less than a 50% stenosis.  There is a mild amount of calcified plaque in the right carotid bulb, proximal ECA, and proximal ICA.  The right vertebral artery was patent with antegrade flow.     The left internal carotid artery demonstrated less than a 50% stenosis.  There is a minimal amount of plaque in the left carotid bulb and proximal ICA.  The left vertebral artery was patent with antegrade flow.     Echo 10.2.23  Left Ventricle: There is normal systolic function with a visually estimated ejection  fraction of 60 - 65%. There is normal diastolic function.  Right Ventricle: Normal right ventricular cavity size. Systolic function is normal.  Less than mild valvular stenosis/regurgitation.  Agitated saline study of the atrial septum is negative after vasalva maneuver, suggesting absence of intracardiac shunt at the atrial level.          Review of patient's allergies indicates:   Allergen Reactions    Sarilumab Other (See Comments)     Other reaction(s): fainting  Kevzara       No current facility-administered medications on file prior to encounter.     Current Outpatient Medications on File Prior to Encounter   Medication Sig    ammonium lactate 12 % Crea Apply 1 application  topically 2 (two) times daily.    AUSTEDO 6 mg Tab Take 1 tablet by mouth 2 (two) times daily.    carbidopa-levodopa  mg (SINEMET)  mg per tablet Take 2 tablets by mouth 2 (two) times a day.    colchicine (COLCRYS) 0.6 mg tablet TAKE 1 TABLET BY MOUTH TWICE  DAILY    metoprolol succinate (TOPROL-XL) 25 MG 24 hr tablet Take 1 tablet (25 mg total) by mouth once daily.    pantoprazole (PROTONIX) 40 MG tablet Take 1 tablet (40 mg total) by mouth once daily.    polyethylene glycol (MOVIPREP) 100-7.5-2.691 gram solution Take 2,000 mLs by mouth As instructed (take as directed per instructions provided by GI clinic).    rosuvastatin (CRESTOR) 20 MG tablet Take 1 tablet (20 mg total) by mouth once daily.    selegiline HCl (ELDEPRYL) 5 mg tablet Take 5 mg by mouth 2 (two) times daily with meals.    tofacitinib (XELJANZ XR) 11 mg Tb24 Take 1 tablet by mouth once daily.    XARELTO 20 mg Tab Take 1 tablet (20 mg total) by mouth once daily.    ACCU-CHEK GUIDE GLUCOSE METER [facility] [place]     alcohol swabs PadM Apply 1 each topically once daily.    alogliptin-pioglitazone 25-30 mg Tab Take 1 tablet by mouth Daily.    blood sugar diagnostic (ACCU-CHEK GUIDE TEST STRIPS) Strp 1 strip by skin prick route Daily.    blood-glucose meter kit To check BG two  times daily, to use with insurance preferred meter    lancets (ACCU-CHEK SOFTCLIX LANCETS) Misc 1 each by Misc.(Non-Drug; Combo Route) route Daily.    predniSONE (DELTASONE) 5 MG tablet Take 2 tab po qd x 3 days then take 1 tab po qd x 2 days prn RA flare. (Patient taking differently: as needed. Take 2 tab po qd x 3 days then take 1 tab po qd x 2 days prn RA flare.)    sodium chloride 5% (BHUMIKA 128) 5 % ophthalmic solution Place 1 drop into both eyes 3 (three) times daily.    tiZANidine (ZANAFLEX) 4 MG tablet Take 4 mg by mouth once daily.    [DISCONTINUED] lisinopriL 10 MG tablet Take 1 tablet (10 mg total) by mouth once daily. (Patient taking differently: Take 10 mg by mouth once daily. Not taking)         Review of Systems   Constitutional: Negative.   HENT: Negative.     Eyes: Negative.    Cardiovascular:  Positive for chest pain.   Respiratory: Negative.     Endocrine: Negative.    Hematologic/Lymphatic: Negative.    Skin: Negative.    Musculoskeletal: Negative.    Gastrointestinal: Negative.    Genitourinary: Negative.    Neurological: Negative.    Psychiatric/Behavioral: Negative.     Allergic/Immunologic: Negative.      Objective:     Vital Signs (Most Recent):  Temp: 98.2 °F (36.8 °C) (05/21/25 1223)  Pulse: 102 (05/21/25 1223)  Resp: 18 (05/21/25 1223)  BP: 112/76 (05/21/25 1223)  SpO2: (!) 93 % (05/21/25 1223) Vital Signs (24h Range):  Temp:  [97.2 °F (36.2 °C)-98.2 °F (36.8 °C)] 98.2 °F (36.8 °C)  Pulse:  [] 102  Resp:  [14-20] 18  SpO2:  [93 %-96 %] 93 %  BP: (101-174)/(69-99) 112/76     Weight: 102.5 kg (225 lb 15.5 oz)  Body mass index is 31.52 kg/m².    SpO2: (!) 93 %       No intake or output data in the 24 hours ending 05/21/25 1313    Lines/Drains/Airways       Peripheral Intravenous Line  Duration                  Peripheral IV - Single Lumen 05/20/25 2052 20 G Anterior;Distal;Left Upper Arm <1 day                    Physical Exam  Constitutional:       Appearance: Normal appearance.   HENT:  "     Head: Normocephalic.   Eyes:      Extraocular Movements: Extraocular movements intact.      Conjunctiva/sclera: Conjunctivae normal.   Cardiovascular:      Rate and Rhythm: Normal rate and regular rhythm.      Pulses: Normal pulses.      Heart sounds: Normal heart sounds.   Pulmonary:      Effort: Pulmonary effort is normal.      Breath sounds: Normal breath sounds.   Abdominal:      General: Abdomen is flat.   Musculoskeletal:         General: Normal range of motion.      Cervical back: Normal range of motion and neck supple.   Skin:     General: Skin is warm and dry.   Neurological:      Mental Status: He is alert and oriented to person, place, and time.         Significant Labs:   Chemistries:   Recent Labs   Lab 05/18/25  2108 05/19/25  0044 05/20/25  1352 05/20/25  1750     --  139  --    K 4.2  --  4.1  --      --  108*  --    CO2 25  --  20*  --    BUN 21.4  --  26.5*  --    CREATININE 1.43*  --  1.71*  --    CALCIUM 9.9  --  9.5  --    PROT 8.6*  --  7.5  --    BILITOT 0.4  --  0.4  --    ALKPHOS 72  --  64  --    ALT 16  --  <5  --    AST 19  --  18  --    TROPONINI <0.010 0.014 <0.010 <0.010        CBC/Anemia Labs: Coags:    Recent Labs   Lab 05/18/25 2008 05/20/25  1352   WBC 4.34* 5.27   HGB 15.6 14.4   HCT 44.4 42.4    230   MCV 91.9 93.0   RDW 13.4 13.3    No results for input(s): "PT", "INR", "APTT" in the last 168 hours.       Significant Imaging:   Imaging Results              X-Ray Chest AP Portable (Final result)  Result time 05/20/25 18:14:49      Final result by Brandon Storm MD (05/20/25 18:14:49)                   Impression:      No acute findings.      Electronically signed by: Brandon Storm  Date:    05/20/2025  Time:    18:14               Narrative:    EXAMINATION:  XR CHEST AP PORTABLE    CLINICAL HISTORY:  Chest pain, unspecified    COMPARISON:  18 May 2025    FINDINGS:  Frontal view of the chest was obtained. Heart is not significantly enlarged.  Grossly " clear lungs.  No pneumothorax.                                    EKG:        Assessment and Plan:     IMPRESSION:  Chest pain, atypical  PAF/Xarelto  HTN  HLD  RA  Hx of CVA  DM II  Parkinson's disease    PLAN:  DC home today  Continue home medications  Outpatient Nuclear stress test  F/U with [facility] cardiology clinic  in 7-10 days    DISCHARGE PLAN:  Discharge: Home  Discharge Diet: Cardiac, Low Sodium  Discharge Condition: Stable  Discharge Activity:  As Tolerated  Discharge Time: 36 minutes    Discharge Medications:     Medication List        START taking these medications      midodrine 5 MG Tab  Commonly known as: PROAMATINE  Take 1 tablet (5 mg total) by mouth 3 (three) times daily as needed.            CHANGE how you take these medications      predniSONE 5 MG tablet  Commonly known as: DELTASONE  Take 2 tab po qd x 3 days then take 1 tab po qd x 2 days prn RA flare.  What changed:   when to take this  reasons to take this            CONTINUE taking these medications      alcohol swabs Padm  Apply 1 each topically once daily.     alogliptin-pioglitazone 25-30 mg Tab     ammonium lactate 12 % Crea     AUSTEDO 6 mg Tab  Generic drug: deutetrabenazine     blood sugar diagnostic Strp  Commonly known as: ACCU-CHEK GUIDE TEST STRIPS  1 strip by skin prick route Daily.     * blood-glucose meter kit  To check BG two times daily, to use with insurance preferred meter     * ACCU-CHEK GUIDE GLUCOSE METER Misc  Generic drug: blood-glucose meter     carbidopa-levodopa  mg  mg per tablet  Commonly known as: SINEMET     colchicine 0.6 mg tablet  Commonly known as: COLCRYS  TAKE 1 TABLET BY MOUTH TWICE  DAILY     lancets Misc  Commonly known as: ACCU-CHEK SOFTCLIX LANCETS  1 each by Misc.(Non-Drug; Combo Route) route Daily.     metoprolol succinate 25 MG 24 hr tablet  Commonly known as: TOPROL-XL  Take 1 tablet (25 mg total) by mouth once daily.     pantoprazole 40 MG tablet  Commonly known as: PROTONIX  Take 1  tablet (40 mg total) by mouth once daily.     polyethylene glycol 100-7.5-2.691 gram solution  Commonly known as: MoviPrep  Take 2,000 mLs by mouth As instructed (take as directed per instructions provided by GI clinic).     rosuvastatin 20 MG tablet  Commonly known as: CRESTOR  Take 1 tablet (20 mg total) by mouth once daily.     selegiline HCl 5 mg tablet  Commonly known as: ELDEPRYL     sodium chloride 5% 5 % ophthalmic solution  Commonly known as: BHUMIKA 128  Place 1 drop into both eyes 3 (three) times daily.     tiZANidine 4 MG tablet  Commonly known as: ZANAFLEX     XARELTO 20 mg Tab  Generic drug: rivaroxaban  Take 1 tablet (20 mg total) by mouth once daily.     XELJANZ XR 11 mg Tb24  Generic drug: tofacitinib  Take 1 tablet by mouth once daily.           * This list has 2 medication(s) that are the same as other medications prescribed for you. Read the directions carefully, and ask your doctor or other care provider to review them with you.                STOP taking these medications      lisinopriL 10 MG tablet               Where to Get Your Medications        These medications were sent to [facility] [address]  [address] [address]      Phone: [phone]   midodrine 5 MG Tab               JAQUI Galindo-BC  Cardiology  [facility] - [address] [facility] Tele[facility]  05/21/2025 8:49 AM

## 2025-05-21 NOTE — ED PROVIDER NOTES
Encounter Date: 5/20/2025       History     Chief Complaint   Patient presents with    Chest Pain     Pt co chest pain without radiation for past 3 days. Denies n/v/d/fever/chills.      See MDM for details.      The history is provided by the patient and medical records. No  was used.     Review of patient's allergies indicates:   Allergen Reactions    Sarilumab Other (See Comments)     Other reaction(s): fainting  Kevzara     Past Medical History:   Diagnosis Date    Atrial fibrillation     Cataract 2017    Central stenosis of spinal canal     Chronic back pain greater than 3 months duration 10/14/2022    Constipation 09/30/2022    Degeneration of lumbar intervertebral disc 07/06/2022    Diastolic dysfunction 05/01/2023    Dysphonia 09/30/2022    Dystrophic nail 06/19/2023    Erectile dysfunction 09/30/2022    Fatty liver     GERD (gastroesophageal reflux disease) 09/30/2022    H/O cataract removal with insertion of prosthetic lens     Hearing loss 40 years    History of CVA (cerebrovascular accident) 09/30/2022    TIA    History of radial keratotomy 01/01/1998    HTN (hypertension)     Hyperlipidemia 09/30/2022    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/30/2022    Microhematuria 03/30/2023    Overgrown toenails 06/19/2023    Parkinson's disease     Peyronie disease     Polyneuropathy associated with underlying disease 06/19/2023    Rheumatoid arthritis 1/10/1994    Rheumatoid arthritis, unspecified     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    Spinal stenosis of lumbar region 09/30/2022    Type 2 diabetes mellitus without complications     Unspecified macular degeneration     Unspecified macular degeneration      Past Surgical History:   Procedure Laterality Date    BACK SURGERY Left 04/2024    CATARACT EXTRACTION W/  INTRAOCULAR LENS IMPLANT Bilateral     EPIDURAL STEROID INJECTION INTO LUMBAR SPINE      exc cyst Lt ear      EXTERNAL EAR SURGERY  2019    EYE SURGERY      INJECTION OF ANESTHETIC AGENT AROUND MEDIAL  BRANCH NERVES INNERVATING LUMBAR FACET JOINT Bilateral 11/16/2022    Procedure: Block-nerve-medial branch-lumbar;  Surgeon: Gertrude Blanco MD;  Location: [facility] OR;  Service: Pain Management;  Laterality: Bilateral;  Bilateral L4-L5...Patient is requesting to be first case    INJECTION OF ANESTHETIC AGENT AROUND MEDIAL BRANCH NERVES INNERVATING LUMBAR FACET JOINT Bilateral 11/22/2023    Procedure: Block-nerve-medial branch-lumbar;  Surgeon: Gertrude Blanco MD;  Location: [facility] OR;  Service: Pain Management;  Laterality: Bilateral;  Bilateral MBB L3-5    INJECTION OF ANESTHETIC AGENT AROUND MEDIAL BRANCH NERVES INNERVATING LUMBAR FACET JOINT Bilateral 01/24/2024    Procedure: Block-nerve-medial branch-lumbar;  Surgeon: Gertrude Blanco MD;  Location: [facility] OR;  Service: Pain Management;  Laterality: Bilateral;  Bilateral MBB L3-5    KNEE ARTHROSCOPY Right     RADIOFREQUENCY ABLATION Left 03/27/2024    Procedure: Radiofrequency Ablation;  Surgeon: Gertrude Blanco MD;  Location: [facility] OR;  Service: Pain Management;  Laterality: Left;  RFA Lt L3-L5    RADIOFREQUENCY ABLATION Right 04/25/2024    Procedure: RADIOFREQUENCY ABLATION / Right L3 L5;  Surgeon: Gertrude Blanco MD;  Location: [facility] OR;  Service: Pain Management;  Laterality: Right;  Right RFA L3-L5    VASECTOMY  1998     Family History   Problem Relation Name Age of Onset    Heart failure Mother delta     Coronary artery disease Mother delta     Glaucoma Father      Alzheimer's disease Father      Emphysema Father      Autoimmune disease Sister      Pneumonia Sister Jenifer     Dementia Sister Jenifer      Social History[1]  Review of Systems   Constitutional: Negative.  Negative for activity change, appetite change, diaphoresis, fatigue and fever.   HENT:  Negative for rhinorrhea and sinus pressure.    Eyes: Negative.    Respiratory: Negative.  Negative for chest tightness.    Cardiovascular:  Positive for chest pain.   Gastrointestinal: Negative.  Negative  for abdominal distention and abdominal pain.   Endocrine: Negative.    Genitourinary: Negative.    Musculoskeletal: Negative.  Negative for arthralgias.   Allergic/Immunologic: Negative.    Neurological:  Negative for dizziness and headaches.   Hematological: Negative.    Psychiatric/Behavioral: Negative.     All other systems reviewed and are negative.      Physical Exam     Initial Vitals   BP Pulse Resp Temp SpO2   05/20/25 1335 05/20/25 1335 05/20/25 1335 05/20/25 1335 05/20/25 1336   120/77 81 20 97.2 °F (36.2 °C) (!) 94 %      MAP       --                Physical Exam    Nursing note and vitals reviewed.  Constitutional: He appears well-developed and well-nourished. He is cooperative. No distress.   HENT:   Head: Normocephalic and atraumatic. Not macrocephalic.   Right Ear: Tympanic membrane normal. Tympanic membrane is not erythematous.   Left Ear: Tympanic membrane normal. Tympanic membrane is not erythematous.   Nose: No mucosal edema. Right sinus exhibits no frontal sinus tenderness. Left sinus exhibits no frontal sinus tenderness. Mouth/Throat: Mucous membranes are normal. No oropharyngeal exudate.   Eyes: Conjunctivae and EOM are normal. Pupils are equal, round, and reactive to light. Right eye exhibits no discharge. Left eye exhibits no discharge.   Neck: Neck supple. No tracheal deviation present.   Normal range of motion.  Cardiovascular:  Normal rate and regular rhythm.           Pulmonary/Chest: Effort normal and breath sounds normal. No respiratory distress. He has no decreased breath sounds. He has no wheezes. He has no rhonchi. He has no rales. He exhibits no tenderness.   Abdominal: Abdomen is soft. Bowel sounds are normal. He exhibits no distension. There is no abdominal tenderness. There is no rebound.   Musculoskeletal:         General: Normal range of motion.      Cervical back: Normal range of motion and neck supple.     Lymphadenopathy:        Head (right side): No submental adenopathy  present.        Head (left side): No submental adenopathy present.     He has no cervical adenopathy.   Neurological: He is alert and oriented to person, place, and time. He has normal strength. No cranial nerve deficit. GCS score is 15. GCS eye subscore is 4. GCS verbal subscore is 5. GCS motor subscore is 6.   Skin: Skin is warm.   Psychiatric: He has a normal mood and affect. His behavior is normal. Judgment and thought content normal.         ED Course   Procedures  Labs Reviewed   COMPREHENSIVE METABOLIC PANEL - Abnormal       Result Value    Sodium 139      Potassium 4.1      Chloride 108 (*)     CO2 20 (*)     Glucose 128 (*)     Blood Urea Nitrogen 26.5 (*)     Creatinine 1.71 (*)     Calcium 9.5      Protein Total 7.5      Albumin 4.1      Globulin 3.4      Albumin/Globulin Ratio 1.2      Bilirubin Total 0.4      ALP 64      ALT <5      AST 18      eGFR 43      Anion Gap 11.0      BUN/Creatinine Ratio 15     CBC WITH DIFFERENTIAL - Abnormal    WBC 5.27      RBC 4.56 (*)     Hgb 14.4      Hct 42.4      MCV 93.0      MCH 31.6 (*)     MCHC 34.0      RDW 13.3      Platelet 230      MPV 9.7      Neut % 59.5      Lymph % 23.7      Mono % 15.2      Eos % 0.8      Basophil % 0.4      Imm Grans % 0.4      Neut # 3.14      Lymph # 1.25      Mono # 0.80      Eos # 0.04      Baso # 0.02      Imm Gran # 0.02      NRBC% 0.0     TROPONIN I - Normal    Troponin-I <0.010     TROPONIN I - Normal    Troponin-I <0.010     CBC W/ AUTO DIFFERENTIAL    Narrative:     The following orders were created for panel order CBC auto differential.  Procedure                               Abnormality         Status                     ---------                               -----------         ------                     CBC with Differential[[id number]]       Abnormal            Final result                 Please view results for these tests on the individual orders.     EKG Readings: (Independently Interpreted)   Initial Reading: No  STEMI. Rhythm: Normal Sinus Rhythm. Heart Rate: 73. Axis: Left Axis Deviation.     ECG Results              EKG 12-lead (Final result)        Collection Time Result Time QRS Duration OHS QTC Calculation    05/20/25 13:35:54 05/20/25 16:51:11 92 416                     Final result by Interface, Lab [facility] (05/20/25 16:51:19)                   Narrative:    Test Reason : R53.1,    Vent. Rate :  73 BPM     Atrial Rate :  73 BPM     P-R Int : 146 ms          QRS Dur :  92 ms      QT Int : 378 ms       P-R-T Axes :  18 -50  20 degrees    QTcB Int : 416 ms    Normal sinus rhythm  Left axis deviation  Abnormal ECG    Confirmed by Jen Arenas ([id number]) on 5/20/2025 4:51:08 PM    Referred By:            Confirmed By: Jen Arenas                                  Imaging Results              X-Ray Chest AP Portable (Final result)  Result time 05/20/25 18:14:49      Final result by Brandon Storm MD (05/20/25 18:14:49)                   Impression:      No acute findings.      Electronically signed by: Brandon Storm  Date:    05/20/2025  Time:    18:14               Narrative:    EXAMINATION:  XR CHEST AP PORTABLE    CLINICAL HISTORY:  Chest pain, unspecified    COMPARISON:  18 May 2025    FINDINGS:  Frontal view of the chest was obtained. Heart is not significantly enlarged.  Grossly clear lungs.  No pneumothorax.                                       Medications   0.9% NaCl infusion (has no administration in time range)     Medical Decision Making  69yoWM w/hx of AFIb, CVA, DDD, GERD, HTN, HLD, and spinal stenosis presents to the ER with intermittent chest pain for the last 3-4days. Patient takes baby aspirin daily. Today he says his pain radiates into the left arm, but yesterday it did not. Otherwise, he did not take any medicine for this. Denies nausea,vomiting, abdominal pain, fever, chills, or shortness of breath.       Problems Addressed:  Chest pain: acute illness or injury     Details: Differential  diagnosis included but not limited to:  Acute coronary syndrome, myocardial infarction, chest wall tenderness, pleural effusion, costochondritis, other etiologies     Patient has significant cardiac history and has not had a cardiac workup in quite some time.  He was recommended for placement and observation with Cardiology yesterday, new to his leave against medical advice.  He is here today because he is having continued intermittent chest pain.  Now he is having left arm numbness and tingling.  Patient agreeable to stay today.  I talked to DAVIDSON Mathew, with Cardiology who agrees accepts patient for admission at this time under their services.  Discussed with the patient who is agreeable to plan.  Discussed with Dr. LANTIGUA who is agreeable to plan.  Patient will be placed in observation for cardiac monitoring at this time.     Amount and/or Complexity of Data Reviewed  Labs: ordered. Decision-making details documented in ED Course.  Radiology: ordered. Decision-making details documented in ED Course.  ECG/medicine tests: ordered. Decision-making details documented in ED Course.    Risk  Prescription drug management.               ED Course as of 05/20/25 2038   Tue May 20, 2025   2037 Troponin I: <0.010  Negative [ST]   2037 BUN(!): 26.5 [ST]   2037 Creatinine(!): 1.71 [ST]   2037 Dehydration, IV fluids ordered. [ST]   2037 WBC: 5.27  No evidence of leukocytosis [ST]   2037 X-Ray Chest AP Portable  Impression:     No acute findings.        Electronically signed by:Brandon Storm  Date:                                            05/20/2025  Time:                                           18:14      Exam Ended: 05/20/25 18:03 CDT Last Resulted: 05/20/25 18:14 CDT     [ST]   2037 EKG 12-lead  Similar to previous [ST]      ED Course User Index  [ST] Susan Weston PA                           Clinical Impression:  Final diagnoses:  [R53.1] Weakness  [R07.9] Chest pain          ED Disposition Condition    Observation                 This note was typed partially using voice recognition software.  Please be reminded that not all corrections/addendums to grammar may have been made prior to closing of this chart.         [1]   Social History  Tobacco Use    Smoking status: Former     Current packs/day: 0.00     Average packs/day: 1 pack/day for 15.0 years (15.0 ttl pk-yrs)     Types: Cigarettes     Start date: 1972     Quit date: 1987     Years since quittin.4     Passive exposure: Past    Smokeless tobacco: Never   Substance Use Topics    Alcohol use: Never     Comment: occasional    Drug use: Yes     Types: Marijuana     Comment: not using anymore        Susan Weston PA  251

## 2025-05-21 NOTE — DISCHARGE SUMMARY
[facility] - [facility] [place] Telemetry  Discharge Note  Short Stay    * No surgery found *      OUTCOME: Condition has improved and patient is now ready for discharge.    DISPOSITION: Home or Self Care    FINAL DIAGNOSIS:  Chest pain    FOLLOWUP: In clinic    DISCHARGE INSTRUCTIONS:    Discharge Procedure Orders   Diet Cardiac     SUBSEQUENT HOME HEALTH ORDERS   Order Comments: Nursing services     Order Specific Question Answer Comments   What Home Health Agency is the patient currently using? [facility]      Activity as tolerated        TIME SPENT ON DISCHARGE: 36 minutes   Please see H&P as same day DC summary

## 2025-05-22 ENCOUNTER — PATIENT OUTREACH (OUTPATIENT)
Dept: ADMINISTRATIVE | Facility: CLINIC | Age: 70
End: 2025-05-22
Payer: MEDICARE

## 2025-05-22 PROCEDURE — G0180 MD CERTIFICATION HHA PATIENT: HCPCS | Mod: ,,, | Performed by: NURSE PRACTITIONER

## 2025-05-22 NOTE — PROGRESS NOTES
C3 nurse spoke with Melquiades Rehman Jr. for a TCC post hospital discharge follow up call. The patient does not have a scheduled HOSFU appointment with Pau Gomes FNP within 5-7 days post hospital discharge date 5/21. C3 nurse was unable to schedule HOSFU appointment in [facility]. Message sent to PCP staff requesting they contact patient and schedule follow up appointment.

## 2025-05-23 ENCOUNTER — TELEPHONE (OUTPATIENT)
Dept: INTERNAL MEDICINE | Facility: CLINIC | Age: 70
End: 2025-05-23
Payer: MEDICARE

## 2025-05-23 NOTE — TELEPHONE ENCOUNTER
Copied from CRM #[id number]. Topic: General Inquiry - Patient Advice  >> May 21, 2025  2:10 PM Segundo Loja wrote:  Who Called: Nikki maynard/ [facility] [phone]    Caller is requesting assistance/information from provider's office.    Symptoms (please be specific): N/A   How long has patient had these symptoms:  N/A  List of preferred pharmacies on file (remove unneeded): [unfilled]  If different, enter pharmacy into here including location and phone number: N/A      Preferred Method of Contact: Phone Call  Patient's Preferred Phone Number on File: [phone]   Best Call Back Number, if different:  Additional Information: pt is needing HFU. D/c 5/21/25 from [facility]

## 2025-05-25 NOTE — TELEPHONE ENCOUNTER
Patient requesting a referral for acupuncture. Informed patient to find out who is on his plan for acupuncture. Informed patient JOLENE Gomes will send a referral. Patient verbalized understanding.  
Speaking Coherently

## 2025-05-26 ENCOUNTER — TELEPHONE (OUTPATIENT)
Dept: INTERNAL MEDICINE | Facility: CLINIC | Age: 70
End: 2025-05-26
Payer: MEDICARE

## 2025-05-26 NOTE — TELEPHONE ENCOUNTER
Copied from CRM #[id number]. Topic: Appointments - Appointment Scheduling  >> May 23, 2025 10:40 AM Loc wrote:  .Who Called: [facility]    Caller is requesting assistance/information from provider's office.    Symptoms (please be specific): na   How long has patient had these symptoms:  na  List of preferred pharmacies on file (remove unneeded): [unfilled]  If different, enter pharmacy into here including location and phone number: na      Preferred Method of Contact: Phone Call  Patient's Preferred Phone Number on File: [phone]   Best Call Back Number, if different: [phone]  Additional Information: [facility] called stating that pat needs a follow up appt1-2 weeks. Pt was discharged on 5/20/25

## 2025-05-28 ENCOUNTER — PROCEDURE VISIT (OUTPATIENT)
Dept: NEUROLOGY | Facility: CLINIC | Age: 70
End: 2025-05-28
Payer: MEDICARE

## 2025-05-28 VITALS
BODY MASS INDEX: 31.08 KG/M2 | WEIGHT: 222 LBS | HEIGHT: 71 IN | DIASTOLIC BLOOD PRESSURE: 86 MMHG | SYSTOLIC BLOOD PRESSURE: 131 MMHG

## 2025-05-28 DIAGNOSIS — G89.29 CHRONIC MIDLINE LOW BACK PAIN WITHOUT SCIATICA: Primary | ICD-10-CM

## 2025-05-28 DIAGNOSIS — M54.50 CHRONIC MIDLINE LOW BACK PAIN WITHOUT SCIATICA: Primary | ICD-10-CM

## 2025-05-28 NOTE — PROCEDURES
Date: 52825  Time: 9  Name of the procedure being done: acupuncture  Indications: chronic low back pain  Patient consent: yes   Risks and alternatives to the procedure were explained to the patient. Note that the patient was given the opportunity to ask questions and that the patient consented to the procedure in writing  Pertinent Lab Values: none  Type of Anesthesia used: none  Description of the procedure:   17 needles  8 needle L spine PENS, 600-1000 hz with jacqueline casanova;   Kidney mu/lawrence 4 hz  Ki 3--ki 10; 4 hz  BL 59  R ear; L spine, p zero, leonardo men  Stim run for 30 min  Complications: none  Estimated blood loss: <1 mL   Disposition: Pt tolerated the procedure well

## 2025-05-29 ENCOUNTER — CLINICAL SUPPORT (OUTPATIENT)
Dept: CARDIOLOGY | Facility: CLINIC | Age: 70
End: 2025-05-29
Payer: MEDICARE

## 2025-05-29 VITALS
HEART RATE: 68 BPM | BODY MASS INDEX: 31.78 KG/M2 | WEIGHT: 227 LBS | HEIGHT: 71 IN | RESPIRATION RATE: 20 BRPM | OXYGEN SATURATION: 94 % | DIASTOLIC BLOOD PRESSURE: 64 MMHG | SYSTOLIC BLOOD PRESSURE: 101 MMHG

## 2025-05-29 DIAGNOSIS — I10 HYPERTENSION, UNSPECIFIED TYPE: Primary | ICD-10-CM

## 2025-05-29 PROCEDURE — 99214 OFFICE O/P EST MOD 30 MIN: CPT | Mod: PBBFAC

## 2025-05-29 RX ORDER — LISINOPRIL 10 MG/1
10 TABLET ORAL DAILY
COMMUNITY

## 2025-05-29 RX ORDER — ASPIRIN 81 MG/1
81 TABLET ORAL DAILY
COMMUNITY

## 2025-05-29 NOTE — PROGRESS NOTES
"Mr. Rehman attends nurse visit. He has B/P log with 18 results, ranging 90//88, with 5 outliers 159-167/. HR .   He states that he has been using lisinopril 10 mg as needed for "higher pressures". He did not start midodrine as instructed at   hospital discharge. (Hospitalized recently with chest pain). Today's B/P is 101/64, HR 68. Presented to Dr. Conrad, instructions are:  Do not start midodrine as instructed at hospital discharge, check B/P every AM before taking medications, Hold both metoprolol and   Lisinopril if systolic B/P is <110. If systolic B/P is 110-130 take metoprolol only. If systolic B/P is >130 take both metoprolol and lisinopril.   Return in 2 weeks for B/P check, bring B/P log. He verbalizes understanding. Also informed his home health agency, [facility],  Spoke with Nic.     "

## 2025-06-02 ENCOUNTER — HOSPITAL ENCOUNTER (OUTPATIENT)
Dept: RADIOLOGY | Facility: HOSPITAL | Age: 70
Discharge: HOME | End: 2025-06-02
Attending: STUDENT IN AN ORGANIZED HEALTH CARE EDUCATION/TRAINING PROGRAM
Payer: MEDICARE

## 2025-06-02 DIAGNOSIS — R42 DIZZINESS: ICD-10-CM

## 2025-06-02 PROCEDURE — 25500020 PHARM REV CODE 255

## 2025-06-02 PROCEDURE — 70553 MRI BRAIN STEM W/O & W/DYE: CPT | Mod: TC

## 2025-06-02 PROCEDURE — A9577 INJ MULTIHANCE: HCPCS

## 2025-06-02 RX ADMIN — GADOBENATE DIMEGLUMINE 20 ML: 529 INJECTION, SOLUTION INTRAVENOUS at 09:06

## 2025-06-10 ENCOUNTER — CLINICAL SUPPORT (OUTPATIENT)
Dept: CARDIOLOGY | Facility: CLINIC | Age: 70
End: 2025-06-10
Payer: MEDICARE

## 2025-06-10 VITALS
HEART RATE: 81 BPM | RESPIRATION RATE: 20 BRPM | DIASTOLIC BLOOD PRESSURE: 84 MMHG | WEIGHT: 224 LBS | SYSTOLIC BLOOD PRESSURE: 144 MMHG | OXYGEN SATURATION: 95 % | BODY MASS INDEX: 31.36 KG/M2 | HEIGHT: 71 IN

## 2025-06-10 DIAGNOSIS — I10 HYPERTENSION, UNSPECIFIED TYPE: Primary | ICD-10-CM

## 2025-06-10 PROCEDURE — 99215 OFFICE O/P EST HI 40 MIN: CPT | Mod: PBBFAC

## 2025-06-10 NOTE — PROGRESS NOTES
"Mr. Santoro attends nurse visit. He did take AM medication, both lisinopril and metoprolol. He understands the last nurse visit instructions on   when to hold B/P medications as he has fluctuating blood pressures. (Hold both if systolic B/P <110, take metoprolol only if systolic 110-130,   take metoprolol and lisinopril if systolic B/P >130.) He did bring B/P machine as he is not able to write secondary to Parkinson's. Home B/P's  do range between 150-160's/70's, then some are 70's-90's systolic with symptoms/dizziness. He states that his B/P drops when he takes the   Parkinson's medications before or with the Blood pressure meds. He further states that he will sometimes hold all medications, other than B/P meds,  to avoid low B/P. Today's B/P is 151/96, HR 81. Home machine is 152/97, Manual is 144/84. Presented to Dr. Conrad. Instructions are to continue   current "sliding scale"  method of B/P meds, keep all appointments. He verbalizes understanding.    (In discussion with Dr. Del Cid, also, consider dysautonomia secondary to Parkinson's/ medications at next visit)    "

## 2025-06-12 ENCOUNTER — OFFICE VISIT (OUTPATIENT)
Dept: OPHTHALMOLOGY | Facility: CLINIC | Age: 70
End: 2025-06-12
Payer: MEDICARE

## 2025-06-12 VITALS — HEIGHT: 71 IN | BODY MASS INDEX: 31.36 KG/M2 | WEIGHT: 224 LBS

## 2025-06-12 DIAGNOSIS — H18.513 FUCHS' CORNEAL DYSTROPHY OF BOTH EYES: ICD-10-CM

## 2025-06-12 DIAGNOSIS — Z98.890 HISTORY OF RADIAL KERATOTOMY: Primary | ICD-10-CM

## 2025-06-12 PROCEDURE — 99213 OFFICE O/P EST LOW 20 MIN: CPT | Mod: PBBFAC,PN

## 2025-06-12 PROCEDURE — 92025 CPTRIZED CORNEAL TOPOGRAPHY: CPT | Mod: PBBFAC,PN

## 2025-06-12 NOTE — PROGRESS NOTES
[facility]    RTC 6 months Americo K check       Last edited by Cammie Ayers MA on 6/12/2025 11:17 AM.            Assessment /Plan     For exam results, see Encounter Report.    History of radial keratotomy    Fuchs' corneal dystrophy of both eyes        Topography 3/14/23  OD flat, 36.42 simK, 1.8D astigmatism 019  OS flat, 36.50D simK, 0.38D astigmatism    K Jun 8/24/23  OD: prolate, flat simmk 35.34, steep simk 37.5, astig 2.16 at 23  OS: prolate, flat simk 36.28, steep simk 37.16, astig 0.87 @ 107    K Jun 10/24/24  OD: flat, flat simk 35.00, steep simk 37.85, astig 2.85 @ 38  OS: flat, flat simk 35.97, steep simk 36.79, astig 0.83 @ 101    Jun 6/12/25  OD: flat, k1 35.34, K2 37.44, astig 2.10D @32   OS: flat, k1 36.53, k2 37.47, astig 0.95D @106    CCT 8/24/23  OD:   OS:     OCT mac 7/24/24: Good foveal contour, no IRF/SRF     H/o RK OU  - Fluctuating vision. Gets RX with optometry usually to 20/30-20/40. MRx 3/23 to 20/80 ou  - LOWELL 20/25 OU  - Unable to refract past 20/60 // 20/40 on 8/2/23. Patient reports fluctuating vision, worse in AM  - RTC Americo day 8/27 for evaluation of options for stabilization of RK. Repeat refraction if no surgical options   - 8/24/23: Dr Driscoll in OR for emergency, unable to see patient. Slight R eye fluctuation in R eye K jun, but minimal.  Mrx near and distance separate given and patient happy trying this result.   - Patient does not want bifocals  - 7/24/24: MRX to 20/100 // 20/40, pt prefers separate glasses  - 10/24/24: Seen with Dr. Driscoll. Chief complaint is fluctuating VA, inability to achieve 20/20 BCVA with MRx. +3 Omar OU on exam. 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  - 12/12/24: Patient only using kaylynn once daily. Encouraged use TID. Not interested in PKP at this time. Monitor. Seen with Dr. Driscoll  - 6/12/25: compliant with kaylynn TID, patient sees better after administering drops. Discussed that he can increase to QID and use Kaylynn cintia qhs. Jun overall  stable, //690. BCVA 20/60//20/50 today, MRX given again to patient (did not fill last RX). Patient not interested in surgery as he has other health problems going on. Can space out K check and DFE q6mo unless patient interested in surgery.    2. Fuch's OU  - Diomedes TID    3. Iris nevus  - CTM       4. Diabetes w/o ophthalmic manifestations OU        Hemoglobin A1c   Date Value Ref Range Status   09/26/2022 6.4 <=7.0 % Final   03/29/2022 6.0 <=7.0     03/30/2021 6.2 <<=7.0 % Final   - no retinopathy on exam today  - encouraged good BG/HTN control  - recommend annual DFE (next due 7/2025)    RTC 2-3 mo general DFE

## 2025-06-16 ENCOUNTER — CLINICAL SUPPORT (OUTPATIENT)
Dept: AUDIOLOGY | Facility: HOSPITAL | Age: 70
End: 2025-06-16
Payer: MEDICARE

## 2025-06-16 ENCOUNTER — HOSPITAL ENCOUNTER (OUTPATIENT)
Dept: WOUND CARE | Facility: HOSPITAL | Age: 70
Discharge: HOME | End: 2025-06-16
Attending: NURSE PRACTITIONER
Payer: MEDICARE

## 2025-06-16 VITALS
RESPIRATION RATE: 18 BRPM | SYSTOLIC BLOOD PRESSURE: 144 MMHG | HEART RATE: 89 BPM | TEMPERATURE: 97 F | DIASTOLIC BLOOD PRESSURE: 92 MMHG | OXYGEN SATURATION: 95 %

## 2025-06-16 DIAGNOSIS — H90.3 SENSORINEURAL HEARING LOSS, BILATERAL: Primary | ICD-10-CM

## 2025-06-16 DIAGNOSIS — E11.9 ENCOUNTER FOR COMPREHENSIVE DIABETIC FOOT EXAMINATION, TYPE 2 DIABETES MELLITUS: Primary | ICD-10-CM

## 2025-06-16 DIAGNOSIS — L84 CALLUS OF FOOT: ICD-10-CM

## 2025-06-16 DIAGNOSIS — H91.93 DECREASED HEARING OF BOTH EARS: ICD-10-CM

## 2025-06-16 DIAGNOSIS — G63 POLYNEUROPATHY ASSOCIATED WITH UNDERLYING DISEASE: ICD-10-CM

## 2025-06-16 DIAGNOSIS — L60.2 OVERGROWN TOENAILS: ICD-10-CM

## 2025-06-16 DIAGNOSIS — Z91.89 COMPLIANCE WITH MEDICATION REGIMEN: ICD-10-CM

## 2025-06-16 DIAGNOSIS — E11.42 TYPE 2 DIABETES MELLITUS WITH PERIPHERAL NEUROPATHY: ICD-10-CM

## 2025-06-16 PROCEDURE — 11719 TRIM NAIL(S) ANY NUMBER: CPT | Mod: 59,,, | Performed by: NURSE PRACTITIONER

## 2025-06-16 PROCEDURE — 99211 OFF/OP EST MAY X REQ PHY/QHP: CPT | Mod: 25

## 2025-06-16 PROCEDURE — 27000999 HC MEDICAL RECORD PHOTO DOCUMENTATION

## 2025-06-16 PROCEDURE — 11055 PARING/CUTG B9 HYPRKER LES 1: CPT

## 2025-06-16 PROCEDURE — 11721 DEBRIDE NAIL 6 OR MORE: CPT

## 2025-06-16 PROCEDURE — 99499 UNLISTED E&M SERVICE: CPT | Mod: ,,, | Performed by: NURSE PRACTITIONER

## 2025-06-16 PROCEDURE — 92557 COMPREHENSIVE HEARING TEST: CPT | Performed by: AUDIOLOGIST

## 2025-06-16 PROCEDURE — 11719 TRIM NAIL(S) ANY NUMBER: CPT

## 2025-06-16 PROCEDURE — 92567 TYMPANOMETRY: CPT | Performed by: AUDIOLOGIST

## 2025-06-16 PROCEDURE — 11055 PARING/CUTG B9 HYPRKER LES 1: CPT | Mod: ,,, | Performed by: NURSE PRACTITIONER

## 2025-06-16 NOTE — PROGRESS NOTES
[facility]   Outpatient Wound Care     Subjective:   Patient ID: Melquiades Rehman Jr. is a 69 y.o. male.    Chief Complaint: DFC      History of Present Illness:   69 y.o. White male presents to wound care clinic today for diabetic foot care, and callus care, presents to clinic alone.  Ambulates without difficulty.  During examination of all medications patient has not been taking medications as directed.  Currently not taking Carbidopa.  Reviewed previous medical history since last visit of 03/07/2025.  Past medical history:  He was a patient of Monica Chun NP.  Voices he is currently under a stem cell research product receiving injections for his Parkinson's.  Followed by Pau Gomes FNP for PCP last appt 11/12/2024.    Today's visit 06/16/2025:  Reviewed previous progress notes since last visit of 03/07/2025.  Long discussion with the patient today regarding importance of taking all medications as directed especially carbidopa.  Patient has increase hand tremors and muscle jerking. Diabetic foot exam performed.  Monofilament test done decreased sensation noted in all areas.  Bilateral lower extremities cool to touch with absent hair growth.  Trimmed all 10 toenails using podiatry clippers, and filed with [place] board. Callus to right foot plantar surface. Pared callus using #4 dermal curette. Rationale for paring today to decrease pressure, and prevent ulcers.  Instructed the importance of taking all medications as directed.  Apply Vaseline to bilateral feet for dryness.  Instructed may perform daily with the exception between toes.  Instructed the importance of checking feet daily due to decrease sensation high risk for diabetic foot ulcers.  Instructed on proper footwear, nail care, and daily skin assessment.  Will have him return to the clinic  in 3 months.  Instructed to call the office with any questions, concerns, or new skin issues.  Verbalized understanding of all instructions.     03/07/2025:  Progress notes since last visit of 11/27/2024. Diabetic foot exam performed.  Monofilament test done decreased sensation noted in all areas.  Bilateral lower extremities cool to touch with absent hair growth.  Trimmed all 10 toenails using podiatry clippers, and filed with omar board. Callus to right foot plantar surface. Pared callus using #4 dermal curette. Rationale for paring today to decrease pressure, and prevent ulcers.  Instructed the importance of checking feet daily due to decrease sensation high risk for diabetic foot ulcers.  Instructed on proper footwear, nail care, and daily skin assessment.  Will have him return to the clinic in 3 months.  Instructed to call the office with any questions, concerns, or new skin issues.  Verbalized understanding of all instructions.    11/27/2024:  Reviewed previous progress notes on 08/20/2024. Diabetic foot exam performed.  Monofilament test done decreased sensation noted in all areas.  Bilateral lower extremities cool to touch with absent hair growth.  Trimmed all 10 toenails using podiatry clippers, and filed with omar board.  Instructed the importance of checking feet daily due to decrease sensation high risk for diabetic foot ulcers.  Instructed on proper footwear, nail care, and daily skin assessment.  Will have him return to the clinic in 3 months.  Instructed to call the office with any questions, concerns, or new skin issues.  Verbalized understanding of all instructions.    08/20/2024:  Diabetic foot exam performed.  Monofilament test done decreased sensation noted in all areas. Bilateral lower extremities cool to touch and bilateral cool feet. Trimmed all 10 toenails using podiatry clippers, and filed with omar board. Bilateral great toe callus pared using #4 Dermal curette. Rationale for paring to  decrease pressure, and alleviate pain.  Tolerated well.  Instructed the importance of checking feet daily due to decrease sensation.  Instructed on proper footwear, nail care, and daily skin assessments.  Will have him return to the clinic in 3 months for diabetic foot care.  Instructed to call the office with any questions, concerns, or new skin issues.  Verbalized understanding of all instructions.                        History includes:      Past Medical History:   Diagnosis Date    Atrial fibrillation     Cataract 2017    Central stenosis of spinal canal     Chronic back pain greater than 3 months duration 10/14/2022    Constipation 09/30/2022    Degeneration of lumbar intervertebral disc 07/06/2022    Diastolic dysfunction 05/01/2023    Dysphonia 09/30/2022    Dystrophic nail 06/19/2023    Erectile dysfunction 09/30/2022    Fatty liver     GERD (gastroesophageal reflux disease) 09/30/2022    H/O cataract removal with insertion of prosthetic lens     Hearing loss 40 years    History of CVA (cerebrovascular accident) 09/30/2022    TIA    History of radial keratotomy 01/01/1998    HTN (hypertension)     Hyperlipidemia 09/30/2022    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/30/2022    Microhematuria 03/30/2023    Overgrown toenails 06/19/2023    Parkinson's disease     Peyronie disease     Polyneuropathy associated with underlying disease 06/19/2023    Rheumatoid arthritis 1/10/1994    Rheumatoid arthritis, unspecified     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    Spinal stenosis of lumbar region 09/30/2022    Type 2 diabetes mellitus without complications     Unspecified macular degeneration     Unspecified macular degeneration       Past Surgical History:   Procedure Laterality Date    BACK SURGERY Left 04/2024    CATARACT EXTRACTION W/  INTRAOCULAR LENS IMPLANT Bilateral     EPIDURAL STEROID INJECTION INTO LUMBAR SPINE      exc cyst Lt ear      EXTERNAL EAR SURGERY  2019    EYE SURGERY      INJECTION OF ANESTHETIC AGENT AROUND MEDIAL  BRANCH NERVES INNERVATING LUMBAR FACET JOINT Bilateral 2022    Procedure: Block-nerve-medial branch-lumbar;  Surgeon: Gertrude Blanco MD;  Location: [facility] OR;  Service: Pain Management;  Laterality: Bilateral;  Bilateral L4-L5...Patient is requesting to be first case    INJECTION OF ANESTHETIC AGENT AROUND MEDIAL BRANCH NERVES INNERVATING LUMBAR FACET JOINT Bilateral 2023    Procedure: Block-nerve-medial branch-lumbar;  Surgeon: Gertrude Blanco MD;  Location: [facility] OR;  Service: Pain Management;  Laterality: Bilateral;  Bilateral MBB L3-5    INJECTION OF ANESTHETIC AGENT AROUND MEDIAL BRANCH NERVES INNERVATING LUMBAR FACET JOINT Bilateral 2024    Procedure: Block-nerve-medial branch-lumbar;  Surgeon: Gertrude Blanco MD;  Location: [facility] OR;  Service: Pain Management;  Laterality: Bilateral;  Bilateral MBB L3-5    KNEE ARTHROSCOPY Right     RADIOFREQUENCY ABLATION Left 2024    Procedure: Radiofrequency Ablation;  Surgeon: Gertrude Blanco MD;  Location: [facility] OR;  Service: Pain Management;  Laterality: Left;  RFA Lt L3-L5    RADIOFREQUENCY ABLATION Right 2024    Procedure: RADIOFREQUENCY ABLATION / Right L3 L5;  Surgeon: Gertrude Blanco MD;  Location: [facility] OR;  Service: Pain Management;  Laterality: Right;  Right RFA L3-L5    VASECTOMY        Social History     Socioeconomic History    Marital status:     Number of children: 1   Tobacco Use    Smoking status: Former     Current packs/day: 0.00     Average packs/day: 1 pack/day for 15.0 years (15.0 ttl pk-yrs)     Types: Cigarettes     Start date: 1972     Quit date: 1987     Years since quittin.4     Passive exposure: Past    Smokeless tobacco: Never   Substance and Sexual Activity    Alcohol use: Never     Comment: occasional    Drug use: Yes     Types: Marijuana     Comment: not using anymore    Sexual activity: Yes     Partners: Female     Social Drivers of Health     Financial Resource Strain: Low Risk   (5/21/2025)    Overall Financial Resource Strain (CARDIA)     Difficulty of Paying Living Expenses: Not hard at all   Food Insecurity: No Food Insecurity (5/21/2025)    Hunger Vital Sign     Worried About Running Out of Food in the Last Year: Never true     Ran Out of Food in the Last Year: Never true   Transportation Needs: No Transportation Needs (5/21/2025)    PRAPARE - Transportation     Lack of Transportation (Medical): No     Lack of Transportation (Non-Medical): No   Physical Activity: Unknown (1/11/2025)    Exercise Vital Sign     Days of Exercise per Week: 0 days   Stress: No Stress Concern Present (5/21/2025)    [place] [employer] - Occupational Stress Questionnaire     Feeling of Stress : Not at all   Housing Stability: Low Risk  (5/21/2025)    Housing Stability Vital Sign     Unable to Pay for Housing in the Last Year: No     Homeless in the Last Year: No   .      Current Outpatient Medications   Medication Sig Dispense Refill    aspirin (ECOTRIN) 81 MG EC tablet Take 81 mg by mouth once daily.      ACCU-CHEK GUIDE GLUCOSE METER Misc  (Patient not taking: Reported on 6/16/2025)      alcohol swabs PadM Apply 1 each topically once daily. (Patient not taking: Reported on 6/16/2025) 100 each 11    alogliptin-pioglitazone 25-30 mg Tab Take 1 tablet by mouth Daily.      ammonium lactate 12 % Crea Apply 1 application  topically 2 (two) times daily. (Patient not taking: Reported on 6/16/2025)      AUSTEDO 6 mg Tab Take 1 tablet by mouth 2 (two) times daily. (Patient not taking: Reported on 6/16/2025)      blood sugar diagnostic (ACCU-CHEK GUIDE TEST STRIPS) Strp 1 strip by skin prick route Daily. (Patient not taking: Reported on 6/16/2025) 100 strip 11    blood-glucose meter kit To check BG two times daily, to use with insurance preferred meter (Patient not taking: Reported on 6/16/2025) 1 each 0    carbidopa-levodopa  mg (SINEMET)  mg per tablet Take 2 tablets by mouth 2 (two)  times a day. (Patient not taking: Reported on 6/16/2025)      colchicine (COLCRYS) 0.6 mg tablet TAKE 1 TABLET BY MOUTH TWICE  DAILY (Patient not taking: Reported on 6/16/2025) 180 tablet 3    lancets (ACCU-CHEK SOFTCLIX LANCETS) Misc 1 each by Misc.(Non-Drug; Combo Route) route Daily. (Patient not taking: Reported on 6/16/2025) 100 each 11    lisinopriL 10 MG tablet Take 10 mg by mouth once daily. (Patient not taking: Reported on 6/16/2025)      metoprolol succinate (TOPROL-XL) 25 MG 24 hr tablet Take 1 tablet (25 mg total) by mouth once daily. (Patient not taking: Reported on 6/16/2025) 90 tablet 3    midodrine (PROAMATINE) 5 MG Tab Take 1 tablet (5 mg total) by mouth 3 (three) times daily as needed. (Patient not taking: Reported on 6/16/2025) 90 tablet 11    pantoprazole (PROTONIX) 40 MG tablet Take 1 tablet (40 mg total) by mouth once daily. (Patient not taking: Reported on 6/16/2025) 90 tablet 1    polyethylene glycol (MOVIPREP) 100-7.5-2.691 gram solution Take 2,000 mLs by mouth As instructed (take as directed per instructions provided by GI clinic). (Patient not taking: Reported on 6/10/2025) 1 kit 0    predniSONE (DELTASONE) 5 MG tablet Take 2 tab po qd x 3 days then take 1 tab po qd x 2 days prn RA flare. (Patient not taking: Reported on 6/16/2025) 30 tablet 0    rosuvastatin (CRESTOR) 20 MG tablet Take 1 tablet (20 mg total) by mouth once daily. (Patient not taking: Reported on 6/16/2025) 90 tablet 3    selegiline HCl (ELDEPRYL) 5 mg tablet Take 5 mg by mouth 2 (two) times daily with meals. (Patient not taking: Reported on 6/16/2025)      sodium chloride 5% (BHUMIKA 128) 5 % ophthalmic solution Place 1 drop into both eyes 3 (three) times daily. (Patient not taking: Reported on 6/16/2025) 30 mL 2    tiZANidine (ZANAFLEX) 4 MG tablet Take 4 mg by mouth once daily. (Patient not taking: Reported on 6/16/2025)      tofacitinib (XELJANZ XR) 11 mg Tb24 Take 1 tablet by mouth once daily. (Patient not taking: Reported  on 6/16/2025) 30 tablet 2    XARELTO 20 mg Tab Take 1 tablet (20 mg total) by mouth once daily. (Patient not taking: Reported on 6/16/2025) 90 tablet 2     Current Facility-Administered Medications   Medication Dose Route Frequency Provider Last Rate Last Admin    influenza (adjuvanted) (Fluad) 45 mcg/0.5 mL IM vaccine (> or = 64 yo) 0.5 mL  0.5 mL Intramuscular 1 time in Clinic/HOD            Review of Systems   All other systems reviewed and are negative.         Labs Reviewed:   Chemistry:  Lab Results   Component Value Date    BUN 26.5 (H) 05/20/2025    BUN 21.4 05/18/2025    CREATININE 1.71 (H) 05/20/2025    CREATININE 1.43 (H) 05/18/2025    EGFRNORACEVR 43 05/20/2025    EGFRNORACEVR 54 05/18/2025    AST 18 05/20/2025    AST 19 05/18/2025    ALT <5 05/20/2025    ALT 16 05/18/2025    HGBA1C 6.6 03/27/2025        Hematology:  Lab Results   Component Value Date    WBC 5.27 05/20/2025    WBC 4.34 (L) 05/18/2025    HGB 14.4 05/20/2025    HGB 15.6 05/18/2025    HCT 42.4 05/20/2025    HCT 44.4 05/18/2025     05/20/2025     05/18/2025       Inflammatory Markers:  Lab Results   Component Value Date    SEDRATE 15 08/08/2024    SEDRATE 7 01/11/2024    SEDRATE 6 10/09/2023        Objective:        Physical Exam  Vitals reviewed.   Cardiovascular:      Pulses:           Dorsalis pedis pulses are 2+ on the right side and 2+ on the left side.        Posterior tibial pulses are 2+ on the right side and 2+ on the left side.   Feet:      Right foot:      Skin integrity: Callus and dry skin present.      Toenail Condition: Right toenails are long.      Left foot:      Skin integrity: Dry skin present.      Toenail Condition: Left toenails are long.      Comments: Monofilament test done decreased sensation noted in all areas.  Skin:     General: Skin is cool and dry.      Capillary Refill: Capillary refill takes less than 2 seconds.   Neurological:      Mental Status: He is alert.                        Assessment:          ICD-10-CM ICD-9-CM   1. Encounter for comprehensive diabetic foot examination, type 2 diabetes mellitus  E11.9 250.00   2. Overgrown toenails  L60.2 703.8   3. Callus of foot  L84 700   4. Polyneuropathy associated with underlying disease  G63 357.4   5. Compliance with medication regimen  Z91.89 V49.89   6. Type 2 diabetes mellitus with peripheral neuropathy  E11.42 250.60     357.2         Plan:   Tissue pathology and/or culture taken:  [] Yes [x] No   Sharp debridement performed:   [] Yes [x] No   Labs ordered this visit:   [] Yes [x] No   Imaging ordered this visit:   [] Yes [x] No         1. Encounter for comprehensive diabetic foot examination, type 2 diabetes mellitus     Diabetic foot exam performed.  Instructed on proper foot care.   2. Overgrown toenails     Trimmed.  Instructed on proper nail care   3. Callus of foot     Pared.  Instructed on proper footwear.   4. Polyneuropathy associated with underlying disease     Will check feet daily due to high risk for diabetic ulcers.   5. Compliance with medication regimen     Reinforced the importance of taking all medications as directed.   6. Type 2 diabetes mellitus with peripheral neuropathy     Last A1c 6.6.    Must have a strict diabetic diet, take glucose lowering medications as prescribed and encourage lower HA1C.        The time spent including preparing to see the patient, obtaining patient history and assessment, evaluation of the plan of care, patient/caregiver counseling and education, orders, documentation, coordination of care, and other professional medical management activities for today's encounter was 20 minute.    Time spent performing procedures during today's encounter was 25 minute.    Follow up in about 3 months (around 9/16/2025) for DFC.  Teaching provided on s/s to call wound clinic for promptly.   ER precautions taught for after hours and weekends.       DAVIDSON Mo

## 2025-06-16 NOTE — PROGRESS NOTES
Hearing Evaluation        Patient History: Mr. Rehman has a long-standing hearing loss reporting no changes in hearing since previous evaluation. He also reports occasional bilateral tinnitus. Vertigo issues are attributed to low blood pressure. Middle ear issues are denied. All additional history is unremarkable.        Test Results:                    Pure Tone Testing:      Right ear:       Mild to moderately severe sensorineural hearing loss from 250-8kHz. Speech reception threshold is in agreement with puretone findings.  Discrimination score of 100% is considered excellent.        Left ear:          Mild to severe sensorineural hearing loss from 250-8kHz. Speech reception threshold is in agreement with puretone findings.  Discrimination score of 100% is considered excellent.                                                                            Tympanometry:                                           Right ear:       Type 'A' tymp, normal middle ear pressure/function     Left ear:          Type 'As' tymp, reduced TM (.17mL) mobility                         Interpretations:      Behavioral test findings indicate no significant changes in hearing since previous hearing evaluation. Speech reception thresholds obtained at 20dB, AU, and are in agreement with puretone findings. Speech discrimination scores of 100%, AU, are considered excellent.  Immittance measures indicate normal middle ear pressure/function for the right ear and minimal TM mobility for the left ear.            Recommendations:   1. Annual hearing evaluations  2. Binaural amplification (Information on [place] [employer] given)

## 2025-06-18 ENCOUNTER — TELEPHONE (OUTPATIENT)
Dept: INTERNAL MEDICINE | Facility: CLINIC | Age: 70
End: 2025-06-18
Payer: MEDICARE

## 2025-06-18 NOTE — TELEPHONE ENCOUNTER
I received a call from Pt. Informing of blood pressure readings for yesterday 6/17/25. Pt. States that when he takes all of his b/p medications, his readings are too low and he feels weak. Pt. Also stated he did inform his cardiologist this AM. Pt. States, last reading was 107/80. I recommended ED precautions if Pt. Feels weak d/t low blood pressure. Pt. Verbalized understanding.

## 2025-07-02 DIAGNOSIS — I10 PRIMARY HYPERTENSION: Primary | ICD-10-CM

## 2025-07-02 DIAGNOSIS — R31.21 ASYMPTOMATIC MICROSCOPIC HEMATURIA: ICD-10-CM

## 2025-07-07 ENCOUNTER — LAB VISIT (OUTPATIENT)
Dept: LAB | Facility: HOSPITAL | Age: 70
End: 2025-07-07
Attending: NURSE PRACTITIONER
Payer: MEDICARE

## 2025-07-07 DIAGNOSIS — Z79.899 DRUG-INDUCED IMMUNODEFICIENCY: ICD-10-CM

## 2025-07-07 DIAGNOSIS — Z79.899 HIGH RISK MEDICATION USE: ICD-10-CM

## 2025-07-07 DIAGNOSIS — M05.79 SEROPOSITIVE RHEUMATOID ARTHRITIS OF MULTIPLE SITES: ICD-10-CM

## 2025-07-07 DIAGNOSIS — R31.21 ASYMPTOMATIC MICROSCOPIC HEMATURIA: ICD-10-CM

## 2025-07-07 DIAGNOSIS — D84.821 DRUG-INDUCED IMMUNODEFICIENCY: ICD-10-CM

## 2025-07-07 DIAGNOSIS — I10 PRIMARY HYPERTENSION: ICD-10-CM

## 2025-07-07 LAB
ALBUMIN SERPL-MCNC: 3.8 G/DL (ref 3.4–4.8)
ALBUMIN/GLOB SERPL: 1 RATIO (ref 1.1–2)
ALP SERPL-CCNC: 68 UNIT/L (ref 40–150)
ALT SERPL-CCNC: 18 UNIT/L (ref 0–55)
ANION GAP SERPL CALC-SCNC: 6 MEQ/L
AST SERPL-CCNC: 16 UNIT/L (ref 11–45)
BACTERIA #/AREA URNS AUTO: ABNORMAL /HPF
BASOPHILS # BLD AUTO: 0.02 X10(3)/MCL
BASOPHILS NFR BLD AUTO: 0.4 %
BILIRUB SERPL-MCNC: 0.4 MG/DL
BILIRUB UR QL STRIP.AUTO: NEGATIVE
BUN SERPL-MCNC: 14.5 MG/DL (ref 8.4–25.7)
CALCIUM SERPL-MCNC: 9.1 MG/DL (ref 8.8–10)
CHLORIDE SERPL-SCNC: 105 MMOL/L (ref 98–107)
CLARITY UR: CLEAR
CO2 SERPL-SCNC: 27 MMOL/L (ref 23–31)
COLOR UR AUTO: YELLOW
CREAT SERPL-MCNC: 0.99 MG/DL (ref 0.72–1.25)
CREAT/UREA NIT SERPL: 15
CRP SERPL-MCNC: 2.6 MG/L
EOSINOPHIL # BLD AUTO: 0.06 X10(3)/MCL (ref 0–0.9)
EOSINOPHIL NFR BLD AUTO: 1.3 %
ERYTHROCYTE [DISTWIDTH] IN BLOOD BY AUTOMATED COUNT: 13.5 % (ref 11.5–17)
ERYTHROCYTE [SEDIMENTATION RATE] IN BLOOD: 11 MM/HR (ref 0–15)
ESTROGEN SERPL-MCNC: NORMAL PG/ML
GFR SERPLBLD CREATININE-BSD FMLA CKD-EPI: >60 ML/MIN/1.73/M2
GLOBULIN SER-MCNC: 4 GM/DL (ref 2.4–3.5)
GLUCOSE SERPL-MCNC: 129 MG/DL (ref 82–115)
GLUCOSE UR QL STRIP: NORMAL
HBV CORE AB SERPL QL IA: NONREACTIVE
HBV SURFACE AB SER-ACNC: 0 MIU/ML
HBV SURFACE AB SERPL IA-ACNC: NONREACTIVE M[IU]/ML
HBV SURFACE AG SERPL QL IA: NONREACTIVE
HCT VFR BLD AUTO: 45.1 % (ref 42–52)
HCV AB SERPL QL IA: NONREACTIVE
HGB BLD-MCNC: 15.5 G/DL (ref 14–18)
HGB UR QL STRIP: ABNORMAL
HIV 1+2 AB+HIV1 P24 AG SERPL QL IA: NONREACTIVE
HYALINE CASTS #/AREA URNS LPF: ABNORMAL /LPF
IMM GRANULOCYTES # BLD AUTO: 0.02 X10(3)/MCL (ref 0–0.04)
IMM GRANULOCYTES NFR BLD AUTO: 0.4 %
INSULIN SERPL-ACNC: NORMAL U[IU]/ML
KETONES UR QL STRIP: NEGATIVE
LAB AP CLINICAL INFORMATION: NORMAL
LAB AP GROSS DESCRIPTION: NORMAL
LAB AP URINE CYTOLOGY INTERPRETATION SPECIMEN 1: NORMAL
LEUKOCYTE ESTERASE UR QL STRIP: NEGATIVE
LYMPHOCYTES # BLD AUTO: 1.56 X10(3)/MCL (ref 0.6–4.6)
LYMPHOCYTES NFR BLD AUTO: 34.4 %
MCH RBC QN AUTO: 32.3 PG (ref 27–31)
MCHC RBC AUTO-ENTMCNC: 34.4 G/DL (ref 33–36)
MCV RBC AUTO: 94 FL (ref 80–94)
MONOCYTES # BLD AUTO: 0.61 X10(3)/MCL (ref 0.1–1.3)
MONOCYTES NFR BLD AUTO: 13.4 %
MUCOUS THREADS URNS QL MICRO: ABNORMAL /LPF
NEUTROPHILS # BLD AUTO: 2.27 X10(3)/MCL (ref 2.1–9.2)
NEUTROPHILS NFR BLD AUTO: 50.1 %
NITRITE UR QL STRIP: NEGATIVE
NRBC BLD AUTO-RTO: 0 %
PH UR STRIP: 5.5 [PH]
PLATELET # BLD AUTO: 279 X10(3)/MCL (ref 130–400)
PMV BLD AUTO: 9.3 FL (ref 7.4–10.4)
POTASSIUM SERPL-SCNC: 4.3 MMOL/L (ref 3.5–5.1)
PROT SERPL-MCNC: 7.8 GM/DL (ref 5.8–7.6)
PROT UR QL STRIP: ABNORMAL
RBC # BLD AUTO: 4.8 X10(6)/MCL (ref 4.7–6.1)
RBC #/AREA URNS AUTO: ABNORMAL /HPF
SODIUM SERPL-SCNC: 138 MMOL/L (ref 136–145)
SP GR UR STRIP.AUTO: 1.03 (ref 1–1.03)
SQUAMOUS #/AREA URNS LPF: ABNORMAL /HPF
UROBILINOGEN UR STRIP-ACNC: NORMAL
WBC # BLD AUTO: 4.54 X10(3)/MCL (ref 4.5–11.5)
WBC #/AREA URNS AUTO: ABNORMAL /HPF

## 2025-07-07 PROCEDURE — 86706 HEP B SURFACE ANTIBODY: CPT

## 2025-07-07 PROCEDURE — 36415 COLL VENOUS BLD VENIPUNCTURE: CPT

## 2025-07-07 PROCEDURE — 87340 HEPATITIS B SURFACE AG IA: CPT

## 2025-07-07 PROCEDURE — 86140 C-REACTIVE PROTEIN: CPT

## 2025-07-07 PROCEDURE — 85652 RBC SED RATE AUTOMATED: CPT

## 2025-07-07 PROCEDURE — 87389 HIV-1 AG W/HIV-1&-2 AB AG IA: CPT

## 2025-07-07 PROCEDURE — 86704 HEP B CORE ANTIBODY TOTAL: CPT

## 2025-07-07 PROCEDURE — 86803 HEPATITIS C AB TEST: CPT

## 2025-07-07 PROCEDURE — 81001 URINALYSIS AUTO W/SCOPE: CPT

## 2025-07-07 PROCEDURE — 88108 CYTOPATH CONCENTRATE TECH: CPT | Mod: TC

## 2025-07-07 PROCEDURE — 80053 COMPREHEN METABOLIC PANEL: CPT

## 2025-07-07 PROCEDURE — 85025 COMPLETE CBC W/AUTO DIFF WBC: CPT

## 2025-07-07 PROCEDURE — 86480 TB TEST CELL IMMUN MEASURE: CPT

## 2025-07-09 LAB
GAMMA INTERFERON BACKGROUND BLD IA-ACNC: 0.06 IU/ML
M TB IFN-G BLD-IMP: NEGATIVE
M TB IFN-G CD4+ BCKGRND COR BLD-ACNC: 0.04 IU/ML
M TB IFN-G CD4+CD8+ BCKGRND COR BLD-ACNC: 0.02 IU/ML
MITOGEN IGNF BCKGRD COR BLD-ACNC: 9.94 IU/ML

## 2025-07-11 RX ORDER — ALOGLIPTIN BENZOATE AND PIOGLITAZONE HYDROCHLORIDE 25; 30 MG/1; MG/1
1 TABLET, FILM COATED ORAL
Qty: 30 TABLET | Refills: 11 | Status: SHIPPED | OUTPATIENT
Start: 2025-07-11

## 2025-07-14 ENCOUNTER — OFFICE VISIT (OUTPATIENT)
Dept: INTERNAL MEDICINE | Facility: CLINIC | Age: 70
End: 2025-07-14
Payer: MEDICARE

## 2025-07-14 VITALS
WEIGHT: 225.63 LBS | DIASTOLIC BLOOD PRESSURE: 73 MMHG | HEART RATE: 72 BPM | RESPIRATION RATE: 16 BRPM | SYSTOLIC BLOOD PRESSURE: 116 MMHG | TEMPERATURE: 98 F | BODY MASS INDEX: 31.59 KG/M2 | HEIGHT: 71 IN

## 2025-07-14 DIAGNOSIS — K21.9 GASTROESOPHAGEAL REFLUX DISEASE, UNSPECIFIED WHETHER ESOPHAGITIS PRESENT: ICD-10-CM

## 2025-07-14 DIAGNOSIS — Z00.00 WELL ADULT EXAM: ICD-10-CM

## 2025-07-14 DIAGNOSIS — G89.4 CHRONIC PAIN SYNDROME: ICD-10-CM

## 2025-07-14 DIAGNOSIS — E11.9 CONTROLLED TYPE 2 DIABETES MELLITUS WITHOUT COMPLICATION, WITHOUT LONG-TERM CURRENT USE OF INSULIN: ICD-10-CM

## 2025-07-14 DIAGNOSIS — E78.2 MIXED HYPERLIPIDEMIA: ICD-10-CM

## 2025-07-14 DIAGNOSIS — M10.9 GOUT, UNSPECIFIED CAUSE, UNSPECIFIED CHRONICITY, UNSPECIFIED SITE: ICD-10-CM

## 2025-07-14 DIAGNOSIS — Z83.719 FAMILY HISTORY OF COLONIC POLYPS: ICD-10-CM

## 2025-07-14 DIAGNOSIS — I10 PRIMARY HYPERTENSION: Primary | ICD-10-CM

## 2025-07-14 DIAGNOSIS — R31.9 HEMATURIA, UNSPECIFIED TYPE: ICD-10-CM

## 2025-07-14 PROCEDURE — 3044F HG A1C LEVEL LT 7.0%: CPT | Mod: CPTII,,, | Performed by: NURSE PRACTITIONER

## 2025-07-14 PROCEDURE — 1159F MED LIST DOCD IN RCRD: CPT | Mod: CPTII,,, | Performed by: NURSE PRACTITIONER

## 2025-07-14 PROCEDURE — 1160F RVW MEDS BY RX/DR IN RCRD: CPT | Mod: CPTII,,, | Performed by: NURSE PRACTITIONER

## 2025-07-14 PROCEDURE — 99214 OFFICE O/P EST MOD 30 MIN: CPT | Mod: S$PBB,,, | Performed by: NURSE PRACTITIONER

## 2025-07-14 PROCEDURE — 3078F DIAST BP <80 MM HG: CPT | Mod: CPTII,,, | Performed by: NURSE PRACTITIONER

## 2025-07-14 PROCEDURE — 3074F SYST BP LT 130 MM HG: CPT | Mod: CPTII,,, | Performed by: NURSE PRACTITIONER

## 2025-07-14 PROCEDURE — 3008F BODY MASS INDEX DOCD: CPT | Mod: CPTII,,, | Performed by: NURSE PRACTITIONER

## 2025-07-14 PROCEDURE — 99215 OFFICE O/P EST HI 40 MIN: CPT | Mod: PBBFAC | Performed by: NURSE PRACTITIONER

## 2025-07-14 PROCEDURE — 4010F ACE/ARB THERAPY RXD/TAKEN: CPT | Mod: CPTII,,, | Performed by: NURSE PRACTITIONER

## 2025-07-14 RX ORDER — LANCETS
1 EACH MISCELLANEOUS DAILY
Qty: 100 EACH | Refills: 11 | Status: CANCELLED | OUTPATIENT
Start: 2025-07-14

## 2025-07-14 RX ORDER — ISOPROPYL ALCOHOL 70 ML/100ML
1 SWAB TOPICAL DAILY
Qty: 100 EACH | Refills: 11 | Status: CANCELLED | OUTPATIENT
Start: 2025-07-14

## 2025-07-14 RX ORDER — COLCHICINE 0.6 MG/1
0.6 TABLET ORAL 2 TIMES DAILY
Qty: 180 TABLET | Refills: 3 | Status: CANCELLED | OUTPATIENT
Start: 2025-07-14

## 2025-07-14 RX ORDER — PANTOPRAZOLE SODIUM 40 MG/1
40 TABLET, DELAYED RELEASE ORAL DAILY
Qty: 90 TABLET | Refills: 1 | Status: CANCELLED | OUTPATIENT
Start: 2025-07-14

## 2025-07-14 RX ORDER — GABAPENTIN 300 MG/1
300 CAPSULE ORAL 3 TIMES DAILY
Qty: 270 CAPSULE | Refills: 1 | Status: SHIPPED | OUTPATIENT
Start: 2025-07-14 | End: 2026-07-14

## 2025-07-14 NOTE — PROGRESS NOTES
Patient ID: [id number]     Chief Complaint: Follow-up (Lab review )        HPI:     HPI      Melquiades Rehman Jr. is a 69 y.o. male here today for a follow up.         Immunizations:   Immunization History   Administered Date(s) Administered    COVID-19, vector-nr, rS-Ad26, PF (Danie) 03/08/2021, 10/25/2021    Influenza 10/28/2014    Influenza (FLUAD) - Quadrivalent - Adjuvanted - PF *Preferred* (65+) 10/21/2023    Influenza - Quadrivalent 09/30/2020    Influenza - Quadrivalent - PF *Preferred* (6 months and older) 12/04/2019, 09/30/2020    Influenza - Trivalent - Fluad - Adjuvanted - PF (65 years and older 01/13/2025    Influenza - Trivalent - Fluarix, Flulaval, Fluzone, Afluria - PF 10/26/2018, 12/04/2019    Pneumococcal Conjugate - 13 Valent 05/03/2019    Pneumococcal Polysaccharide - 23 Valent 11/24/2020    Zoster Recombinant 11/12/2019, 02/13/2020, 01/13/2025        -------------------------------------    Atrial fibrillation    Cataract    Central stenosis of spinal canal    Chronic back pain greater than 3 months duration    Constipation    Degeneration of lumbar intervertebral disc    Diastolic dysfunction    Dysphonia    Dystrophic nail    Erectile dysfunction    Fatty liver    GERD (gastroesophageal reflux disease)    H/O cataract removal with insertion of prosthetic lens    Hearing loss    History of CVA (cerebrovascular accident)    TIA    History of radial keratotomy    HTN (hypertension)    Hyperlipidemia    Induratio penis plastica    Microhematuria    Overgrown toenails    Parkinson's disease    Peyronie disease    Polyneuropathy associated with underlying disease    Rheumatoid arthritis    Rheumatoid arthritis, unspecified    Sixth nerve palsy    Spinal stenosis of lumbar region    Type 2 diabetes mellitus without complications    Unspecified macular degeneration    Unspecified macular degeneration        Past Surgical History:   Procedure Laterality Date    BACK SURGERY Left 04/2024    CATARACT  EXTRACTION W/  INTRAOCULAR LENS IMPLANT Bilateral     EPIDURAL STEROID INJECTION INTO LUMBAR SPINE      exc cyst Lt ear      EXTERNAL EAR SURGERY  2019    EYE SURGERY      INJECTION OF ANESTHETIC AGENT AROUND MEDIAL BRANCH NERVES INNERVATING LUMBAR FACET JOINT Bilateral 11/16/2022    Procedure: Block-nerve-medial branch-lumbar;  Surgeon: Gertrude Blanco MD;  Location: [facility] OR;  Service: Pain Management;  Laterality: Bilateral;  Bilateral L4-L5...Patient is requesting to be first case    INJECTION OF ANESTHETIC AGENT AROUND MEDIAL BRANCH NERVES INNERVATING LUMBAR FACET JOINT Bilateral 11/22/2023    Procedure: Block-nerve-medial branch-lumbar;  Surgeon: Gertrude Blanco MD;  Location: [facility] OR;  Service: Pain Management;  Laterality: Bilateral;  Bilateral MBB L3-5    INJECTION OF ANESTHETIC AGENT AROUND MEDIAL BRANCH NERVES INNERVATING LUMBAR FACET JOINT Bilateral 01/24/2024    Procedure: Block-nerve-medial branch-lumbar;  Surgeon: Gertrude Blanco MD;  Location: [facility] OR;  Service: Pain Management;  Laterality: Bilateral;  Bilateral MBB L3-5    KNEE ARTHROSCOPY Right     RADIOFREQUENCY ABLATION Left 03/27/2024    Procedure: Radiofrequency Ablation;  Surgeon: Gertrude Blanco MD;  Location: [facility] OR;  Service: Pain Management;  Laterality: Left;  RFA Lt L3-L5    RADIOFREQUENCY ABLATION Right 04/25/2024    Procedure: RADIOFREQUENCY ABLATION / Right L3 L5;  Surgeon: Gertrude Blanco MD;  Location: [facility] OR;  Service: Pain Management;  Laterality: Right;  Right RFA L3-L5    VASECTOMY  1998       Review of patient's allergies indicates:   Allergen Reactions    Sarilumab Other (See Comments)     Other reaction(s): fainting  Kevzara       Current Outpatient Medications   Medication Instructions    ACCU-CHEK GUIDE GLUCOSE METER Misc     alcohol swabs PadM 1 each, Topical (Top), Daily    alogliptin-pioglitazone 25-30 mg Tab 1 tablet, Oral    ammonium lactate 12 % Crea 1 application , 2 times daily    aspirin (ECOTRIN) 81 mg,  Daily    blood sugar diagnostic (ACCU-CHEK GUIDE TEST STRIPS) Strp 1 strip, skin prick, Daily    blood-glucose meter kit To check BG two times daily, to use with insurance preferred meter    carbidopa-levodopa  mg (SINEMET)  mg per tablet 2 tablets, 2 times daily    lancets (ACCU-CHEK SOFTCLIX LANCETS) Misc 1 each, Misc.(Non-Drug; Combo Route), Daily    lisinopriL 10 mg, Daily    metoprolol succinate (TOPROL-XL) 25 mg, Oral, Daily    predniSONE (DELTASONE) 5 MG tablet Take 2 tab po qd x 3 days then take 1 tab po qd x 2 days prn RA flare.    sodium chloride 5% (BHUMIKA 128) 5 % ophthalmic solution 1 drop, Both Eyes, 3 times daily    tofacitinib (XELJANZ XR) 11 mg Tb24 1 tablet, Oral, Daily    XARELTO 20 mg, Oral, Daily       Social History[1]     Family History   Problem Relation Name Age of Onset    Heart failure Mother delta     Coronary artery disease Mother delta     Glaucoma Father      Alzheimer's disease Father      Emphysema Father      Autoimmune disease Sister      Pneumonia Sister Jenifer     Dementia Sister Jenifer         Patient Care Team:  Pau Gomes FNP as PCP - General (Family Medicine)  Taisha Wood LPN as Care Coordinator  Homar Barrios MD (Neurology)     Subjective:     Review of Systems     See HPI for details    Constitutional: Denies Change in appetite. Denies Chills. Denies Fever. Denies Night sweats.  Eye: Denies Blurred vision. Denies Discharge. Denies Eye pain.  ENT: Denies Decreased hearing. Denies Sore throat. Denies Swollen glands.  Respiratory: Denies Cough. Denies Shortness of breath. Denies Shortness of breath with exertion. Denies Wheezing.  Cardiovascular: DeniesChest pain at rest. Denies Chest pain with exertion. Denies Irregular heartbeat. Denies Palpitations. Denies Edema.  Gastrointestinal: Denies Abdominal pain. DeniesDiarrhea. Denies Nausea. Denies Vomiting. Denies Hematemesis or Hematochezia.  Genitourinary: Denies Dysuria. Denies Urinary  "frequency. Denies Urinary urgency. Denies Blood in urine.  Endocrine: Denies Cold intolerance. Denies Excessive thirst. Denies Heat intolerance. Denies Weight loss. Denies Weight gain.  Musculoskeletal: Denies Painful joints. Denies Weakness.  Integumentary: Denies Rash. Denies Itching. Denies Dry skin.  Neurologic: Denies Dizziness. Denies Fainting. Denies Headache.  Psychiatric: Denies Depression. Denies Anxiety. Denies Suicidal/Homicidal ideations.    All Other ROS: Negative except as stated in HPI.       Objective:     Visit Vitals  /73 (BP Location: Right arm, Patient Position: Sitting)   Pulse 72   Temp 98.1 °F (36.7 °C)   Resp 16   Ht 5' 11" (1.803 m)   Wt 102.3 kg (225 lb 9.6 oz)   BMI 31.46 kg/m²       Physical Exam    General: Alert and oriented, No acute distress.  Head: Normocephalic, Atraumatic.  Eye: Pupils are equal, round and reactive to light, Extraocular movements are intact, Sclera non-icteric.  Ears/Nose/Throat: Normal, Mucosa moist,Clear.  Neck/Thyroid: Supple, Non-tender, No carotid bruit, No lymphadenopathy, No JVD, Full range of motion.  Respiratory: Clear to auscultation bilaterally; No wheezes, rales or rhonchi,Non-labored respirations, Symmetrical chest wall expansion.  Cardiovascular: Regular rate and rhythm, S1/S2 normal, No murmurs, rubs or gallops.  Gastrointestinal: Soft, Non-tender, Non-distended, Normal bowel sounds, No palpable organomegaly.  Musculoskeletal: Normal range of motion.  Integumentary: Warm, Dry, Intact, No suspicious lesions or rashes.  Extremities: No clubbing, cyanosis or edema  Neurologic: No focal deficits, Cranial Nerves II-XII are grossly intact, Motor strength normal upper and lower extremities, Sensory exam intact.  Psychiatric: Normal interaction, Coherent speech, Euthymic mood, Appropriate affect       Labs Reviewed:     Chemistry:  Lab Results   Component Value Date     07/07/2025    K 4.3 07/07/2025    BUN 14.5 07/07/2025    CREATININE 0.99 " 07/07/2025    EGFRNORACEVR >60 07/07/2025    CALCIUM 9.1 07/07/2025    ALKPHOS 68 07/07/2025    ALBUMIN 3.8 07/07/2025    BILIDIR 0.2 04/09/2024    IBILI 0.20 04/09/2024    AST 16 07/07/2025    ALT 18 07/07/2025    MG 1.70 05/07/2025    PHOS 3.3 12/04/2020    ZZQLVYGC97SY 43.5 05/25/2021        Lab Results   Component Value Date    HGBA1C 6.6 03/27/2025        Hematology:  Lab Results   Component Value Date    WBC 4.54 07/07/2025    HGB 15.5 07/07/2025    HCT 45.1 07/07/2025     07/07/2025       Lipid Panel:  Lab Results   Component Value Date    CHOL 159 05/13/2025    HDL 45 05/13/2025    LDL 71.00 05/13/2025    TRIG 214 (H) 05/13/2025    TOTALCHOLEST 4 05/13/2025        Urine:  Lab Results   Component Value Date    APPEARANCEUA Clear 07/07/2025    SGUA 1.028 07/07/2025    PROTEINUA Trace (A) 07/07/2025    KETONESUA Negative 07/07/2025    LEUKOCYTESUR Negative 07/07/2025    RBCUA 0-5 07/07/2025    WBCUA 0-5 07/07/2025    BACTERIA None Seen 07/07/2025    SQEPUA None Seen 07/07/2025    HYALINECASTS None Seen 07/07/2025    CREATRANDUR 225.5 (H) 11/12/2024    PROTEINURINE 18.0 05/15/2023    UPROTCREA 0.1 05/15/2023        Assessment:       ICD-10-CM ICD-9-CM   1. Primary hypertension  I10 401.9   2. Mixed hyperlipidemia  E78.2 272.2   3. Controlled type 2 diabetes mellitus without complication, without long-term current use of insulin  E11.9 250.00   4. Gastroesophageal reflux disease, unspecified whether esophagitis present  K21.9 530.81   5. Well adult exam  Z00.00 V70.0   6. Family history of colonic polyps  Z83.719 V18.51   7. Gout, unspecified cause, unspecified chronicity, unspecified site  M10.9 274.9   8. Hematuria, unspecified type  R31.9 599.70   9. Chronic pain syndrome  G89.4 338.4        Plan:     1. Primary hypertension (Primary)   Low fat low salt diet and exercise. Cont Lisinopril HCTZ, Metoprolol as prescribed.     2. Mixed hyperlipidemia  Low fat diet and exercise. Cont Crestor as prescribed.      3. Controlled type 2 diabetes mellitus without complication, without long-term current use of insulin  A1c 6.6. ADA diet and exercise. Cont Oseni as prescribed. Urine microalbumin 11-10-23. DM foot 10-21-23. Dm eye     4. Gastroesophageal reflux disease, unspecified whether esophagitis present  Avoid triggers. Cont Pantoprazole as prescribed.     5. Well adult exam  Labs in 4 months. UTD PSA. Colonoscopy done 3-12-12 no polyps, internal rrhoids noted- suggest repeat in 5 years- due 3/2017. Last colonoscopy done with [facility].     6. Family history of colonic polyps  Colonoscopy done 3-12-12 no polyps, internal rrhoids noted- suggest repeat in 5 years- due 3/2017. Last colonoscopy done with [facility]. Offered Colonoscopy referral. Pt refused- states he will wait for the blood test that is suppose to get FDA approval.     7. Gout, unspecified cause, unspecified chronicity, unspecified site  Encouraged low purine low salt diet, drink plenty of water. Refilled Colchicine as prescribed prn gout attack.     8. Chronic pain syndrome  Pt states he was followed by pain management but is not longer seeing them and is requesting RX for Gabapentin for his nerve pain. RX Gabapentin 300 mg Take 1 cap po daily Day 1 then increase to 1 cap po BID Day 2 then increase to 1 cap po TID.          Follow up in about 4 months (around 11/14/2025) for with labs 1 week prior to appt. . In addition to their scheduled follow up, the patient has also been instructed to follow up on as needed basis.     Future Appointments   Date Time Provider Department Center   8/5/2025 10:00 AM Logan Randall MD [facility] [address]   9/11/2025  1:30 PM Quincy Conrad MD [facility] CARD [place]   9/15/2025 10:20 AM PROVIDERS, [facility] [place]   9/16/2025  2:00 PM RESIDENT 2, [facility] OTORHINOLARYNGOLOGY [facility] ENT [place]   9/23/2025 10:30 AM Ewa Lao FNP [facility] OPWND [place]   11/10/2025  1:00 PM  Barbara Tran, [facility] [place] PATTERSON Eliseo, CHARI             [1]   Social History  Socioeconomic History    Marital status:     Number of children: 1   Tobacco Use    Smoking status: Former     Current packs/day: 0.00     Average packs/day: 1 pack/day for 15.0 years (15.0 ttl pk-yrs)     Types: Cigarettes     Start date: 1972     Quit date: 1987     Years since quittin.5     Passive exposure: Past    Smokeless tobacco: Never   Substance and Sexual Activity    Alcohol use: Never     Comment: occasional    Drug use: Yes     Types: Marijuana     Comment: not using anymore    Sexual activity: Yes     Partners: Female     Social Drivers of Health     Financial Resource Strain: Low Risk  (2025)    Overall Financial Resource Strain (CARDIA)     Difficulty of Paying Living Expenses: Not hard at all   Food Insecurity: No Food Insecurity (2025)    Hunger Vital Sign     Worried About Running Out of Food in the Last Year: Never true     Ran Out of Food in the Last Year: Never true   Transportation Needs: No Transportation Needs (2025)    PRAPARE - Transportation     Lack of Transportation (Medical): No     Lack of Transportation (Non-Medical): No   Physical Activity: Unknown (2025)    Exercise Vital Sign     Days of Exercise per Week: 0 days   Stress: No Stress Concern Present (2025)    [employer] [employer] - Occupational Stress Questionnaire     Feeling of Stress : Not at all   Housing Stability: Low Risk  (2025)    Housing Stability Vital Sign     Unable to Pay for Housing in the Last Year: No     Homeless in the Last Year: No

## 2025-07-21 ENCOUNTER — TELEPHONE (OUTPATIENT)
Dept: ORTHOPEDICS | Facility: CLINIC | Age: 70
End: 2025-07-21
Payer: MEDICARE

## 2025-07-21 NOTE — TELEPHONE ENCOUNTER
Attempted to contact pt, no answer. LVM with reason for call and call back number.       Copied from CRM #[id number]. Topic: General Inquiry - Patient Advice  >> Jul 18, 2025  8:39 AM Little wrote:  Type:  Needs Medical Advice    Who Called: Melquiades  Symptoms (please be specific): low back pain   How long has patient had these symptoms:  chronic  Pharmacy name and phone #:  n/a  Would the patient rather a call back or a response via [facility]? call  Best Call Back Number: [phone]  Additional Information: Calling to speak with provider/nurse regarding plan of care and stem cell therapy/injection for condition. Please call and discuss with patient as soon as possible.

## 2025-07-21 NOTE — TELEPHONE ENCOUNTER
Pt informed that message sent to Sports department to see if stem cell therapy can be performed by doctors there.       Copied from CRM #[id number]. Topic: General Inquiry - Return Call  >> Jul 21, 2025  8:37 AM Neha wrote:  Pt returning call from Blanca to schedule appt, asking for call back  >> Jul 21, 2025  9:00 AM Med Assistant Blanca wrote:

## 2025-07-23 ENCOUNTER — TELEPHONE (OUTPATIENT)
Dept: OPHTHALMOLOGY | Facility: CLINIC | Age: 70
End: 2025-07-23
Payer: MEDICARE

## 2025-07-23 NOTE — TELEPHONE ENCOUNTER
C/O double vision when looking at a distance, images do not touch and corrects itself when he tilts his head to the left and looks to the right. Has been occurring for 2-3 days, comes and goes. Occurs when he is focusing on something for a while and goes away when he changes his vision. States CBG is about 125.     ----- Message from Samina sent at 7/23/2025 10:53 AM CDT -----  Regarding: Double  vision  Contact: Melquiades  Mr. Arnett called and would like to know what can he do about his double vision. I did ask if he checked his blood glucose numbers. Per Mr. Arnett in normal range.  Please give him a call whenever possible.         Thanks

## 2025-07-25 ENCOUNTER — TELEPHONE (OUTPATIENT)
Dept: RHEUMATOLOGY | Facility: CLINIC | Age: 70
End: 2025-07-25
Payer: MEDICARE

## 2025-07-25 DIAGNOSIS — D84.821 DRUG-INDUCED IMMUNODEFICIENCY: ICD-10-CM

## 2025-07-25 DIAGNOSIS — Z79.899 HIGH RISK MEDICATION USE: ICD-10-CM

## 2025-07-25 DIAGNOSIS — Z79.899 DRUG-INDUCED IMMUNODEFICIENCY: ICD-10-CM

## 2025-07-25 DIAGNOSIS — M05.79 SEROPOSITIVE RHEUMATOID ARTHRITIS OF MULTIPLE SITES: ICD-10-CM

## 2025-07-25 RX ORDER — TOFACITINIB 11 MG/1
1 TABLET, FILM COATED, EXTENDED RELEASE ORAL DAILY
Qty: 30 TABLET | Refills: 5 | Status: SHIPPED | OUTPATIENT
Start: 2025-07-25

## 2025-07-28 ENCOUNTER — TELEPHONE (OUTPATIENT)
Dept: OPHTHALMOLOGY | Facility: CLINIC | Age: 70
End: 2025-07-28
Payer: MEDICARE

## 2025-07-28 NOTE — TELEPHONE ENCOUNTER
Called patient who reports that he no longer has double vision. Counseled on usage of kaylynn drops as patient with history of fuchs and edematous cornea, with visual complaints that improve with kaylynn. Has only been using TID, counseld on QID usage per our last chart. Counseled that if double vision is consistent recommend presentation to ED. Patient denies being in any pain.

## 2025-07-29 RX ORDER — RIVAROXABAN 20 MG/1
20 TABLET, FILM COATED ORAL DAILY
Qty: 90 TABLET | Refills: 3 | Status: SHIPPED | OUTPATIENT
Start: 2025-07-29 | End: 2026-07-29

## 2025-08-07 ENCOUNTER — OFFICE VISIT (OUTPATIENT)
Dept: UROLOGY | Facility: CLINIC | Age: 70
End: 2025-08-07
Payer: MEDICARE

## 2025-08-07 VITALS
HEART RATE: 80 BPM | OXYGEN SATURATION: 96 % | BODY MASS INDEX: 31.48 KG/M2 | DIASTOLIC BLOOD PRESSURE: 90 MMHG | HEIGHT: 71 IN | SYSTOLIC BLOOD PRESSURE: 145 MMHG | WEIGHT: 224.88 LBS | RESPIRATION RATE: 18 BRPM

## 2025-08-07 DIAGNOSIS — R31.9 HEMATURIA, UNSPECIFIED TYPE: ICD-10-CM

## 2025-08-07 LAB
BILIRUB SERPL-MCNC: NORMAL MG/DL
BLOOD URINE, POC: NORMAL
COLOR, POC UA: YELLOW
ESTROGEN SERPL-MCNC: NORMAL PG/ML
GLUCOSE UR QL STRIP: NORMAL
INSULIN SERPL-ACNC: NORMAL U[IU]/ML
KETONES UR QL STRIP: NORMAL
LAB AP GROSS DESCRIPTION: NORMAL
LAB AP URINE CYTOLOGY INTERPRETATION SPECIMEN 1: NORMAL
LEUKOCYTE ESTERASE URINE, POC: NORMAL
NITRITE, POC UA: NORMAL
PH, POC UA: 5.5
PROTEIN, POC: NORMAL
SPECIFIC GRAVITY, POC UA: >=1.03
UROBILINOGEN, POC UA: 1

## 2025-08-07 PROCEDURE — 4010F ACE/ARB THERAPY RXD/TAKEN: CPT | Mod: CPTII,,, | Performed by: NURSE PRACTITIONER

## 2025-08-07 PROCEDURE — 88108 CYTOPATH CONCENTRATE TECH: CPT | Mod: TC | Performed by: NURSE PRACTITIONER

## 2025-08-07 PROCEDURE — 1160F RVW MEDS BY RX/DR IN RCRD: CPT | Mod: CPTII,,, | Performed by: NURSE PRACTITIONER

## 2025-08-07 PROCEDURE — 81001 URINALYSIS AUTO W/SCOPE: CPT | Mod: PBBFAC | Performed by: NURSE PRACTITIONER

## 2025-08-07 PROCEDURE — 3080F DIAST BP >= 90 MM HG: CPT | Mod: CPTII,,, | Performed by: NURSE PRACTITIONER

## 2025-08-07 PROCEDURE — 3008F BODY MASS INDEX DOCD: CPT | Mod: CPTII,,, | Performed by: NURSE PRACTITIONER

## 2025-08-07 PROCEDURE — 99215 OFFICE O/P EST HI 40 MIN: CPT | Mod: PBBFAC,25 | Performed by: NURSE PRACTITIONER

## 2025-08-07 PROCEDURE — 3044F HG A1C LEVEL LT 7.0%: CPT | Mod: CPTII,,, | Performed by: NURSE PRACTITIONER

## 2025-08-07 PROCEDURE — 1101F PT FALLS ASSESS-DOCD LE1/YR: CPT | Mod: CPTII,,, | Performed by: NURSE PRACTITIONER

## 2025-08-07 PROCEDURE — 3077F SYST BP >= 140 MM HG: CPT | Mod: CPTII,,, | Performed by: NURSE PRACTITIONER

## 2025-08-07 PROCEDURE — 1159F MED LIST DOCD IN RCRD: CPT | Mod: CPTII,,, | Performed by: NURSE PRACTITIONER

## 2025-08-07 PROCEDURE — 3288F FALL RISK ASSESSMENT DOCD: CPT | Mod: CPTII,,, | Performed by: NURSE PRACTITIONER

## 2025-08-07 PROCEDURE — 99213 OFFICE O/P EST LOW 20 MIN: CPT | Mod: S$PBB,,, | Performed by: NURSE PRACTITIONER

## 2025-08-07 NOTE — PROGRESS NOTES
Pt seen by JOLENE Pearce; Urine collected & sent to lab; Pt scheduled for 8 day virtual visit to go over results; Pt instructed to return to clinic in 6 months; Discharge paperwork given w/pt verbalizing understanding

## 2025-08-07 NOTE — PROGRESS NOTES
Chief Complaint:   Chief Complaint   Patient presents with    Hematuria       HPI:   Patient is a 69-year-old male here for six-month follow-up hematuria.    Patient states PCP noted a urinalysis with trace hematuria.    Patient seen by Dr. Morocho for a cystoscope on 03/25/2024 with moderate enlarged lobes and hypervascular area on the left anterior bladder wall, patient to repeat cystoscope in 3 months which is scheduled in June.    Current PSA 0.76 on 04/09/2024.  Mild decreased from previous PSA 0.87.    Today patient presents without any complaints of dysuria, urinary frequency, urinary hesitancy, urinary incontinence, urinary retention, gross hematuria, nocturia.    RTC six-month with urine cytology.  Instructed patient will notify him in 8 days of urine cytology results on audio virtual visit.      Allergies:  Review of patient's allergies indicates:   Allergen Reactions    Sarilumab Other (See Comments)     Other reaction(s): fainting  Kevzara       Medications:  Current Medications[1]    Review of Systems:  General: No fever, chills, fatigability, or weight loss.  Skin: No rashes, itching, or changes in color or texture of skin.  Chest: Denies BRAGG, cyanosis, wheezing, cough, and sputum production.  Abdomen: Appetite fine. No weight loss. Denies diarrhea, abdominal pain, hematemesis, or blood in stool.  Musculoskeletal: No joint stiffness or swelling. Denies back pain.  : As above.  All other review of systems negative.    PMH:  Past Medical History:   Diagnosis Date    Atrial fibrillation     Cataract 2017    Central stenosis of spinal canal     Chronic back pain greater than 3 months duration 10/14/2022    Constipation 09/30/2022    Degeneration of lumbar intervertebral disc 07/06/2022    Diastolic dysfunction 05/01/2023    Dysphonia 09/30/2022    Dystrophic nail 06/19/2023    Erectile dysfunction 09/30/2022    Fatty liver     GERD (gastroesophageal reflux disease) 09/30/2022    H/O cataract removal with  insertion of prosthetic lens     Hearing loss 40 years    History of CVA (cerebrovascular accident) 09/30/2022    TIA    History of radial keratotomy 01/01/1998    HTN (hypertension)     Hyperlipidemia 09/30/2022    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/30/2022    Microhematuria 03/30/2023    Overgrown toenails 06/19/2023    Parkinson's disease     Peyronie disease     Polyneuropathy associated with underlying disease 06/19/2023    Rheumatoid arthritis 1/10/1994    Rheumatoid arthritis, unspecified     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    Spinal stenosis of lumbar region 09/30/2022    Type 2 diabetes mellitus without complications     Unspecified macular degeneration     Unspecified macular degeneration        PSH:  Past Surgical History:   Procedure Laterality Date    BACK SURGERY Left 04/2024    CATARACT EXTRACTION W/  INTRAOCULAR LENS IMPLANT Bilateral     EPIDURAL STEROID INJECTION INTO LUMBAR SPINE      exc cyst Lt ear      EXTERNAL EAR SURGERY  2019    EYE SURGERY      INJECTION OF ANESTHETIC AGENT AROUND MEDIAL BRANCH NERVES INNERVATING LUMBAR FACET JOINT Bilateral 11/16/2022    Procedure: Block-nerve-medial branch-lumbar;  Surgeon: Gertrude Blanco MD;  Location: [facility] OR;  Service: Pain Management;  Laterality: Bilateral;  Bilateral L4-L5...Patient is requesting to be first case    INJECTION OF ANESTHETIC AGENT AROUND MEDIAL BRANCH NERVES INNERVATING LUMBAR FACET JOINT Bilateral 11/22/2023    Procedure: Block-nerve-medial branch-lumbar;  Surgeon: Gertrude Blanco MD;  Location: [facility] OR;  Service: Pain Management;  Laterality: Bilateral;  Bilateral MBB L3-5    INJECTION OF ANESTHETIC AGENT AROUND MEDIAL BRANCH NERVES INNERVATING LUMBAR FACET JOINT Bilateral 01/24/2024    Procedure: Block-nerve-medial branch-lumbar;  Surgeon: Gertrude Blanco MD;  Location: [facility] OR;  Service: Pain Management;  Laterality: Bilateral;  Bilateral MBB L3-5    KNEE ARTHROSCOPY Right     RADIOFREQUENCY ABLATION Left 03/27/2024    Procedure:  Radiofrequency Ablation;  Surgeon: Gertrude Blanco MD;  Location: [facility] OR;  Service: Pain Management;  Laterality: Left;  RFA Lt L3-L5    RADIOFREQUENCY ABLATION Right 04/25/2024    Procedure: RADIOFREQUENCY ABLATION / Right L3 L5;  Surgeon: Gertrude Blanco MD;  Location: [facility] OR;  Service: Pain Management;  Laterality: Right;  Right RFA L3-L5    VASECTOMY  1998       FamHx:  Family History   Problem Relation Name Age of Onset    Heart failure Mother delta     Coronary artery disease Mother delta     Glaucoma Father      Alzheimer's disease Father      Emphysema Father      Autoimmune disease Sister      Pneumonia Sister Jenifer     Dementia Sister Jenifer        SocHx:  Social History[2]    Physical Exam:  Vitals:    08/07/25 1239   BP: (!) 145/90   Pulse:    Resp:      General: A&Ox3, no apparent distress, no deformities  Neck: No masses, normal thyroid  Lungs: CTA estelita, no use of accessory muscles  Heart: RRR, no arrhythmias  Abdomen: Soft, NT, ND, no masses, no hernias, no hepatosplenomegaly  Lymphatic: Neck and groin nodes negative  Skin: The skin is warm and dry. No jaundice.  Ext: No c/c/e.    GUMale:  Deferred FATIMAH this time.    Labs:    Latest Reference Range & Units 09/06/18 09:26 09/18/19 09:39 07/22/20 07:04 09/25/20 08:32 05/25/21 08:33 09/26/22 06:38 11/10/23 13:56 04/09/24 06:51 03/27/25 08:29   Prostate Specific Antigen <=4.00 ng/mL 0.87 1.06 1.1 1.3 2.26 0.91 0.87 0.76 1.49     Urinalysis:  Component  Ref Range & Units (hover) 13:04   Color, UA Yellow   Spec Grav UA >=1.030   pH, UA 5.5   WBC, UA neg   Nitrite, UA neg   Protein, POC neg   Glucose, UA neg   Ketones, UA neg   Urobilinogen, UA 1.0   Bilirubin, POC neg   Blood, UA trace-intact      Microscopic urinalysis revealed RBCs trace intact, nitrites negative, WBCs negative.       Specimen Collected: 08/07/25 13:04 CDT Last Resulted: 08/07/25 13:04 CDT           Impression:  1. Hematuria, unspecified type  - Ambulatory referral/consult to  Urology  - POCT URINE DIPSTICK WITH MICROSCOPE, AUTOMATED      Plan:  Instructed patient to schedule an 8 day audio virtual visit to discuss urine cytology results.    RTC six-month with urine cytology.  Instructed patient if develops any abnormal urologic symptoms notify clinic to be re-evaluate treated or during after hours go to emergency room versus urgent here.                           [facility]         [1]   Current Outpatient Medications   Medication Sig Dispense Refill    ACCU-CHEK GUIDE GLUCOSE METER Misc       alogliptin-pioglitazone 25-30 mg Tab TAKE 1 TABLET BY MOUTH ONCE  DAILY 30 tablet 11    aspirin (ECOTRIN) 81 MG EC tablet Take 81 mg by mouth once daily.      blood sugar diagnostic (ACCU-CHEK GUIDE TEST STRIPS) Strp 1 strip by skin prick route Daily. 100 strip 11    blood-glucose meter kit To check BG two times daily, to use with insurance preferred meter 1 each 0    carbidopa-levodopa  mg (SINEMET)  mg per tablet Take 2 tablets by mouth 2 (two) times a day.      gabapentin (NEURONTIN) 300 MG capsule Take 1 capsule (300 mg total) by mouth 3 (three) times daily. Take 1 cap po daily Day 1 then increase to 1 cap po BID Day 2 then increase to 1 cap po TID. 270 capsule 1    lancets (ACCU-CHEK SOFTCLIX LANCETS) Misc 1 each by Misc.(Non-Drug; Combo Route) route Daily. 100 each 11    lisinopriL 10 MG tablet Take 10 mg by mouth once daily.      metoprolol succinate (TOPROL-XL) 25 MG 24 hr tablet Take 1 tablet (25 mg total) by mouth once daily. 90 tablet 3    predniSONE (DELTASONE) 5 MG tablet Take 2 tab po qd x 3 days then take 1 tab po qd x 2 days prn RA flare. 30 tablet 0    tofacitinib (XELJANZ XR) 11 mg Tb24 Take 1 tablet by mouth once daily. 30 tablet 5    XARELTO 20 mg Tab Take 1 tablet (20 mg total) by mouth once daily. 90 tablet 3    alcohol swabs PadM Apply 1 each topically once daily. (Patient not taking: Reported on 8/7/2025) 100 each 11    ammonium lactate 12 % Crea Apply 1 application   topically 2 (two) times daily. (Patient not taking: Reported on 2025)      sodium chloride 5% (BHUMIKA 128) 5 % ophthalmic solution Place 1 drop into both eyes 3 (three) times daily. (Patient not taking: Reported on 2025) 30 mL 2     Current Facility-Administered Medications   Medication Dose Route Frequency Provider Last Rate Last Admin    influenza (adjuvanted) (Fluad) 45 mcg/0.5 mL IM vaccine (> or = 64 yo) 0.5 mL  0.5 mL Intramuscular 1 time in Clinic/HOD        [2]   Social History  Socioeconomic History    Marital status:     Number of children: 1   Tobacco Use    Smoking status: Former     Current packs/day: 0.00     Average packs/day: 1 pack/day for 15.0 years (15.0 ttl pk-yrs)     Types: Cigarettes     Start date: 1972     Quit date: 1987     Years since quittin.6     Passive exposure: Past    Smokeless tobacco: Never   Substance and Sexual Activity    Alcohol use: Never     Comment: occasional    Drug use: Yes     Types: Marijuana     Comment: not using anymore    Sexual activity: Yes     Partners: Female     Social Drivers of Health     Financial Resource Strain: Low Risk  (2025)    Overall Financial Resource Strain (CARDIA)     Difficulty of Paying Living Expenses: Not hard at all   Food Insecurity: No Food Insecurity (2025)    Hunger Vital Sign     Worried About Running Out of Food in the Last Year: Never true     Ran Out of Food in the Last Year: Never true   Transportation Needs: No Transportation Needs (2025)    PRAPARE - Transportation     Lack of Transportation (Medical): No     Lack of Transportation (Non-Medical): No   Physical Activity: Unknown (2025)    Exercise Vital Sign     Days of Exercise per Week: 0 days   Stress: No Stress Concern Present (2025)    [employer] - Occupational Stress Questionnaire     Feeling of Stress : Not at all   Housing Stability: Low Risk  (2025)    Housing Stability Vital Sign      Unable to Pay for Housing in the Last Year: No     Homeless in the Last Year: No

## 2025-08-18 ENCOUNTER — OFFICE VISIT (OUTPATIENT)
Dept: UROLOGY | Facility: CLINIC | Age: 70
End: 2025-08-18
Payer: MEDICARE

## 2025-08-18 DIAGNOSIS — R31.9 HEMATURIA, UNSPECIFIED TYPE: Primary | ICD-10-CM

## 2025-08-19 ENCOUNTER — DOCUMENT SCAN (OUTPATIENT)
Dept: HOME HEALTH SERVICES | Facility: HOSPITAL | Age: 70
End: 2025-08-19
Payer: MEDICARE

## 2025-08-20 ENCOUNTER — EXTERNAL HOME HEALTH (OUTPATIENT)
Dept: HOME HEALTH SERVICES | Facility: HOSPITAL | Age: 70
End: 2025-08-20
Payer: MEDICARE

## 2025-08-20 ENCOUNTER — DOCUMENT SCAN (OUTPATIENT)
Dept: HOME HEALTH SERVICES | Facility: HOSPITAL | Age: 70
End: 2025-08-20
Payer: MEDICARE

## 2025-08-27 ENCOUNTER — TELEPHONE (OUTPATIENT)
Facility: CLINIC | Age: 70
End: 2025-08-27
Payer: MEDICARE

## 2025-08-30 ENCOUNTER — HOSPITAL ENCOUNTER (EMERGENCY)
Facility: HOSPITAL | Age: 70
Discharge: HOME | End: 2025-08-30
Attending: EMERGENCY MEDICINE
Payer: MEDICARE

## 2025-08-30 VITALS
SYSTOLIC BLOOD PRESSURE: 179 MMHG | RESPIRATION RATE: 18 BRPM | DIASTOLIC BLOOD PRESSURE: 102 MMHG | OXYGEN SATURATION: 95 % | WEIGHT: 210 LBS | TEMPERATURE: 98 F | HEART RATE: 95 BPM | HEIGHT: 71 IN | BODY MASS INDEX: 29.4 KG/M2

## 2025-08-30 DIAGNOSIS — M54.32 SCIATICA OF LEFT SIDE: Primary | ICD-10-CM

## 2025-08-30 DIAGNOSIS — R52 PAIN: ICD-10-CM

## 2025-08-30 PROCEDURE — 63600175 PHARM REV CODE 636 W HCPCS: Performed by: EMERGENCY MEDICINE

## 2025-08-30 PROCEDURE — 96372 THER/PROPH/DIAG INJ SC/IM: CPT | Performed by: EMERGENCY MEDICINE

## 2025-08-30 PROCEDURE — 25000003 PHARM REV CODE 250: Performed by: EMERGENCY MEDICINE

## 2025-08-30 PROCEDURE — 99284 EMERGENCY DEPT VISIT MOD MDM: CPT | Mod: 25

## 2025-08-30 RX ORDER — HYDROCODONE BITARTRATE AND ACETAMINOPHEN 5; 325 MG/1; MG/1
1 TABLET ORAL EVERY 6 HOURS PRN
Qty: 18 TABLET | Refills: 0 | Status: SHIPPED | OUTPATIENT
Start: 2025-08-30

## 2025-08-30 RX ORDER — HYDROMORPHONE HYDROCHLORIDE 2 MG/ML
1 INJECTION, SOLUTION INTRAMUSCULAR; INTRAVENOUS; SUBCUTANEOUS
Status: COMPLETED | OUTPATIENT
Start: 2025-08-30 | End: 2025-08-30

## 2025-08-30 RX ORDER — KETOROLAC TROMETHAMINE 30 MG/ML
60 INJECTION, SOLUTION INTRAMUSCULAR; INTRAVENOUS
Status: COMPLETED | OUTPATIENT
Start: 2025-08-30 | End: 2025-08-30

## 2025-08-30 RX ORDER — BETAMETHASONE SODIUM PHOSPHATE AND BETAMETHASONE ACETATE 3; 3 MG/ML; MG/ML
6 INJECTION, SUSPENSION INTRA-ARTICULAR; INTRALESIONAL; INTRAMUSCULAR; SOFT TISSUE
Status: COMPLETED | OUTPATIENT
Start: 2025-08-30 | End: 2025-08-30

## 2025-08-30 RX ORDER — KETOROLAC TROMETHAMINE 10 MG/1
10 TABLET, FILM COATED ORAL EVERY 6 HOURS
Qty: 20 TABLET | Refills: 0 | Status: SHIPPED | OUTPATIENT
Start: 2025-08-30 | End: 2025-09-04

## 2025-08-30 RX ORDER — ONDANSETRON 4 MG/1
4 TABLET, ORALLY DISINTEGRATING ORAL
Status: COMPLETED | OUTPATIENT
Start: 2025-08-30 | End: 2025-08-30

## 2025-08-30 RX ORDER — BACLOFEN 10 MG/1
10 TABLET ORAL 3 TIMES DAILY
Qty: 15 TABLET | Refills: 0 | Status: SHIPPED | OUTPATIENT
Start: 2025-08-30

## 2025-08-30 RX ADMIN — KETOROLAC TROMETHAMINE 60 MG: 60 INJECTION, SOLUTION INTRAMUSCULAR at 02:08

## 2025-08-30 RX ADMIN — ONDANSETRON 4 MG: 4 TABLET, ORALLY DISINTEGRATING ORAL at 02:08

## 2025-08-30 RX ADMIN — HYDROMORPHONE HYDROCHLORIDE 1 MG: 2 INJECTION INTRAMUSCULAR; INTRAVENOUS; SUBCUTANEOUS at 02:08

## 2025-08-30 RX ADMIN — BETAMETHASONE SODIUM PHOSPHATE AND BETAMETHASONE ACETATE 6 MG: 3; 3 INJECTION, SUSPENSION INTRA-ARTICULAR; INTRALESIONAL; INTRAMUSCULAR at 02:08

## 2025-09-03 DIAGNOSIS — M05.79 SEROPOSITIVE RHEUMATOID ARTHRITIS OF MULTIPLE SITES: ICD-10-CM

## 2025-09-03 RX ORDER — PREDNISONE 5 MG/1
TABLET ORAL
Qty: 30 TABLET | Refills: 0 | Status: SHIPPED | OUTPATIENT
Start: 2025-09-03

## (undated) DEVICE — DRAPE MEDIUM SHEET 40X70IN

## (undated) DEVICE — DRAPE UTILITY W/ TAPE 20X30IN

## (undated) DEVICE — SET SMARTSITE EXT SMALLBORE NF

## (undated) DEVICE — KIT SURGICAL TURNOVER

## (undated) DEVICE — GLOVE PROTEXIS PI CRM 6.5

## (undated) DEVICE — Device

## (undated) DEVICE — PAD ELECTROSURGICAL PAT PLATE

## (undated) DEVICE — NDL SYR 10ML 18X1.5 LL BLUNT

## (undated) DEVICE — DRAPE FULL SHEET 70X100IN

## (undated) DEVICE — SYR 10CC LUER LOCK

## (undated) DEVICE — SYR DISP LL 5CC

## (undated) DEVICE — APPLICATOR CHLORAPREP ORN 26ML

## (undated) DEVICE — NDL SAFETY 25G X 1.5 ECLIPSE

## (undated) DEVICE — BANDAGE ADHESIVE

## (undated) DEVICE — NDL FLTR 5MCRN BLNT TIP 18GX1

## (undated) DEVICE — BANDAGE SHEER STRIP 3/4X3IN

## (undated) DEVICE — TOWEL OR DISP STRL BLUE 4/PK

## (undated) DEVICE — NDL QUINCKE S/SU 22GA 7IN

## (undated) DEVICE — COVER PROBE BLK PK NS 3.5X20CM

## (undated) DEVICE — SYR 3CC LUER LOC

## (undated) DEVICE — NDL SPINAL 22GA 3.5 IN QUINCKE

## (undated) DEVICE — COVER TABLE HVY DTY 60X90IN

## (undated) DEVICE — POSITIONER HEAD ADULT

## (undated) DEVICE — NDL QUINCKE S/SU 22GA 5IN

## (undated) DEVICE — ADAPTER DUAL NSL LUER M-M 7FT

## (undated) DEVICE — GLOVE SIGNATURE MICRO LTX 6.5

## (undated) DEVICE — SYR W NDL BLN FILL 5ML 18GX1.5

## (undated) DEVICE — NDL HYPO REG 25G X 1 1/2

## (undated) DEVICE — CANNULA VENOM 18G 150MM